# Patient Record
Sex: MALE | Race: ASIAN | NOT HISPANIC OR LATINO
[De-identification: names, ages, dates, MRNs, and addresses within clinical notes are randomized per-mention and may not be internally consistent; named-entity substitution may affect disease eponyms.]

---

## 2017-01-05 ENCOUNTER — APPOINTMENT (OUTPATIENT)
Dept: OPHTHALMOLOGY | Facility: CLINIC | Age: 77
End: 2017-01-05

## 2017-01-10 ENCOUNTER — FORM ENCOUNTER (OUTPATIENT)
Age: 77
End: 2017-01-10

## 2017-01-11 ENCOUNTER — APPOINTMENT (OUTPATIENT)
Dept: OTOLARYNGOLOGY | Facility: CLINIC | Age: 77
End: 2017-01-11

## 2017-01-11 ENCOUNTER — APPOINTMENT (OUTPATIENT)
Dept: RADIOLOGY | Facility: CLINIC | Age: 77
End: 2017-01-11

## 2017-01-11 ENCOUNTER — APPOINTMENT (OUTPATIENT)
Dept: ORTHOPEDIC SURGERY | Facility: CLINIC | Age: 77
End: 2017-01-11

## 2017-01-11 ENCOUNTER — OUTPATIENT (OUTPATIENT)
Dept: OUTPATIENT SERVICES | Facility: HOSPITAL | Age: 77
LOS: 1 days | End: 2017-01-11
Payer: MEDICARE

## 2017-01-11 VITALS — HEIGHT: 70 IN | BODY MASS INDEX: 27.92 KG/M2 | WEIGHT: 195 LBS

## 2017-01-11 VITALS
SYSTOLIC BLOOD PRESSURE: 160 MMHG | HEIGHT: 70 IN | WEIGHT: 195 LBS | BODY MASS INDEX: 27.92 KG/M2 | DIASTOLIC BLOOD PRESSURE: 90 MMHG

## 2017-01-11 DIAGNOSIS — M75.101 UNSPECIFIED ROTATOR CUFF TEAR OR RUPTURE OF RIGHT SHOULDER, NOT SPECIFIED AS TRAUMATIC: Chronic | ICD-10-CM

## 2017-01-11 DIAGNOSIS — M19.011 PRIMARY OSTEOARTHRITIS, RIGHT SHOULDER: ICD-10-CM

## 2017-01-11 DIAGNOSIS — H26.9 UNSPECIFIED CATARACT: Chronic | ICD-10-CM

## 2017-01-11 DIAGNOSIS — Q76.1 KLIPPEL-FEIL SYNDROME: Chronic | ICD-10-CM

## 2017-01-11 DIAGNOSIS — Z98.89 OTHER SPECIFIED POSTPROCEDURAL STATES: Chronic | ICD-10-CM

## 2017-01-11 DIAGNOSIS — R05 COUGH: ICD-10-CM

## 2017-01-11 DIAGNOSIS — R49.9 UNSPECIFIED VOICE AND RESONANCE DISORDER: ICD-10-CM

## 2017-01-11 DIAGNOSIS — M65.30 TRIGGER FINGER, UNSPECIFIED FINGER: Chronic | ICD-10-CM

## 2017-01-11 PROCEDURE — 71046 X-RAY EXAM CHEST 2 VIEWS: CPT

## 2017-02-21 ENCOUNTER — RX RENEWAL (OUTPATIENT)
Age: 77
End: 2017-02-21

## 2017-02-28 ENCOUNTER — APPOINTMENT (OUTPATIENT)
Dept: ORTHOPEDIC SURGERY | Facility: CLINIC | Age: 77
End: 2017-02-28

## 2017-04-06 ENCOUNTER — APPOINTMENT (OUTPATIENT)
Dept: ORTHOPEDIC SURGERY | Facility: CLINIC | Age: 77
End: 2017-04-06

## 2017-05-08 ENCOUNTER — RX RENEWAL (OUTPATIENT)
Age: 77
End: 2017-05-08

## 2017-06-05 ENCOUNTER — APPOINTMENT (OUTPATIENT)
Dept: OPHTHALMOLOGY | Facility: CLINIC | Age: 77
End: 2017-06-05

## 2017-06-08 ENCOUNTER — APPOINTMENT (OUTPATIENT)
Dept: OPHTHALMOLOGY | Facility: CLINIC | Age: 77
End: 2017-06-08

## 2017-06-15 ENCOUNTER — APPOINTMENT (OUTPATIENT)
Dept: NEPHROLOGY | Facility: CLINIC | Age: 77
End: 2017-06-15

## 2017-06-15 VITALS
HEIGHT: 70 IN | OXYGEN SATURATION: 95 % | WEIGHT: 195 LBS | SYSTOLIC BLOOD PRESSURE: 144 MMHG | BODY MASS INDEX: 27.92 KG/M2 | DIASTOLIC BLOOD PRESSURE: 70 MMHG | HEART RATE: 70 BPM

## 2017-06-29 LAB
ALBUMIN SERPL ELPH-MCNC: 4.3 G/DL
ANION GAP SERPL CALC-SCNC: 20 MMOL/L
APPEARANCE: CLEAR
BACTERIA: NEGATIVE
BASOPHILS # BLD AUTO: 0.02 K/UL
BASOPHILS NFR BLD AUTO: 0.4 %
BILIRUBIN URINE: NEGATIVE
BLOOD URINE: NEGATIVE
BUN SERPL-MCNC: 30 MG/DL
CALCIUM SERPL-MCNC: 10.6 MG/DL
CHLORIDE SERPL-SCNC: 103 MMOL/L
CO2 SERPL-SCNC: 19 MMOL/L
COLOR: YELLOW
CREAT SERPL-MCNC: 1.99 MG/DL
EOSINOPHIL # BLD AUTO: 0.26 K/UL
EOSINOPHIL NFR BLD AUTO: 5.2 %
GLUCOSE QUALITATIVE U: NORMAL MG/DL
GLUCOSE SERPL-MCNC: 157 MG/DL
HCT VFR BLD CALC: 38.6 %
HGB BLD-MCNC: 13 G/DL
HYALINE CASTS: 2 /LPF
IMM GRANULOCYTES NFR BLD AUTO: 0.2 %
KETONES URINE: NEGATIVE
LEUKOCYTE ESTERASE URINE: NEGATIVE
LYMPHOCYTES # BLD AUTO: 0.92 K/UL
LYMPHOCYTES NFR BLD AUTO: 18.5 %
MAN DIFF?: NORMAL
MCHC RBC-ENTMCNC: 31.4 PG
MCHC RBC-ENTMCNC: 33.7 GM/DL
MCV RBC AUTO: 93.2 FL
MICROSCOPIC-UA: NORMAL
MONOCYTES # BLD AUTO: 0.54 K/UL
MONOCYTES NFR BLD AUTO: 10.8 %
NEUTROPHILS # BLD AUTO: 3.23 K/UL
NEUTROPHILS NFR BLD AUTO: 64.9 %
NITRITE URINE: NEGATIVE
PH URINE: 5
PHOSPHATE SERPL-MCNC: 3 MG/DL
PLATELET # BLD AUTO: 190 K/UL
POTASSIUM SERPL-SCNC: 4.3 MMOL/L
PROTEIN URINE: ABNORMAL MG/DL
RBC # BLD: 4.14 M/UL
RBC # FLD: 13.7 %
RED BLOOD CELLS URINE: 0 /HPF
SODIUM SERPL-SCNC: 142 MMOL/L
SPECIFIC GRAVITY URINE: 1.01
SQUAMOUS EPITHELIAL CELLS: 0 /HPF
UROBILINOGEN URINE: NORMAL MG/DL
WBC # FLD AUTO: 4.98 K/UL
WHITE BLOOD CELLS URINE: 0 /HPF

## 2017-08-07 ENCOUNTER — RX RENEWAL (OUTPATIENT)
Age: 77
End: 2017-08-07

## 2017-09-11 ENCOUNTER — APPOINTMENT (OUTPATIENT)
Dept: ORTHOPEDIC SURGERY | Facility: CLINIC | Age: 77
End: 2017-09-11
Payer: MEDICARE

## 2017-09-11 PROCEDURE — 99214 OFFICE O/P EST MOD 30 MIN: CPT

## 2017-09-15 ENCOUNTER — OUTPATIENT (OUTPATIENT)
Dept: OUTPATIENT SERVICES | Facility: HOSPITAL | Age: 77
LOS: 1 days | End: 2017-09-15
Payer: MEDICARE

## 2017-09-15 VITALS
TEMPERATURE: 98 F | HEIGHT: 70 IN | WEIGHT: 184.97 LBS | OXYGEN SATURATION: 98 % | RESPIRATION RATE: 16 BRPM | DIASTOLIC BLOOD PRESSURE: 76 MMHG | SYSTOLIC BLOOD PRESSURE: 136 MMHG | HEART RATE: 78 BPM

## 2017-09-15 DIAGNOSIS — M67.432 GANGLION, LEFT WRIST: ICD-10-CM

## 2017-09-15 DIAGNOSIS — M67.439 GANGLION, UNSPECIFIED WRIST: ICD-10-CM

## 2017-09-15 DIAGNOSIS — Q76.1 KLIPPEL-FEIL SYNDROME: Chronic | ICD-10-CM

## 2017-09-15 DIAGNOSIS — E11.9 TYPE 2 DIABETES MELLITUS WITHOUT COMPLICATIONS: ICD-10-CM

## 2017-09-15 DIAGNOSIS — H26.9 UNSPECIFIED CATARACT: Chronic | ICD-10-CM

## 2017-09-15 DIAGNOSIS — I25.10 ATHEROSCLEROTIC HEART DISEASE OF NATIVE CORONARY ARTERY WITHOUT ANGINA PECTORIS: ICD-10-CM

## 2017-09-15 DIAGNOSIS — Z98.89 OTHER SPECIFIED POSTPROCEDURAL STATES: Chronic | ICD-10-CM

## 2017-09-15 DIAGNOSIS — Z01.818 ENCOUNTER FOR OTHER PREPROCEDURAL EXAMINATION: ICD-10-CM

## 2017-09-15 DIAGNOSIS — M65.30 TRIGGER FINGER, UNSPECIFIED FINGER: Chronic | ICD-10-CM

## 2017-09-15 DIAGNOSIS — M75.101 UNSPECIFIED ROTATOR CUFF TEAR OR RUPTURE OF RIGHT SHOULDER, NOT SPECIFIED AS TRAUMATIC: Chronic | ICD-10-CM

## 2017-09-15 LAB
ANION GAP SERPL CALC-SCNC: 15 MMOL/L — SIGNIFICANT CHANGE UP (ref 5–17)
BUN SERPL-MCNC: 24 MG/DL — HIGH (ref 7–23)
CALCIUM SERPL-MCNC: 10.4 MG/DL — SIGNIFICANT CHANGE UP (ref 8.4–10.5)
CHLORIDE SERPL-SCNC: 102 MMOL/L — SIGNIFICANT CHANGE UP (ref 96–108)
CO2 SERPL-SCNC: 22 MMOL/L — SIGNIFICANT CHANGE UP (ref 22–31)
CREAT SERPL-MCNC: 1.48 MG/DL — HIGH (ref 0.5–1.3)
GLUCOSE SERPL-MCNC: 156 MG/DL — HIGH (ref 70–99)
HBA1C BLD-MCNC: 7.2 % — HIGH (ref 4–5.6)
HCT VFR BLD CALC: 38.8 % — LOW (ref 39–50)
HGB BLD-MCNC: 13.2 G/DL — SIGNIFICANT CHANGE UP (ref 13–17)
MCHC RBC-ENTMCNC: 31.8 PG — SIGNIFICANT CHANGE UP (ref 27–34)
MCHC RBC-ENTMCNC: 34 GM/DL — SIGNIFICANT CHANGE UP (ref 32–36)
MCV RBC AUTO: 93.5 FL — SIGNIFICANT CHANGE UP (ref 80–100)
PLATELET # BLD AUTO: 192 K/UL — SIGNIFICANT CHANGE UP (ref 150–400)
POTASSIUM SERPL-MCNC: 4 MMOL/L — SIGNIFICANT CHANGE UP (ref 3.5–5.3)
POTASSIUM SERPL-SCNC: 4 MMOL/L — SIGNIFICANT CHANGE UP (ref 3.5–5.3)
RBC # BLD: 4.15 M/UL — LOW (ref 4.2–5.8)
RBC # FLD: 13.5 % — SIGNIFICANT CHANGE UP (ref 10.3–14.5)
SODIUM SERPL-SCNC: 139 MMOL/L — SIGNIFICANT CHANGE UP (ref 135–145)
WBC # BLD: 4.73 K/UL — SIGNIFICANT CHANGE UP (ref 3.8–10.5)
WBC # FLD AUTO: 4.73 K/UL — SIGNIFICANT CHANGE UP (ref 3.8–10.5)

## 2017-09-15 PROCEDURE — 83036 HEMOGLOBIN GLYCOSYLATED A1C: CPT

## 2017-09-15 PROCEDURE — 85027 COMPLETE CBC AUTOMATED: CPT

## 2017-09-15 PROCEDURE — G0463: CPT

## 2017-09-15 PROCEDURE — 80048 BASIC METABOLIC PNL TOTAL CA: CPT

## 2017-09-15 RX ORDER — LIDOCAINE HCL 20 MG/ML
0.2 VIAL (ML) INJECTION ONCE
Qty: 0 | Refills: 0 | Status: DISCONTINUED | OUTPATIENT
Start: 2017-09-18 | End: 2017-10-03

## 2017-09-15 RX ORDER — SODIUM CHLORIDE 9 MG/ML
3 INJECTION INTRAMUSCULAR; INTRAVENOUS; SUBCUTANEOUS EVERY 8 HOURS
Qty: 0 | Refills: 0 | Status: DISCONTINUED | OUTPATIENT
Start: 2017-09-18 | End: 2017-10-03

## 2017-09-15 NOTE — H&P PST ADULT - PSH
Bilateral cataracts  sp extraction  Bilateral rotator cuff syndrome    Cervical fusion syndrome    History of Arthroscopy  bilateral shoulder rotator cuff repair  History of lumbar laminectomy    Laminectomy with Spinal Fusion  cervical- 2009  lumbar lamenectomy  1998  Trigger finger of both hands  sp repair Bilateral cataracts  sp extraction  Bilateral rotator cuff syndrome  operated both'  Cervical fusion syndrome    History of Arthroscopy  bilateral shoulder rotator cuff repair  History of lumbar laminectomy    Laminectomy with Spinal Fusion  cervical- 2009  lumbar lamenectomy  1998  Trigger finger of both hands  sp repair

## 2017-09-15 NOTE — H&P PST ADULT - MUSCULOSKELETAL
details… detailed exam no joint erythema/no joint warmth/no calf tenderness/ROM intact/no joint swelling/normal strength

## 2017-09-15 NOTE — H&P PST ADULT - LAST CARDIAC ANGIOGRAM/IMAGING
2016-[facility] & 4 stents placed" 03/2016-[facility] & 4 stents placed"

## 2017-09-15 NOTE — H&P PST ADULT - HISTORY OF PRESENT ILLNESS
77 year old male retired  of [employer] reporta having ganglionic cysy in left hand 77 year old male retired  of [employer] report having ganglionic cyst in left wrist, scheduled for removal on 09/18/17.

## 2017-09-15 NOTE — H&P PST ADULT - MUSCULOSKELETAL COMMENTS
left  hand ganglionic cyst left  hand ganglionic cyst, h/o cervical fusion surgery left wrist ganglion

## 2017-09-15 NOTE — H&P PST ADULT - PROBLEM SELECTOR PLAN 3
Will follow up with labs/ fingerstick.   HgA1c ordered   Instructed to hold metformin & Glimepiride the morning of surgery  STAT FS  on the day of surgery ordered

## 2017-09-15 NOTE — H&P PST ADULT - PROBLEM SELECTOR PLAN 2
CALLED [facility] OFFICE(Ms Kaplan )TO INFORM/VERIFY DOS-MONDAY & PATIENT ON ASPRIN & PLAVIX FOR CAD with 4 coron.art stents 03/2016(LAST DOSE TODAY) & NO PRE OP MEDICAL EVAL OR NO APPOINTMENT>" no medical eval needed for this surgery & to continue both Asprin & Plavix to OR"

## 2017-09-15 NOTE — H&P PST ADULT - PMH
BPH (Benign Prostatic Hypertrophy)    Diabetes    Diabetes Mellitus Type II    H/O: Rotator Cuff Tear    Hyperlipemia    Hyperlipidemia    Hypertension    Hypertension    Pneumonia    Renal Calculi BPH (Benign Prostatic Hypertrophy)    CAD (coronary artery disease)  stents x4( 03/2016)  last echo stress test 05/2016  Diabetes  2  Diabetes Mellitus Type II    Ganglion cyst of wrist, left    H/O: Rotator Cuff Tear    Hyperlipemia    Hyperlipidemia    Hypertension    Hypertension    Pneumonia    Renal Calculi  h/olithotripsy 2010

## 2017-09-17 RX ORDER — ONDANSETRON 8 MG/1
4 TABLET, FILM COATED ORAL ONCE
Qty: 0 | Refills: 0 | Status: DISCONTINUED | OUTPATIENT
Start: 2017-09-18 | End: 2017-10-03

## 2017-09-17 RX ORDER — SODIUM CHLORIDE 9 MG/ML
1000 INJECTION, SOLUTION INTRAVENOUS
Qty: 0 | Refills: 0 | Status: DISCONTINUED | OUTPATIENT
Start: 2017-09-18 | End: 2017-10-03

## 2017-09-18 ENCOUNTER — APPOINTMENT (OUTPATIENT)
Dept: ORTHOPEDIC SURGERY | Facility: HOSPITAL | Age: 77
End: 2017-09-18

## 2017-09-18 ENCOUNTER — OUTPATIENT (OUTPATIENT)
Dept: OUTPATIENT SERVICES | Facility: HOSPITAL | Age: 77
LOS: 1 days | End: 2017-09-18
Payer: MEDICARE

## 2017-09-18 ENCOUNTER — RESULT REVIEW (OUTPATIENT)
Age: 77
End: 2017-09-18

## 2017-09-18 ENCOUNTER — TRANSCRIPTION ENCOUNTER (OUTPATIENT)
Age: 77
End: 2017-09-18

## 2017-09-18 VITALS
HEART RATE: 58 BPM | HEIGHT: 70 IN | WEIGHT: 184.97 LBS | DIASTOLIC BLOOD PRESSURE: 71 MMHG | OXYGEN SATURATION: 98 % | TEMPERATURE: 98 F | RESPIRATION RATE: 16 BRPM | SYSTOLIC BLOOD PRESSURE: 164 MMHG

## 2017-09-18 VITALS
HEART RATE: 58 BPM | SYSTOLIC BLOOD PRESSURE: 144 MMHG | DIASTOLIC BLOOD PRESSURE: 80 MMHG | OXYGEN SATURATION: 100 % | RESPIRATION RATE: 16 BRPM

## 2017-09-18 DIAGNOSIS — M67.439 GANGLION, UNSPECIFIED WRIST: ICD-10-CM

## 2017-09-18 DIAGNOSIS — M65.30 TRIGGER FINGER, UNSPECIFIED FINGER: Chronic | ICD-10-CM

## 2017-09-18 DIAGNOSIS — Z98.89 OTHER SPECIFIED POSTPROCEDURAL STATES: Chronic | ICD-10-CM

## 2017-09-18 DIAGNOSIS — Q76.1 KLIPPEL-FEIL SYNDROME: Chronic | ICD-10-CM

## 2017-09-18 DIAGNOSIS — H26.9 UNSPECIFIED CATARACT: Chronic | ICD-10-CM

## 2017-09-18 DIAGNOSIS — M75.101 UNSPECIFIED ROTATOR CUFF TEAR OR RUPTURE OF RIGHT SHOULDER, NOT SPECIFIED AS TRAUMATIC: Chronic | ICD-10-CM

## 2017-09-18 PROCEDURE — 88304 TISSUE EXAM BY PATHOLOGIST: CPT | Mod: 26

## 2017-09-18 PROCEDURE — 88304 TISSUE EXAM BY PATHOLOGIST: CPT

## 2017-09-18 PROCEDURE — 26160 REMOVE TENDON SHEATH LESION: CPT | Mod: LT

## 2017-09-18 PROCEDURE — 25111 REMOVE WRIST TENDON LESION: CPT | Mod: LT

## 2017-09-18 RX ORDER — ACETAMINOPHEN 500 MG
1000 TABLET ORAL ONCE
Qty: 0 | Refills: 0 | Status: DISCONTINUED | OUTPATIENT
Start: 2017-09-18 | End: 2017-09-18

## 2017-09-18 NOTE — ASU DISCHARGE PLAN (ADULT/PEDIATRIC). - MEDICATION SUMMARY - MEDICATIONS TO TAKE
I will START or STAY ON the medications listed below when I get home from the hospital:    Viagra 100 mg oral tablet  -- 1 tab(s) by mouth once a day, As Needed  -- Indication: For home med    Aspirin Enteric Coated 81 mg oral delayed release tablet  -- 1 tab(s) by mouth every other day  -- Indication: For home med    Norco 5 mg-325 mg oral tablet  -- 1 tab(s) by mouth every 6 hours  -- Indication: For pain    Flomax 0.4 mg oral capsule  -- 1 cap(s) by mouth once a day  -- Indication: For home med    glimepiride 2 mg oral tablet  -- 1 tab(s) by mouth once a day  -- Indication: For home med    metFORMIN 500 mg oral tablet  -- 1 tab(s) by mouth 2 times a day  -- Indication: For home med    allopurinol 100 mg oral tablet  -- 1 tab(s) by mouth once a day  -- Indication: For home med    atorvastatin 80 mg oral tablet  -- 1 tab(s) by mouth once a day  -- Indication: For home med    Plavix 75 mg oral tablet  -- 1 tab(s) by mouth once a day  -- Indication: For home med    carvedilol 25 mg oral tablet  -- 1 tab(s) by mouth 2 times a day  -- Indication: For hoem med    Norvasc 10 mg oral tablet  -- 1 tab(s) by mouth once a day  -- Indication: For home med    Pepcid 20 mg oral tablet  -- 1 tab(s) by mouth twice pre op  -- Indication: For home med    Co Q-10 100 mg oral capsule  -- 1 cap(s) by mouth once a day  -- Indication: For home med    glucosamine 500 mg oral tablet  -- 1 tab(s) by mouth 3 times a day  -- Indication: For home med    hydrALAZINE 50 mg oral tablet  -- 1 tab(s) by mouth 3 times a day  -- Indication: For home med    multivitamin  -- 1 tab(s) by mouth once a day  -- Indication: For home med

## 2017-09-18 NOTE — ASU DISCHARGE PLAN (ADULT/PEDIATRIC). - NOTIFY
Pain not relieved by Medications/Numbness, color, or temperature change to extremity/Swelling that continues/Bleeding that does not stop/Fever greater than 101

## 2017-09-18 NOTE — ASU PATIENT PROFILE, ADULT - PMH
BPH (Benign Prostatic Hypertrophy)    CAD (coronary artery disease)  stents x4( 03/2016)  last echo stress test 05/2016  Diabetes  2  Diabetes Mellitus Type II    Ganglion cyst of wrist, left    H/O: Rotator Cuff Tear    Hyperlipemia    Hyperlipidemia    Hypertension    Hypertension    Pneumonia    Renal Calculi  h/olithotripsy 2010

## 2017-09-18 NOTE — ASU DISCHARGE PLAN (ADULT/PEDIATRIC). - NURSING INSTRUCTIONS
pt met discharged criteria.printed and verbalized  instructions given to patient and family. patient confortable, to go home. ambulated to car by nursing assistant.

## 2017-09-18 NOTE — ASU PATIENT PROFILE, ADULT - PSH
Bilateral cataracts  sp extraction  Bilateral rotator cuff syndrome  operated both'  Cervical fusion syndrome    History of Arthroscopy  bilateral shoulder rotator cuff repair  History of lumbar laminectomy    Laminectomy with Spinal Fusion  cervical- 2009  lumbar lamenectomy  1998  Trigger finger of both hands  sp repair

## 2017-09-18 NOTE — PRE-ANESTHESIA EVALUATION ADULT - NSANTHOSAYNRD_GEN_A_CORE
No. SHARATH screening performed.  STOP BANG Legend: 0-2 = LOW Risk; 3-4 = INTERMEDIATE Risk; 5-8 = HIGH Risk

## 2017-09-21 LAB — SURGICAL PATHOLOGY STUDY: SIGNIFICANT CHANGE UP

## 2017-09-25 ENCOUNTER — APPOINTMENT (OUTPATIENT)
Dept: ORTHOPEDIC SURGERY | Facility: CLINIC | Age: 77
End: 2017-09-25

## 2017-09-25 ENCOUNTER — RX RENEWAL (OUTPATIENT)
Age: 77
End: 2017-09-25

## 2017-09-25 ENCOUNTER — APPOINTMENT (OUTPATIENT)
Dept: ORTHOPEDIC SURGERY | Facility: CLINIC | Age: 77
End: 2017-09-25
Payer: MEDICARE

## 2017-09-25 DIAGNOSIS — M67.439 GANGLION, UNSPECIFIED WRIST: ICD-10-CM

## 2017-09-25 PROCEDURE — 99024 POSTOP FOLLOW-UP VISIT: CPT

## 2017-10-16 ENCOUNTER — RX RENEWAL (OUTPATIENT)
Age: 77
End: 2017-10-16

## 2017-10-23 ENCOUNTER — APPOINTMENT (OUTPATIENT)
Dept: NEPHROLOGY | Facility: CLINIC | Age: 77
End: 2017-10-23
Payer: MEDICARE

## 2017-10-23 VITALS
DIASTOLIC BLOOD PRESSURE: 72 MMHG | WEIGHT: 185.19 LBS | SYSTOLIC BLOOD PRESSURE: 131 MMHG | BODY MASS INDEX: 26.51 KG/M2 | OXYGEN SATURATION: 97 % | HEART RATE: 63 BPM | HEIGHT: 70 IN

## 2017-10-23 PROCEDURE — 99214 OFFICE O/P EST MOD 30 MIN: CPT

## 2017-10-26 ENCOUNTER — APPOINTMENT (OUTPATIENT)
Dept: OPHTHALMOLOGY | Facility: CLINIC | Age: 77
End: 2017-10-26
Payer: MEDICARE

## 2017-10-26 PROCEDURE — 92012 INTRM OPH EXAM EST PATIENT: CPT

## 2017-10-26 PROCEDURE — 92083 EXTENDED VISUAL FIELD XM: CPT

## 2017-11-02 ENCOUNTER — APPOINTMENT (OUTPATIENT)
Dept: NEUROLOGY | Facility: CLINIC | Age: 77
End: 2017-11-02
Payer: MEDICARE

## 2017-11-02 VITALS
BODY MASS INDEX: 26.2 KG/M2 | WEIGHT: 183 LBS | HEART RATE: 66 BPM | DIASTOLIC BLOOD PRESSURE: 68 MMHG | HEIGHT: 70 IN | SYSTOLIC BLOOD PRESSURE: 151 MMHG

## 2017-11-02 DIAGNOSIS — Z86.73 PERSONAL HISTORY OF TRANSIENT ISCHEMIC ATTACK (TIA), AND CEREBRAL INFARCTION W/OUT RESIDUAL DEFICITS: ICD-10-CM

## 2017-11-02 PROCEDURE — 99205 OFFICE O/P NEW HI 60 MIN: CPT

## 2017-11-02 RX ORDER — CLOPIDOGREL BISULFATE 75 MG/1
75 TABLET, FILM COATED ORAL
Qty: 90 | Refills: 0 | Status: ACTIVE | COMMUNITY
Start: 2017-10-22

## 2017-11-07 LAB
ALBUMIN SERPL ELPH-MCNC: 4.3 G/DL
ANION GAP SERPL CALC-SCNC: 14 MMOL/L
BASOPHILS # BLD AUTO: 0.02 K/UL
BASOPHILS NFR BLD AUTO: 0.5 %
BUN SERPL-MCNC: 25 MG/DL
CALCIUM SERPL-MCNC: 11.3 MG/DL
CHLORIDE SERPL-SCNC: 102 MMOL/L
CHOLEST SERPL-MCNC: 127 MG/DL
CHOLEST/HDLC SERPL: 3.5 RATIO
CO2 SERPL-SCNC: 25 MMOL/L
CREAT SERPL-MCNC: 1.45 MG/DL
EOSINOPHIL # BLD AUTO: 0.19 K/UL
EOSINOPHIL NFR BLD AUTO: 4.8 %
GLUCOSE SERPL-MCNC: 119 MG/DL
HBA1C MFR BLD HPLC: 7.2 %
HCT VFR BLD CALC: 40.4 %
HDLC SERPL-MCNC: 36 MG/DL
HGB BLD-MCNC: 13.7 G/DL
IMM GRANULOCYTES NFR BLD AUTO: 0 %
LDLC SERPL CALC-MCNC: 57 MG/DL
LYMPHOCYTES # BLD AUTO: 0.96 K/UL
LYMPHOCYTES NFR BLD AUTO: 24.4 %
MAN DIFF?: NORMAL
MCHC RBC-ENTMCNC: 32.5 PG
MCHC RBC-ENTMCNC: 33.9 GM/DL
MCV RBC AUTO: 96 FL
MONOCYTES # BLD AUTO: 0.58 K/UL
MONOCYTES NFR BLD AUTO: 14.8 %
NEUTROPHILS # BLD AUTO: 2.18 K/UL
NEUTROPHILS NFR BLD AUTO: 55.5 %
PHOSPHATE SERPL-MCNC: 2.7 MG/DL
PLATELET # BLD AUTO: 174 K/UL
POTASSIUM SERPL-SCNC: 4.2 MMOL/L
RBC # BLD: 4.21 M/UL
RBC # FLD: 13.3 %
SODIUM SERPL-SCNC: 141 MMOL/L
TRIGL SERPL-MCNC: 170 MG/DL
WBC # FLD AUTO: 3.93 K/UL

## 2017-11-14 ENCOUNTER — FORM ENCOUNTER (OUTPATIENT)
Age: 77
End: 2017-11-14

## 2017-11-15 ENCOUNTER — OUTPATIENT (OUTPATIENT)
Dept: OUTPATIENT SERVICES | Facility: HOSPITAL | Age: 77
LOS: 1 days | End: 2017-11-15
Payer: MEDICARE

## 2017-11-15 ENCOUNTER — APPOINTMENT (OUTPATIENT)
Dept: MRI IMAGING | Facility: IMAGING CENTER | Age: 77
End: 2017-11-15
Payer: MEDICARE

## 2017-11-15 DIAGNOSIS — Q76.1 KLIPPEL-FEIL SYNDROME: Chronic | ICD-10-CM

## 2017-11-15 DIAGNOSIS — M65.30 TRIGGER FINGER, UNSPECIFIED FINGER: Chronic | ICD-10-CM

## 2017-11-15 DIAGNOSIS — H26.9 UNSPECIFIED CATARACT: Chronic | ICD-10-CM

## 2017-11-15 DIAGNOSIS — M75.101 UNSPECIFIED ROTATOR CUFF TEAR OR RUPTURE OF RIGHT SHOULDER, NOT SPECIFIED AS TRAUMATIC: Chronic | ICD-10-CM

## 2017-11-15 DIAGNOSIS — Z98.89 OTHER SPECIFIED POSTPROCEDURAL STATES: Chronic | ICD-10-CM

## 2017-11-15 DIAGNOSIS — R51 HEADACHE: ICD-10-CM

## 2017-11-15 PROCEDURE — 82565 ASSAY OF CREATININE: CPT

## 2017-11-15 PROCEDURE — A9585: CPT

## 2017-11-15 PROCEDURE — 70553 MRI BRAIN STEM W/O & W/DYE: CPT

## 2017-11-15 PROCEDURE — 70553 MRI BRAIN STEM W/O & W/DYE: CPT | Mod: 26

## 2017-11-17 ENCOUNTER — APPOINTMENT (OUTPATIENT)
Dept: CARDIOLOGY | Facility: CLINIC | Age: 77
End: 2017-11-17
Payer: MEDICARE

## 2017-11-17 VITALS
SYSTOLIC BLOOD PRESSURE: 165 MMHG | HEART RATE: 61 BPM | HEIGHT: 70 IN | DIASTOLIC BLOOD PRESSURE: 69 MMHG | OXYGEN SATURATION: 99 %

## 2017-11-17 VITALS — DIASTOLIC BLOOD PRESSURE: 81 MMHG | HEART RATE: 61 BPM | SYSTOLIC BLOOD PRESSURE: 148 MMHG

## 2017-11-17 PROCEDURE — 99214 OFFICE O/P EST MOD 30 MIN: CPT

## 2017-11-17 RX ORDER — PREDNISONE 10 MG/1
10 TABLET ORAL
Qty: 21 | Refills: 0 | Status: DISCONTINUED | COMMUNITY
Start: 2017-11-02 | End: 2017-11-17

## 2017-11-17 RX ORDER — BENZONATATE 200 MG/1
200 CAPSULE ORAL
Qty: 60 | Refills: 1 | Status: DISCONTINUED | COMMUNITY
Start: 2017-01-11 | End: 2017-11-17

## 2017-11-27 ENCOUNTER — RX RENEWAL (OUTPATIENT)
Age: 77
End: 2017-11-27

## 2017-12-04 ENCOUNTER — OUTPATIENT (OUTPATIENT)
Dept: OUTPATIENT SERVICES | Facility: HOSPITAL | Age: 77
LOS: 1 days | End: 2017-12-04
Payer: MEDICARE

## 2017-12-04 ENCOUNTER — APPOINTMENT (OUTPATIENT)
Dept: UROLOGY | Facility: CLINIC | Age: 77
End: 2017-12-04
Payer: MEDICARE

## 2017-12-04 DIAGNOSIS — Q76.1 KLIPPEL-FEIL SYNDROME: Chronic | ICD-10-CM

## 2017-12-04 DIAGNOSIS — M75.101 UNSPECIFIED ROTATOR CUFF TEAR OR RUPTURE OF RIGHT SHOULDER, NOT SPECIFIED AS TRAUMATIC: Chronic | ICD-10-CM

## 2017-12-04 DIAGNOSIS — M65.30 TRIGGER FINGER, UNSPECIFIED FINGER: Chronic | ICD-10-CM

## 2017-12-04 DIAGNOSIS — N52.9 MALE ERECTILE DYSFUNCTION, UNSPECIFIED: ICD-10-CM

## 2017-12-04 DIAGNOSIS — Z98.89 OTHER SPECIFIED POSTPROCEDURAL STATES: Chronic | ICD-10-CM

## 2017-12-04 DIAGNOSIS — R35.0 FREQUENCY OF MICTURITION: ICD-10-CM

## 2017-12-04 DIAGNOSIS — H26.9 UNSPECIFIED CATARACT: Chronic | ICD-10-CM

## 2017-12-04 PROCEDURE — 99214 OFFICE O/P EST MOD 30 MIN: CPT | Mod: 25

## 2017-12-04 PROCEDURE — 76775 US EXAM ABDO BACK WALL LIM: CPT | Mod: 26

## 2017-12-04 PROCEDURE — 76775 US EXAM ABDO BACK WALL LIM: CPT

## 2017-12-08 DIAGNOSIS — N20.0 CALCULUS OF KIDNEY: ICD-10-CM

## 2017-12-08 DIAGNOSIS — N40.1 BENIGN PROSTATIC HYPERPLASIA WITH LOWER URINARY TRACT SYMPTOMS: ICD-10-CM

## 2017-12-08 DIAGNOSIS — N52.9 MALE ERECTILE DYSFUNCTION, UNSPECIFIED: ICD-10-CM

## 2018-01-04 ENCOUNTER — APPOINTMENT (OUTPATIENT)
Dept: NEUROLOGY | Facility: CLINIC | Age: 78
End: 2018-01-04

## 2018-02-13 ENCOUNTER — RX RENEWAL (OUTPATIENT)
Age: 78
End: 2018-02-13

## 2018-02-16 ENCOUNTER — APPOINTMENT (OUTPATIENT)
Dept: NEUROLOGY | Facility: CLINIC | Age: 78
End: 2018-02-16

## 2018-02-16 ENCOUNTER — APPOINTMENT (OUTPATIENT)
Dept: CARDIOLOGY | Facility: CLINIC | Age: 78
End: 2018-02-16
Payer: MEDICARE

## 2018-02-16 VITALS
HEIGHT: 70 IN | OXYGEN SATURATION: 98 % | HEART RATE: 60 BPM | WEIGHT: 185 LBS | DIASTOLIC BLOOD PRESSURE: 72 MMHG | SYSTOLIC BLOOD PRESSURE: 157 MMHG | BODY MASS INDEX: 26.48 KG/M2

## 2018-02-16 PROCEDURE — 99214 OFFICE O/P EST MOD 30 MIN: CPT

## 2018-03-05 ENCOUNTER — APPOINTMENT (OUTPATIENT)
Dept: NEUROLOGY | Facility: CLINIC | Age: 78
End: 2018-03-05
Payer: MEDICARE

## 2018-03-05 VITALS
BODY MASS INDEX: 26.48 KG/M2 | SYSTOLIC BLOOD PRESSURE: 157 MMHG | WEIGHT: 185 LBS | DIASTOLIC BLOOD PRESSURE: 78 MMHG | HEART RATE: 75 BPM | HEIGHT: 70 IN

## 2018-03-05 DIAGNOSIS — G44.40 DRUG-INDUCED HEADACHE, NOT ELSEWHERE CLASSIFIED, NOT INTRACTABLE: ICD-10-CM

## 2018-03-05 DIAGNOSIS — R51 HEADACHE: ICD-10-CM

## 2018-03-05 PROCEDURE — 99214 OFFICE O/P EST MOD 30 MIN: CPT

## 2018-03-26 ENCOUNTER — APPOINTMENT (OUTPATIENT)
Dept: NEPHROLOGY | Facility: CLINIC | Age: 78
End: 2018-03-26
Payer: MEDICARE

## 2018-03-26 VITALS
HEIGHT: 70 IN | DIASTOLIC BLOOD PRESSURE: 71 MMHG | OXYGEN SATURATION: 97 % | HEART RATE: 60 BPM | SYSTOLIC BLOOD PRESSURE: 128 MMHG | BODY MASS INDEX: 26.77 KG/M2 | WEIGHT: 187 LBS

## 2018-03-26 PROCEDURE — 99214 OFFICE O/P EST MOD 30 MIN: CPT

## 2018-03-27 LAB
ALBUMIN SERPL ELPH-MCNC: 4.3 G/DL
ANION GAP SERPL CALC-SCNC: 13 MMOL/L
APPEARANCE: CLEAR
BACTERIA: NEGATIVE
BASOPHILS # BLD AUTO: 0.04 K/UL
BASOPHILS NFR BLD AUTO: 0.8 %
BILIRUBIN URINE: NEGATIVE
BLOOD URINE: NEGATIVE
BUN SERPL-MCNC: 28 MG/DL
CALCIUM SERPL-MCNC: 10.3 MG/DL
CHLORIDE SERPL-SCNC: 99 MMOL/L
CO2 SERPL-SCNC: 25 MMOL/L
COLOR: YELLOW
CREAT SERPL-MCNC: 1.7 MG/DL
EOSINOPHIL # BLD AUTO: 0.29 K/UL
EOSINOPHIL NFR BLD AUTO: 5.8 %
GLUCOSE QUALITATIVE U: NEGATIVE MG/DL
GLUCOSE SERPL-MCNC: 199 MG/DL
HCT VFR BLD CALC: 41.6 %
HGB BLD-MCNC: 14 G/DL
HYALINE CASTS: 1 /LPF
IMM GRANULOCYTES NFR BLD AUTO: 0 %
KETONES URINE: NEGATIVE
LEUKOCYTE ESTERASE URINE: NEGATIVE
LYMPHOCYTES # BLD AUTO: 1.08 K/UL
LYMPHOCYTES NFR BLD AUTO: 21.7 %
MAN DIFF?: NORMAL
MCHC RBC-ENTMCNC: 31.8 PG
MCHC RBC-ENTMCNC: 33.7 GM/DL
MCV RBC AUTO: 94.5 FL
MICROSCOPIC-UA: NORMAL
MONOCYTES # BLD AUTO: 0.55 K/UL
MONOCYTES NFR BLD AUTO: 11.1 %
NEUTROPHILS # BLD AUTO: 3.01 K/UL
NEUTROPHILS NFR BLD AUTO: 60.6 %
NITRITE URINE: NEGATIVE
PH URINE: 5
PHOSPHATE SERPL-MCNC: 2.9 MG/DL
PLATELET # BLD AUTO: 190 K/UL
POTASSIUM SERPL-SCNC: 4.5 MMOL/L
PROTEIN URINE: NEGATIVE MG/DL
RBC # BLD: 4.4 M/UL
RBC # FLD: 13.3 %
RED BLOOD CELLS URINE: 0 /HPF
SODIUM SERPL-SCNC: 137 MMOL/L
SPECIFIC GRAVITY URINE: 1.01
SQUAMOUS EPITHELIAL CELLS: 0 /HPF
UROBILINOGEN URINE: NEGATIVE MG/DL
WBC # FLD AUTO: 4.97 K/UL
WHITE BLOOD CELLS URINE: 0 /HPF

## 2018-05-04 ENCOUNTER — RX RENEWAL (OUTPATIENT)
Age: 78
End: 2018-05-04

## 2018-05-10 ENCOUNTER — APPOINTMENT (OUTPATIENT)
Dept: OPHTHALMOLOGY | Facility: CLINIC | Age: 78
End: 2018-05-10
Payer: MEDICARE

## 2018-05-10 PROCEDURE — 92014 COMPRE OPH EXAM EST PT 1/>: CPT

## 2018-05-10 PROCEDURE — 92250 FUNDUS PHOTOGRAPHY W/I&R: CPT

## 2018-06-18 ENCOUNTER — RX RENEWAL (OUTPATIENT)
Age: 78
End: 2018-06-18

## 2018-07-30 ENCOUNTER — RX RENEWAL (OUTPATIENT)
Age: 78
End: 2018-07-30

## 2018-08-27 ENCOUNTER — RX RENEWAL (OUTPATIENT)
Age: 78
End: 2018-08-27

## 2018-08-31 ENCOUNTER — APPOINTMENT (OUTPATIENT)
Dept: CARDIOLOGY | Facility: CLINIC | Age: 78
End: 2018-08-31
Payer: MEDICARE

## 2018-08-31 VITALS
SYSTOLIC BLOOD PRESSURE: 171 MMHG | HEIGHT: 70 IN | DIASTOLIC BLOOD PRESSURE: 81 MMHG | OXYGEN SATURATION: 97 % | HEART RATE: 60 BPM

## 2018-08-31 PROCEDURE — 99214 OFFICE O/P EST MOD 30 MIN: CPT

## 2018-09-05 VITALS — SYSTOLIC BLOOD PRESSURE: 158 MMHG | DIASTOLIC BLOOD PRESSURE: 76 MMHG

## 2018-09-05 VITALS — HEART RATE: 59 BPM | SYSTOLIC BLOOD PRESSURE: 171 MMHG | DIASTOLIC BLOOD PRESSURE: 81 MMHG | OXYGEN SATURATION: 97 %

## 2018-09-24 ENCOUNTER — APPOINTMENT (OUTPATIENT)
Dept: NEPHROLOGY | Facility: CLINIC | Age: 78
End: 2018-09-24
Payer: MEDICARE

## 2018-09-24 VITALS
HEART RATE: 60 BPM | SYSTOLIC BLOOD PRESSURE: 150 MMHG | BODY MASS INDEX: 26.54 KG/M2 | DIASTOLIC BLOOD PRESSURE: 80 MMHG | OXYGEN SATURATION: 98 % | RESPIRATION RATE: 15 BRPM | WEIGHT: 185 LBS

## 2018-09-24 PROBLEM — M67.432 GANGLION, LEFT WRIST: Chronic | Status: ACTIVE | Noted: 2017-09-15

## 2018-09-24 PROCEDURE — 99214 OFFICE O/P EST MOD 30 MIN: CPT

## 2018-09-24 RX ORDER — BLOOD-GLUCOSE METER
W/DEVICE EACH MISCELLANEOUS
Qty: 1 | Refills: 0 | Status: COMPLETED | COMMUNITY
Start: 2017-03-11 | End: 2018-09-24

## 2018-09-26 LAB
ALBUMIN SERPL ELPH-MCNC: 4.2 G/DL
ANION GAP SERPL CALC-SCNC: 12 MMOL/L
APPEARANCE: CLEAR
BACTERIA: NEGATIVE
BASOPHILS # BLD AUTO: 0.03 K/UL
BASOPHILS NFR BLD AUTO: 0.5 %
BILIRUBIN URINE: NEGATIVE
BLOOD URINE: NEGATIVE
BUN SERPL-MCNC: 23 MG/DL
CALCIUM SERPL-MCNC: 10.5 MG/DL
CHLORIDE SERPL-SCNC: 104 MMOL/L
CHOLEST SERPL-MCNC: 137 MG/DL
CHOLEST/HDLC SERPL: 3.6 RATIO
CO2 SERPL-SCNC: 25 MMOL/L
COLOR: YELLOW
CREAT SERPL-MCNC: 1.43 MG/DL
CREAT SPEC-SCNC: 13 MG/DL
CREAT/PROT UR: 0.4 RATIO
EOSINOPHIL # BLD AUTO: 0.28 K/UL
EOSINOPHIL NFR BLD AUTO: 4.4 %
GLUCOSE QUALITATIVE U: NEGATIVE MG/DL
GLUCOSE SERPL-MCNC: 198 MG/DL
HBA1C MFR BLD HPLC: 8.6 %
HCT VFR BLD CALC: 41.2 %
HDLC SERPL-MCNC: 38 MG/DL
HGB BLD-MCNC: 14 G/DL
HYALINE CASTS: 0 /LPF
IMM GRANULOCYTES NFR BLD AUTO: 0.3 %
KETONES URINE: NEGATIVE
LDLC SERPL CALC-MCNC: 55 MG/DL
LEUKOCYTE ESTERASE URINE: NEGATIVE
LYMPHOCYTES # BLD AUTO: 0.95 K/UL
LYMPHOCYTES NFR BLD AUTO: 15 %
MAN DIFF?: NORMAL
MCHC RBC-ENTMCNC: 32 PG
MCHC RBC-ENTMCNC: 34 GM/DL
MCV RBC AUTO: 94.3 FL
MICROSCOPIC-UA: NORMAL
MONOCYTES # BLD AUTO: 0.66 K/UL
MONOCYTES NFR BLD AUTO: 10.4 %
NEUTROPHILS # BLD AUTO: 4.38 K/UL
NEUTROPHILS NFR BLD AUTO: 69.4 %
NITRITE URINE: NEGATIVE
PH URINE: 6
PHOSPHATE SERPL-MCNC: 2.8 MG/DL
PLATELET # BLD AUTO: 183 K/UL
POTASSIUM SERPL-SCNC: 4.2 MMOL/L
PROT UR-MCNC: 5 MG/DL
PROTEIN URINE: NEGATIVE MG/DL
RBC # BLD: 4.37 M/UL
RBC # FLD: 13.2 %
RED BLOOD CELLS URINE: 0 /HPF
SODIUM SERPL-SCNC: 141 MMOL/L
SPECIFIC GRAVITY URINE: 1
SQUAMOUS EPITHELIAL CELLS: 0 /HPF
TRIGL SERPL-MCNC: 221 MG/DL
UROBILINOGEN URINE: NEGATIVE MG/DL
WBC # FLD AUTO: 6.32 K/UL
WHITE BLOOD CELLS URINE: 0 /HPF

## 2018-11-19 ENCOUNTER — RX RENEWAL (OUTPATIENT)
Age: 78
End: 2018-11-19

## 2018-12-03 ENCOUNTER — APPOINTMENT (OUTPATIENT)
Dept: UROLOGY | Facility: CLINIC | Age: 78
End: 2018-12-03
Payer: MEDICARE

## 2018-12-03 ENCOUNTER — OUTPATIENT (OUTPATIENT)
Dept: OUTPATIENT SERVICES | Facility: HOSPITAL | Age: 78
LOS: 1 days | End: 2018-12-03
Payer: MEDICARE

## 2018-12-03 DIAGNOSIS — M75.101 UNSPECIFIED ROTATOR CUFF TEAR OR RUPTURE OF RIGHT SHOULDER, NOT SPECIFIED AS TRAUMATIC: Chronic | ICD-10-CM

## 2018-12-03 DIAGNOSIS — Q76.1 KLIPPEL-FEIL SYNDROME: Chronic | ICD-10-CM

## 2018-12-03 DIAGNOSIS — Z98.89 OTHER SPECIFIED POSTPROCEDURAL STATES: Chronic | ICD-10-CM

## 2018-12-03 DIAGNOSIS — M65.30 TRIGGER FINGER, UNSPECIFIED FINGER: Chronic | ICD-10-CM

## 2018-12-03 DIAGNOSIS — H26.9 UNSPECIFIED CATARACT: Chronic | ICD-10-CM

## 2018-12-03 DIAGNOSIS — R35.0 FREQUENCY OF MICTURITION: ICD-10-CM

## 2018-12-03 PROCEDURE — 99214 OFFICE O/P EST MOD 30 MIN: CPT | Mod: 25

## 2018-12-03 PROCEDURE — 76775 US EXAM ABDO BACK WALL LIM: CPT | Mod: 26

## 2018-12-03 PROCEDURE — 76775 US EXAM ABDO BACK WALL LIM: CPT

## 2018-12-10 DIAGNOSIS — N52.9 MALE ERECTILE DYSFUNCTION, UNSPECIFIED: ICD-10-CM

## 2018-12-10 DIAGNOSIS — N40.1 BENIGN PROSTATIC HYPERPLASIA WITH LOWER URINARY TRACT SYMPTOMS: ICD-10-CM

## 2018-12-10 DIAGNOSIS — N20.0 CALCULUS OF KIDNEY: ICD-10-CM

## 2019-02-05 ENCOUNTER — APPOINTMENT (OUTPATIENT)
Dept: OPHTHALMOLOGY | Facility: CLINIC | Age: 79
End: 2019-02-05
Payer: MEDICARE

## 2019-02-05 DIAGNOSIS — H40.003 PREGLAUCOMA, UNSPECIFIED, BILATERAL: ICD-10-CM

## 2019-02-05 DIAGNOSIS — H35.373 PUCKERING OF MACULA, BILATERAL: ICD-10-CM

## 2019-02-05 PROCEDURE — 92012 INTRM OPH EXAM EST PATIENT: CPT

## 2019-02-05 PROCEDURE — 92134 CPTRZ OPH DX IMG PST SGM RTA: CPT

## 2019-02-14 ENCOUNTER — APPOINTMENT (OUTPATIENT)
Dept: PULMONOLOGY | Facility: CLINIC | Age: 79
End: 2019-02-14
Payer: MEDICARE

## 2019-02-14 VITALS
OXYGEN SATURATION: 98 % | SYSTOLIC BLOOD PRESSURE: 168 MMHG | HEART RATE: 69 BPM | BODY MASS INDEX: 27.49 KG/M2 | HEIGHT: 70 IN | DIASTOLIC BLOOD PRESSURE: 66 MMHG | WEIGHT: 192 LBS

## 2019-02-14 PROCEDURE — 94729 DIFFUSING CAPACITY: CPT | Mod: 26

## 2019-02-14 PROCEDURE — 99204 OFFICE O/P NEW MOD 45 MIN: CPT | Mod: 25

## 2019-02-14 PROCEDURE — 94060 EVALUATION OF WHEEZING: CPT | Mod: 26

## 2019-02-14 RX ORDER — HYDROCODONE BITARTRATE AND ACETAMINOPHEN 5; 325 MG/1; MG/1
5-325 TABLET ORAL
Qty: 10 | Refills: 0 | Status: DISCONTINUED | COMMUNITY
Start: 2017-09-14 | End: 2019-02-14

## 2019-02-14 RX ORDER — OMEPRAZOLE 20 MG/1
20 CAPSULE, DELAYED RELEASE ORAL
Qty: 30 | Refills: 0 | Status: DISCONTINUED | COMMUNITY
Start: 2018-08-27 | End: 2019-02-14

## 2019-02-14 RX ORDER — UBIQUINOL 100 MG
CAPSULE ORAL
Refills: 0 | Status: ACTIVE | COMMUNITY

## 2019-02-14 RX ORDER — MOMETASONE FUROATE 200 UG/1
200 AEROSOL RESPIRATORY (INHALATION) TWICE DAILY
Qty: 3 | Refills: 0 | Status: DISCONTINUED | COMMUNITY
Start: 2019-02-14 | End: 2019-02-14

## 2019-02-14 RX ORDER — UBIDECARENONE/VIT E ACET 100MG-5
CAPSULE ORAL
Refills: 0 | Status: ACTIVE | COMMUNITY

## 2019-02-14 RX ORDER — GUAIFENESIN AND CODEINE PHOSPHATE 10; 100 MG/5ML; MG/5ML
100-10 SOLUTION ORAL AT BEDTIME
Qty: 100 | Refills: 0 | Status: DISCONTINUED | COMMUNITY
Start: 2017-01-11 | End: 2019-02-14

## 2019-02-14 NOTE — HISTORY OF PRESENT ILLNESS
[FreeTextEntry1] : 79 year ild male with h/o cough daily for the last 3 years.  He was treated with Omeprazole, montelukast and several sprays which did not help. His cough worsens after taking ACe inhibitors which he stopped. He has h/o remote smoking history.The patient loves chocolates and eats lot of them. 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 He coughs as soon as he lies down and he stands up takes the cough medicine.\par \par He has no pets at home.\par CXR done in 1/17 is reported as normal.\par He goes to bed and falls asleep right away wakes up after 2 hours to urinate, wakes up 3-4 times to urinate.  Snores at night. He has woken up with  choking.\par \par He had a diagnosis of sleep apnea and he could not tolerate CPAP.

## 2019-02-14 NOTE — REVIEW OF SYSTEMS
[Cough] : cough [Dyspnea] : dyspnea [Nocturia] : nocturia [Myalgias] : myalgias [Arthralgias] : arthralgias [Negative] : Dermatologic

## 2019-02-14 NOTE — PHYSICAL EXAM
[General Appearance - Well Developed] : well developed [Normal Appearance] : normal appearance [Well Groomed] : well groomed [General Appearance - Well Nourished] : well nourished [No Deformities] : no deformities [General Appearance - In No Acute Distress] : no acute distress [Normal Conjunctiva] : the conjunctiva exhibited no abnormalities [Eyelids - No Xanthelasma] : the eyelids demonstrated no xanthelasmas [Normal Oropharynx] : normal oropharynx [IV] : IV [Neck Appearance] : the appearance of the neck was normal [Neck Cervical Mass (___cm)] : no neck mass was observed [Jugular Venous Distention Increased] : there was no jugular-venous distention [Thyroid Diffuse Enlargement] : the thyroid was not enlarged [Thyroid Nodule] : there were no palpable thyroid nodules [Neck Circumference: ___] : neck circumference is [unfilled] [Heart Rate And Rhythm] : heart rate and rhythm were normal [Heart Sounds] : normal S1 and S2 [Murmurs] : no murmurs present [Respiration, Rhythm And Depth] : normal respiratory rhythm and effort [Exaggerated Use Of Accessory Muscles For Inspiration] : no accessory muscle use [Auscultation Breath Sounds / Voice Sounds] : lungs were clear to auscultation bilaterally [Abdomen Soft] : soft [Abdomen Tenderness] : non-tender [Abdomen Mass (___ Cm)] : no abdominal mass palpated [Abnormal Walk] : normal gait [Gait - Sufficient For Exercise Testing] : the gait was sufficient for exercise testing [Nail Clubbing] : no clubbing of the fingernails [Cyanosis, Localized] : no localized cyanosis [Petechial Hemorrhages (___cm)] : no petechial hemorrhages [Skin Color & Pigmentation] : normal skin color and pigmentation [Skin Turgor] : normal skin turgor [] : no rash [Deep Tendon Reflexes (DTR)] : deep tendon reflexes were 2+ and symmetric [Sensation] : the sensory exam was normal to light touch and pinprick [No Focal Deficits] : no focal deficits [Oriented To Time, Place, And Person] : oriented to person, place, and time [Impaired Insight] : insight and judgment were intact [Affect] : the affect was normal

## 2019-02-14 NOTE — DISCUSSION/SUMMARY
[FreeTextEntry1] : The patient is a 79-year-old male with poorly controlled diabetes mellitus here for the evaluation of chronic cough. His cough appears to be multifactorial. He has been taking things that increase his cough like soda and chocolates. In addition he eats close to bedtime. He has been on Plavix and has untreated sleep apnea both of which can aggravate the gastroesophageal reflux disease.\par The patient has been advised to change his diet and stop eating chocolate and soda. He was advised not to eat close to bedtime. He will be treated for gastroesophageal reflux and airways disease.\par He was advised to try oral appliance for sleep apnea. He was advised to go off dairy for 4 weeks.

## 2019-03-01 ENCOUNTER — NON-APPOINTMENT (OUTPATIENT)
Age: 79
End: 2019-03-01

## 2019-03-01 ENCOUNTER — APPOINTMENT (OUTPATIENT)
Dept: CARDIOLOGY | Facility: CLINIC | Age: 79
End: 2019-03-01
Payer: MEDICARE

## 2019-03-01 VITALS — HEART RATE: 60 BPM | SYSTOLIC BLOOD PRESSURE: 129 MMHG | DIASTOLIC BLOOD PRESSURE: 75 MMHG

## 2019-03-01 VITALS
BODY MASS INDEX: 27.55 KG/M2 | DIASTOLIC BLOOD PRESSURE: 70 MMHG | WEIGHT: 192 LBS | HEART RATE: 56 BPM | SYSTOLIC BLOOD PRESSURE: 163 MMHG

## 2019-03-01 PROCEDURE — 99214 OFFICE O/P EST MOD 30 MIN: CPT

## 2019-03-01 NOTE — HISTORY OF PRESENT ILLNESS
[FreeTextEntry1] : 79 year old gentleman with a history of hypertension, CRI,  hyperlipidemia, diabetes mellitus, benign prostatic hypertrophy (BPH), urolithiasis, osteoarthritis, and gout. Presented for f/u, h/o RCA PCI with FOREST x 3. \par with un revascularized 70% midLAD. Doing well, active denies any CP, SOB, palpitations or claudication. \par Presents for routine visit.\par  \par

## 2019-03-01 NOTE — PHYSICAL EXAM
[General Appearance - Well Developed] : well developed [General Appearance - Well Nourished] : well nourished [Normal Conjunctiva] : the conjunctiva exhibited no abnormalities [Eyelids - No Xanthelasma] : the eyelids demonstrated no xanthelasmas [Normal Oral Mucosa] : normal oral mucosa [No Oral Cyanosis] : no oral cyanosis [Normal Jugular Venous A Waves Present] : normal jugular venous A waves present [Normal Jugular Venous V Waves Present] : normal jugular venous V waves present [] : no respiratory distress [Respiration, Rhythm And Depth] : normal respiratory rhythm and effort [Auscultation Breath Sounds / Voice Sounds] : lungs were clear to auscultation bilaterally [Heart Sounds] : normal S1 and S2 [Murmurs] : no murmurs present [Edema] : no peripheral edema present [1+] : left 1+ [Bowel Sounds] : normal bowel sounds [Abdomen Soft] : soft [Abdomen Tenderness] : non-tender [Abnormal Walk] : normal gait [Nail Clubbing] : no clubbing of the fingernails [Cyanosis, Localized] : no localized cyanosis [Skin Color & Pigmentation] : normal skin color and pigmentation [Oriented To Time, Place, And Person] : oriented to person, place, and time [FreeTextEntry1] : No bruit.

## 2019-03-11 ENCOUNTER — APPOINTMENT (OUTPATIENT)
Dept: NEPHROLOGY | Facility: CLINIC | Age: 79
End: 2019-03-11
Payer: MEDICARE

## 2019-03-11 VITALS
WEIGHT: 185 LBS | OXYGEN SATURATION: 96 % | DIASTOLIC BLOOD PRESSURE: 86 MMHG | HEIGHT: 70 IN | SYSTOLIC BLOOD PRESSURE: 147 MMHG | HEART RATE: 60 BPM | BODY MASS INDEX: 26.48 KG/M2

## 2019-03-11 PROCEDURE — 99214 OFFICE O/P EST MOD 30 MIN: CPT

## 2019-03-12 LAB
ALBUMIN SERPL ELPH-MCNC: 4.4 G/DL
ANION GAP SERPL CALC-SCNC: 13 MMOL/L
APPEARANCE: CLEAR
BACTERIA: NEGATIVE
BASOPHILS # BLD AUTO: 0.05 K/UL
BASOPHILS NFR BLD AUTO: 1.1 %
BILIRUBIN URINE: NEGATIVE
BLOOD URINE: NEGATIVE
BUN SERPL-MCNC: 23 MG/DL
CALCIUM SERPL-MCNC: 10.5 MG/DL
CHLORIDE SERPL-SCNC: 102 MMOL/L
CO2 SERPL-SCNC: 22 MMOL/L
COLOR: COLORLESS
CREAT SERPL-MCNC: 1.46 MG/DL
CREAT SPEC-SCNC: 36 MG/DL
CREAT/PROT UR: 0.8 RATIO
EOSINOPHIL # BLD AUTO: 0.32 K/UL
EOSINOPHIL NFR BLD AUTO: 6.9 %
GLUCOSE QUALITATIVE U: NEGATIVE
GLUCOSE SERPL-MCNC: 224 MG/DL
HBA1C MFR BLD HPLC: 8.5 %
HCT VFR BLD CALC: 42.6 %
HGB BLD-MCNC: 14.4 G/DL
HYALINE CASTS: 1 /LPF
IMM GRANULOCYTES NFR BLD AUTO: 0.2 %
KETONES URINE: NEGATIVE
LEUKOCYTE ESTERASE URINE: NEGATIVE
LYMPHOCYTES # BLD AUTO: 0.95 K/UL
LYMPHOCYTES NFR BLD AUTO: 20.5 %
MAN DIFF?: NORMAL
MCHC RBC-ENTMCNC: 31.6 PG
MCHC RBC-ENTMCNC: 33.8 GM/DL
MCV RBC AUTO: 93.6 FL
MICROSCOPIC-UA: NORMAL
MONOCYTES # BLD AUTO: 0.53 K/UL
MONOCYTES NFR BLD AUTO: 11.4 %
NEUTROPHILS # BLD AUTO: 2.77 K/UL
NEUTROPHILS NFR BLD AUTO: 59.9 %
NITRITE URINE: NEGATIVE
PH URINE: 6
PHOSPHATE SERPL-MCNC: 3 MG/DL
PLATELET # BLD AUTO: 201 K/UL
POTASSIUM SERPL-SCNC: 4.5 MMOL/L
PROT UR-MCNC: 28 MG/DL
PROTEIN URINE: NORMAL
RBC # BLD: 4.55 M/UL
RBC # FLD: 12.7 %
RED BLOOD CELLS URINE: 0 /HPF
SODIUM SERPL-SCNC: 137 MMOL/L
SPECIFIC GRAVITY URINE: 1.01
SQUAMOUS EPITHELIAL CELLS: 0 /HPF
UROBILINOGEN URINE: NORMAL
WBC # FLD AUTO: 4.63 K/UL
WHITE BLOOD CELLS URINE: 0 /HPF

## 2019-03-12 NOTE — REVIEW OF SYSTEMS
[Loss Of Hearing] : hearing loss [Palpitations] : palpitations [SOB on Exertion] : shortness of breath during exertion [Hesitancy] : urinary hesitancy [Nocturia] : nocturia [Sleep Disturbances] : sleep disturbances [Negative] : ENT [Fever] : no fever [Chills] : no chills [Feeling Poorly] : not feeling poorly [Feeling Tired] : not feeling tired [Recent Weight Gain (___ Lbs)] : no recent weight gain [Recent Weight Loss (___ Lbs)] : no recent weight loss [Heart Rate Is Slow] : the heart rate was not slow [Heart Rate Is Fast] : the heart rate was not fast [Chest Pain] : no chest pain [Lower Ext Edema] : no extremity edema [Shortness Of Breath] : no shortness of breath [Wheezing] : no wheezing [Abdominal Pain] : no abdominal pain [Vomiting] : no vomiting [Constipation] : no constipation [Diarrhea] : no diarrhea [Heartburn] : no heartburn [Arthralgias] : no arthralgias [Joint Pain] : no joint pain [Itching] : no itching [Dry Skin] : no dry skin [Dizziness] : no dizziness [Fainting] : no fainting [Anxiety] : no anxiety [Depression] : no depression [Muscle Weakness] : no muscle weakness [Easy Bleeding] : no tendency for easy bleeding [Easy Bruising] : no tendency for easy bruising

## 2019-03-12 NOTE — HISTORY OF PRESENT ILLNESS
[Stage 3] : stage 3 [Hypertensive Nephropathy] : hypertensive nephropathy [Nocturia] : nocturia [Antihypertensives] : antihypertensives [Good Compliance] : good compliance  [Edema] : no edema [Pruritus] : no pruritus [Malaise] : no malaise [Weakness] : no weakness [Anorexia] : no anorexia [Nausea] : no nausea [Vomiting] : no vomiting [FreeTextEntry1] : A case of CKD stage III with diabetes, hypertension, CAD, diabetic retinopathy,  nephrolithiasis, hypercalcemia and bilateral renal cysts has  no new symptoms.

## 2019-03-12 NOTE — ASSESSMENT
[FreeTextEntry1] : A case of CKD stage III with diabetes, hypertension, CAD, diabetic retinopathy, nephrolithiasis with hypercalcemia and renal cysts.BP is controlled and weight is stable.  Advised renal function. Lab tests evaluated. Renal function stable. There is increase in blood sugar. Advised better control of blood sugar.

## 2019-03-19 ENCOUNTER — APPOINTMENT (OUTPATIENT)
Dept: PULMONOLOGY | Facility: CLINIC | Age: 79
End: 2019-03-19
Payer: MEDICARE

## 2019-03-19 VITALS
DIASTOLIC BLOOD PRESSURE: 61 MMHG | HEIGHT: 60 IN | HEART RATE: 69 BPM | WEIGHT: 188 LBS | BODY MASS INDEX: 36.91 KG/M2 | OXYGEN SATURATION: 99 % | SYSTOLIC BLOOD PRESSURE: 134 MMHG

## 2019-03-19 DIAGNOSIS — R05 COUGH: ICD-10-CM

## 2019-03-19 PROCEDURE — 99213 OFFICE O/P EST LOW 20 MIN: CPT | Mod: 25

## 2019-03-19 PROCEDURE — 94729 DIFFUSING CAPACITY: CPT | Mod: 26

## 2019-03-19 PROCEDURE — 94010 BREATHING CAPACITY TEST: CPT | Mod: 26

## 2019-03-19 RX ORDER — BECLOMETHASONE DIPROPIONATE HFA 80 UG/1
80 AEROSOL, METERED RESPIRATORY (INHALATION) TWICE DAILY
Qty: 3 | Refills: 0 | Status: DISCONTINUED | COMMUNITY
End: 2019-03-19

## 2019-03-19 NOTE — PROCEDURE
[FreeTextEntry1] : The pulmonary function testing done reveals improved FEV1, FVC and DLCO compared to the last study.

## 2019-03-19 NOTE — HISTORY OF PRESENT ILLNESS
[FreeTextEntry1] : Patient with history of cough due to multifactorial etiology is here for followup. He has been treated for gastroesophageal reflux with ranitidine. He has been off dairy. The patient feels the cough is almost resolved. He feels well.\par He has been sleeping with an oral appliance. He sleeps for a total of 6-7 hours at night. He wakes up once in the middle to urinate.\par He has been on a low carb diet and lost 4 pounds since his last evaluation about a month back.\par He is not taking any inhalers at present.

## 2019-03-19 NOTE — PHYSICAL EXAM
[General Appearance - Well Developed] : well developed [Normal Appearance] : normal appearance [Well Groomed] : well groomed [General Appearance - Well Nourished] : well nourished [No Deformities] : no deformities [General Appearance - In No Acute Distress] : no acute distress [Normal Conjunctiva] : the conjunctiva exhibited no abnormalities [Eyelids - No Xanthelasma] : the eyelids demonstrated no xanthelasmas [Normal Oropharynx] : normal oropharynx [III] : III [Neck Cervical Mass (___cm)] : no neck mass was observed [Neck Appearance] : the appearance of the neck was normal [Jugular Venous Distention Increased] : there was no jugular-venous distention [Thyroid Diffuse Enlargement] : the thyroid was not enlarged [Thyroid Nodule] : there were no palpable thyroid nodules [Heart Rate And Rhythm] : heart rate and rhythm were normal [Heart Sounds] : normal S1 and S2 [Murmurs] : no murmurs present [Exaggerated Use Of Accessory Muscles For Inspiration] : no accessory muscle use [Respiration, Rhythm And Depth] : normal respiratory rhythm and effort [Abdomen Tenderness] : non-tender [Auscultation Breath Sounds / Voice Sounds] : lungs were clear to auscultation bilaterally [Abdomen Soft] : soft [Abdomen Mass (___ Cm)] : no abdominal mass palpated [Abnormal Walk] : normal gait [Gait - Sufficient For Exercise Testing] : the gait was sufficient for exercise testing [Nail Clubbing] : no clubbing of the fingernails [Cyanosis, Localized] : no localized cyanosis [Petechial Hemorrhages (___cm)] : no petechial hemorrhages [Skin Color & Pigmentation] : normal skin color and pigmentation [] : no rash [No Venous Stasis] : no venous stasis [Skin Lesions] : no skin lesions [No Skin Ulcers] : no skin ulcer [No Xanthoma] : no  xanthoma was observed [Deep Tendon Reflexes (DTR)] : deep tendon reflexes were 2+ and symmetric [Sensation] : the sensory exam was normal to light touch and pinprick [No Focal Deficits] : no focal deficits [Oriented To Time, Place, And Person] : oriented to person, place, and time [Impaired Insight] : insight and judgment were intact [Affect] : the affect was normal

## 2019-04-01 ENCOUNTER — APPOINTMENT (OUTPATIENT)
Dept: NEUROLOGY | Facility: CLINIC | Age: 79
End: 2019-04-01

## 2019-04-04 ENCOUNTER — RX CHANGE (OUTPATIENT)
Age: 79
End: 2019-04-04

## 2019-06-26 ENCOUNTER — RX CHANGE (OUTPATIENT)
Age: 79
End: 2019-06-26

## 2019-07-22 ENCOUNTER — APPOINTMENT (OUTPATIENT)
Dept: PULMONOLOGY | Facility: CLINIC | Age: 79
End: 2019-07-22
Payer: MEDICARE

## 2019-07-22 VITALS
WEIGHT: 185 LBS | SYSTOLIC BLOOD PRESSURE: 121 MMHG | OXYGEN SATURATION: 97 % | BODY MASS INDEX: 27.72 KG/M2 | DIASTOLIC BLOOD PRESSURE: 64 MMHG | HEART RATE: 61 BPM | HEIGHT: 68.5 IN

## 2019-07-22 PROCEDURE — 99213 OFFICE O/P EST LOW 20 MIN: CPT

## 2019-07-22 NOTE — REVIEW OF SYSTEMS
[Cough] : cough [Difficulty Maintaining Sleep] : no difficulty maintaining sleep [Difficulty Initiating Sleep] : no difficulty falling asleep [Negative] : Sleep Disorder

## 2019-07-22 NOTE — DISCUSSION/SUMMARY
[FreeTextEntry1] : The patient appears to have reflux related cough. Patient has been taking both aspirin and Plavix for more than 2 years. He was advised to check with the cardiologist if one of them can be discontinued.\par The patient is advised to continue the current diet.\par The patient is to continue the use of oral appliance.

## 2019-07-22 NOTE — HISTORY OF PRESENT ILLNESS
[FreeTextEntry1] : The patient with history of sleep apnea returns for followup. The patient has been using oral appliance every night.\par His cough persists although it is much better than before. The patient has cough mostly at night. The cough is mostly dry. The patient has significant history of reaction to ACE inhibitors. He is currently taking both aspirin and Plavix. He has history of significant gastroesophageal reflux disease. He takes ranitidine currently. The patient is being following reflux diet and lifestyle measures.

## 2019-08-01 ENCOUNTER — APPOINTMENT (OUTPATIENT)
Dept: OPHTHALMOLOGY | Facility: CLINIC | Age: 79
End: 2019-08-01

## 2019-08-02 ENCOUNTER — APPOINTMENT (OUTPATIENT)
Dept: OPHTHALMOLOGY | Facility: CLINIC | Age: 79
End: 2019-08-02

## 2019-09-09 ENCOUNTER — APPOINTMENT (OUTPATIENT)
Dept: NEPHROLOGY | Facility: CLINIC | Age: 79
End: 2019-09-09
Payer: MEDICARE

## 2019-09-09 VITALS
WEIGHT: 180.78 LBS | HEART RATE: 62 BPM | BODY MASS INDEX: 26.78 KG/M2 | OXYGEN SATURATION: 96 % | SYSTOLIC BLOOD PRESSURE: 146 MMHG | HEIGHT: 69 IN | DIASTOLIC BLOOD PRESSURE: 68 MMHG

## 2019-09-09 PROCEDURE — 99214 OFFICE O/P EST MOD 30 MIN: CPT

## 2019-09-09 NOTE — PHYSICAL EXAM
[General Appearance - Alert] : alert [General Appearance - In No Acute Distress] : in no acute distress [General Appearance - Well Nourished] : well nourished [General Appearance - Well Developed] : well developed [Sclera] : the sclera and conjunctiva were normal [Outer Ear] : the ears and nose were normal in appearance [Neck Appearance] : the appearance of the neck was normal [Neck Cervical Mass (___cm)] : no neck mass was observed [Jugular Venous Distention Increased] : there was no jugular-venous distention [Exaggerated Use Of Accessory Muscles For Inspiration] : no accessory muscle use [Respiration, Rhythm And Depth] : normal respiratory rhythm and effort [Auscultation Breath Sounds / Voice Sounds] : lungs were clear to auscultation bilaterally [Apical Impulse] : the apical impulse was normal [Heart Rate And Rhythm] : heart rate was normal and rhythm regular [Heart Sounds] : normal S1 and S2 [Heart Sounds Gallop] : no gallops [Edema] : there was no peripheral edema [Bowel Sounds] : normal bowel sounds [Abdomen Soft] : soft [Abdomen Tenderness] : non-tender [Cervical Lymph Nodes Enlarged Posterior Bilaterally] : posterior cervical [Cervical Lymph Nodes Enlarged Anterior Bilaterally] : anterior cervical [Supraclavicular Lymph Nodes Enlarged Bilaterally] : supraclavicular [Axillary Lymph Nodes Enlarged Bilaterally] : axillary [No CVA Tenderness] : no ~M costovertebral angle tenderness [No Spinal Tenderness] : no spinal tenderness [Abnormal Walk] : normal gait [Nail Clubbing] : no clubbing  or cyanosis of the fingernails [Musculoskeletal - Swelling] : no joint swelling seen [Skin Color & Pigmentation] : normal skin color and pigmentation [] : no rash [Oriented To Time, Place, And Person] : oriented to person, place, and time

## 2019-09-10 LAB
ALBUMIN SERPL ELPH-MCNC: 4.2 G/DL
ANION GAP SERPL CALC-SCNC: 11 MMOL/L
APPEARANCE: CLEAR
BACTERIA: NEGATIVE
BILIRUBIN URINE: NEGATIVE
BLOOD URINE: NEGATIVE
BUN SERPL-MCNC: 22 MG/DL
CALCIUM SERPL-MCNC: 10.5 MG/DL
CHLORIDE SERPL-SCNC: 100 MMOL/L
CO2 SERPL-SCNC: 26 MMOL/L
COLOR: COLORLESS
CREAT SERPL-MCNC: 1.58 MG/DL
CREAT SPEC-SCNC: 36 MG/DL
CREAT/PROT UR: 0.4 RATIO
GLUCOSE QUALITATIVE U: NEGATIVE
GLUCOSE SERPL-MCNC: 213 MG/DL
HYALINE CASTS: 1 /LPF
KETONES URINE: NEGATIVE
LEUKOCYTE ESTERASE URINE: NEGATIVE
MICROSCOPIC-UA: NORMAL
NITRITE URINE: NEGATIVE
PH URINE: 6
PHOSPHATE SERPL-MCNC: 2.8 MG/DL
POTASSIUM SERPL-SCNC: 4.2 MMOL/L
PROT UR-MCNC: 14 MG/DL
PROTEIN URINE: NEGATIVE
RED BLOOD CELLS URINE: 0 /HPF
SODIUM SERPL-SCNC: 137 MMOL/L
SPECIFIC GRAVITY URINE: 1.01
SQUAMOUS EPITHELIAL CELLS: 0 /HPF
UROBILINOGEN URINE: NORMAL
WHITE BLOOD CELLS URINE: 0 /HPF

## 2019-09-10 NOTE — HISTORY OF PRESENT ILLNESS
[Stage 3] : stage 3 [Nocturia] : nocturia [Hypertensive Nephropathy] : hypertensive nephropathy [Good Compliance] : good compliance  [Antihypertensives] : antihypertensives [Edema] : no edema [Pruritus] : no pruritus [Malaise] : no malaise [Weakness] : no weakness [Anorexia] : no anorexia [Vomiting] : no vomiting [Nausea] : no nausea [FreeTextEntry1] : A case of CKD stage III with diabetes, hypertension, CAD, diabetic retinopathy,  nephrolithiasis, hypercalcemia and bilateral renal cysts. Patient feels comfortable.

## 2019-09-10 NOTE — REVIEW OF SYSTEMS
[Loss Of Hearing] : hearing loss [Palpitations] : palpitations [SOB on Exertion] : shortness of breath during exertion [Hesitancy] : urinary hesitancy [Nocturia] : nocturia [Sleep Disturbances] : sleep disturbances [Negative] : ENT [Fever] : no fever [Feeling Poorly] : not feeling poorly [Chills] : no chills [Feeling Tired] : not feeling tired [Recent Weight Gain (___ Lbs)] : no recent weight gain [Recent Weight Loss (___ Lbs)] : no recent weight loss [Heart Rate Is Slow] : the heart rate was not slow [Heart Rate Is Fast] : the heart rate was not fast [Lower Ext Edema] : no extremity edema [Chest Pain] : no chest pain [Wheezing] : no wheezing [Shortness Of Breath] : no shortness of breath [Vomiting] : no vomiting [Abdominal Pain] : no abdominal pain [Diarrhea] : no diarrhea [Constipation] : no constipation [Heartburn] : no heartburn [Arthralgias] : no arthralgias [Itching] : no itching [Joint Pain] : no joint pain [Dry Skin] : no dry skin [Dizziness] : no dizziness [Fainting] : no fainting [Anxiety] : no anxiety [Depression] : no depression [Muscle Weakness] : no muscle weakness [Easy Bruising] : no tendency for easy bruising [Easy Bleeding] : no tendency for easy bleeding

## 2019-09-10 NOTE — ASSESSMENT
[FreeTextEntry1] : A case of CKD stage III with diabetes, hypertension, CAD, diabetic retinopathy, nephrolithiasis with hypercalcemia and renal cysts.BP is controlled and weight is stable.  Advised renal function. lab tests discussed with the patient. Renal function stable. Blood sugar is high. Advised better control of blood sugar.

## 2019-10-21 ENCOUNTER — APPOINTMENT (OUTPATIENT)
Dept: PULMONOLOGY | Facility: CLINIC | Age: 79
End: 2019-10-21
Payer: MEDICARE

## 2019-10-21 VITALS
OXYGEN SATURATION: 99 % | SYSTOLIC BLOOD PRESSURE: 128 MMHG | HEART RATE: 67 BPM | WEIGHT: 180 LBS | HEIGHT: 69 IN | BODY MASS INDEX: 26.66 KG/M2 | DIASTOLIC BLOOD PRESSURE: 60 MMHG

## 2019-10-21 DIAGNOSIS — R05 COUGH: ICD-10-CM

## 2019-10-21 PROCEDURE — 94010 BREATHING CAPACITY TEST: CPT

## 2019-10-21 PROCEDURE — 99214 OFFICE O/P EST MOD 30 MIN: CPT | Mod: 25

## 2019-10-21 PROCEDURE — G0008: CPT

## 2019-10-21 PROCEDURE — 90674 CCIIV4 VAC NO PRSV 0.5 ML IM: CPT

## 2019-10-21 PROCEDURE — 94729 DIFFUSING CAPACITY: CPT

## 2019-10-21 RX ORDER — RANITIDINE 150 MG/1
150 TABLET ORAL
Qty: 180 | Refills: 0 | Status: DISCONTINUED | COMMUNITY
Start: 2019-02-14 | End: 2019-10-21

## 2019-10-21 NOTE — HISTORY OF PRESENT ILLNESS
[FreeTextEntry1] : The patient with history of sleep apnea and cough returns for followup. His cough has improved significantly. He has been using oral appliance for sleep apnea as he cannot tolerate CPAP.\par He has been on a diet and lost 12 pounds in 1 year..

## 2019-10-21 NOTE — DISCUSSION/SUMMARY
[FreeTextEntry1] : The patient was advised to continue exercise and diet. He'll continue to use oral appliance. His improvement might be related to weight loss.

## 2019-10-21 NOTE — PHYSICAL EXAM
[General Appearance - Well Developed] : well developed [Normal Appearance] : normal appearance [Well Groomed] : well groomed [General Appearance - Well Nourished] : well nourished [No Deformities] : no deformities [General Appearance - In No Acute Distress] : no acute distress [Normal Conjunctiva] : the conjunctiva exhibited no abnormalities [Eyelids - No Xanthelasma] : the eyelids demonstrated no xanthelasmas [Normal Oropharynx] : normal oropharynx [IV] : IV [Neck Appearance] : the appearance of the neck was normal [Neck Cervical Mass (___cm)] : no neck mass was observed [Jugular Venous Distention Increased] : there was no jugular-venous distention [Thyroid Diffuse Enlargement] : the thyroid was not enlarged [Thyroid Nodule] : there were no palpable thyroid nodules [Heart Rate And Rhythm] : heart rate and rhythm were normal [Heart Sounds] : normal S1 and S2 [Murmurs] : no murmurs present [Respiration, Rhythm And Depth] : normal respiratory rhythm and effort [Exaggerated Use Of Accessory Muscles For Inspiration] : no accessory muscle use [Auscultation Breath Sounds / Voice Sounds] : lungs were clear to auscultation bilaterally [Abdomen Soft] : soft [Abdomen Tenderness] : non-tender [Gait - Sufficient For Exercise Testing] : the gait was sufficient for exercise testing [Abnormal Walk] : normal gait [Abdomen Mass (___ Cm)] : no abdominal mass palpated [Cyanosis, Localized] : no localized cyanosis [Nail Clubbing] : no clubbing of the fingernails [Petechial Hemorrhages (___cm)] : no petechial hemorrhages [Skin Color & Pigmentation] : normal skin color and pigmentation [Skin Lesions] : no skin lesions [No Venous Stasis] : no venous stasis [] : no rash [No Skin Ulcers] : no skin ulcer [No Xanthoma] : no  xanthoma was observed [Sensation] : the sensory exam was normal to light touch and pinprick [Deep Tendon Reflexes (DTR)] : deep tendon reflexes were 2+ and symmetric [Impaired Insight] : insight and judgment were intact [Oriented To Time, Place, And Person] : oriented to person, place, and time [No Focal Deficits] : no focal deficits [Affect] : the affect was normal

## 2019-10-23 ENCOUNTER — APPOINTMENT (OUTPATIENT)
Dept: OPHTHALMOLOGY | Facility: CLINIC | Age: 79
End: 2019-10-23
Payer: MEDICARE

## 2019-10-23 ENCOUNTER — NON-APPOINTMENT (OUTPATIENT)
Age: 79
End: 2019-10-23

## 2019-10-23 PROCEDURE — 92014 COMPRE OPH EXAM EST PT 1/>: CPT

## 2019-10-23 PROCEDURE — 92134 CPTRZ OPH DX IMG PST SGM RTA: CPT

## 2019-12-23 ENCOUNTER — APPOINTMENT (OUTPATIENT)
Dept: UROLOGY | Facility: CLINIC | Age: 79
End: 2019-12-23

## 2020-01-15 ENCOUNTER — APPOINTMENT (OUTPATIENT)
Dept: UROLOGY | Facility: CLINIC | Age: 80
End: 2020-01-15
Payer: MEDICARE

## 2020-01-15 ENCOUNTER — OUTPATIENT (OUTPATIENT)
Dept: OUTPATIENT SERVICES | Facility: HOSPITAL | Age: 80
LOS: 1 days | End: 2020-01-15
Payer: MEDICARE

## 2020-01-15 VITALS — SYSTOLIC BLOOD PRESSURE: 161 MMHG | DIASTOLIC BLOOD PRESSURE: 70 MMHG | TEMPERATURE: 97.7 F | HEART RATE: 62 BPM

## 2020-01-15 DIAGNOSIS — M75.101 UNSPECIFIED ROTATOR CUFF TEAR OR RUPTURE OF RIGHT SHOULDER, NOT SPECIFIED AS TRAUMATIC: Chronic | ICD-10-CM

## 2020-01-15 DIAGNOSIS — H26.9 UNSPECIFIED CATARACT: Chronic | ICD-10-CM

## 2020-01-15 DIAGNOSIS — M65.30 TRIGGER FINGER, UNSPECIFIED FINGER: Chronic | ICD-10-CM

## 2020-01-15 DIAGNOSIS — Z98.89 OTHER SPECIFIED POSTPROCEDURAL STATES: Chronic | ICD-10-CM

## 2020-01-15 DIAGNOSIS — Q76.1 KLIPPEL-FEIL SYNDROME: Chronic | ICD-10-CM

## 2020-01-15 PROCEDURE — 76775 US EXAM ABDO BACK WALL LIM: CPT

## 2020-01-15 PROCEDURE — 76775 US EXAM ABDO BACK WALL LIM: CPT | Mod: 26

## 2020-01-15 PROCEDURE — 99214 OFFICE O/P EST MOD 30 MIN: CPT | Mod: 25

## 2020-01-15 NOTE — PHYSICAL EXAM
[Bowel Sounds] : normal bowel sounds [General Appearance - Well Nourished] : well nourished [General Appearance - Well Developed] : well developed [Skin Color & Pigmentation] : normal skin color and pigmentation [Heart Rate And Rhythm] : Heart rate and rhythm were normal [] : no respiratory distress [Oriented To Time, Place, And Person] : oriented to person, place, and time [Normal Station and Gait] : the gait and station were normal for the patient's age [No Focal Deficits] : no focal deficits [No Palpable Adenopathy] : no palpable adenopathy

## 2020-01-21 NOTE — HISTORY OF PRESENT ILLNESS
[None] : no symptoms [FreeTextEntry1] : Mr. Schmitz is a 80 yo M\par Hx of kidney stones, BPH, ED, CKD\par Here for follow up.\par No new c/o\par \par LEMUEL today: no stones, stable cysts bilaterally\par \par On Flomax for BPH\par Stable nocturia x 3/night without bother. \par \par Urinating well: Qtime 6 seconds, Qmax 9 cc/s\par VV: 109 cc PVR 0cc today \par Cr 1.58 (9/2019) up from 1.43 in 2018\par \par Patient is currently experiencing no nausea nocturia and erectile dysfunction, but no urinary incontinence, no urinary retention, no urinary urgency, no urinary frequency, no straining, no weak stream, no intermittency, no post-void dribbling, no dysuria, no gross hematuria, no bladder spasm, no abdominal pain, no flank pain, edema was not present, no recent weight loss, no fever and no fatigue. \par Pain Level: None

## 2020-01-21 NOTE — ASSESSMENT
[FreeTextEntry1] : Doing well.\par No recurrent stones\par Continue Flomax for BPH\par f/u 12 months\par Renal sono next visit. \par

## 2020-01-24 DIAGNOSIS — N20.0 CALCULUS OF KIDNEY: ICD-10-CM

## 2020-01-24 DIAGNOSIS — N40.1 BENIGN PROSTATIC HYPERPLASIA WITH LOWER URINARY TRACT SYMPTOMS: ICD-10-CM

## 2020-01-24 DIAGNOSIS — N18.3 CHRONIC KIDNEY DISEASE, STAGE 3 (MODERATE): ICD-10-CM

## 2020-02-18 ENCOUNTER — APPOINTMENT (OUTPATIENT)
Dept: PULMONOLOGY | Facility: CLINIC | Age: 80
End: 2020-02-18

## 2020-03-09 ENCOUNTER — APPOINTMENT (OUTPATIENT)
Dept: UROLOGY | Facility: CLINIC | Age: 80
End: 2020-03-09

## 2020-03-09 ENCOUNTER — APPOINTMENT (OUTPATIENT)
Dept: UROLOGY | Facility: CLINIC | Age: 80
End: 2020-03-09
Payer: MEDICARE

## 2020-03-09 ENCOUNTER — APPOINTMENT (OUTPATIENT)
Dept: NEPHROLOGY | Facility: CLINIC | Age: 80
End: 2020-03-09
Payer: MEDICARE

## 2020-03-09 ENCOUNTER — OUTPATIENT (OUTPATIENT)
Dept: OUTPATIENT SERVICES | Facility: HOSPITAL | Age: 80
LOS: 1 days | End: 2020-03-09
Payer: MEDICARE

## 2020-03-09 VITALS
WEIGHT: 188 LBS | DIASTOLIC BLOOD PRESSURE: 84 MMHG | SYSTOLIC BLOOD PRESSURE: 144 MMHG | HEART RATE: 66 BPM | HEIGHT: 69 IN | BODY MASS INDEX: 27.85 KG/M2 | OXYGEN SATURATION: 98 %

## 2020-03-09 DIAGNOSIS — M65.30 TRIGGER FINGER, UNSPECIFIED FINGER: Chronic | ICD-10-CM

## 2020-03-09 DIAGNOSIS — R35.0 FREQUENCY OF MICTURITION: ICD-10-CM

## 2020-03-09 DIAGNOSIS — H26.9 UNSPECIFIED CATARACT: Chronic | ICD-10-CM

## 2020-03-09 DIAGNOSIS — M75.101 UNSPECIFIED ROTATOR CUFF TEAR OR RUPTURE OF RIGHT SHOULDER, NOT SPECIFIED AS TRAUMATIC: Chronic | ICD-10-CM

## 2020-03-09 DIAGNOSIS — Q76.1 KLIPPEL-FEIL SYNDROME: Chronic | ICD-10-CM

## 2020-03-09 DIAGNOSIS — Z98.89 OTHER SPECIFIED POSTPROCEDURAL STATES: Chronic | ICD-10-CM

## 2020-03-09 PROCEDURE — 99214 OFFICE O/P EST MOD 30 MIN: CPT

## 2020-03-09 PROCEDURE — 76775 US EXAM ABDO BACK WALL LIM: CPT | Mod: 26

## 2020-03-09 PROCEDURE — 76775 US EXAM ABDO BACK WALL LIM: CPT

## 2020-03-09 PROCEDURE — 99213 OFFICE O/P EST LOW 20 MIN: CPT | Mod: 25

## 2020-03-09 NOTE — PHYSICAL EXAM
[General Appearance - Alert] : alert [General Appearance - In No Acute Distress] : in no acute distress [General Appearance - Well Nourished] : well nourished [General Appearance - Well Developed] : well developed [Sclera] : the sclera and conjunctiva were normal [Outer Ear] : the ears and nose were normal in appearance [Neck Appearance] : the appearance of the neck was normal [Neck Cervical Mass (___cm)] : no neck mass was observed [Jugular Venous Distention Increased] : there was no jugular-venous distention [Respiration, Rhythm And Depth] : normal respiratory rhythm and effort [Exaggerated Use Of Accessory Muscles For Inspiration] : no accessory muscle use [Auscultation Breath Sounds / Voice Sounds] : lungs were clear to auscultation bilaterally [Apical Impulse] : the apical impulse was normal [Heart Rate And Rhythm] : heart rate was normal and rhythm regular [Heart Sounds] : normal S1 and S2 [Heart Sounds Gallop] : no gallops [Edema] : there was no peripheral edema [Bowel Sounds] : normal bowel sounds [Abdomen Soft] : soft [Abdomen Tenderness] : non-tender [Cervical Lymph Nodes Enlarged Posterior Bilaterally] : posterior cervical [Cervical Lymph Nodes Enlarged Anterior Bilaterally] : anterior cervical [Supraclavicular Lymph Nodes Enlarged Bilaterally] : supraclavicular [Axillary Lymph Nodes Enlarged Bilaterally] : axillary [No CVA Tenderness] : no ~M costovertebral angle tenderness [No Spinal Tenderness] : no spinal tenderness [Abnormal Walk] : normal gait [Nail Clubbing] : no clubbing  or cyanosis of the fingernails [Musculoskeletal - Swelling] : no joint swelling seen [Skin Color & Pigmentation] : normal skin color and pigmentation [] : no rash [Oriented To Time, Place, And Person] : oriented to person, place, and time

## 2020-03-10 ENCOUNTER — TRANSCRIPTION ENCOUNTER (OUTPATIENT)
Age: 80
End: 2020-03-10

## 2020-03-10 LAB
ALBUMIN SERPL ELPH-MCNC: 4.3 G/DL
ANION GAP SERPL CALC-SCNC: 12 MMOL/L
APPEARANCE: CLEAR
BACTERIA: NEGATIVE
BILIRUBIN URINE: NEGATIVE
BLOOD URINE: NEGATIVE
BUN SERPL-MCNC: 27 MG/DL
CALCIUM SERPL-MCNC: 10.4 MG/DL
CHLORIDE SERPL-SCNC: 102 MMOL/L
CO2 SERPL-SCNC: 24 MMOL/L
COLOR: NORMAL
CREAT SERPL-MCNC: 1.58 MG/DL
GLUCOSE QUALITATIVE U: ABNORMAL
GLUCOSE SERPL-MCNC: 217 MG/DL
HYALINE CASTS: 0 /LPF
KETONES URINE: NEGATIVE
LEUKOCYTE ESTERASE URINE: NEGATIVE
MICROSCOPIC-UA: NORMAL
NITRITE URINE: NEGATIVE
PH URINE: 5.5
PHOSPHATE SERPL-MCNC: 3.2 MG/DL
POTASSIUM SERPL-SCNC: 4.4 MMOL/L
PROTEIN URINE: ABNORMAL
RED BLOOD CELLS URINE: 1 /HPF
SODIUM SERPL-SCNC: 138 MMOL/L
SPECIFIC GRAVITY URINE: 1.01
SQUAMOUS EPITHELIAL CELLS: 0 /HPF
UROBILINOGEN URINE: NORMAL
WHITE BLOOD CELLS URINE: 0 /HPF

## 2020-03-10 NOTE — HISTORY OF PRESENT ILLNESS
[Stage 3] : stage 3 [Hypertensive Nephropathy] : hypertensive nephropathy [Nocturia] : nocturia [Antihypertensives] : antihypertensives [Good Compliance] : good compliance  [Edema] : no edema [Pruritus] : no pruritus [Malaise] : no malaise [Weakness] : no weakness [Anorexia] : no anorexia [Nausea] : no nausea [Vomiting] : no vomiting [FreeTextEntry1] : A case of CKD stage III with diabetes, hypertension, CAD, diabetic retinopathy,  nephrolithiasis, hypercalcemia and bilateral renal cysts. Patient does not have any new symptoms. Metformin has been discontinued and Tradjenta 5 mg PO has been started.

## 2020-03-10 NOTE — ASSESSMENT
[FreeTextEntry1] : A case of CKD stage III with diabetes, hypertension, CAD, diabetic retinopathy, nephrolithiasis with hypercalcemia and renal cysts.BP is controlled and weight is stable. \par Chronic kidney disease: No uremic symptoms. Adivsed BMP.\par Hypertension: BP is controlled.\par Diabetes mellitus: Metformin has been discontinued and Tradgenta 5 mg PO daily. \par Renal cyst. Need to be evaluated by renal sonogram. \par Renal function stable. Consider to change to SGLT2 inhibitors for diabetes control.

## 2020-03-13 ENCOUNTER — FORM ENCOUNTER (OUTPATIENT)
Age: 80
End: 2020-03-13

## 2020-03-14 ENCOUNTER — OUTPATIENT (OUTPATIENT)
Dept: OUTPATIENT SERVICES | Facility: HOSPITAL | Age: 80
LOS: 1 days | End: 2020-03-14
Payer: MEDICARE

## 2020-03-14 ENCOUNTER — APPOINTMENT (OUTPATIENT)
Dept: ULTRASOUND IMAGING | Facility: IMAGING CENTER | Age: 80
End: 2020-03-14
Payer: MEDICARE

## 2020-03-14 DIAGNOSIS — Z98.89 OTHER SPECIFIED POSTPROCEDURAL STATES: Chronic | ICD-10-CM

## 2020-03-14 DIAGNOSIS — Q76.1 KLIPPEL-FEIL SYNDROME: Chronic | ICD-10-CM

## 2020-03-14 DIAGNOSIS — Z00.8 ENCOUNTER FOR OTHER GENERAL EXAMINATION: ICD-10-CM

## 2020-03-14 DIAGNOSIS — M75.101 UNSPECIFIED ROTATOR CUFF TEAR OR RUPTURE OF RIGHT SHOULDER, NOT SPECIFIED AS TRAUMATIC: Chronic | ICD-10-CM

## 2020-03-14 DIAGNOSIS — H26.9 UNSPECIFIED CATARACT: Chronic | ICD-10-CM

## 2020-03-14 DIAGNOSIS — M65.30 TRIGGER FINGER, UNSPECIFIED FINGER: Chronic | ICD-10-CM

## 2020-03-14 PROCEDURE — 76770 US EXAM ABDO BACK WALL COMP: CPT | Mod: 26

## 2020-03-14 PROCEDURE — 76770 US EXAM ABDO BACK WALL COMP: CPT

## 2020-03-16 DIAGNOSIS — Z87.442 PERSONAL HISTORY OF URINARY CALCULI: ICD-10-CM

## 2020-04-29 ENCOUNTER — APPOINTMENT (OUTPATIENT)
Dept: OPHTHALMOLOGY | Facility: CLINIC | Age: 80
End: 2020-04-29

## 2020-05-05 ENCOUNTER — APPOINTMENT (OUTPATIENT)
Dept: PULMONOLOGY | Facility: CLINIC | Age: 80
End: 2020-05-05
Payer: MEDICARE

## 2020-05-05 DIAGNOSIS — K21.9 GASTRO-ESOPHAGEAL REFLUX DISEASE W/OUT ESOPHAGITIS: ICD-10-CM

## 2020-05-05 PROCEDURE — 99213 OFFICE O/P EST LOW 20 MIN: CPT | Mod: 95

## 2020-05-05 NOTE — HISTORY OF PRESENT ILLNESS
[Home] : at home, [unfilled] , at the time of the visit. [Medical Office: ([facility])___] : at the medical office located in  [TextBox_4] : The patient with H/o sleep apnea and GERD is being evaluated. He cannot tolerate CPAP. He is not using oral appliance.\par He sleeps well most of the times.\par He has GERD and ran out of Famotidine. He wants it to be renewed.\par He feels well.

## 2020-05-18 RX ORDER — FAMOTIDINE 20 MG/1
20 TABLET, FILM COATED ORAL
Qty: 180 | Refills: 0 | Status: DISCONTINUED | COMMUNITY
Start: 2020-04-27 | End: 2020-05-18

## 2020-06-03 NOTE — REASON FOR VISIT
Palliative Care [Follow-Up - Clinic] : a clinic follow-up of [FreeTextEntry2] : cardiac evaluation prior to dental work

## 2020-06-12 ENCOUNTER — TRANSCRIPTION ENCOUNTER (OUTPATIENT)
Age: 80
End: 2020-06-12

## 2020-06-12 ENCOUNTER — INPATIENT (INPATIENT)
Facility: HOSPITAL | Age: 80
LOS: 0 days | Discharge: HOME | DRG: 446 | End: 2020-06-12
Attending: TRANSPLANT SURGERY | Admitting: TRANSPLANT SURGERY
Payer: MEDICARE

## 2020-06-12 VITALS
RESPIRATION RATE: 18 BRPM | HEART RATE: 66 BPM | SYSTOLIC BLOOD PRESSURE: 160 MMHG | DIASTOLIC BLOOD PRESSURE: 81 MMHG | OXYGEN SATURATION: 97 % | TEMPERATURE: 98 F

## 2020-06-12 VITALS
SYSTOLIC BLOOD PRESSURE: 219 MMHG | HEART RATE: 71 BPM | OXYGEN SATURATION: 98 % | HEIGHT: 70 IN | RESPIRATION RATE: 17 BRPM | WEIGHT: 184.97 LBS | TEMPERATURE: 98 F | DIASTOLIC BLOOD PRESSURE: 98 MMHG

## 2020-06-12 DIAGNOSIS — Q76.1 KLIPPEL-FEIL SYNDROME: Chronic | ICD-10-CM

## 2020-06-12 DIAGNOSIS — M65.30 TRIGGER FINGER, UNSPECIFIED FINGER: Chronic | ICD-10-CM

## 2020-06-12 DIAGNOSIS — Z98.89 OTHER SPECIFIED POSTPROCEDURAL STATES: Chronic | ICD-10-CM

## 2020-06-12 DIAGNOSIS — M75.101 UNSPECIFIED ROTATOR CUFF TEAR OR RUPTURE OF RIGHT SHOULDER, NOT SPECIFIED AS TRAUMATIC: Chronic | ICD-10-CM

## 2020-06-12 DIAGNOSIS — H26.9 UNSPECIFIED CATARACT: Chronic | ICD-10-CM

## 2020-06-12 DIAGNOSIS — R10.11 RIGHT UPPER QUADRANT PAIN: ICD-10-CM

## 2020-06-12 LAB
ALBUMIN SERPL ELPH-MCNC: 4.4 G/DL — SIGNIFICANT CHANGE UP (ref 3.3–5)
ALP SERPL-CCNC: 76 U/L — SIGNIFICANT CHANGE UP (ref 40–120)
ALT FLD-CCNC: 33 U/L — SIGNIFICANT CHANGE UP (ref 10–45)
ANION GAP SERPL CALC-SCNC: 12 MMOL/L — SIGNIFICANT CHANGE UP (ref 5–17)
APPEARANCE UR: CLEAR — SIGNIFICANT CHANGE UP
APTT BLD: 30.1 SEC — SIGNIFICANT CHANGE UP (ref 27.5–36.3)
AST SERPL-CCNC: 34 U/L — SIGNIFICANT CHANGE UP (ref 10–40)
BACTERIA # UR AUTO: NEGATIVE — SIGNIFICANT CHANGE UP
BASOPHILS # BLD AUTO: 0.03 K/UL — SIGNIFICANT CHANGE UP (ref 0–0.2)
BASOPHILS NFR BLD AUTO: 0.8 % — SIGNIFICANT CHANGE UP (ref 0–2)
BILIRUB SERPL-MCNC: 0.4 MG/DL — SIGNIFICANT CHANGE UP (ref 0.2–1.2)
BILIRUB UR-MCNC: NEGATIVE — SIGNIFICANT CHANGE UP
BLD GP AB SCN SERPL QL: NEGATIVE — SIGNIFICANT CHANGE UP
BUN SERPL-MCNC: 26 MG/DL — HIGH (ref 7–23)
CALCIUM SERPL-MCNC: 10.5 MG/DL — SIGNIFICANT CHANGE UP (ref 8.4–10.5)
CHLORIDE SERPL-SCNC: 102 MMOL/L — SIGNIFICANT CHANGE UP (ref 96–108)
CO2 SERPL-SCNC: 25 MMOL/L — SIGNIFICANT CHANGE UP (ref 22–31)
COLOR SPEC: COLORLESS — SIGNIFICANT CHANGE UP
CREAT SERPL-MCNC: 1.53 MG/DL — HIGH (ref 0.5–1.3)
DIFF PNL FLD: ABNORMAL
EOSINOPHIL # BLD AUTO: 0.34 K/UL — SIGNIFICANT CHANGE UP (ref 0–0.5)
EOSINOPHIL NFR BLD AUTO: 8.6 % — HIGH (ref 0–6)
EPI CELLS # UR: 0 /HPF — SIGNIFICANT CHANGE UP
GLUCOSE SERPL-MCNC: 209 MG/DL — HIGH (ref 70–99)
GLUCOSE UR QL: ABNORMAL
HCT VFR BLD CALC: 40.4 % — SIGNIFICANT CHANGE UP (ref 39–50)
HGB BLD-MCNC: 13.6 G/DL — SIGNIFICANT CHANGE UP (ref 13–17)
HYALINE CASTS # UR AUTO: 0 /LPF — SIGNIFICANT CHANGE UP (ref 0–7)
IMM GRANULOCYTES NFR BLD AUTO: 0.3 % — SIGNIFICANT CHANGE UP (ref 0–1.5)
INR BLD: 0.9 RATIO — SIGNIFICANT CHANGE UP (ref 0.88–1.16)
KETONES UR-MCNC: NEGATIVE — SIGNIFICANT CHANGE UP
LEUKOCYTE ESTERASE UR-ACNC: NEGATIVE — SIGNIFICANT CHANGE UP
LIDOCAIN IGE QN: 93 U/L — HIGH (ref 7–60)
LYMPHOCYTES # BLD AUTO: 1.27 K/UL — SIGNIFICANT CHANGE UP (ref 1–3.3)
LYMPHOCYTES # BLD AUTO: 32.2 % — SIGNIFICANT CHANGE UP (ref 13–44)
MCHC RBC-ENTMCNC: 32.4 PG — SIGNIFICANT CHANGE UP (ref 27–34)
MCHC RBC-ENTMCNC: 33.7 GM/DL — SIGNIFICANT CHANGE UP (ref 32–36)
MCV RBC AUTO: 96.2 FL — SIGNIFICANT CHANGE UP (ref 80–100)
MONOCYTES # BLD AUTO: 0.57 K/UL — SIGNIFICANT CHANGE UP (ref 0–0.9)
MONOCYTES NFR BLD AUTO: 14.4 % — HIGH (ref 2–14)
NEUTROPHILS # BLD AUTO: 1.73 K/UL — LOW (ref 1.8–7.4)
NEUTROPHILS NFR BLD AUTO: 43.7 % — SIGNIFICANT CHANGE UP (ref 43–77)
NITRITE UR-MCNC: NEGATIVE — SIGNIFICANT CHANGE UP
NRBC # BLD: 0 /100 WBCS — SIGNIFICANT CHANGE UP (ref 0–0)
PH UR: 7.5 — SIGNIFICANT CHANGE UP (ref 5–8)
PLATELET # BLD AUTO: 166 K/UL — SIGNIFICANT CHANGE UP (ref 150–400)
POTASSIUM SERPL-MCNC: 4.1 MMOL/L — SIGNIFICANT CHANGE UP (ref 3.5–5.3)
POTASSIUM SERPL-SCNC: 4.1 MMOL/L — SIGNIFICANT CHANGE UP (ref 3.5–5.3)
PROT SERPL-MCNC: 7.1 G/DL — SIGNIFICANT CHANGE UP (ref 6–8.3)
PROT UR-MCNC: ABNORMAL
PROTHROM AB SERPL-ACNC: 10.2 SEC — SIGNIFICANT CHANGE UP (ref 10–12.9)
RBC # BLD: 4.2 M/UL — SIGNIFICANT CHANGE UP (ref 4.2–5.8)
RBC # FLD: 14.1 % — SIGNIFICANT CHANGE UP (ref 10.3–14.5)
RBC CASTS # UR COMP ASSIST: 1 /HPF — SIGNIFICANT CHANGE UP (ref 0–4)
RH IG SCN BLD-IMP: POSITIVE — SIGNIFICANT CHANGE UP
SARS-COV-2 RNA SPEC QL NAA+PROBE: SIGNIFICANT CHANGE UP
SODIUM SERPL-SCNC: 139 MMOL/L — SIGNIFICANT CHANGE UP (ref 135–145)
SP GR SPEC: 1.01 — LOW (ref 1.01–1.02)
UROBILINOGEN FLD QL: NEGATIVE — SIGNIFICANT CHANGE UP
WBC # BLD: 3.95 K/UL — SIGNIFICANT CHANGE UP (ref 3.8–10.5)
WBC # FLD AUTO: 3.95 K/UL — SIGNIFICANT CHANGE UP (ref 3.8–10.5)
WBC UR QL: 0 /HPF — SIGNIFICANT CHANGE UP (ref 0–5)

## 2020-06-12 PROCEDURE — 83690 ASSAY OF LIPASE: CPT

## 2020-06-12 PROCEDURE — 99285 EMERGENCY DEPT VISIT HI MDM: CPT

## 2020-06-12 PROCEDURE — 99053 MED SERV 10PM-8AM 24 HR FAC: CPT

## 2020-06-12 PROCEDURE — 86900 BLOOD TYPING SEROLOGIC ABO: CPT

## 2020-06-12 PROCEDURE — 93971 EXTREMITY STUDY: CPT | Mod: 26,RT

## 2020-06-12 PROCEDURE — 81001 URINALYSIS AUTO W/SCOPE: CPT

## 2020-06-12 PROCEDURE — 85730 THROMBOPLASTIN TIME PARTIAL: CPT

## 2020-06-12 PROCEDURE — 76700 US EXAM ABDOM COMPLETE: CPT

## 2020-06-12 PROCEDURE — 93005 ELECTROCARDIOGRAM TRACING: CPT

## 2020-06-12 PROCEDURE — 76857 US EXAM PELVIC LIMITED: CPT | Mod: 26

## 2020-06-12 PROCEDURE — 82962 GLUCOSE BLOOD TEST: CPT

## 2020-06-12 PROCEDURE — 76700 US EXAM ABDOM COMPLETE: CPT | Mod: 26

## 2020-06-12 PROCEDURE — 80053 COMPREHEN METABOLIC PANEL: CPT

## 2020-06-12 PROCEDURE — 71045 X-RAY EXAM CHEST 1 VIEW: CPT

## 2020-06-12 PROCEDURE — 85610 PROTHROMBIN TIME: CPT

## 2020-06-12 PROCEDURE — 86850 RBC ANTIBODY SCREEN: CPT

## 2020-06-12 PROCEDURE — 76857 US EXAM PELVIC LIMITED: CPT

## 2020-06-12 PROCEDURE — 86901 BLOOD TYPING SEROLOGIC RH(D): CPT

## 2020-06-12 PROCEDURE — 85027 COMPLETE CBC AUTOMATED: CPT

## 2020-06-12 PROCEDURE — 71045 X-RAY EXAM CHEST 1 VIEW: CPT | Mod: 26

## 2020-06-12 PROCEDURE — 99222 1ST HOSP IP/OBS MODERATE 55: CPT | Mod: GC

## 2020-06-12 PROCEDURE — 99223 1ST HOSP IP/OBS HIGH 75: CPT | Mod: GC

## 2020-06-12 PROCEDURE — 93971 EXTREMITY STUDY: CPT

## 2020-06-12 PROCEDURE — 87086 URINE CULTURE/COLONY COUNT: CPT

## 2020-06-12 RX ORDER — DEXTROSE 50 % IN WATER 50 %
25 SYRINGE (ML) INTRAVENOUS ONCE
Refills: 0 | Status: DISCONTINUED | OUTPATIENT
Start: 2020-06-12 | End: 2020-06-12

## 2020-06-12 RX ORDER — ATORVASTATIN CALCIUM 80 MG/1
80 TABLET, FILM COATED ORAL AT BEDTIME
Refills: 0 | Status: DISCONTINUED | OUTPATIENT
Start: 2020-06-12 | End: 2020-06-12

## 2020-06-12 RX ORDER — INSULIN LISPRO 100/ML
VIAL (ML) SUBCUTANEOUS
Refills: 0 | Status: DISCONTINUED | OUTPATIENT
Start: 2020-06-12 | End: 2020-06-12

## 2020-06-12 RX ORDER — FUROSEMIDE 40 MG
20 TABLET ORAL DAILY
Refills: 0 | Status: DISCONTINUED | OUTPATIENT
Start: 2020-06-12 | End: 2020-06-12

## 2020-06-12 RX ORDER — ASPIRIN/CALCIUM CARB/MAGNESIUM 324 MG
81 TABLET ORAL DAILY
Refills: 0 | Status: DISCONTINUED | OUTPATIENT
Start: 2020-06-12 | End: 2020-06-12

## 2020-06-12 RX ORDER — ALLOPURINOL 300 MG
100 TABLET ORAL DAILY
Refills: 0 | Status: DISCONTINUED | OUTPATIENT
Start: 2020-06-12 | End: 2020-06-12

## 2020-06-12 RX ORDER — DEXTROSE 50 % IN WATER 50 %
15 SYRINGE (ML) INTRAVENOUS ONCE
Refills: 0 | Status: DISCONTINUED | OUTPATIENT
Start: 2020-06-12 | End: 2020-06-12

## 2020-06-12 RX ORDER — ACETAMINOPHEN 500 MG
650 TABLET ORAL ONCE
Refills: 0 | Status: COMPLETED | OUTPATIENT
Start: 2020-06-12 | End: 2020-06-12

## 2020-06-12 RX ORDER — CLOPIDOGREL BISULFATE 75 MG/1
75 TABLET, FILM COATED ORAL DAILY
Refills: 0 | Status: DISCONTINUED | OUTPATIENT
Start: 2020-06-13 | End: 2020-06-12

## 2020-06-12 RX ORDER — ACETAMINOPHEN 500 MG
2 TABLET ORAL
Qty: 0 | Refills: 0 | DISCHARGE
Start: 2020-06-12

## 2020-06-12 RX ORDER — SODIUM CHLORIDE 9 MG/ML
500 INJECTION INTRAMUSCULAR; INTRAVENOUS; SUBCUTANEOUS ONCE
Refills: 0 | Status: COMPLETED | OUTPATIENT
Start: 2020-06-12 | End: 2020-06-12

## 2020-06-12 RX ORDER — HYDRALAZINE HCL 50 MG
1 TABLET ORAL
Qty: 0 | Refills: 0 | DISCHARGE

## 2020-06-12 RX ORDER — FAMOTIDINE 10 MG/ML
1 INJECTION INTRAVENOUS
Qty: 0 | Refills: 0 | DISCHARGE

## 2020-06-12 RX ORDER — INSULIN LISPRO 100/ML
VIAL (ML) SUBCUTANEOUS AT BEDTIME
Refills: 0 | Status: DISCONTINUED | OUTPATIENT
Start: 2020-06-12 | End: 2020-06-12

## 2020-06-12 RX ORDER — TAMSULOSIN HYDROCHLORIDE 0.4 MG/1
0.4 CAPSULE ORAL AT BEDTIME
Refills: 0 | Status: DISCONTINUED | OUTPATIENT
Start: 2020-06-12 | End: 2020-06-12

## 2020-06-12 RX ORDER — CLOPIDOGREL BISULFATE 75 MG/1
75 TABLET, FILM COATED ORAL DAILY
Refills: 0 | Status: DISCONTINUED | OUTPATIENT
Start: 2020-06-12 | End: 2020-06-12

## 2020-06-12 RX ORDER — FINASTERIDE 5 MG/1
5 TABLET, FILM COATED ORAL DAILY
Refills: 0 | Status: DISCONTINUED | OUTPATIENT
Start: 2020-06-12 | End: 2020-06-12

## 2020-06-12 RX ORDER — AMLODIPINE BESYLATE 2.5 MG/1
10 TABLET ORAL DAILY
Refills: 0 | Status: DISCONTINUED | OUTPATIENT
Start: 2020-06-12 | End: 2020-06-12

## 2020-06-12 RX ORDER — GLUCAGON INJECTION, SOLUTION 0.5 MG/.1ML
1 INJECTION, SOLUTION SUBCUTANEOUS ONCE
Refills: 0 | Status: DISCONTINUED | OUTPATIENT
Start: 2020-06-12 | End: 2020-06-12

## 2020-06-12 RX ORDER — SODIUM CHLORIDE 9 MG/ML
1000 INJECTION, SOLUTION INTRAVENOUS
Refills: 0 | Status: DISCONTINUED | OUTPATIENT
Start: 2020-06-12 | End: 2020-06-12

## 2020-06-12 RX ORDER — DEXTROSE 50 % IN WATER 50 %
12.5 SYRINGE (ML) INTRAVENOUS ONCE
Refills: 0 | Status: DISCONTINUED | OUTPATIENT
Start: 2020-06-12 | End: 2020-06-12

## 2020-06-12 RX ORDER — CARVEDILOL PHOSPHATE 80 MG/1
25 CAPSULE, EXTENDED RELEASE ORAL EVERY 12 HOURS
Refills: 0 | Status: DISCONTINUED | OUTPATIENT
Start: 2020-06-12 | End: 2020-06-12

## 2020-06-12 RX ORDER — HYDRALAZINE HCL 50 MG
50 TABLET ORAL
Refills: 0 | Status: DISCONTINUED | OUTPATIENT
Start: 2020-06-12 | End: 2020-06-12

## 2020-06-12 RX ORDER — METFORMIN HYDROCHLORIDE 850 MG/1
1 TABLET ORAL
Qty: 0 | Refills: 0 | DISCHARGE

## 2020-06-12 RX ADMIN — Medication 1 TABLET(S): at 13:50

## 2020-06-12 RX ADMIN — SODIUM CHLORIDE 500 MILLILITER(S): 9 INJECTION INTRAMUSCULAR; INTRAVENOUS; SUBCUTANEOUS at 06:41

## 2020-06-12 RX ADMIN — Medication 81 MILLIGRAM(S): at 12:12

## 2020-06-12 RX ADMIN — Medication 50 MILLIGRAM(S): at 21:21

## 2020-06-12 RX ADMIN — Medication 50 MILLIGRAM(S): at 12:12

## 2020-06-12 RX ADMIN — AMLODIPINE BESYLATE 10 MILLIGRAM(S): 2.5 TABLET ORAL at 12:15

## 2020-06-12 RX ADMIN — Medication 1: at 13:49

## 2020-06-12 RX ADMIN — FINASTERIDE 5 MILLIGRAM(S): 5 TABLET, FILM COATED ORAL at 12:12

## 2020-06-12 RX ADMIN — Medication 650 MILLIGRAM(S): at 21:02

## 2020-06-12 RX ADMIN — ATORVASTATIN CALCIUM 80 MILLIGRAM(S): 80 TABLET, FILM COATED ORAL at 21:21

## 2020-06-12 RX ADMIN — TAMSULOSIN HYDROCHLORIDE 0.4 MILLIGRAM(S): 0.4 CAPSULE ORAL at 21:21

## 2020-06-12 RX ADMIN — Medication 20 MILLIGRAM(S): at 18:15

## 2020-06-12 RX ADMIN — Medication 650 MILLIGRAM(S): at 02:40

## 2020-06-12 RX ADMIN — Medication 100 MILLIGRAM(S): at 12:12

## 2020-06-12 RX ADMIN — CARVEDILOL PHOSPHATE 25 MILLIGRAM(S): 80 CAPSULE, EXTENDED RELEASE ORAL at 18:12

## 2020-06-12 NOTE — CONSULT NOTE ADULT - ASSESSMENT
80M p/w 1w of abd pain while tolerating PO diet, surgery was consulted for possible cholecystitis.  Patient is hemodynamically stable at present time with limited clinical evidence for acute cholecystitis.     - no emergent surgical intervention at present time  - patient without laboratory evidence of acute cholecystitis at present time, given clinical exam and US findings likely biliary colic and not acute cholecystitis.   - will PO trial patient and repeat physical exam for possible discharge home w/ outpatient follow up vs admission at present time    Disposition to follow repeat examination by surgical team.     Plan discussed with attending surgeon ANGELY Lakhani MD    --  JANICE Dejesus MD, PGY-II  Transplant Surgery: [id number]

## 2020-06-12 NOTE — ED PROVIDER NOTE - NS ED ROS FT
Gen: Denies fever, weight loss  CV: Denies chest pain, palpitations  Skin: Denies rash, erythema, color changes  Resp: Denies SOB, cough  Endo: Denies sensitivity to heat, cold, increased urination  GI: Denies constipation, nausea, vomiting + abdominal pain   Msk: Denies LE swelling + back pain + extremity pain + swelling   : Denies dysuria, increased frequency  Neuro: Denies LOC, weakness, seizures  Psych: Denies hx of psych, hallucinations

## 2020-06-12 NOTE — CONSULT NOTE ADULT - ASSESSMENT
79 y/o M w/ PMH of HTN, HLD, DM2, CKD, BPH, OA, gout, CAD s/p FOREST x 3 (RCA) c/o RUQ abd pain x 1W.    #Stable ischemic heart dz  #Pre-op cardiac eval  -Pt has no active cardiopulmonary complaints  -Has known hx of CAD  -Please obtain ECG; notify cardiology when complete  -Pt has 1 RCRI risk factor, which is considered to be elev  -As per PERDUE, his risk of talya-op MACE is estimated to be 0.3%  -His maximal ET exceeds 4 METS  -Pt is at elev risk for a low risk procedure, however further cardiac testing (except ECG, as above) is unlikely to alter mgmt/outcomes    #Notify cardiology when ECG is complete  #Will d/w attg    Jass Reyes MD  Cardiology Fellow  [phone]  All Cardiology service information can be found 24/7 on [email or web address], password: [email or web address] 79 y/o M w/ PMH of HTN, HLD, DM2, CKD, BPH, OA, gout, CAD s/p FOREST x 3 (RCA) c/o RUQ abd pain x 1W.    #Stable ischemic heart dz  #Pre-op cardiac eval  -Pt has no active cardiopulmonary complaints  -Has known hx of CAD  -Please obtain ECG; notify cardiology when complete  -Pt has 1 RCRI risk factor, which is considered to be elev  -As per PERDUE, his risk of talya-op MACE is estimated to be 0.3%  -His maximal ET exceeds 4 METS  -Pt is at elev risk for a low risk procedure, however further cardiac testing (except ECG, as above) is unlikely to alter mgmt/outcomes  -Given hx of FOREST, would c/w clopidogrel in talya-op period if OK w/ surg team. Otherwise, pt should minimally be on ASA.    #Notify cardiology when ECG is complete  #Will d/w mecca Reyes MD  Cardiology Fellow  [phone]  All Cardiology service information can be found 24/7 on [email or web address], password: [email or web address]

## 2020-06-12 NOTE — ED ADULT NURSE NOTE - OBJECTIVE STATEMENT
pt reports 5 days of ruq abd pain, pt denies any nausea or vomiting, pt denies any fevers , pt reports normal bowel and urinary habits.  pt also reports bilateral lower extremity swelling that started today. pt is alert and oriented x3, speaking full clear sentences, respirations non-labored, skin warm dry and intact, strong pulses throughout, +1 pitting edema in bilat ankle area.  abd is soft but tender in ruq.  pt moving all extremities son command.

## 2020-06-12 NOTE — DISCHARGE NOTE PROVIDER - NSDCFUSCHEDAPPT_GEN_ALL_CORE_FT
ROSY JOSEPH ; 06/14/2020 ; NPP DisEmerg 1 Vasile ROSY Bonds ; 06/15/2020 ; [facility] PST-Presurgtest  ROSY JOSEPH ; 06/17/2020 ; [facility]  [facility]  ROSY JOSEPH ; 06/17/2020 ; [facility] Jac ROSY JOSEPH ; 06/14/2020 ; NPP DisEmerg 1 Vasile ROSY Bonds ; 06/15/2020 ; [facility] PST-Presurgtest  ROSY JOSEPH ; 06/17/2020 ; [facility] Surg TrPl  [facility]  ROSY JOSEPH ; 06/17/2020 ; [facility] Jac

## 2020-06-12 NOTE — CONSULT NOTE ADULT - ATTENDING COMMENTS
Agree with plan as outlined above.  No further cardiac testing needed prior to surgery.   Must remain on either plavix or aspirin perioperatively.

## 2020-06-12 NOTE — ED PROVIDER NOTE - SHIFT CHANGE DETAILS
***ATTENDING ADDENDUM (Dr. Camden Hagan): I have received handoff from [facility]. 80M with right upper quadrant abdominal pain, with concern for acute biliary disease (e.g. cholelithiasis, cholecystitis, or cholangitis). Awaiting completion of general surgery consultation and possible additional advanced testing. Will continue to observe and monitor closely.

## 2020-06-12 NOTE — ED PROVIDER NOTE - PROGRESS NOTE DETAILS
Saranya Pisano D.O. (PGY-1): Patient signed out to me, surgery in room now w/ patient. Plan- tba, will reassess pt's pain. Aliya JONES (PGY-1): Patient signed out to me, surgery in room now w/ patient. Plan- tba, will reassess pt's pain. Aliya JONES (PGY-1): Reassessed patient's pain, pain well controlled, pt ambulating without assistance. Will po challenge, surgery requesting to come back to re-examine abd after PO challenge, if can tolerate w/o significant pain, likely dc home with outpatient Sx clinic f/u and schedule for cholecystectomy. Aliya JONES (PGY-1): Reassessed patient's pain, pain well controlled, pt ambulated comfortably to bathroom. Will po challenge, surgery requesting to come back to re-examine abd after PO challenge, if can tolerate w/o significant pain, likely dc home with outpatient Sx clinic f/u and schedule for cholecystectomy. Aliya JONES (PGY-1): Reassessed patient's pain, pain well controlled, pt ambulated comfortably to bathroom. Will po challenge, surgery requesting to come back to re-examine abd after PO challenge, if can tolerate w/o significant pain, likely dc home with outpatient Sx clinic f/u and schedule for cholecystectomy.  **ATTENDING ADDENDUM (Dr. Camden Hagan): agree with above notation by Aliya. Agree with likely discharge home with close outpatient followup with primary care physician/provider. Anticipatory guidance provided by ED team throughout ED course.

## 2020-06-12 NOTE — DISCHARGE NOTE PROVIDER - CARE PROVIDER_API CALL
Jeff Lakhani I  SURGERY  [address] [place], [place] [address]  Phone: [phone]  Fax: [phone]  Follow Up Time:

## 2020-06-12 NOTE — DISCHARGE NOTE NURSING/CASE MANAGEMENT/SOCIAL WORK - NSDCFUADDAPPT_GEN_ALL_CORE_FT
1.  you are scheduled for surgery on Wednesday, June 17 2020 at 7:30AM with Dr. Lakhani (laparoscopic cholecystectomy or removal of gallbladder).  Please arrive at 5:30AM on this day.    2. you are scheduled for COVID testing on Sunday, June 14 2020 at 1:45PM at [address] [place] [address].  Please call [phone] option #2 to confirm or for any questions    3.  you are scheduled for pre-surgical testing on Monday, Patricia 15 at 10:30AM at [facility]. Please bring garage parking pass into hospital with you to have it validated at Pre-Surgical Testing.  You will also need to stop at the kiosk desk outside the main entrance to have your parking ticket re-validated before heading back to garage.  Please call the Surgery office for any questions: [phone]

## 2020-06-12 NOTE — DISCHARGE NOTE NURSING/CASE MANAGEMENT/SOCIAL WORK - PATIENT PORTAL LINK FT
You can access the FollowMyHealth Patient Portal offered by [facility] by registering at the following website: [email or web address]. By joining [employer]’s FollowMyHealth portal, you will also be able to view your health information using other applications (apps) compatible with our system.

## 2020-06-12 NOTE — DISCHARGE NOTE PROVIDER - NSDCCPCAREPLAN_GEN_ALL_CORE_FT
PRINCIPAL DISCHARGE DIAGNOSIS  Diagnosis: Right upper quadrant abdominal pain  Assessment and Plan of Treatment: you were treated for biliary colic with antibiotics and hydration.  you are scheduled for surgery to remove your gallbladder (cholecystectomy) on Wednesday June 17 with Dr. Lakhani.  You will continue to take Aspirin.  You must stop taking Plavix at this time in preparation for surgery.  You will restart it after surgery.  You are prescribed Augmentin (antibiotics) to protect you from infection before surgery. Please avoid high fat, greasy, large meals.  You may take Tylenol for pain as needed.

## 2020-06-12 NOTE — CONSULT NOTE ADULT - SUBJECTIVE AND OBJECTIVE BOX
Patient seen and evaluated at bedside    Chief Complaint: Abd pain    HPI: 81 y/o M w/ PMH of HTN, HLD, DM2, CKD, BPH, OA, gout, CAD s/p FOREST x 3 (RCA) c/o RUQ abd pain x 1W. Pt seen by gen sx and felt to be 2/2 biliary colic planned for possible lap nahid. Cards c/s for pre-op cardiac eval. Pt reports hx of angina in the past and pos stress test that prompted cath and FOREST placement. Since that time, he has not had recurrence of any CP. No dyspnea. No LE edema. No orthopnea/PND. At baseline, his maximal ET is limited only by knee pain but states can ambulate up 2 FOS w/o CP/dyspnea/resting; otherwise has never had limitation of maximal ET due to CP/dyspnea/fatigue. No hx of lung dz. Pt denies any active complaints aside from abd pain.      PMHx:   Ganglion cyst of wrist, left  CAD (coronary artery disease)  Diabetes  Hyperlipidemia  Hypertension  BPH (Benign Prostatic Hypertrophy)  Pneumonia  H/O: Rotator Cuff Tear  History of Arthroscopy  Renal Calculi  Diabetes Mellitus Type II  Hyperlipemia  Hypertension      PSHx:   Bilateral rotator cuff syndrome  History of lumbar laminectomy  Cervical fusion syndrome  Trigger finger of both hands  Bilateral cataracts  lumbar lamenectomy  Laminectomy with Spinal Fusion  History of Arthroscopy      Allergies:  Avapro (Hives)  enalapril (Unknown)  losartan (Other)  losartan (Other)  seasonal allergies-outdoor (Urticaria; Rhinorrhea; Sneezing)      Home Meds: Reviewed    Current Medications:   allopurinol 100 milliGRAM(s) Oral daily  amLODIPine   Tablet 10 milliGRAM(s) Oral daily  amoxicillin  500 milliGRAM(s)/clavulanate 1 Tablet(s) Oral two times a day  atorvastatin 80 milliGRAM(s) Oral at bedtime  carvedilol 25 milliGRAM(s) Oral every 12 hours  dextrose 40% Gel 15 Gram(s) Oral once PRN  dextrose 5%. 1000 milliLiter(s) IV Continuous <Continuous>  dextrose 50% Injectable 12.5 Gram(s) IV Push once  dextrose 50% Injectable 25 Gram(s) IV Push once  dextrose 50% Injectable 25 Gram(s) IV Push once  finasteride 5 milliGRAM(s) Oral daily  furosemide    Tablet 20 milliGRAM(s) Oral daily  glucagon  Injectable 1 milliGRAM(s) IntraMuscular once PRN  hydrALAZINE 50 milliGRAM(s) Oral two times a day  insulin lispro (HumaLOG) corrective regimen sliding scale   SubCutaneous three times a day before meals  insulin lispro (HumaLOG) corrective regimen sliding scale   SubCutaneous at bedtime  tamsulosin 0.4 milliGRAM(s) Oral at bedtime      FAMILY HISTORY:  Family history of cerebrovascular accident (CVA)  Family history of cerebrovascular accident: mother      Social History:  Smoking History: Denies  Alcohol Use: Denies  Drug Use: Denies    REVIEW OF SYSTEMS:  CONSTITUTIONAL: No weakness, fevers or chills  EYES/ENT: No visual changes;  No dysphagia  NECK: No pain or stiffness  RESPIRATORY: No cough, wheezing, hemoptysis; No shortness of breath  CARDIOVASCULAR: No chest pain or palpitations; No lower extremity edema  GASTROINTESTINAL: +Abd pain  BACK: No back pain  GENITOURINARY: No dysuria, frequency or hematuria  NEUROLOGICAL: No numbness or weakness  SKIN: No itching, burning, rashes, or lesions   All other review of systems is negative unless indicated above.    Physical Exam:  T(F): 97.7 (06-12), Max: 98.2 (06-12)  HR: 62 (06-12) (62 - 72)  BP: 160/66 (06-12) (160/66 - 219/98)  RR: 16 (06-12)  SpO2: 96% (06-12)  Gen: NAD.  HEENT: NCAT. PERRLA b/l.  Neck: No JVP elev.  CV: Normal S1, S2. RRR. No MRG.  Chest: CTAB. No WRR.  Abd: +BSx4. Soft. Nondistended. +RUQ tenderness.  Ext: No LE edema.  Skin: No cyanosis.    Cardiovascular Diagnostic Testing:    ECG: Personally reviewed:    Echo: Personally reviewed: < from: Transthoracic Echocardiogram (05.10.16 @ 12:55) >  1. Normal left ventricular internal dimensions and wall  thicknesses.  2. Normal left ventricular systolic function. No segmental  wall motion abnormalities.  3. Mild diastolic dysfunction (Stage I).  4. Normal right ventricular size andfunction.    Stress Testing:     Cath:    Imag< from: Nuclear Stress Test-Exercise (05.10.16 @ 15:44) >  * The left ventricle was normal in size. 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    Labs: Personally reviewed                        13.6   3.95  )-----------( 166      ( 12 Jun 2020 02:58 )             40.4     06-12    139  |  102  |  26<H>  ----------------------------<  209<H>  4.1   |  25  |  1.53<H>    Ca    10.5      12 Jun 2020 02:58    TPro  7.1  /  Alb  4.4  /  TBili  0.4  /  DBili  x   /  AST  34  /  ALT  33  /  AlkPhos  76  06-12    PT/INR - ( 12 Jun 2020 06:51 )   PT: 10.2 sec;   INR: 0.90 ratio         PTT - ( 12 Jun 2020 06:51 )  PTT:30.1 sec

## 2020-06-12 NOTE — ED ADULT NURSE REASSESSMENT NOTE - NS ED NURSE REASSESS COMMENT FT1
Pt remains off unit at U/S.
Pt reports partial relief of pain. NAD at this time. Has been ambulatory back and forth to the bathroom independently. Denies complains. Offered additional linens and lights dimmed per pt request. Awaiting abd U/S, plan of care explained.
Pt returned from U/S. Type + screen, coagulation labs, and COVID swab collected and sent to lab. VSS/ NAD. Aware of care plan at this time. 500cc bolus of NS infusing at this time. CXR completed. Patient denies complaints of pain or nausea at this time. Safety and comfort maintained. Change of shift report to receiving RN in progress. See handoff details in ED Adult Nurse Note in [place].
Pt. was given breakfast and pt. tolerated food well. Pending surgical re assessment.
70 year old female multiple myeloma, fever, positive flu profile. Patient has difficulty ambulating and has been sent over from [facility]. PMH Hypothyroidism, DM, Hyperlipidemia, GERD, HTN
Pt. received A&OX4 with no noted or reported distress, stable vitals.  Pt. denies pain at this time.   infusing with no noted pain, redness or swelling.  Pt. pending surgical consult, and COVID results.  Will continue to monitor  as ordered.

## 2020-06-12 NOTE — CONSULT NOTE ADULT - SUBJECTIVE AND OBJECTIVE BOX
SURGERY CONSULT NOTE  ROSY JOSEPH  |  [id number]  |  20 @ 08:51    CC: Patient is a 80y old  Male who presents with a chief complaint of abdominal pain.     HPI:  80M hx DM HTN HLD CAD s/p CABG now p/w 1w of abd pain w/o associated nausea / vomiting. Patient has been tolerating PO during the past week, has had no changes in bowel or bladder, no weight loss. No fevers.  Patient states that the pain was progressive and when he looked down yesterday found his r. foot was swollen prompting ED presentation. Ambulating w/o difficulty.     Patient denies any epigastric pain, denies back pain, denies HA / CP / SOB / N / V.     REVIEW OF SYSTEMS:  As above.     PAST MEDICAL & SURGICAL HISTORY:  Ganglion cyst of wrist, left  CAD (coronary artery disease): stents x4( 2016)  last echo stress test 2016  Diabetes: 2  Hyperlipidemia  Hypertension  BPH (Benign Prostatic Hypertrophy)  Pneumonia  H/O: Rotator Cuff Tear  Renal Calculi: h/olithotripsy   Diabetes Mellitus Type II  Hyperlipemia  Hypertension  Bilateral rotator cuff syndrome: operated both&#x27;  History of lumbar laminectomy  Cervical fusion syndrome  Trigger finger of both hands: sp repair  Bilateral cataracts: sp extraction  lumbar lamenectomy:   Laminectomy with Spinal Fusion: cervical-   History of Arthroscopy: bilateral shoulder rotator cuff repair    ALLERGIES:  Avapro (Hives)  enalapril (Unknown)  losartan (Other)  seasonal allergies-outdoor (Urticaria; Rhinorrhea; Sneezing)  losartan (Other)    SOCIAL HISTORY:  Smoking Hx: denies  Etoh Hx: denies  IVDA Hx: denies    FAMILY HISTORY:  Family history of cerebrovascular accident (CVA) (Mother)  Family history of cerebrovascular accident (Mother): mother    Objective:   Vital Signs Last 24 Hrs  T(C): 36.8 (2020 07:30), Max: 36.8 (2020 07:30)  T(F): 98.2 (2020 07:30), Max: 98.2 (2020 07:30)  HR: 65 (2020 07:30) (64 - 72)  BP: 166/76 (2020 07:30) (160/79 - 219/98)  BP(mean): 100 (2020 07:30) (100 - 100)  RR: 16 (2020 07:30) (16 - 18)  SpO2: 100% (2020 07:30) (98% - 100%)    Physical Exam:  General: Well developed, well nourished, alert and cooperative, and appears to be in no acute distress.  HEENT: normocephalic, vision is grossly intact  Chest: respirations grossly unlabored, supine on RA.  Abdomen: soft, non-distended, non-tender, no guarding or rebound    LABS:                        13.6   3.95  )-----------( 166      ( 2020 02:58 )             40.4     06-12    139  |  102  |  26<H>  ----------------------------<  209<H>  4.1   |  25  |  1.53<H>    Ca    10.5      2020 02:58    TPro  7.1  /  Alb  4.4  /  TBili  0.4  /  DBili  x   /  AST  34  /  ALT  33  /  AlkPhos  76  06-12  PT/INR - ( 2020 06:51 )   PT: 10.2 sec;   INR: 0.90 ratio    PTT - ( 2020 06:51 )  PTT:30.1 sec    LIVER FUNCTIONS - ( 2020 02:58 )  Alb: 4.4 g/dL / Pro: 7.1 g/dL / ALK PHOS: 76 U/L / ALT: 33 U/L / AST: 34 U/L / GGT: x           Urinalysis Basic - ( 2020 02:58 )    Color: Colorless / Appearance: Clear / S.009 / pH: x  Gluc: x / Ketone: Negative  / Bili: Negative / Urobili: Negative   Blood: x / Protein: 30 mg/dL / Nitrite: Negative   Leuk Esterase: Negative / RBC: 1 /hpf / WBC 0 /HPF   Sq Epi: x / Non Sq Epi: 0 /hpf / Bacteria: Negative    RADIOLOGY & ADDITIONAL STUDIES:    < from: US Abdomen Complete (20 @ 06:09) >  FINDINGS:  Liver: Normal size and echotexture  Bile ducts: Common bile duct measures 6 mm, the upper limit of normal.   Gallbladder: Distended with sludge, and a 3.2 cm calcified gallstone. Mural thickening up to 3.5 mm..    Pancreas: Visualized portions are within normal limits.  Spleen: 1.0 cm. Within normal limits.    Right kidney: 11.9 cm. An upper pole cyst measures 2.3 x 3.2 x 3.4 cm. Hyperechoic renal parenchyma, without hydronephrosis or perinephric fluid collection..   Left kidney: 11.5 cm. An upper/middle pole cyst measures 2.5 x 3.0 x 4.0 cm. Hyperechoic renal parenchyma. Pelviectasis. Trace perinephric fluid collection.     Ascites: None.  Aorta and IVC: Normal in caliber where imaged    Urinary bladder: Sonographically unremarkable    IMPRESSION:     1.  Appearance of the gallbladder is indeterminate, consider nuclear medicine hepatobiliary imaging if there is clinical concern for acute cystic duct obstruction.  2.  Chronic medical renal disease.  3.  Left renal pelviectasis and trace perinephric fluid.  4.  Bilateral renal cysts.    < end of copied text > SURGERY CONSULT NOTE  ROSY JOSEPH  |  [id number]  |  20 @ 08:51    CC: Patient is a 80y old  Male who presents with a chief complaint of abdominal pain.     HPI:  80M hx DM HTN HLD CAD s/p stent now p/w 1w of abd pain w/o associated nausea / vomiting. Patient has been tolerating PO during the past week, has had no changes in bowel or bladder, no weight loss. No fevers.  Patient states that the pain was progressive and when he looked down yesterday found his r. foot was swollen prompting ED presentation. Ambulating w/o difficulty.     Patient denies any epigastric pain, denies back pain, denies HA / CP / SOB / N / V.     REVIEW OF SYSTEMS:  As above.     PAST MEDICAL & SURGICAL HISTORY:  Ganglion cyst of wrist, left  CAD (coronary artery disease): stents x4( 2016)  last echo stress test 2016  Diabetes: 2  Hyperlipidemia  Hypertension  BPH (Benign Prostatic Hypertrophy)  Pneumonia  H/O: Rotator Cuff Tear  Renal Calculi: h/olithotripsy   Diabetes Mellitus Type II  Hyperlipemia  Hypertension  Bilateral rotator cuff syndrome: operated both&#x27;  History of lumbar laminectomy  Cervical fusion syndrome  Trigger finger of both hands: sp repair  Bilateral cataracts: sp extraction  lumbar lamenectomy:   Laminectomy with Spinal Fusion: cervical-   History of Arthroscopy: bilateral shoulder rotator cuff repair    ALLERGIES:  Avapro (Hives)  enalapril (Unknown)  losartan (Other)  seasonal allergies-outdoor (Urticaria; Rhinorrhea; Sneezing)  losartan (Other)    SOCIAL HISTORY:  Smoking Hx: denies  Etoh Hx: denies  IVDA Hx: denies    FAMILY HISTORY:  Family history of cerebrovascular accident (CVA) (Mother)  Family history of cerebrovascular accident (Mother): mother    Objective:   Vital Signs Last 24 Hrs  T(C): 36.8 (2020 07:30), Max: 36.8 (2020 07:30)  T(F): 98.2 (2020 07:30), Max: 98.2 (2020 07:30)  HR: 65 (2020 07:30) (64 - 72)  BP: 166/76 (2020 07:30) (160/79 - 219/98)  BP(mean): 100 (2020 07:30) (100 - 100)  RR: 16 (2020 07:30) (16 - 18)  SpO2: 100% (2020 07:30) (98% - 100%)    Physical Exam:  General: Well developed, well nourished, alert and cooperative, and appears to be in no acute distress.  HEENT: normocephalic, vision is grossly intact  Chest: respirations grossly unlabored, supine on RA.  Abdomen: soft, non-distended, non-tender, no guarding or rebound    LABS:                        13.6   3.95  )-----------( 166      ( 2020 02:58 )             40.4     06-12    139  |  102  |  26<H>  ----------------------------<  209<H>  4.1   |  25  |  1.53<H>    Ca    10.5      2020 02:58    TPro  7.1  /  Alb  4.4  /  TBili  0.4  /  DBili  x   /  AST  34  /  ALT  33  /  AlkPhos  76  06-12  PT/INR - ( 2020 06:51 )   PT: 10.2 sec;   INR: 0.90 ratio    PTT - ( 2020 06:51 )  PTT:30.1 sec    LIVER FUNCTIONS - ( 2020 02:58 )  Alb: 4.4 g/dL / Pro: 7.1 g/dL / ALK PHOS: 76 U/L / ALT: 33 U/L / AST: 34 U/L / GGT: x           Urinalysis Basic - ( 2020 02:58 )    Color: Colorless / Appearance: Clear / S.009 / pH: x  Gluc: x / Ketone: Negative  / Bili: Negative / Urobili: Negative   Blood: x / Protein: 30 mg/dL / Nitrite: Negative   Leuk Esterase: Negative / RBC: 1 /hpf / WBC 0 /HPF   Sq Epi: x / Non Sq Epi: 0 /hpf / Bacteria: Negative    RADIOLOGY & ADDITIONAL STUDIES:    < from: US Abdomen Complete (20 @ 06:09) >  FINDINGS:  Liver: Normal size and echotexture  Bile ducts: Common bile duct measures 6 mm, the upper limit of normal.   Gallbladder: Distended with sludge, and a 3.2 cm calcified gallstone. Mural thickening up to 3.5 mm..    Pancreas: Visualized portions are within normal limits.  Spleen: 1.0 cm. Within normal limits.    Right kidney: 11.9 cm. An upper pole cyst measures 2.3 x 3.2 x 3.4 cm. Hyperechoic renal parenchyma, without hydronephrosis or perinephric fluid collection..   Left kidney: 11.5 cm. An upper/middle pole cyst measures 2.5 x 3.0 x 4.0 cm. Hyperechoic renal parenchyma. Pelviectasis. Trace perinephric fluid collection.     Ascites: None.  Aorta and IVC: Normal in caliber where imaged    Urinary bladder: Sonographically unremarkable    IMPRESSION:     1.  Appearance of the gallbladder is indeterminate, consider nuclear medicine hepatobiliary imaging if there is clinical concern for acute cystic duct obstruction.  2.  Chronic medical renal disease.  3.  Left renal pelviectasis and trace perinephric fluid.  4.  Bilateral renal cysts.    < end of copied text >

## 2020-06-12 NOTE — DISCHARGE NOTE PROVIDER - NSDCFUADDAPPT_GEN_ALL_CORE_FT
1.  you are scheduled for surgery on Wednesday, June 17 2020 at 7:30AM with Dr. Lakhani (laparoscopic cholecystectomy or removal of gallbladder).  Please arrive at 5:30AM on this day.    2. you are scheduled for COVID testing on Sunday, June 14 2020 at 1:45PM at [address] [place] [address].  Please call [phone] option #[phone] to confirm or for any questions    3.  you are scheduled for pre-surgical testing on Monday, Patricia 15 at 10:30AM at [facility]. Please bring garage parking pass into hospital with you to have it validated at Pre-Surgical Testing.  You will also need to stop at the kiosk desk outside the main entrance to have your parking ticket re-validated before heading back to garage.  Please call the Surgery office for any questions: [phone]

## 2020-06-12 NOTE — ED PROVIDER NOTE - CLINICAL SUMMARY MEDICAL DECISION MAKING FREE TEXT BOX
80M w/ ruq, flank pain x1 week - afebrile, vss, ttp in RUQ not in RLQ, neg straight leg test & w/o pitting edema, higher suspicion for cholecystic vs renal etiology, will ultrasound & send labs to start - consider further imaging if inial u/s negative.

## 2020-06-12 NOTE — ED PROVIDER NOTE - NSFOLLOWUPINSTRUCTIONS_ED_ALL_ED_FT
- Lab and imaging results, if performed, were discussed with you along with your discharge diagnosis    - Please schedule a follow-up appointment with Surgery     - Return to the ED for any new, worsening, or concerning symptoms to you including worsening abdominal pain, inability to eat or drink, intractable vomiting     - Continue all prescribed medications    - Take Tylenol as directed as needed for pain  To control your pain at home, you should take Tylenol 650mg-1000mg every 6 to 8 hours. Limit your maximum daily Tylenol from all sources to 4000mg. Be aware that many other medications contain acetaminophen which is also known as Tylenol. Tylenol is an over the counter medications that you can  at your local pharmacy without a prescription. You need to respect all of the warnings on the bottles.    - Rest and keep yourself hydrated with fluids

## 2020-06-12 NOTE — ED PROVIDER NOTE - OBJECTIVE STATEMENT
76M w/ hx  DM, HLD, HTN, CAD s/p 4 cardiac stents p/w abd pain x1 week, initially epigastric now localizing more to RUQ w/o associated anorexia, no f/c/n/v/d, no CP or SOB, no urinary sxs. Also complains of R flank & Lower back pain & states last night it began radiating down R leg and felt like R foot was swollen. Had RUQ ultrasound scheduled for today outpt.

## 2020-06-12 NOTE — DISCHARGE NOTE PROVIDER - NSDCMRMEDTOKEN_GEN_ALL_CORE_FT
allopurinol 100 mg oral tablet: 1 tab(s) orally once a day  amoxicillin-clavulanate 500 mg-125 mg oral tablet: 1 tab(s) orally 2 times a day  Aspirin Enteric Coated 81 mg oral delayed release tablet: 1 tab(s) orally every other day  atorvastatin 80 mg oral tablet: 1 tab(s) orally once a day  carvedilol 25 mg oral tablet: 1 tab(s) orally 2 times a day  Co Q-10 100 mg oral capsule: 1 cap(s) orally once a day  Flomax 0.4 mg oral capsule: 1 cap(s) orally once a day  glimepiride 2 mg oral tablet: 1 tab(s) orally once a day  glucosamine 500 mg oral tablet: 1 tab(s) orally 3 times a day  hydrALAZINE 50 mg oral tablet: 1 tab(s) orally 2 times a day  Lasix 20 mg oral tablet: 1 tab(s) orally once a day  multivitamin: 1 tab(s) orally once a day  Norvasc 10 mg oral tablet: 1 tab(s) orally once a day  Propecia 1 mg oral tablet: 1 tab(s) orally once a day  Tradjenta 5 mg oral tablet: 1 tab(s) orally once a day  Viagra 100 mg oral tablet: 1 tab(s) orally once a day, As Needed acetaminophen 325 mg oral tablet: 2 tab(s) orally every 4 to 6 hours, As Needed  allopurinol 100 mg oral tablet: 1 tab(s) orally once a day  amoxicillin-clavulanate 500 mg-125 mg oral tablet: 1 tab(s) orally 2 times a day  Aspirin Enteric Coated 81 mg oral delayed release tablet: 1 tab(s) orally every other day  atorvastatin 80 mg oral tablet: 1 tab(s) orally once a day  carvedilol 25 mg oral tablet: 1 tab(s) orally 2 times a day  Co Q-10 100 mg oral capsule: 1 cap(s) orally once a day  Flomax 0.4 mg oral capsule: 1 cap(s) orally once a day  glimepiride 2 mg oral tablet: 1 tab(s) orally once a day  glucosamine 500 mg oral tablet: 1 tab(s) orally 3 times a day  hydrALAZINE 50 mg oral tablet: 1 tab(s) orally 2 times a day  Lasix 20 mg oral tablet: 1 tab(s) orally once a day  multivitamin: 1 tab(s) orally once a day  Norvasc 10 mg oral tablet: 1 tab(s) orally once a day  Propecia 1 mg oral tablet: 1 tab(s) orally once a day  Tradjenta 5 mg oral tablet: 1 tab(s) orally once a day  Viagra 100 mg oral tablet: 1 tab(s) orally once a day, As Needed

## 2020-06-12 NOTE — DISCHARGE NOTE PROVIDER - HOSPITAL COURSE
80M with DM, HTN, HLD, CAD s/p stents (on Plavix), presented to ED with abdominal pain, n/v x1 week.  No loss of appetite.  Also with RLE edema, negative for DVT on LE dopplers.     imaging revealed biliary colic without definitive acute cholecystitis (mildly distended, mildly thickened wall, no pericholecystic fluid).  Provided pain control, ABX, hydration and diet trials with good response.           Discharged home on 6/12 with plans for OR lap cholecystectomy with Dr. Lakhani (given recurrent episodes of biliary colic) on Wednesday June 17, Augmentin and Tylenol PO prn pain.         He was cleared by Cardiology Dr. Hipolito Gonzales, with plans to hold Plavix and continue ASA.

## 2020-06-12 NOTE — CONSULT NOTE ADULT - ATTENDING COMMENTS
Reports RUQ pain ~1wk.  Has had episodes in the past.  Mild TTP in RUQ.  U/S cannot r/o acute cholecystitis (mild distended, mildly thickened wall, no pericholecystic fluid).  No leukocytosis.  Does have h/o stent about 2yrs ago.  He is on Plavix monotherapy.  Will plan for cardiology evaluation and clearance. PO abx.  Will perform laparoscopic cholecystectomy in short order after discussing case with cardiology consult service.

## 2020-06-12 NOTE — ED PROVIDER NOTE - ATTENDING CONTRIBUTION TO CARE
MD Ibarra:  patient seen and evaluated personally.   I agree with the History & Physical,  Impression & Plan other than what was detailed in my note.  MD Ibarra     76M with IDDM, HTN, HLD, s/p cardiac stent x 4 presents to ed w/ cc of abd pain MD Ibarra:  patient seen and evaluated personally.   I agree with the History & Physical,  Impression & Plan other than what was detailed in my note.  MD Ibarra     76M with IDDM, HTN, HLD, s/p cardiac stent x 4 presents to ed w/ cc of abd pain, states it has been going on intermittently over the past week. Also having lower r back pain. Is here today because this pain in back is now going down leg and he feels his legs are swollen. no hx of dvt/pe, no sob, cp noted. pt states pain in ruq is mild and has not really changed. hx of kidney stone. no hematuria, dysuria. afebrile vitals stable, non toxic, well appearing, pos ttp in ruq, neg ramírez, no rlq ttp on my exam. ne straight leg test, no pitting edema noted, neg lott/tristin.

## 2020-06-12 NOTE — ED PROVIDER NOTE - PHYSICAL EXAMINATION
Gen: WDWN, NAD  HEENT: EOMI, no nasal discharge, mucous membranes moist  CV: RRR,, no M/R/G  Resp: CTAB, no W/R/R  GI: Abdomen soft non-distended, TTP RUQ w/ equivocal murphys,  MSK: No open wounds, no bruising, no LE edema, old surgical scar on thoracic spine, + R lumbar back TTP w/ neg straight leg raise   Neuro: A&Ox4, following commands, moving all four extremities spontaneously  Psych: appropriate mood, denies AH, VH, SI

## 2020-06-13 LAB
CULTURE RESULTS: SIGNIFICANT CHANGE UP
SPECIMEN SOURCE: SIGNIFICANT CHANGE UP

## 2020-06-14 ENCOUNTER — APPOINTMENT (OUTPATIENT)
Dept: DISASTER EMERGENCY | Facility: CLINIC | Age: 80
End: 2020-06-14

## 2020-06-14 DIAGNOSIS — Z01.818 ENCOUNTER FOR OTHER PREPROCEDURAL EXAMINATION: ICD-10-CM

## 2020-06-15 ENCOUNTER — OUTPATIENT (OUTPATIENT)
Dept: OUTPATIENT SERVICES | Facility: HOSPITAL | Age: 80
LOS: 1 days | End: 2020-06-15
Payer: MEDICARE

## 2020-06-15 VITALS
SYSTOLIC BLOOD PRESSURE: 147 MMHG | HEART RATE: 63 BPM | WEIGHT: 186.73 LBS | TEMPERATURE: 97 F | OXYGEN SATURATION: 99 % | RESPIRATION RATE: 18 BRPM | DIASTOLIC BLOOD PRESSURE: 74 MMHG | HEIGHT: 70 IN

## 2020-06-15 DIAGNOSIS — I25.10 ATHEROSCLEROTIC HEART DISEASE OF NATIVE CORONARY ARTERY WITHOUT ANGINA PECTORIS: ICD-10-CM

## 2020-06-15 DIAGNOSIS — E11.9 TYPE 2 DIABETES MELLITUS WITHOUT COMPLICATIONS: ICD-10-CM

## 2020-06-15 DIAGNOSIS — K81.0 ACUTE CHOLECYSTITIS: ICD-10-CM

## 2020-06-15 DIAGNOSIS — M65.30 TRIGGER FINGER, UNSPECIFIED FINGER: Chronic | ICD-10-CM

## 2020-06-15 DIAGNOSIS — Z01.818 ENCOUNTER FOR OTHER PREPROCEDURAL EXAMINATION: ICD-10-CM

## 2020-06-15 DIAGNOSIS — G47.33 OBSTRUCTIVE SLEEP APNEA (ADULT) (PEDIATRIC): ICD-10-CM

## 2020-06-15 DIAGNOSIS — H26.9 UNSPECIFIED CATARACT: Chronic | ICD-10-CM

## 2020-06-15 DIAGNOSIS — K81.9 CHOLECYSTITIS, UNSPECIFIED: ICD-10-CM

## 2020-06-15 DIAGNOSIS — Z98.89 OTHER SPECIFIED POSTPROCEDURAL STATES: Chronic | ICD-10-CM

## 2020-06-15 DIAGNOSIS — M75.101 UNSPECIFIED ROTATOR CUFF TEAR OR RUPTURE OF RIGHT SHOULDER, NOT SPECIFIED AS TRAUMATIC: Chronic | ICD-10-CM

## 2020-06-15 DIAGNOSIS — Q76.1 KLIPPEL-FEIL SYNDROME: Chronic | ICD-10-CM

## 2020-06-15 DIAGNOSIS — I10 ESSENTIAL (PRIMARY) HYPERTENSION: ICD-10-CM

## 2020-06-15 LAB
A1C WITH ESTIMATED AVERAGE GLUCOSE RESULT: 8.2 % — HIGH (ref 4–5.6)
ALBUMIN SERPL ELPH-MCNC: 4.3 G/DL — SIGNIFICANT CHANGE UP (ref 3.3–5)
ALP SERPL-CCNC: 74 U/L — SIGNIFICANT CHANGE UP (ref 40–120)
ALT FLD-CCNC: 29 U/L — SIGNIFICANT CHANGE UP (ref 10–45)
ANION GAP SERPL CALC-SCNC: 10 MMOL/L — SIGNIFICANT CHANGE UP (ref 5–17)
AST SERPL-CCNC: 32 U/L — SIGNIFICANT CHANGE UP (ref 10–40)
BILIRUB SERPL-MCNC: 0.5 MG/DL — SIGNIFICANT CHANGE UP (ref 0.2–1.2)
BUN SERPL-MCNC: 22 MG/DL — SIGNIFICANT CHANGE UP (ref 7–23)
CALCIUM SERPL-MCNC: 10 MG/DL — SIGNIFICANT CHANGE UP (ref 8.4–10.5)
CHLORIDE SERPL-SCNC: 103 MMOL/L — SIGNIFICANT CHANGE UP (ref 96–108)
CO2 SERPL-SCNC: 23 MMOL/L — SIGNIFICANT CHANGE UP (ref 22–31)
CREAT SERPL-MCNC: 1.53 MG/DL — HIGH (ref 0.5–1.3)
ESTIMATED AVERAGE GLUCOSE: 189 MG/DL — HIGH (ref 68–114)
GLUCOSE SERPL-MCNC: 281 MG/DL — HIGH (ref 70–99)
HCT VFR BLD CALC: 40.6 % — SIGNIFICANT CHANGE UP (ref 39–50)
HGB BLD-MCNC: 13.4 G/DL — SIGNIFICANT CHANGE UP (ref 13–17)
MCHC RBC-ENTMCNC: 31.6 PG — SIGNIFICANT CHANGE UP (ref 27–34)
MCHC RBC-ENTMCNC: 33 GM/DL — SIGNIFICANT CHANGE UP (ref 32–36)
MCV RBC AUTO: 95.8 FL — SIGNIFICANT CHANGE UP (ref 80–100)
NRBC # BLD: 0 /100 WBCS — SIGNIFICANT CHANGE UP (ref 0–0)
PLATELET # BLD AUTO: 159 K/UL — SIGNIFICANT CHANGE UP (ref 150–400)
POTASSIUM SERPL-MCNC: 4.4 MMOL/L — SIGNIFICANT CHANGE UP (ref 3.5–5.3)
POTASSIUM SERPL-SCNC: 4.4 MMOL/L — SIGNIFICANT CHANGE UP (ref 3.5–5.3)
PROT SERPL-MCNC: 7 G/DL — SIGNIFICANT CHANGE UP (ref 6–8.3)
RBC # BLD: 4.24 M/UL — SIGNIFICANT CHANGE UP (ref 4.2–5.8)
RBC # FLD: 14.1 % — SIGNIFICANT CHANGE UP (ref 10.3–14.5)
SARS-COV-2 N GENE NPH QL NAA+PROBE: NOT DETECTED
SODIUM SERPL-SCNC: 136 MMOL/L — SIGNIFICANT CHANGE UP (ref 135–145)
WBC # BLD: 3.87 K/UL — SIGNIFICANT CHANGE UP (ref 3.8–10.5)
WBC # FLD AUTO: 3.87 K/UL — SIGNIFICANT CHANGE UP (ref 3.8–10.5)

## 2020-06-15 PROCEDURE — 83036 HEMOGLOBIN GLYCOSYLATED A1C: CPT

## 2020-06-15 PROCEDURE — 80053 COMPREHEN METABOLIC PANEL: CPT

## 2020-06-15 PROCEDURE — 85027 COMPLETE CBC AUTOMATED: CPT

## 2020-06-15 PROCEDURE — G0463: CPT

## 2020-06-15 RX ORDER — LIDOCAINE HCL 20 MG/ML
0.2 VIAL (ML) INJECTION ONCE
Refills: 0 | Status: DISCONTINUED | OUTPATIENT
Start: 2020-06-17 | End: 2020-07-02

## 2020-06-15 RX ORDER — CEFAZOLIN SODIUM 1 G
2000 VIAL (EA) INJECTION ONCE
Refills: 0 | Status: DISCONTINUED | OUTPATIENT
Start: 2020-06-17 | End: 2020-07-02

## 2020-06-15 RX ORDER — CHLORHEXIDINE GLUCONATE 213 G/1000ML
1 SOLUTION TOPICAL ONCE
Refills: 0 | Status: DISCONTINUED | OUTPATIENT
Start: 2020-06-17 | End: 2020-07-02

## 2020-06-15 NOTE — H&P PST ADULT - NSICDXPASTSURGICALHX_GEN_ALL_CORE_FT
PAST SURGICAL HISTORY:  Bilateral cataracts sp extraction    Bilateral rotator cuff syndrome operated both'    Cervical fusion syndrome     History of Arthroscopy bilateral shoulder rotator cuff repair    History of lumbar laminectomy     Laminectomy with Spinal Fusion cervical- 2009    lumbar lamenectomy 1998    Trigger finger of both hands sp repair

## 2020-06-15 NOTE — H&P PST ADULT - NSICDXFAMILYHX_GEN_ALL_CORE_FT
FAMILY HISTORY:  Family history of cerebrovascular accident, mother  Family history of cerebrovascular accident (CVA)

## 2020-06-15 NOTE — H&P PST ADULT - ATTENDING COMMENTS
81yo M seen in ER for biliary colic vs cholecystitis.  Felt better with abx, pain medication and PO challenge.  Scheduled today for short interval f/u lap cholecystectomy, possible open.  Risks of the procedure including but not limited to bleeding, infection and damage to adjacent structures discussed.  Pt signed surgical consent after all questions were answered. 79yo M seen in ER for biliary colic vs cholecystitis.  Felt better with abx, pain medication and PO challenge.  Scheduled today for short interval f/u lap cholecystectomy, possible open.  Pt was on Plavix, but changed to ASA after d/w Cardiology consultant.  Surgery scheduled after giving 5 days for conversion.  Risks of the procedure including but not limited to bleeding, infection and damage to adjacent structures discussed.  Pt signed surgical consent after all questions were answered.

## 2020-06-15 NOTE — H&P PST ADULT - NSICDXPROBLEM_GEN_ALL_CORE_FT
PROBLEM DIAGNOSES  Problem: Cholecystitis  Assessment and Plan: Laparoscpic cholecystectomy    Problem: Type 2 diabetes mellitus  Assessment and Plan: Pt. instructed not to tke his scheduled dose of glimeperide evening before surgery and not to take his scheduled dose of tradjenta morning of procedure  Stat fingerstick morning of procedure    Problem: Hypertension  Assessment and Plan: Pt. instructed to take his scheduled dose of carvedilol, hydralazine and lasix morning of procedure    Problem: Coronary artery disease  Assessment and Plan: Pt. reports last dose of Plavix 6/13/20 & continuing daily aspirin    Problem: SHARATH (obstructive sleep apnea)  Assessment and Plan: SHARATH Precautions  OR booking notified of SHARATH

## 2020-06-15 NOTE — H&P PST ADULT - HISTORY OF PRESENT ILLNESS
80 year old male presents for laparoscopic cholecystectomy. Pt. reports experiencing right upper quadrant pain X 1 week. Evaluated in ER 6/12/20, + Cholecystitis, procedure recommended.

## 2020-06-15 NOTE — H&P PST ADULT - NSICDXPASTMEDICALHX_GEN_ALL_CORE_FT
PAST MEDICAL HISTORY:  BPH (Benign Prostatic Hypertrophy)     CAD (coronary artery disease) stents x4( 03/2016)  last echo stress test 05/2016    Diabetes 2    Diabetes Mellitus Type II     Ganglion cyst of wrist, left     H/O: Rotator Cuff Tear     Hyperlipemia     Hyperlipidemia     Hypertension     Hypertension     Pneumonia     Renal Calculi h/olithotripsy 2010 PAST MEDICAL HISTORY:  BPH (Benign Prostatic Hypertrophy)     CAD (coronary artery disease) stents x4( 03/2017)  last echo stress test 05/2016    Chronic kidney disease (CKD)     Diabetes 2    Diabetes Mellitus Type II     Ganglion cyst of wrist, left     H/O gastroesophageal reflux (GERD)     H/O: Rotator Cuff Tear     Hyperlipemia     Hyperlipidemia     Hypertension     Hypertension     OA (osteoarthritis)     SHARATH (obstructive sleep apnea) non compliant with CPAP    Pneumonia     Renal Calculi h/olithotripsy 2010

## 2020-06-16 ENCOUNTER — TRANSCRIPTION ENCOUNTER (OUTPATIENT)
Age: 80
End: 2020-06-16

## 2020-06-17 ENCOUNTER — APPOINTMENT (OUTPATIENT)
Dept: TRANSPLANT | Facility: HOSPITAL | Age: 80
End: 2020-06-17

## 2020-06-17 ENCOUNTER — OUTPATIENT (OUTPATIENT)
Dept: OUTPATIENT SERVICES | Facility: HOSPITAL | Age: 80
LOS: 1 days | End: 2020-06-17
Payer: MEDICARE

## 2020-06-17 ENCOUNTER — RESULT REVIEW (OUTPATIENT)
Age: 80
End: 2020-06-17

## 2020-06-17 VITALS
OXYGEN SATURATION: 97 % | WEIGHT: 186.73 LBS | HEART RATE: 59 BPM | RESPIRATION RATE: 16 BRPM | SYSTOLIC BLOOD PRESSURE: 174 MMHG | DIASTOLIC BLOOD PRESSURE: 87 MMHG | TEMPERATURE: 98 F | HEIGHT: 70 IN

## 2020-06-17 VITALS
TEMPERATURE: 98 F | SYSTOLIC BLOOD PRESSURE: 162 MMHG | HEART RATE: 68 BPM | OXYGEN SATURATION: 97 % | RESPIRATION RATE: 17 BRPM | DIASTOLIC BLOOD PRESSURE: 76 MMHG

## 2020-06-17 DIAGNOSIS — Q76.1 KLIPPEL-FEIL SYNDROME: Chronic | ICD-10-CM

## 2020-06-17 DIAGNOSIS — M65.30 TRIGGER FINGER, UNSPECIFIED FINGER: Chronic | ICD-10-CM

## 2020-06-17 DIAGNOSIS — H26.9 UNSPECIFIED CATARACT: Chronic | ICD-10-CM

## 2020-06-17 DIAGNOSIS — Z98.89 OTHER SPECIFIED POSTPROCEDURAL STATES: Chronic | ICD-10-CM

## 2020-06-17 DIAGNOSIS — M75.101 UNSPECIFIED ROTATOR CUFF TEAR OR RUPTURE OF RIGHT SHOULDER, NOT SPECIFIED AS TRAUMATIC: Chronic | ICD-10-CM

## 2020-06-17 DIAGNOSIS — K81.0 ACUTE CHOLECYSTITIS: ICD-10-CM

## 2020-06-17 PROCEDURE — 47562 LAPAROSCOPIC CHOLECYSTECTOMY: CPT | Mod: GC

## 2020-06-17 PROCEDURE — 82962 GLUCOSE BLOOD TEST: CPT

## 2020-06-17 PROCEDURE — 88309 TISSUE EXAM BY PATHOLOGIST: CPT

## 2020-06-17 PROCEDURE — C1889: CPT

## 2020-06-17 PROCEDURE — 47562 LAPAROSCOPIC CHOLECYSTECTOMY: CPT

## 2020-06-17 PROCEDURE — 88309 TISSUE EXAM BY PATHOLOGIST: CPT | Mod: 26

## 2020-06-17 RX ORDER — SODIUM CHLORIDE 9 MG/ML
3 INJECTION INTRAMUSCULAR; INTRAVENOUS; SUBCUTANEOUS EVERY 8 HOURS
Refills: 0 | Status: DISCONTINUED | OUTPATIENT
Start: 2020-06-17 | End: 2020-06-17

## 2020-06-17 RX ORDER — ONDANSETRON 8 MG/1
4 TABLET, FILM COATED ORAL ONCE
Refills: 0 | Status: DISCONTINUED | OUTPATIENT
Start: 2020-06-17 | End: 2020-06-17

## 2020-06-17 RX ORDER — SODIUM CHLORIDE 9 MG/ML
1000 INJECTION, SOLUTION INTRAVENOUS
Refills: 0 | Status: DISCONTINUED | OUTPATIENT
Start: 2020-06-17 | End: 2020-07-02

## 2020-06-17 RX ORDER — HYDROMORPHONE HYDROCHLORIDE 2 MG/ML
0.25 INJECTION INTRAMUSCULAR; INTRAVENOUS; SUBCUTANEOUS
Refills: 0 | Status: DISCONTINUED | OUTPATIENT
Start: 2020-06-17 | End: 2020-06-17

## 2020-06-17 RX ORDER — CEPHALEXIN 500 MG
1 CAPSULE ORAL
Qty: 10 | Refills: 0
Start: 2020-06-17 | End: 2020-06-21

## 2020-06-17 RX ORDER — CEPHALEXIN 500 MG
1 CAPSULE ORAL
Qty: 10 | Refills: 0
Start: 2020-06-17

## 2020-06-17 RX ADMIN — SODIUM CHLORIDE 30 MILLILITER(S): 9 INJECTION, SOLUTION INTRAVENOUS at 12:06

## 2020-06-17 NOTE — ASU DISCHARGE PLAN (ADULT/PEDIATRIC) - CARE PROVIDER_API CALL
Jeff Lakhani I  SURGERY  [address] [place], [place] [address]  Phone: [phone]  Fax: [phone]  Follow Up Time: 2 weeks

## 2020-06-17 NOTE — PRE-ANESTHESIA EVALUATION ADULT - NSANTHPMHFT_GEN_ALL_CORE
80m pmh ( CAD s/p stents x4, DM-2, CKD, GERD, HLD), Patient denies: Angina & Dyspnea on rest or exertion, Denies Severe Regurgitation/Heart burn.

## 2020-06-17 NOTE — PRE-ANESTHESIA EVALUATION ADULT - NSRADCARDRESULTSFT_GEN_ALL_CORE
< from: Transthoracic Echocardiogram (05.10.16 @ 12:55) >    Conclusions:  1. Normal left ventricular internal dimensions and wall  thicknesses.  2. Normal left ventricular systolic function. No segmental  wall motion abnormalities.  3. Mild diastolic dysfunction (Stage I).  4. Normal right ventricular size andfunction.  *** No previous Echo exam.  ------------------------------------------------------------------------  Confirmed on  5/10/2016 - 14:26:13 by Marilyn Amado M.D.    < end of copied text >    < from: Nuclear Stress Test-Exercise (05.10.16 @ 15:44) >    IMPRESSIONS:AbnormalStudy  * Exercise capacity: 8 METS, Average for age and gender.  * Chest Pain: No chest pain with exercise.  * Symptom: Shortness of breath.  * HR Response: Appropriate.  * BP Response: Appropriate.  * Heart Rhythm: Sinus Rhythm - 66 BPM.  * BaselineECG: There were approximately 1 mm downsloping  ST segment depressions and T wave inversions in leads II,  III, aVF, and V6 at baseline.  T wave inversion in lead  V5.  * ECG Abnormalities: ECG changes could not be interpreted  due to abnormal baseline ECG.  * Arrhythmia: Rare VPDs occurred during stress and  recovery.  * Review of raw data shows: Minor motion artifact.  * The left ventricle was normal in size. 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  * Post-stress gated wall motion analysis was performed  (LVEF = 57 %;LVEDV = 94 ml.) revealing reduced systolic  thickening of the basal inferior, basal inferolateral, and  basal inferoseptal walls.  *** Compared with Nuclear/Stress perfusion images of  3/22/2016, slightly less ischemia is noted on the current  study.    < end of copied text >

## 2020-06-17 NOTE — BRIEF OPERATIVE NOTE - OPERATION/FINDINGS
Chronically inflamed gallbladder. Cystic duct and artery clipped with 5mm clips. Large gallstone removed in separate endocatch bag.

## 2020-06-17 NOTE — PRE-ANESTHESIA EVALUATION ADULT - NSANTHLABRESULTSFT_GEN_ALL_CORE
13.4   3.87  )-----------( 159      ( 15 Raj 2020 11:06 )             40.6   06-15    136  |  103  |  22  ----------------------------<  281<H>  4.4   |  23  |  1.53<H>    Ca    10.0      15 Raj 2020 11:06    TPro  7.0  /  Alb  4.3  /  TBili  0.5  /  DBili  x   /  AST  32  /  ALT  29  /  AlkPhos  74  06-15    COVID-19 PCR: Timothy (12 Jun 2020 06:51)

## 2020-06-24 PROBLEM — M19.90 UNSPECIFIED OSTEOARTHRITIS, UNSPECIFIED SITE: Chronic | Status: ACTIVE | Noted: 2020-06-15

## 2020-06-24 PROBLEM — G47.33 OBSTRUCTIVE SLEEP APNEA (ADULT) (PEDIATRIC): Chronic | Status: ACTIVE | Noted: 2020-06-15

## 2020-06-24 PROBLEM — Z87.19 PERSONAL HISTORY OF OTHER DISEASES OF THE DIGESTIVE SYSTEM: Chronic | Status: ACTIVE | Noted: 2020-06-15

## 2020-06-24 PROBLEM — N18.9 CHRONIC KIDNEY DISEASE, UNSPECIFIED: Chronic | Status: ACTIVE | Noted: 2020-06-15

## 2020-06-29 ENCOUNTER — APPOINTMENT (OUTPATIENT)
Dept: TRANSPLANT | Facility: CLINIC | Age: 80
End: 2020-06-29
Payer: MEDICARE

## 2020-06-29 VITALS
WEIGHT: 180 LBS | HEIGHT: 69 IN | DIASTOLIC BLOOD PRESSURE: 75 MMHG | RESPIRATION RATE: 15 BRPM | OXYGEN SATURATION: 100 % | BODY MASS INDEX: 26.66 KG/M2 | HEART RATE: 65 BPM | SYSTOLIC BLOOD PRESSURE: 150 MMHG | TEMPERATURE: 98 F

## 2020-06-29 LAB
AFP-TM SERPL-MCNC: 2 NG/ML
ALBUMIN SERPL ELPH-MCNC: 4.1 G/DL
ALP BLD-CCNC: 79 U/L
ALT SERPL-CCNC: 29 U/L
ANION GAP SERPL CALC-SCNC: 13 MMOL/L
AST SERPL-CCNC: 25 U/L
BASOPHILS # BLD AUTO: 0.02 K/UL
BASOPHILS NFR BLD AUTO: 0.4 %
BILIRUB SERPL-MCNC: 0.6 MG/DL
BUN SERPL-MCNC: 30 MG/DL
CALCIUM SERPL-MCNC: 10 MG/DL
CANCER AG19-9 SERPL-ACNC: 52 U/ML
CEA SERPL-MCNC: 4.3 NG/ML
CHLORIDE SERPL-SCNC: 102 MMOL/L
CO2 SERPL-SCNC: 24 MMOL/L
CREAT SERPL-MCNC: 1.89 MG/DL
EOSINOPHIL # BLD AUTO: 0.25 K/UL
EOSINOPHIL NFR BLD AUTO: 4.5 %
GLUCOSE SERPL-MCNC: 204 MG/DL
HCT VFR BLD CALC: 39.4 %
HGB BLD-MCNC: 13.4 G/DL
IMM GRANULOCYTES NFR BLD AUTO: 0.5 %
LYMPHOCYTES # BLD AUTO: 1.11 K/UL
LYMPHOCYTES NFR BLD AUTO: 20 %
MAN DIFF?: NORMAL
MCHC RBC-ENTMCNC: 32.4 PG
MCHC RBC-ENTMCNC: 34 GM/DL
MCV RBC AUTO: 95.2 FL
MONOCYTES # BLD AUTO: 0.55 K/UL
MONOCYTES NFR BLD AUTO: 9.9 %
NEUTROPHILS # BLD AUTO: 3.58 K/UL
NEUTROPHILS NFR BLD AUTO: 64.7 %
PLATELET # BLD AUTO: 184 K/UL
POTASSIUM SERPL-SCNC: 4.2 MMOL/L
PROT SERPL-MCNC: 6.3 G/DL
RBC # BLD: 4.14 M/UL
RBC # FLD: 13.8 %
SODIUM SERPL-SCNC: 138 MMOL/L
WBC # FLD AUTO: 5.54 K/UL

## 2020-06-29 PROCEDURE — 99024 POSTOP FOLLOW-UP VISIT: CPT

## 2020-06-29 NOTE — HISTORY OF PRESENT ILLNESS
[de-identified] : 79yo M presents for evaluation sp lap cholecystectomy.  Since surgery his talya-incisional pain has been improving.  He is tolerating a diet with nl BMs.  He has noted no incisional issues. \par \par

## 2020-06-29 NOTE — PHYSICAL EXAM
[de-identified] : NAD [de-identified] : Breathing comfortably on room air [de-identified] : soft, nt/nd

## 2020-06-29 NOTE — ASSESSMENT
[FreeTextEntry1] : 79yo sp lap cholecystectomy for biliary colic, specimen revealing GB Ca, T1a. \par \par - We discussed his diagnosis.  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  Likely can proceed with observation/surveillance imaging q3-6mos.  Pt referred to Dr. Ford for further consultation.   CEA//AFP drawn today.  Will get baseline MRI.\par - Contact information given to patient should he have further questions.\par

## 2020-07-06 ENCOUNTER — APPOINTMENT (OUTPATIENT)
Age: 80
End: 2020-07-06
Payer: MEDICARE

## 2020-07-06 PROCEDURE — 99205 OFFICE O/P NEW HI 60 MIN: CPT | Mod: 95

## 2020-07-06 RX ORDER — HYDRALAZINE HYDROCHLORIDE 50 MG/1
50 TABLET ORAL TWICE DAILY
Qty: 60 | Refills: 6 | Status: DISCONTINUED | COMMUNITY
End: 2020-07-06

## 2020-07-06 NOTE — PHYSICAL EXAM
[Fully active, able to carry on all pre-disease performance without restriction] : Status 0 - Fully active, able to carry on all pre-disease performance without restriction [Normal] : affect appropriate [de-identified] : normal respiratory pattern

## 2020-07-06 NOTE — CONSULT LETTER
[Dear  ___] : Dear  [unfilled], [Courtesy Letter:] : I had the pleasure of seeing your patient, [unfilled], in my office today. [Please see my note below.] : Please see my note below. [Consult Closing:] : Thank you very much for allowing me to participate in the care of this patient.  If you have any questions, please do not hesitate to contact me. [Sincerely,] : Sincerely, [FreeTextEntry3] : Ashli Frod D.O. \par Attending Physician \par [facility] H[facility]\par  \par [facility] \par Tel: [phone]\par Fax: [phone]\par

## 2020-07-06 NOTE — REASON FOR VISIT
[Initial Consultation] : an initial consultation [FreeTextEntry2] : Stage I resected gallbladder cancer

## 2020-07-06 NOTE — HISTORY OF PRESENT ILLNESS
[Home] : at home, [unfilled] , at the time of the visit. [Medical Office: ([facility])___] : at the medical office located in  [Disease: _____________________] : Disease: [unfilled] [Verbal consent obtained from patient] : the patient, [unfilled] [T: ___] : T[unfilled] [M: ___] : M[unfilled] [N: ___] : N[unfilled] [AJCC Stage: ____] : AJCC Stage: [unfilled] [de-identified] : 80 M w/ h/o CKD, HTN, DM, CAD s/p stents, SHARATH presents for evaluation of an incidentally found Stage I gallbladder cancer after a cholecystectomy. \par \sue Tapia noted progressive RUQ abdominal pain in the beginning of June 2020. He presented to [facility] ER on 6/12 with same complaints and underwent a abdominal US which was indeterminant but showed a thickened wall. Denied any change in BMs, change in appetite or weight loss. After obtaining out patient cardiac clearance he underwent a lap choley on 6/17/20. Intra op GB noted to be chronically inflamed with a large gallstone noted. 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 The cystic duct was negative for malignancy or dysplasia. No LV1 or PNI noted. Post op course unremarkable. \sue \sue Tapia subsequently referred to medical oncology for further evaluation.  [de-identified] : adenocarcinoma  [de-identified] : Recovering well after surgery. Mild tenderness at the incision area. Eating well. Denies any change in BMs. No n/v

## 2020-07-07 ENCOUNTER — OUTPATIENT (OUTPATIENT)
Dept: OUTPATIENT SERVICES | Facility: HOSPITAL | Age: 80
LOS: 1 days | End: 2020-07-07
Payer: MEDICARE

## 2020-07-07 ENCOUNTER — APPOINTMENT (OUTPATIENT)
Dept: CT IMAGING | Facility: CLINIC | Age: 80
End: 2020-07-07
Payer: MEDICARE

## 2020-07-07 DIAGNOSIS — H26.9 UNSPECIFIED CATARACT: Chronic | ICD-10-CM

## 2020-07-07 DIAGNOSIS — M75.101 UNSPECIFIED ROTATOR CUFF TEAR OR RUPTURE OF RIGHT SHOULDER, NOT SPECIFIED AS TRAUMATIC: Chronic | ICD-10-CM

## 2020-07-07 DIAGNOSIS — Z98.89 OTHER SPECIFIED POSTPROCEDURAL STATES: Chronic | ICD-10-CM

## 2020-07-07 DIAGNOSIS — Q76.1 KLIPPEL-FEIL SYNDROME: Chronic | ICD-10-CM

## 2020-07-07 DIAGNOSIS — M65.30 TRIGGER FINGER, UNSPECIFIED FINGER: Chronic | ICD-10-CM

## 2020-07-07 DIAGNOSIS — C23 MALIGNANT NEOPLASM OF GALLBLADDER: ICD-10-CM

## 2020-07-07 PROCEDURE — 71250 CT THORAX DX C-: CPT | Mod: 26

## 2020-07-07 PROCEDURE — 71250 CT THORAX DX C-: CPT

## 2020-07-08 ENCOUNTER — OUTPATIENT (OUTPATIENT)
Dept: OUTPATIENT SERVICES | Facility: HOSPITAL | Age: 80
LOS: 1 days | Discharge: HOME | End: 2020-07-08

## 2020-07-08 DIAGNOSIS — M75.101 UNSPECIFIED ROTATOR CUFF TEAR OR RUPTURE OF RIGHT SHOULDER, NOT SPECIFIED AS TRAUMATIC: Chronic | ICD-10-CM

## 2020-07-08 DIAGNOSIS — M65.30 TRIGGER FINGER, UNSPECIFIED FINGER: Chronic | ICD-10-CM

## 2020-07-08 DIAGNOSIS — Q76.1 KLIPPEL-FEIL SYNDROME: Chronic | ICD-10-CM

## 2020-07-08 DIAGNOSIS — Z98.89 OTHER SPECIFIED POSTPROCEDURAL STATES: Chronic | ICD-10-CM

## 2020-07-08 DIAGNOSIS — D64.9 ANEMIA, UNSPECIFIED: ICD-10-CM

## 2020-07-08 DIAGNOSIS — H26.9 UNSPECIFIED CATARACT: Chronic | ICD-10-CM

## 2020-07-13 ENCOUNTER — APPOINTMENT (OUTPATIENT)
Dept: MRI IMAGING | Facility: CLINIC | Age: 80
End: 2020-07-13

## 2020-07-17 ENCOUNTER — APPOINTMENT (OUTPATIENT)
Dept: CT IMAGING | Facility: CLINIC | Age: 80
End: 2020-07-17

## 2020-07-17 ENCOUNTER — APPOINTMENT (OUTPATIENT)
Dept: MRI IMAGING | Facility: CLINIC | Age: 80
End: 2020-07-17

## 2020-08-03 ENCOUNTER — APPOINTMENT (OUTPATIENT)
Age: 80
End: 2020-08-03

## 2020-08-03 ENCOUNTER — RESULT REVIEW (OUTPATIENT)
Age: 80
End: 2020-08-03

## 2020-08-03 LAB
ALBUMIN SERPL ELPH-MCNC: 4.3 G/DL
ALP BLD-CCNC: 71 U/L
ALT SERPL-CCNC: 30 U/L
ANION GAP SERPL CALC-SCNC: 11 MMOL/L
AST SERPL-CCNC: 37 U/L
BASOPHILS # BLD AUTO: 0.05 K/UL — SIGNIFICANT CHANGE UP (ref 0–0.2)
BASOPHILS NFR BLD AUTO: 1.1 % — SIGNIFICANT CHANGE UP (ref 0–2)
BILIRUB SERPL-MCNC: 0.4 MG/DL
BUN SERPL-MCNC: 26 MG/DL
CALCIUM SERPL-MCNC: 10.5 MG/DL
CANCER AG19-9 SERPL-ACNC: 46 U/ML
CEA SERPL-MCNC: 3.9 NG/ML
CHLORIDE SERPL-SCNC: 106 MMOL/L
CO2 SERPL-SCNC: 22 MMOL/L
CREAT SERPL-MCNC: 1.62 MG/DL
EOSINOPHIL # BLD AUTO: 0.29 K/UL — SIGNIFICANT CHANGE UP (ref 0–0.5)
EOSINOPHIL NFR BLD AUTO: 6.2 % — HIGH (ref 0–6)
GLUCOSE SERPL-MCNC: 196 MG/DL
HCT VFR BLD CALC: 38.9 % — LOW (ref 39–50)
HGB BLD-MCNC: 13.4 G/DL — SIGNIFICANT CHANGE UP (ref 13–17)
IMM GRANULOCYTES NFR BLD AUTO: 0 % — SIGNIFICANT CHANGE UP (ref 0–1.5)
LYMPHOCYTES # BLD AUTO: 0.97 K/UL — LOW (ref 1–3.3)
LYMPHOCYTES # BLD AUTO: 20.8 % — SIGNIFICANT CHANGE UP (ref 13–44)
MCHC RBC-ENTMCNC: 32.8 PG — SIGNIFICANT CHANGE UP (ref 27–34)
MCHC RBC-ENTMCNC: 34.4 GM/DL — SIGNIFICANT CHANGE UP (ref 32–36)
MCV RBC AUTO: 95.1 FL — SIGNIFICANT CHANGE UP (ref 80–100)
MONOCYTES # BLD AUTO: 0.49 K/UL — SIGNIFICANT CHANGE UP (ref 0–0.9)
MONOCYTES NFR BLD AUTO: 10.5 % — SIGNIFICANT CHANGE UP (ref 2–14)
NEUTROPHILS # BLD AUTO: 2.87 K/UL — SIGNIFICANT CHANGE UP (ref 1.8–7.4)
NEUTROPHILS NFR BLD AUTO: 61.4 % — SIGNIFICANT CHANGE UP (ref 43–77)
NRBC # BLD: 0 /100 WBCS — SIGNIFICANT CHANGE UP (ref 0–0)
PLATELET # BLD AUTO: 167 K/UL — SIGNIFICANT CHANGE UP (ref 150–400)
POTASSIUM SERPL-SCNC: 4.3 MMOL/L
PROT SERPL-MCNC: 6.5 G/DL
RBC # BLD: 4.09 M/UL — LOW (ref 4.2–5.8)
RBC # FLD: 13.3 % — SIGNIFICANT CHANGE UP (ref 10.3–14.5)
SODIUM SERPL-SCNC: 139 MMOL/L
WBC # BLD: 4.67 K/UL — SIGNIFICANT CHANGE UP (ref 3.8–10.5)
WBC # FLD AUTO: 4.67 K/UL — SIGNIFICANT CHANGE UP (ref 3.8–10.5)

## 2020-08-03 RX ORDER — FAMOTIDINE 20 MG/1
20 TABLET, FILM COATED ORAL
Qty: 180 | Refills: 0 | Status: ACTIVE | COMMUNITY
Start: 2020-05-18 | End: 1900-01-01

## 2020-08-07 ENCOUNTER — OUTPATIENT (OUTPATIENT)
Dept: OUTPATIENT SERVICES | Facility: HOSPITAL | Age: 80
LOS: 1 days | Discharge: HOME | End: 2020-08-07

## 2020-08-07 DIAGNOSIS — H26.9 UNSPECIFIED CATARACT: Chronic | ICD-10-CM

## 2020-08-07 DIAGNOSIS — Z98.89 OTHER SPECIFIED POSTPROCEDURAL STATES: Chronic | ICD-10-CM

## 2020-08-07 DIAGNOSIS — M75.101 UNSPECIFIED ROTATOR CUFF TEAR OR RUPTURE OF RIGHT SHOULDER, NOT SPECIFIED AS TRAUMATIC: Chronic | ICD-10-CM

## 2020-08-07 DIAGNOSIS — Q76.1 KLIPPEL-FEIL SYNDROME: Chronic | ICD-10-CM

## 2020-08-07 DIAGNOSIS — M65.30 TRIGGER FINGER, UNSPECIFIED FINGER: Chronic | ICD-10-CM

## 2020-08-07 DIAGNOSIS — D64.9 ANEMIA, UNSPECIFIED: ICD-10-CM

## 2020-08-10 ENCOUNTER — APPOINTMENT (OUTPATIENT)
Age: 80
End: 2020-08-10
Payer: MEDICARE

## 2020-08-10 DIAGNOSIS — F15.23: ICD-10-CM

## 2020-08-10 PROCEDURE — 99213 OFFICE O/P EST LOW 20 MIN: CPT | Mod: 95

## 2020-08-15 ENCOUNTER — OUTPATIENT (OUTPATIENT)
Dept: OUTPATIENT SERVICES | Facility: HOSPITAL | Age: 80
LOS: 1 days | End: 2020-08-15
Payer: MEDICARE

## 2020-08-15 ENCOUNTER — APPOINTMENT (OUTPATIENT)
Dept: ULTRASOUND IMAGING | Facility: IMAGING CENTER | Age: 80
End: 2020-08-15
Payer: MEDICARE

## 2020-08-15 DIAGNOSIS — M65.30 TRIGGER FINGER, UNSPECIFIED FINGER: Chronic | ICD-10-CM

## 2020-08-15 DIAGNOSIS — H26.9 UNSPECIFIED CATARACT: Chronic | ICD-10-CM

## 2020-08-15 DIAGNOSIS — E04.1 NONTOXIC SINGLE THYROID NODULE: ICD-10-CM

## 2020-08-15 DIAGNOSIS — M75.101 UNSPECIFIED ROTATOR CUFF TEAR OR RUPTURE OF RIGHT SHOULDER, NOT SPECIFIED AS TRAUMATIC: Chronic | ICD-10-CM

## 2020-08-15 DIAGNOSIS — Z98.89 OTHER SPECIFIED POSTPROCEDURAL STATES: Chronic | ICD-10-CM

## 2020-08-15 DIAGNOSIS — Q76.1 KLIPPEL-FEIL SYNDROME: Chronic | ICD-10-CM

## 2020-08-15 PROCEDURE — 76536 US EXAM OF HEAD AND NECK: CPT

## 2020-08-15 PROCEDURE — 76536 US EXAM OF HEAD AND NECK: CPT | Mod: 26

## 2020-08-21 ENCOUNTER — NON-APPOINTMENT (OUTPATIENT)
Age: 80
End: 2020-08-21

## 2020-08-21 ENCOUNTER — APPOINTMENT (OUTPATIENT)
Dept: CARDIOLOGY | Facility: CLINIC | Age: 80
End: 2020-08-21
Payer: MEDICARE

## 2020-08-21 VITALS — DIASTOLIC BLOOD PRESSURE: 70 MMHG | HEART RATE: 62 BPM | OXYGEN SATURATION: 99 % | SYSTOLIC BLOOD PRESSURE: 143 MMHG

## 2020-08-21 PROCEDURE — 99214 OFFICE O/P EST MOD 30 MIN: CPT

## 2020-08-21 PROCEDURE — 93000 ELECTROCARDIOGRAM COMPLETE: CPT

## 2020-08-24 NOTE — PHYSICAL EXAM
[General Appearance - Well Developed] : well developed [General Appearance - Well Nourished] : well nourished [No Oral Cyanosis] : no oral cyanosis [Normal Oral Mucosa] : normal oral mucosa [Normal Conjunctiva] : the conjunctiva exhibited no abnormalities [Eyelids - No Xanthelasma] : the eyelids demonstrated no xanthelasmas [Normal Jugular Venous A Waves Present] : normal jugular venous A waves present [Normal Jugular Venous V Waves Present] : normal jugular venous V waves present [Respiration, Rhythm And Depth] : normal respiratory rhythm and effort [Murmurs] : no murmurs present [Edema] : no peripheral edema present [Heart Sounds] : normal S1 and S2 [Auscultation Breath Sounds / Voice Sounds] : lungs were clear to auscultation bilaterally [1+] : right 1+ [Abdomen Soft] : soft [Bowel Sounds] : normal bowel sounds [Abdomen Tenderness] : non-tender [Nail Clubbing] : no clubbing of the fingernails [Abnormal Walk] : normal gait [Skin Color & Pigmentation] : normal skin color and pigmentation [Cyanosis, Localized] : no localized cyanosis [Oriented To Time, Place, And Person] : oriented to person, place, and time [Heart Rate And Rhythm] : heart rate and rhythm were normal [No Venous Stasis] : no venous stasis [] : no rash [No Skin Ulcers] : no skin ulcer [Impaired Insight] : insight and judgment were intact [Affect] : the affect was normal [Mood] : the mood was normal [FreeTextEntry1] : No bruit.

## 2020-08-24 NOTE — DISCUSSION/SUMMARY
[FreeTextEntry1] : Assessment:\par 1. CAD s/p RCA FOREST x 3 and unrevascularized 70% mLAD (2016 NST without anterior ischemia)\par 2. HTN / HLD / DM\par 3. 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 Maintain DAPT w/ ASA 81mg daily and Plavix 75mg daily given unrevascularized mLAD\par 2. Decrease Atorvastatin to 40mg qhs (age > 75)\par 3. Continue Norvasc 10mg daily, Coreg 25mg BID, Hydralzine 50mg BID\par 4. Echo now to eval for dyspnea with heavy exertion. \par 5. Med/Onc Follow-up\par \par RTC 6 months

## 2020-08-31 PROBLEM — F15.23 CAFFEINE WITHDRAWAL: Status: RESOLVED | Noted: 2018-03-05 | Resolved: 2020-08-31

## 2020-08-31 NOTE — HISTORY OF PRESENT ILLNESS
[Home] : at home, [unfilled] , at the time of the visit. [Medical Office: ([facility])___] : at the medical office located in  [Verbal consent obtained from patient] : the patient, [unfilled] [Disease: _____________________] : Disease: [unfilled] [T: ___] : T[unfilled] [N: ___] : N[unfilled] [M: ___] : M[unfilled] [AJCC Stage: ____] : AJCC Stage: [unfilled] [de-identified] : 80 M w/ h/o CKD, HTN, DM, CAD s/p stents, SHARATH presents for evaluation of an incidentally found Stage I gallbladder cancer after a cholecystectomy. \par \sue Tapia noted progressive RUQ abdominal pain in the beginning of June 2020. He presented to [facility] ER on 6/12 with same complaints and underwent a abdominal US which was indeterminant but showed a thickened wall. Denied any change in BMs, change in appetite or weight loss. After obtaining out patient cardiac clearance he underwent a lap choley on 6/17/20. Intra op GB noted to be chronically inflamed with a large gallstone noted. 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 The cystic duct was negative for malignancy or dysplasia. No LV1 or PNI noted. Post op course unremarkable. \par \sue Tapia subsequently referred to medical oncology for further evaluation. \par \par 7/9/20: CT Chest: ill defined b/l groundglass opacities, 1.3 indeterminant adrenal lesion, 1.4 thyroid nodule \par 7/23/20: MRI A/P: limited scan for biliary pathology  [de-identified] : adenocarcinoma  [de-identified] : overall feels well. denies any c/o. we reviewed results of CT chest, MRI

## 2020-08-31 NOTE — CONSULT LETTER
[Dear  ___] : Dear  [unfilled], [Courtesy Letter:] : I had the pleasure of seeing your patient, [unfilled], in my office today. [Please see my note below.] : Please see my note below. [Consult Closing:] : Thank you very much for allowing me to participate in the care of this patient.  If you have any questions, please do not hesitate to contact me. [Sincerely,] : Sincerely, [FreeTextEntry3] : Ashli Ford D.O. \par Attending Physician \par [facility] of Medical Oncology and Hematology\par  \par [facility] \par Tel: [phone]\par Fax: [phone]\par

## 2020-08-31 NOTE — PHYSICAL EXAM
[Fully active, able to carry on all pre-disease performance without restriction] : Status 0 - Fully active, able to carry on all pre-disease performance without restriction [Normal] : affect appropriate [de-identified] : normal respiratory pattern

## 2020-09-03 ENCOUNTER — APPOINTMENT (OUTPATIENT)
Dept: NUCLEAR MEDICINE | Facility: IMAGING CENTER | Age: 80
End: 2020-09-03
Payer: MEDICARE

## 2020-09-03 ENCOUNTER — OUTPATIENT (OUTPATIENT)
Dept: OUTPATIENT SERVICES | Facility: HOSPITAL | Age: 80
LOS: 1 days | End: 2020-09-03
Payer: MEDICARE

## 2020-09-03 DIAGNOSIS — Q76.1 KLIPPEL-FEIL SYNDROME: Chronic | ICD-10-CM

## 2020-09-03 DIAGNOSIS — C23 MALIGNANT NEOPLASM OF GALLBLADDER: ICD-10-CM

## 2020-09-03 DIAGNOSIS — Z98.89 OTHER SPECIFIED POSTPROCEDURAL STATES: Chronic | ICD-10-CM

## 2020-09-03 DIAGNOSIS — M65.30 TRIGGER FINGER, UNSPECIFIED FINGER: Chronic | ICD-10-CM

## 2020-09-03 DIAGNOSIS — H26.9 UNSPECIFIED CATARACT: Chronic | ICD-10-CM

## 2020-09-03 DIAGNOSIS — M75.101 UNSPECIFIED ROTATOR CUFF TEAR OR RUPTURE OF RIGHT SHOULDER, NOT SPECIFIED AS TRAUMATIC: Chronic | ICD-10-CM

## 2020-09-03 PROCEDURE — A9552: CPT

## 2020-09-03 PROCEDURE — 78815 PET IMAGE W/CT SKULL-THIGH: CPT | Mod: 26,PS

## 2020-09-03 PROCEDURE — 78815 PET IMAGE W/CT SKULL-THIGH: CPT

## 2020-09-04 ENCOUNTER — APPOINTMENT (OUTPATIENT)
Dept: HEMATOLOGY ONCOLOGY | Facility: CLINIC | Age: 80
End: 2020-09-04

## 2020-09-04 ENCOUNTER — APPOINTMENT (OUTPATIENT)
Dept: OPHTHALMOLOGY | Facility: CLINIC | Age: 80
End: 2020-09-04

## 2020-09-04 ENCOUNTER — RESULT REVIEW (OUTPATIENT)
Age: 80
End: 2020-09-04

## 2020-09-04 LAB
ALBUMIN SERPL ELPH-MCNC: 4.5 G/DL
ALP BLD-CCNC: 75 U/L
ALT SERPL-CCNC: 29 U/L
ANION GAP SERPL CALC-SCNC: 12 MMOL/L
AST SERPL-CCNC: 34 U/L
BASOPHILS # BLD AUTO: 0.03 K/UL — SIGNIFICANT CHANGE UP (ref 0–0.2)
BASOPHILS NFR BLD AUTO: 0.7 % — SIGNIFICANT CHANGE UP (ref 0–2)
BILIRUB SERPL-MCNC: 0.4 MG/DL
BUN SERPL-MCNC: 31 MG/DL
CALCIUM SERPL-MCNC: 10.3 MG/DL
CANCER AG19-9 SERPL-ACNC: 44 U/ML
CEA SERPL-MCNC: 4 NG/ML
CHLORIDE SERPL-SCNC: 100 MMOL/L
CO2 SERPL-SCNC: 23 MMOL/L
CREAT SERPL-MCNC: 1.82 MG/DL
EOSINOPHIL # BLD AUTO: 0.3 K/UL — SIGNIFICANT CHANGE UP (ref 0–0.5)
EOSINOPHIL NFR BLD AUTO: 6.7 % — HIGH (ref 0–6)
GLUCOSE SERPL-MCNC: 232 MG/DL
HCT VFR BLD CALC: 37.2 % — LOW (ref 39–50)
HGB BLD-MCNC: 12.6 G/DL — LOW (ref 13–17)
IMM GRANULOCYTES NFR BLD AUTO: 0.2 % — SIGNIFICANT CHANGE UP (ref 0–1.5)
LYMPHOCYTES # BLD AUTO: 1.09 K/UL — SIGNIFICANT CHANGE UP (ref 1–3.3)
LYMPHOCYTES # BLD AUTO: 24.4 % — SIGNIFICANT CHANGE UP (ref 13–44)
MCHC RBC-ENTMCNC: 32.7 PG — SIGNIFICANT CHANGE UP (ref 27–34)
MCHC RBC-ENTMCNC: 33.9 GM/DL — SIGNIFICANT CHANGE UP (ref 32–36)
MCV RBC AUTO: 96.6 FL — SIGNIFICANT CHANGE UP (ref 80–100)
MONOCYTES # BLD AUTO: 0.64 K/UL — SIGNIFICANT CHANGE UP (ref 0–0.9)
MONOCYTES NFR BLD AUTO: 14.3 % — HIGH (ref 2–14)
NEUTROPHILS # BLD AUTO: 2.4 K/UL — SIGNIFICANT CHANGE UP (ref 1.8–7.4)
NEUTROPHILS NFR BLD AUTO: 53.7 % — SIGNIFICANT CHANGE UP (ref 43–77)
NRBC # BLD: 0 /100 WBCS — SIGNIFICANT CHANGE UP (ref 0–0)
PLATELET # BLD AUTO: 161 K/UL — SIGNIFICANT CHANGE UP (ref 150–400)
POTASSIUM SERPL-SCNC: 4.3 MMOL/L
PROT SERPL-MCNC: 6.8 G/DL
RBC # BLD: 3.85 M/UL — LOW (ref 4.2–5.8)
RBC # FLD: 13.3 % — SIGNIFICANT CHANGE UP (ref 10.3–14.5)
SODIUM SERPL-SCNC: 136 MMOL/L
WBC # BLD: 4.47 K/UL — SIGNIFICANT CHANGE UP (ref 3.8–10.5)
WBC # FLD AUTO: 4.47 K/UL — SIGNIFICANT CHANGE UP (ref 3.8–10.5)

## 2020-09-14 ENCOUNTER — APPOINTMENT (OUTPATIENT)
Dept: NEPHROLOGY | Facility: CLINIC | Age: 80
End: 2020-09-14
Payer: MEDICARE

## 2020-09-14 VITALS
BODY MASS INDEX: 26.7 KG/M2 | SYSTOLIC BLOOD PRESSURE: 135 MMHG | OXYGEN SATURATION: 98 % | HEART RATE: 65 BPM | WEIGHT: 180.78 LBS | DIASTOLIC BLOOD PRESSURE: 70 MMHG

## 2020-09-14 PROCEDURE — 99214 OFFICE O/P EST MOD 30 MIN: CPT

## 2020-09-14 NOTE — PHYSICAL EXAM
[General Appearance - Alert] : alert [General Appearance - In No Acute Distress] : in no acute distress [General Appearance - Well Nourished] : well nourished [General Appearance - Well Developed] : well developed [Sclera] : the sclera and conjunctiva were normal [Outer Ear] : the ears and nose were normal in appearance [Neck Appearance] : the appearance of the neck was normal [Neck Cervical Mass (___cm)] : no neck mass was observed [Jugular Venous Distention Increased] : there was no jugular-venous distention [Respiration, Rhythm And Depth] : normal respiratory rhythm and effort [Exaggerated Use Of Accessory Muscles For Inspiration] : no accessory muscle use [Auscultation Breath Sounds / Voice Sounds] : lungs were clear to auscultation bilaterally [Apical Impulse] : the apical impulse was normal [Heart Rate And Rhythm] : heart rate was normal and rhythm regular [Heart Sounds] : normal S1 and S2 [Heart Sounds Gallop] : no gallops [Edema] : there was no peripheral edema [Bowel Sounds] : normal bowel sounds [Abdomen Soft] : soft [Abdomen Tenderness] : non-tender [Cervical Lymph Nodes Enlarged Posterior Bilaterally] : posterior cervical [Cervical Lymph Nodes Enlarged Anterior Bilaterally] : anterior cervical [Supraclavicular Lymph Nodes Enlarged Bilaterally] : supraclavicular [Axillary Lymph Nodes Enlarged Bilaterally] : axillary [No Spinal Tenderness] : no spinal tenderness [No CVA Tenderness] : no ~M costovertebral angle tenderness [Nail Clubbing] : no clubbing  or cyanosis of the fingernails [Abnormal Walk] : normal gait [Musculoskeletal - Swelling] : no joint swelling seen [] : no rash [Skin Color & Pigmentation] : normal skin color and pigmentation [Oriented To Time, Place, And Person] : oriented to person, place, and time

## 2020-09-15 NOTE — REVIEW OF SYSTEMS
[Recent Weight Loss (___ Lbs)] : recent [unfilled] ~Ulb weight loss [Loss Of Hearing] : hearing loss [Palpitations] : palpitations [SOB on Exertion] : shortness of breath during exertion [Hesitancy] : urinary hesitancy [Nocturia] : nocturia [Sleep Disturbances] : sleep disturbances [Negative] : ENT [Chills] : no chills [Fever] : no fever [Feeling Poorly] : not feeling poorly [Feeling Tired] : not feeling tired [Recent Weight Gain (___ Lbs)] : no recent weight gain [Heart Rate Is Slow] : the heart rate was not slow [Chest Pain] : no chest pain [Heart Rate Is Fast] : the heart rate was not fast [Lower Ext Edema] : no extremity edema [Shortness Of Breath] : no shortness of breath [Wheezing] : no wheezing [Abdominal Pain] : no abdominal pain [Vomiting] : no vomiting [Constipation] : no constipation [Diarrhea] : no diarrhea [Heartburn] : no heartburn [Arthralgias] : no arthralgias [Joint Pain] : no joint pain [Itching] : no itching [Dry Skin] : no dry skin [Dizziness] : no dizziness [Fainting] : no fainting [Anxiety] : no anxiety [Depression] : no depression [Muscle Weakness] : no muscle weakness [Easy Bleeding] : no tendency for easy bleeding [Easy Bruising] : no tendency for easy bruising

## 2020-09-15 NOTE — ASSESSMENT
[FreeTextEntry1] : A case of CKD stage III with diabetes, hypertension, CAD, diabetic retinopathy, nephrolithiasis with hypercalcemia and renal cysts.BP is controlled and weight is stable.  Patient has undergone Advised renal function. lab tests discussed with the patient. Renal function stable. Blood sugar is high. Advised better control of blood sugar. Advised renal function tests. Lab tests evaluated. Renal function stable. A1C 8.1%. Advised better control of blood sugar.

## 2020-09-15 NOTE — HISTORY OF PRESENT ILLNESS
[Stage 3] : stage 3 [Hypertensive Nephropathy] : hypertensive nephropathy [Nocturia] : nocturia [Antihypertensives] : antihypertensives [Good Compliance] : good compliance  [Edema] : no edema [Pruritus] : no pruritus [Malaise] : no malaise [Weakness] : no weakness [Anorexia] : no anorexia [Nausea] : no nausea [Vomiting] : no vomiting [FreeTextEntry1] : A case of CKD stage III with diabetes, hypertension, CAD, diabetic retinopathy,  nephrolithiasis, hypercalcemia and bilateral renal cysts. Patient has undergone cholecystectomy for gall bladder cancer. Patient complains of mild insomnia. Patient is being evaluated for CKD.

## 2020-09-16 ENCOUNTER — OUTPATIENT (OUTPATIENT)
Dept: OUTPATIENT SERVICES | Facility: HOSPITAL | Age: 80
LOS: 1 days | End: 2020-09-16
Payer: MEDICARE

## 2020-09-16 ENCOUNTER — APPOINTMENT (OUTPATIENT)
Dept: CV DIAGNOSITCS | Facility: HOSPITAL | Age: 80
End: 2020-09-16

## 2020-09-16 DIAGNOSIS — M65.30 TRIGGER FINGER, UNSPECIFIED FINGER: Chronic | ICD-10-CM

## 2020-09-16 DIAGNOSIS — H26.9 UNSPECIFIED CATARACT: Chronic | ICD-10-CM

## 2020-09-16 DIAGNOSIS — Z98.89 OTHER SPECIFIED POSTPROCEDURAL STATES: Chronic | ICD-10-CM

## 2020-09-16 DIAGNOSIS — Q76.1 KLIPPEL-FEIL SYNDROME: Chronic | ICD-10-CM

## 2020-09-16 DIAGNOSIS — M75.101 UNSPECIFIED ROTATOR CUFF TEAR OR RUPTURE OF RIGHT SHOULDER, NOT SPECIFIED AS TRAUMATIC: Chronic | ICD-10-CM

## 2020-09-16 DIAGNOSIS — I10 ESSENTIAL (PRIMARY) HYPERTENSION: ICD-10-CM

## 2020-09-16 PROCEDURE — 93306 TTE W/DOPPLER COMPLETE: CPT | Mod: 26

## 2020-09-16 PROCEDURE — 93306 TTE W/DOPPLER COMPLETE: CPT

## 2020-09-17 ENCOUNTER — OUTPATIENT (OUTPATIENT)
Dept: OUTPATIENT SERVICES | Facility: HOSPITAL | Age: 80
LOS: 1 days | Discharge: HOME | End: 2020-09-17

## 2020-09-17 DIAGNOSIS — D64.9 ANEMIA, UNSPECIFIED: ICD-10-CM

## 2020-09-17 DIAGNOSIS — Z98.89 OTHER SPECIFIED POSTPROCEDURAL STATES: Chronic | ICD-10-CM

## 2020-09-17 DIAGNOSIS — M75.101 UNSPECIFIED ROTATOR CUFF TEAR OR RUPTURE OF RIGHT SHOULDER, NOT SPECIFIED AS TRAUMATIC: Chronic | ICD-10-CM

## 2020-09-17 DIAGNOSIS — Q76.1 KLIPPEL-FEIL SYNDROME: Chronic | ICD-10-CM

## 2020-09-17 DIAGNOSIS — M65.30 TRIGGER FINGER, UNSPECIFIED FINGER: Chronic | ICD-10-CM

## 2020-09-17 DIAGNOSIS — H26.9 UNSPECIFIED CATARACT: Chronic | ICD-10-CM

## 2020-09-18 ENCOUNTER — APPOINTMENT (OUTPATIENT)
Dept: HEMATOLOGY ONCOLOGY | Facility: CLINIC | Age: 80
End: 2020-09-18

## 2020-09-18 LAB
ALBUMIN SERPL ELPH-MCNC: 4.4 G/DL
ANION GAP SERPL CALC-SCNC: 11 MMOL/L
BUN SERPL-MCNC: 30 MG/DL
CALCIUM SERPL-MCNC: 10.4 MG/DL
CHLORIDE SERPL-SCNC: 102 MMOL/L
CO2 SERPL-SCNC: 24 MMOL/L
CREAT SERPL-MCNC: 1.62 MG/DL
ESTIMATED AVERAGE GLUCOSE: 186 MG/DL
GLUCOSE SERPL-MCNC: 214 MG/DL
HBA1C MFR BLD HPLC: 8.1 %
PHOSPHATE SERPL-MCNC: 3.1 MG/DL
POTASSIUM SERPL-SCNC: 4.3 MMOL/L
SODIUM SERPL-SCNC: 137 MMOL/L

## 2020-10-14 ENCOUNTER — APPOINTMENT (OUTPATIENT)
Age: 80
End: 2020-10-14
Payer: MEDICARE

## 2020-10-14 PROCEDURE — 99213 OFFICE O/P EST LOW 20 MIN: CPT | Mod: 95

## 2020-11-07 NOTE — HISTORY OF PRESENT ILLNESS
[Home] : at home, [unfilled] , at the time of the visit. [Medical Office: ([facility])___] : at the medical office located in  [Verbal consent obtained from patient] : the patient, [unfilled] [Disease: _____________________] : Disease: [unfilled] [T: ___] : T[unfilled] [N: ___] : N[unfilled] [M: ___] : M[unfilled] [AJCC Stage: ____] : AJCC Stage: [unfilled] [de-identified] : 80 M w/ h/o CKD, HTN, DM, CAD s/p stents, SHARATH presents for evaluation of an incidentally found Stage I gallbladder cancer after a cholecystectomy. \par \sue Tapia noted progressive RUQ abdominal pain in the beginning of June 2020. He presented to [facility] ER on 6/12 with same complaints and underwent a abdominal US which was indeterminant but showed a thickened wall. Denied any change in BMs, change in appetite or weight loss. After obtaining out patient cardiac clearance he underwent a lap choley on 6/17/20. Intra op GB noted to be chronically inflamed with a large gallstone noted. 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 The cystic duct was negative for malignancy or dysplasia. No LV1 or PNI noted. Post op course unremarkable. \par \sue Tapia subsequently referred to medical oncology for further evaluation. \par \par 7/9/20: CT Chest: ill defined b/l groundglass opacities, 1.3 indeterminant adrenal lesion, 1.4 thyroid nodule \par 7/23/20: MRI A/P: limited scan for biliary pathology \par 9/3/20: PET/CT: AMADO [de-identified] : adenocarcinoma  [FreeTextEntry1] : surveillance  [de-identified] : Feels well. Denies any c/o.

## 2020-11-13 ENCOUNTER — APPOINTMENT (OUTPATIENT)
Dept: CARDIOLOGY | Facility: CLINIC | Age: 80
End: 2020-11-13
Payer: MEDICARE

## 2020-11-13 ENCOUNTER — NON-APPOINTMENT (OUTPATIENT)
Age: 80
End: 2020-11-13

## 2020-11-13 VITALS — OXYGEN SATURATION: 99 % | SYSTOLIC BLOOD PRESSURE: 189 MMHG | DIASTOLIC BLOOD PRESSURE: 87 MMHG | HEART RATE: 56 BPM

## 2020-11-13 PROCEDURE — 99072 ADDL SUPL MATRL&STAF TM PHE: CPT

## 2020-11-13 PROCEDURE — 99215 OFFICE O/P EST HI 40 MIN: CPT

## 2020-11-13 PROCEDURE — 93000 ELECTROCARDIOGRAM COMPLETE: CPT

## 2020-11-13 NOTE — DISCUSSION/SUMMARY
[FreeTextEntry1] : Assessment:\par 1. CAD s/p RCA FOREST x 3 and unrevascularized 70% mLAD (2016 NST without anterior ischemia)\par 2. HTN / HLD / DM\par 3. 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 Maintain DAPT w/ ASA 81mg daily and Plavix 75mg daily given unrevascularized mLAD\par 2. Atorvastatin to 40mg qhs (age > 75)\par 3. Continue Norvasc 10mg daily, Coreg 25mg BID, Hydralzine 50mg BID\par 4.  Will check labs to assess for dyspnea and fatigue:  troponin, BNP, dimer, TSH\par 5.  Nuclear stress test\par 6.  Home blood pressure monitoring\par 7.  Await testing.

## 2020-11-13 NOTE — PHYSICAL EXAM
[General Appearance - Well Developed] : well developed [General Appearance - Well Nourished] : well nourished [Normal Conjunctiva] : the conjunctiva exhibited no abnormalities [Eyelids - No Xanthelasma] : the eyelids demonstrated no xanthelasmas [Normal Oral Mucosa] : normal oral mucosa [No Oral Cyanosis] : no oral cyanosis [Normal Jugular Venous A Waves Present] : normal jugular venous A waves present [Normal Jugular Venous V Waves Present] : normal jugular venous V waves present [Respiration, Rhythm And Depth] : normal respiratory rhythm and effort [Auscultation Breath Sounds / Voice Sounds] : lungs were clear to auscultation bilaterally [Heart Rate And Rhythm] : heart rate and rhythm were normal [Heart Sounds] : normal S1 and S2 [Murmurs] : no murmurs present [Edema] : no peripheral edema present [1+] : left 1+ [Bowel Sounds] : normal bowel sounds [Abdomen Soft] : soft [Abdomen Tenderness] : non-tender [Abnormal Walk] : normal gait [Nail Clubbing] : no clubbing of the fingernails [Cyanosis, Localized] : no localized cyanosis [Skin Color & Pigmentation] : normal skin color and pigmentation [] : no rash [No Venous Stasis] : no venous stasis [No Skin Ulcers] : no skin ulcer [Oriented To Time, Place, And Person] : oriented to person, place, and time [Impaired Insight] : insight and judgment were intact [Affect] : the affect was normal [Mood] : the mood was normal [FreeTextEntry1] : No bruit.

## 2020-11-13 NOTE — HISTORY OF PRESENT ILLNESS
[FreeTextEntry1] : 79 year old active man with CAD s/p RCA FOREST x 3 and unrevascularized 70% mLAD, HTN, HLD, DM, BPH, OA, Gout presents for routine follow-up.\par \par He gets short of breath with minimal exertion.  This is new for him.  Started a few weeks ago.  It is not getting worse.  He is concerned about this.  He is short of breath even with walking at the park.   Increased fatigue.  \par No CP or Palpitations.  BP at home is 150.  Elevated in the office\par \par \par Cardiac Hx:\par 3/16 Cath - RCA PCI with FOREST x 3. LAD 70% --but nuc stress without anterior ischemia. \par 5/16 NST - Basal inf. / lateral ischemia\par 5/16 Echo - Normal LV, RV, EF      \par 6/16 Renal Duplex - No GATITO\par \par Meds:\par ASA, Plavix, Atorava 80\par Coreg 25mg BID, Hydral 50mg BID, Norvasc 10mg, Lasix 20mg      \par \par Allergies:\par ACE/ARB allergy noted

## 2020-11-13 NOTE — REVIEW OF SYSTEMS
[see HPI] : see HPI [Dyspnea on exertion] : dyspnea during exertion [Negative] : Heme/Lymph [Shortness Of Breath] : shortness of breath [Chest Pain] : no chest pain [Palpitations] : no palpitations

## 2020-11-14 LAB
ALBUMIN SERPL ELPH-MCNC: 4.6 G/DL
ALP BLD-CCNC: 65 U/L
ALT SERPL-CCNC: 30 U/L
ANION GAP SERPL CALC-SCNC: 11 MMOL/L
AST SERPL-CCNC: 37 U/L
BASOPHILS # BLD AUTO: 0.04 K/UL
BASOPHILS NFR BLD AUTO: 0.9 %
BILIRUB SERPL-MCNC: 0.4 MG/DL
BUN SERPL-MCNC: 29 MG/DL
CALCIUM SERPL-MCNC: 11.1 MG/DL
CHLORIDE SERPL-SCNC: 104 MMOL/L
CO2 SERPL-SCNC: 24 MMOL/L
CREAT SERPL-MCNC: 2.2 MG/DL
DEPRECATED D DIMER PPP IA-ACNC: 185 NG/ML DDU
EOSINOPHIL # BLD AUTO: 0.3 K/UL
EOSINOPHIL NFR BLD AUTO: 6.9 %
GLUCOSE SERPL-MCNC: 176 MG/DL
HCT VFR BLD CALC: 42.9 %
HGB BLD-MCNC: 14.2 G/DL
IMM GRANULOCYTES NFR BLD AUTO: 0 %
LYMPHOCYTES # BLD AUTO: 1.05 K/UL
LYMPHOCYTES NFR BLD AUTO: 24.3 %
MAN DIFF?: NORMAL
MCHC RBC-ENTMCNC: 32.6 PG
MCHC RBC-ENTMCNC: 33.1 GM/DL
MCV RBC AUTO: 98.6 FL
MONOCYTES # BLD AUTO: 0.41 K/UL
MONOCYTES NFR BLD AUTO: 9.5 %
NEUTROPHILS # BLD AUTO: 2.52 K/UL
NEUTROPHILS NFR BLD AUTO: 58.4 %
NT-PROBNP SERPL-MCNC: 452 PG/ML
PLATELET # BLD AUTO: 199 K/UL
POTASSIUM SERPL-SCNC: 4.3 MMOL/L
PROT SERPL-MCNC: 7.3 G/DL
RBC # BLD: 4.35 M/UL
RBC # FLD: 13 %
SODIUM SERPL-SCNC: 140 MMOL/L
TROPONIN-T, HIGH SENSITIVITY: 32 NG/L
TSH SERPL-ACNC: 1.43 UIU/ML
WBC # FLD AUTO: 4.32 K/UL

## 2020-11-16 ENCOUNTER — NON-APPOINTMENT (OUTPATIENT)
Age: 80
End: 2020-11-16

## 2020-11-17 ENCOUNTER — OUTPATIENT (OUTPATIENT)
Dept: OUTPATIENT SERVICES | Facility: HOSPITAL | Age: 80
LOS: 1 days | End: 2020-11-17
Payer: MEDICARE

## 2020-11-17 ENCOUNTER — APPOINTMENT (OUTPATIENT)
Dept: RADIOLOGY | Facility: IMAGING CENTER | Age: 80
End: 2020-11-17
Payer: MEDICARE

## 2020-11-17 DIAGNOSIS — M75.101 UNSPECIFIED ROTATOR CUFF TEAR OR RUPTURE OF RIGHT SHOULDER, NOT SPECIFIED AS TRAUMATIC: Chronic | ICD-10-CM

## 2020-11-17 DIAGNOSIS — I25.10 ATHEROSCLEROTIC HEART DISEASE OF NATIVE CORONARY ARTERY WITHOUT ANGINA PECTORIS: ICD-10-CM

## 2020-11-17 DIAGNOSIS — H26.9 UNSPECIFIED CATARACT: Chronic | ICD-10-CM

## 2020-11-17 DIAGNOSIS — Z98.89 OTHER SPECIFIED POSTPROCEDURAL STATES: Chronic | ICD-10-CM

## 2020-11-17 DIAGNOSIS — R05 COUGH: ICD-10-CM

## 2020-11-17 DIAGNOSIS — Q76.1 KLIPPEL-FEIL SYNDROME: Chronic | ICD-10-CM

## 2020-11-17 DIAGNOSIS — M65.30 TRIGGER FINGER, UNSPECIFIED FINGER: Chronic | ICD-10-CM

## 2020-11-17 PROCEDURE — 71046 X-RAY EXAM CHEST 2 VIEWS: CPT

## 2020-11-17 PROCEDURE — 71046 X-RAY EXAM CHEST 2 VIEWS: CPT | Mod: 26

## 2020-12-01 ENCOUNTER — APPOINTMENT (OUTPATIENT)
Dept: ULTRASOUND IMAGING | Facility: IMAGING CENTER | Age: 80
End: 2020-12-01
Payer: MEDICARE

## 2020-12-01 ENCOUNTER — OUTPATIENT (OUTPATIENT)
Dept: OUTPATIENT SERVICES | Facility: HOSPITAL | Age: 80
LOS: 1 days | End: 2020-12-01
Payer: MEDICARE

## 2020-12-01 DIAGNOSIS — N40.1 BENIGN PROSTATIC HYPERPLASIA WITH LOWER URINARY TRACT SYMPTOMS: ICD-10-CM

## 2020-12-01 DIAGNOSIS — Z00.8 ENCOUNTER FOR OTHER GENERAL EXAMINATION: ICD-10-CM

## 2020-12-01 DIAGNOSIS — Q76.1 KLIPPEL-FEIL SYNDROME: Chronic | ICD-10-CM

## 2020-12-01 DIAGNOSIS — Z98.89 OTHER SPECIFIED POSTPROCEDURAL STATES: Chronic | ICD-10-CM

## 2020-12-01 DIAGNOSIS — H26.9 UNSPECIFIED CATARACT: Chronic | ICD-10-CM

## 2020-12-01 DIAGNOSIS — M75.101 UNSPECIFIED ROTATOR CUFF TEAR OR RUPTURE OF RIGHT SHOULDER, NOT SPECIFIED AS TRAUMATIC: Chronic | ICD-10-CM

## 2020-12-01 DIAGNOSIS — M65.30 TRIGGER FINGER, UNSPECIFIED FINGER: Chronic | ICD-10-CM

## 2020-12-01 PROCEDURE — 76857 US EXAM PELVIC LIMITED: CPT

## 2020-12-01 PROCEDURE — 76857 US EXAM PELVIC LIMITED: CPT | Mod: 26

## 2020-12-01 PROCEDURE — 76770 US EXAM ABDO BACK WALL COMP: CPT

## 2020-12-01 PROCEDURE — 76770 US EXAM ABDO BACK WALL COMP: CPT | Mod: 26

## 2020-12-04 ENCOUNTER — OUTPATIENT (OUTPATIENT)
Dept: OUTPATIENT SERVICES | Facility: HOSPITAL | Age: 80
LOS: 1 days | Discharge: HOME | End: 2020-12-04

## 2020-12-04 DIAGNOSIS — D64.9 ANEMIA, UNSPECIFIED: ICD-10-CM

## 2020-12-04 DIAGNOSIS — Z98.89 OTHER SPECIFIED POSTPROCEDURAL STATES: Chronic | ICD-10-CM

## 2020-12-04 DIAGNOSIS — H26.9 UNSPECIFIED CATARACT: Chronic | ICD-10-CM

## 2020-12-04 DIAGNOSIS — M65.30 TRIGGER FINGER, UNSPECIFIED FINGER: Chronic | ICD-10-CM

## 2020-12-04 DIAGNOSIS — Q76.1 KLIPPEL-FEIL SYNDROME: Chronic | ICD-10-CM

## 2020-12-04 DIAGNOSIS — M75.101 UNSPECIFIED ROTATOR CUFF TEAR OR RUPTURE OF RIGHT SHOULDER, NOT SPECIFIED AS TRAUMATIC: Chronic | ICD-10-CM

## 2020-12-08 ENCOUNTER — APPOINTMENT (OUTPATIENT)
Dept: HEMATOLOGY ONCOLOGY | Facility: CLINIC | Age: 80
End: 2020-12-08

## 2020-12-08 ENCOUNTER — RESULT REVIEW (OUTPATIENT)
Age: 80
End: 2020-12-08

## 2020-12-08 LAB
BASOPHILS # BLD AUTO: 0.03 K/UL — SIGNIFICANT CHANGE UP (ref 0–0.2)
BASOPHILS NFR BLD AUTO: 0.7 % — SIGNIFICANT CHANGE UP (ref 0–2)
CANCER AG19-9 SERPL-ACNC: 32 U/ML
CEA SERPL-MCNC: 3.6 NG/ML
EOSINOPHIL # BLD AUTO: 0.27 K/UL — SIGNIFICANT CHANGE UP (ref 0–0.5)
EOSINOPHIL NFR BLD AUTO: 6.5 % — HIGH (ref 0–6)
HCT VFR BLD CALC: 38.9 % — LOW (ref 39–50)
HGB BLD-MCNC: 12.9 G/DL — LOW (ref 13–17)
IMM GRANULOCYTES NFR BLD AUTO: 0.2 % — SIGNIFICANT CHANGE UP (ref 0–1.5)
LYMPHOCYTES # BLD AUTO: 0.97 K/UL — LOW (ref 1–3.3)
LYMPHOCYTES # BLD AUTO: 23.2 % — SIGNIFICANT CHANGE UP (ref 13–44)
MCHC RBC-ENTMCNC: 32.3 PG — SIGNIFICANT CHANGE UP (ref 27–34)
MCHC RBC-ENTMCNC: 33.2 G/DL — SIGNIFICANT CHANGE UP (ref 32–36)
MCV RBC AUTO: 97.5 FL — SIGNIFICANT CHANGE UP (ref 80–100)
MONOCYTES # BLD AUTO: 0.47 K/UL — SIGNIFICANT CHANGE UP (ref 0–0.9)
MONOCYTES NFR BLD AUTO: 11.2 % — SIGNIFICANT CHANGE UP (ref 2–14)
NEUTROPHILS # BLD AUTO: 2.43 K/UL — SIGNIFICANT CHANGE UP (ref 1.8–7.4)
NEUTROPHILS NFR BLD AUTO: 58.2 % — SIGNIFICANT CHANGE UP (ref 43–77)
NRBC # BLD: 0 /100 WBCS — SIGNIFICANT CHANGE UP (ref 0–0)
PLATELET # BLD AUTO: 180 K/UL — SIGNIFICANT CHANGE UP (ref 150–400)
RBC # BLD: 3.99 M/UL — LOW (ref 4.2–5.8)
RBC # FLD: 13.2 % — SIGNIFICANT CHANGE UP (ref 10.3–14.5)
WBC # BLD: 4.18 K/UL — SIGNIFICANT CHANGE UP (ref 3.8–10.5)
WBC # FLD AUTO: 4.18 K/UL — SIGNIFICANT CHANGE UP (ref 3.8–10.5)

## 2020-12-09 ENCOUNTER — APPOINTMENT (OUTPATIENT)
Dept: NEPHROLOGY | Facility: CLINIC | Age: 80
End: 2020-12-09
Payer: MEDICARE

## 2020-12-09 DIAGNOSIS — N17.9 ACUTE KIDNEY FAILURE, UNSPECIFIED: ICD-10-CM

## 2020-12-09 PROCEDURE — 99213 OFFICE O/P EST LOW 20 MIN: CPT | Mod: 95

## 2020-12-09 NOTE — HISTORY OF PRESENT ILLNESS
[FreeTextEntry1] : A case of CKD stage III with diabetes, hypertension, bilateral kidney cysts, hyperlipidemia noted to have worsening of serum creatinine. There is no other complaint.

## 2020-12-09 NOTE — ASSESSMENT
[FreeTextEntry1] : A case of CKD stage III with diabetes, hypertension, bilateral kidney cysts, hyperlipidemia noted to have worsening of serum creatinine. There is no other complaint. No h/o weight loss. Diabetes and hypertension is well controlled. Patient elevated serum calcium. Kidneys shows bilateral septate cyst (earlier they were simple cyst). The thickness of the septa 2 mm. Advised to consult urologist. Advised PTH and viti D levels. Patient has been advised to stop vit D and repeat BMP after one week.

## 2020-12-11 LAB
24R-OH-CALCIDIOL SERPL-MCNC: 26 PG/ML
25(OH)D3 SERPL-MCNC: 36 NG/ML
ALBUMIN SERPL ELPH-MCNC: 4.3 G/DL
ANION GAP SERPL CALC-SCNC: 11 MMOL/L
APPEARANCE: CLEAR
BACTERIA: NEGATIVE
BILIRUBIN URINE: NEGATIVE
BLOOD URINE: NEGATIVE
BUN SERPL-MCNC: 34 MG/DL
CALCIUM SERPL-MCNC: 11.1 MG/DL
CHLORIDE SERPL-SCNC: 103 MMOL/L
CO2 SERPL-SCNC: 25 MMOL/L
COLOR: YELLOW
CREAT SERPL-MCNC: 2.15 MG/DL
GLUCOSE QUALITATIVE U: ABNORMAL
GLUCOSE SERPL-MCNC: 157 MG/DL
HYALINE CASTS: 1 /LPF
KETONES URINE: NEGATIVE
LEUKOCYTE ESTERASE URINE: NEGATIVE
MICROSCOPIC-UA: NORMAL
NITRITE URINE: NEGATIVE
PARATHYROID HORMONE INTACT: 90 PG/ML
PH URINE: 6
PHOSPHATE SERPL-MCNC: 3.1 MG/DL
POTASSIUM SERPL-SCNC: 4.7 MMOL/L
PROTEIN URINE: ABNORMAL
RED BLOOD CELLS URINE: 1 /HPF
SODIUM SERPL-SCNC: 140 MMOL/L
SPECIFIC GRAVITY URINE: 1.02
SQUAMOUS EPITHELIAL CELLS: 1 /HPF
UROBILINOGEN URINE: NORMAL
WHITE BLOOD CELLS URINE: 0 /HPF

## 2021-01-13 ENCOUNTER — APPOINTMENT (OUTPATIENT)
Dept: UROLOGY | Facility: CLINIC | Age: 81
End: 2021-01-13
Payer: MEDICARE

## 2021-01-13 ENCOUNTER — OUTPATIENT (OUTPATIENT)
Dept: OUTPATIENT SERVICES | Facility: HOSPITAL | Age: 81
LOS: 1 days | End: 2021-01-13
Payer: MEDICARE

## 2021-01-13 VITALS
DIASTOLIC BLOOD PRESSURE: 75 MMHG | TEMPERATURE: 98 F | SYSTOLIC BLOOD PRESSURE: 173 MMHG | RESPIRATION RATE: 17 BRPM | WEIGHT: 180 LBS | HEART RATE: 74 BPM | HEIGHT: 69 IN | BODY MASS INDEX: 26.66 KG/M2

## 2021-01-13 DIAGNOSIS — M65.30 TRIGGER FINGER, UNSPECIFIED FINGER: Chronic | ICD-10-CM

## 2021-01-13 DIAGNOSIS — Q76.1 KLIPPEL-FEIL SYNDROME: Chronic | ICD-10-CM

## 2021-01-13 DIAGNOSIS — M75.101 UNSPECIFIED ROTATOR CUFF TEAR OR RUPTURE OF RIGHT SHOULDER, NOT SPECIFIED AS TRAUMATIC: Chronic | ICD-10-CM

## 2021-01-13 DIAGNOSIS — Z98.89 OTHER SPECIFIED POSTPROCEDURAL STATES: Chronic | ICD-10-CM

## 2021-01-13 DIAGNOSIS — H26.9 UNSPECIFIED CATARACT: Chronic | ICD-10-CM

## 2021-01-13 DIAGNOSIS — R35.0 FREQUENCY OF MICTURITION: ICD-10-CM

## 2021-01-13 PROCEDURE — 76775 US EXAM ABDO BACK WALL LIM: CPT

## 2021-01-13 PROCEDURE — 99072 ADDL SUPL MATRL&STAF TM PHE: CPT

## 2021-01-13 PROCEDURE — 99213 OFFICE O/P EST LOW 20 MIN: CPT | Mod: 25

## 2021-01-13 PROCEDURE — 76775 US EXAM ABDO BACK WALL LIM: CPT | Mod: 26

## 2021-01-13 NOTE — PHYSICAL EXAM
[General Appearance - Well Developed] : well developed [General Appearance - Well Nourished] : well nourished [Bowel Sounds] : normal bowel sounds [Skin Color & Pigmentation] : normal skin color and pigmentation [Heart Rate And Rhythm] : Heart rate and rhythm were normal [] : no respiratory distress [Oriented To Time, Place, And Person] : oriented to person, place, and time [Normal Station and Gait] : the gait and station were normal for the patient's age [No Focal Deficits] : no focal deficits [No Palpable Adenopathy] : no palpable adenopathy

## 2021-01-15 ENCOUNTER — RESULT REVIEW (OUTPATIENT)
Age: 81
End: 2021-01-15

## 2021-01-15 ENCOUNTER — EMERGENCY (EMERGENCY)
Facility: HOSPITAL | Age: 81
LOS: 1 days | End: 2021-01-15
Attending: EMERGENCY MEDICINE | Admitting: EMERGENCY MEDICINE
Payer: MEDICARE

## 2021-01-15 VITALS
WEIGHT: 182.1 LBS | OXYGEN SATURATION: 98 % | HEART RATE: 77 BPM | SYSTOLIC BLOOD PRESSURE: 137 MMHG | TEMPERATURE: 98 F | RESPIRATION RATE: 18 BRPM | HEIGHT: 70 IN | DIASTOLIC BLOOD PRESSURE: 75 MMHG

## 2021-01-15 DIAGNOSIS — M65.30 TRIGGER FINGER, UNSPECIFIED FINGER: Chronic | ICD-10-CM

## 2021-01-15 DIAGNOSIS — M75.101 UNSPECIFIED ROTATOR CUFF TEAR OR RUPTURE OF RIGHT SHOULDER, NOT SPECIFIED AS TRAUMATIC: Chronic | ICD-10-CM

## 2021-01-15 DIAGNOSIS — Q76.1 KLIPPEL-FEIL SYNDROME: Chronic | ICD-10-CM

## 2021-01-15 DIAGNOSIS — H26.9 UNSPECIFIED CATARACT: Chronic | ICD-10-CM

## 2021-01-15 DIAGNOSIS — N40.1 BENIGN PROSTATIC HYPERPLASIA WITH LOWER URINARY TRACT SYMPTOMS: ICD-10-CM

## 2021-01-15 DIAGNOSIS — Z98.89 OTHER SPECIFIED POSTPROCEDURAL STATES: Chronic | ICD-10-CM

## 2021-01-15 DIAGNOSIS — N28.1 CYST OF KIDNEY, ACQUIRED: ICD-10-CM

## 2021-01-15 LAB
ALBUMIN SERPL ELPH-MCNC: 3.8 G/DL — SIGNIFICANT CHANGE UP (ref 3.3–5)
ALP SERPL-CCNC: 57 U/L — SIGNIFICANT CHANGE UP (ref 40–120)
ALT FLD-CCNC: 20 U/L — SIGNIFICANT CHANGE UP (ref 10–45)
ANION GAP SERPL CALC-SCNC: 10 MMOL/L — SIGNIFICANT CHANGE UP (ref 5–17)
APTT BLD: 33.6 SEC — SIGNIFICANT CHANGE UP (ref 27.5–35.5)
AST SERPL-CCNC: 27 U/L — SIGNIFICANT CHANGE UP (ref 10–40)
BASOPHILS # BLD AUTO: 0.03 K/UL — SIGNIFICANT CHANGE UP (ref 0–0.2)
BASOPHILS NFR BLD AUTO: 0.4 % — SIGNIFICANT CHANGE UP (ref 0–2)
BILIRUB SERPL-MCNC: 0.5 MG/DL — SIGNIFICANT CHANGE UP (ref 0.2–1.2)
BUN SERPL-MCNC: 21 MG/DL — SIGNIFICANT CHANGE UP (ref 7–23)
CALCIUM SERPL-MCNC: 10.4 MG/DL — SIGNIFICANT CHANGE UP (ref 8.4–10.5)
CHLORIDE SERPL-SCNC: 106 MMOL/L — SIGNIFICANT CHANGE UP (ref 96–108)
CO2 SERPL-SCNC: 24 MMOL/L — SIGNIFICANT CHANGE UP (ref 22–31)
CREAT SERPL-MCNC: 1.94 MG/DL — HIGH (ref 0.5–1.3)
CRP SERPL-MCNC: 2.34 MG/DL — HIGH (ref 0–0.4)
EOSINOPHIL # BLD AUTO: 0.15 K/UL — SIGNIFICANT CHANGE UP (ref 0–0.5)
EOSINOPHIL NFR BLD AUTO: 2.1 % — SIGNIFICANT CHANGE UP (ref 0–6)
GLUCOSE BLDC GLUCOMTR-MCNC: 141 MG/DL — HIGH (ref 70–99)
GLUCOSE SERPL-MCNC: 175 MG/DL — HIGH (ref 70–99)
HCT VFR BLD CALC: 35.1 % — LOW (ref 39–50)
HGB BLD-MCNC: 11.7 G/DL — LOW (ref 13–17)
IMM GRANULOCYTES NFR BLD AUTO: 0.4 % — SIGNIFICANT CHANGE UP (ref 0–1.5)
INR BLD: 1.03 RATIO — SIGNIFICANT CHANGE UP (ref 0.88–1.16)
LYMPHOCYTES # BLD AUTO: 0.65 K/UL — LOW (ref 1–3.3)
LYMPHOCYTES # BLD AUTO: 9.2 % — LOW (ref 13–44)
MCHC RBC-ENTMCNC: 32.1 PG — SIGNIFICANT CHANGE UP (ref 27–34)
MCHC RBC-ENTMCNC: 33.3 GM/DL — SIGNIFICANT CHANGE UP (ref 32–36)
MCV RBC AUTO: 96.4 FL — SIGNIFICANT CHANGE UP (ref 80–100)
MONOCYTES # BLD AUTO: 0.76 K/UL — SIGNIFICANT CHANGE UP (ref 0–0.9)
MONOCYTES NFR BLD AUTO: 10.8 % — SIGNIFICANT CHANGE UP (ref 2–14)
NEUTROPHILS # BLD AUTO: 5.43 K/UL — SIGNIFICANT CHANGE UP (ref 1.8–7.4)
NEUTROPHILS NFR BLD AUTO: 77.1 % — HIGH (ref 43–77)
NRBC # BLD: 0 /100 WBCS — SIGNIFICANT CHANGE UP (ref 0–0)
PLATELET # BLD AUTO: 193 K/UL — SIGNIFICANT CHANGE UP (ref 150–400)
POTASSIUM SERPL-MCNC: 4 MMOL/L — SIGNIFICANT CHANGE UP (ref 3.5–5.3)
POTASSIUM SERPL-SCNC: 4 MMOL/L — SIGNIFICANT CHANGE UP (ref 3.5–5.3)
PROT SERPL-MCNC: 6.9 G/DL — SIGNIFICANT CHANGE UP (ref 6–8.3)
PROTHROM AB SERPL-ACNC: 12.3 SEC — SIGNIFICANT CHANGE UP (ref 10.6–13.6)
RBC # BLD: 3.64 M/UL — LOW (ref 4.2–5.8)
RBC # FLD: 13.3 % — SIGNIFICANT CHANGE UP (ref 10.3–14.5)
SARS-COV-2 RNA SPEC QL NAA+PROBE: SIGNIFICANT CHANGE UP
SODIUM SERPL-SCNC: 140 MMOL/L — SIGNIFICANT CHANGE UP (ref 135–145)
WBC # BLD: 7.05 K/UL — SIGNIFICANT CHANGE UP (ref 3.8–10.5)
WBC # FLD AUTO: 7.05 K/UL — SIGNIFICANT CHANGE UP (ref 3.8–10.5)

## 2021-01-15 PROCEDURE — 73630 X-RAY EXAM OF FOOT: CPT | Mod: 26,LT

## 2021-01-15 PROCEDURE — 99220: CPT

## 2021-01-15 RX ORDER — OXYCODONE AND ACETAMINOPHEN 5; 325 MG/1; MG/1
1 TABLET ORAL ONCE
Refills: 0 | Status: DISCONTINUED | OUTPATIENT
Start: 2021-01-15 | End: 2021-01-15

## 2021-01-15 RX ORDER — CEFAZOLIN SODIUM 1 G
VIAL (EA) INJECTION
Refills: 0 | Status: DISCONTINUED | OUTPATIENT
Start: 2021-01-15 | End: 2021-01-15

## 2021-01-15 RX ORDER — TAMSULOSIN HYDROCHLORIDE 0.4 MG/1
0.4 CAPSULE ORAL AT BEDTIME
Refills: 0 | Status: DISCONTINUED | OUTPATIENT
Start: 2021-01-15 | End: 2021-01-18

## 2021-01-15 RX ORDER — DEXTROSE 50 % IN WATER 50 %
25 SYRINGE (ML) INTRAVENOUS ONCE
Refills: 0 | Status: DISCONTINUED | OUTPATIENT
Start: 2021-01-15 | End: 2021-01-18

## 2021-01-15 RX ORDER — CEFAZOLIN SODIUM 1 G
1000 VIAL (EA) INJECTION ONCE
Refills: 0 | Status: COMPLETED | OUTPATIENT
Start: 2021-01-15 | End: 2021-01-15

## 2021-01-15 RX ORDER — AMLODIPINE BESYLATE 2.5 MG/1
10 TABLET ORAL DAILY
Refills: 0 | Status: DISCONTINUED | OUTPATIENT
Start: 2021-01-15 | End: 2021-01-18

## 2021-01-15 RX ORDER — DEXTROSE 50 % IN WATER 50 %
15 SYRINGE (ML) INTRAVENOUS ONCE
Refills: 0 | Status: DISCONTINUED | OUTPATIENT
Start: 2021-01-15 | End: 2021-01-18

## 2021-01-15 RX ORDER — DEXTROSE 50 % IN WATER 50 %
12.5 SYRINGE (ML) INTRAVENOUS ONCE
Refills: 0 | Status: DISCONTINUED | OUTPATIENT
Start: 2021-01-15 | End: 2021-01-18

## 2021-01-15 RX ORDER — ALLOPURINOL 300 MG
100 TABLET ORAL ONCE
Refills: 0 | Status: COMPLETED | OUTPATIENT
Start: 2021-01-15 | End: 2021-01-15

## 2021-01-15 RX ORDER — INSULIN LISPRO 100/ML
VIAL (ML) SUBCUTANEOUS
Refills: 0 | Status: DISCONTINUED | OUTPATIENT
Start: 2021-01-15 | End: 2021-01-18

## 2021-01-15 RX ORDER — OXYCODONE HYDROCHLORIDE 5 MG/1
5 TABLET ORAL ONCE
Refills: 0 | Status: DISCONTINUED | OUTPATIENT
Start: 2021-01-15 | End: 2021-01-15

## 2021-01-15 RX ORDER — AMPICILLIN SODIUM AND SULBACTAM SODIUM 250; 125 MG/ML; MG/ML
3 INJECTION, POWDER, FOR SUSPENSION INTRAMUSCULAR; INTRAVENOUS EVERY 6 HOURS
Refills: 0 | Status: DISCONTINUED | OUTPATIENT
Start: 2021-01-16 | End: 2021-01-18

## 2021-01-15 RX ORDER — AMPICILLIN SODIUM AND SULBACTAM SODIUM 250; 125 MG/ML; MG/ML
INJECTION, POWDER, FOR SUSPENSION INTRAMUSCULAR; INTRAVENOUS
Refills: 0 | Status: DISCONTINUED | OUTPATIENT
Start: 2021-01-15 | End: 2021-01-18

## 2021-01-15 RX ORDER — ATORVASTATIN CALCIUM 80 MG/1
40 TABLET, FILM COATED ORAL AT BEDTIME
Refills: 0 | Status: DISCONTINUED | OUTPATIENT
Start: 2021-01-15 | End: 2021-01-18

## 2021-01-15 RX ORDER — FUROSEMIDE 40 MG
20 TABLET ORAL DAILY
Refills: 0 | Status: DISCONTINUED | OUTPATIENT
Start: 2021-01-15 | End: 2021-01-18

## 2021-01-15 RX ORDER — GLUCAGON INJECTION, SOLUTION 0.5 MG/.1ML
1 INJECTION, SOLUTION SUBCUTANEOUS ONCE
Refills: 0 | Status: DISCONTINUED | OUTPATIENT
Start: 2021-01-15 | End: 2021-01-18

## 2021-01-15 RX ORDER — ACETAMINOPHEN 500 MG
975 TABLET ORAL EVERY 6 HOURS
Refills: 0 | Status: DISCONTINUED | OUTPATIENT
Start: 2021-01-15 | End: 2021-01-18

## 2021-01-15 RX ORDER — FAMOTIDINE 10 MG/ML
20 INJECTION INTRAVENOUS DAILY
Refills: 0 | Status: DISCONTINUED | OUTPATIENT
Start: 2021-01-15 | End: 2021-01-18

## 2021-01-15 RX ORDER — AMPICILLIN SODIUM AND SULBACTAM SODIUM 250; 125 MG/ML; MG/ML
3 INJECTION, POWDER, FOR SUSPENSION INTRAMUSCULAR; INTRAVENOUS ONCE
Refills: 0 | Status: COMPLETED | OUTPATIENT
Start: 2021-01-15 | End: 2021-01-15

## 2021-01-15 RX ORDER — HYDRALAZINE HCL 50 MG
50 TABLET ORAL
Refills: 0 | Status: DISCONTINUED | OUTPATIENT
Start: 2021-01-15 | End: 2021-01-18

## 2021-01-15 RX ORDER — CARVEDILOL PHOSPHATE 80 MG/1
25 CAPSULE, EXTENDED RELEASE ORAL EVERY 12 HOURS
Refills: 0 | Status: DISCONTINUED | OUTPATIENT
Start: 2021-01-15 | End: 2021-01-18

## 2021-01-15 RX ORDER — SODIUM CHLORIDE 9 MG/ML
1000 INJECTION, SOLUTION INTRAVENOUS
Refills: 0 | Status: DISCONTINUED | OUTPATIENT
Start: 2021-01-15 | End: 2021-01-18

## 2021-01-15 RX ORDER — CEFAZOLIN SODIUM 1 G
1000 VIAL (EA) INJECTION EVERY 8 HOURS
Refills: 0 | Status: DISCONTINUED | OUTPATIENT
Start: 2021-01-15 | End: 2021-01-15

## 2021-01-15 RX ORDER — CLOPIDOGREL BISULFATE 75 MG/1
75 TABLET, FILM COATED ORAL DAILY
Refills: 0 | Status: DISCONTINUED | OUTPATIENT
Start: 2021-01-15 | End: 2021-01-18

## 2021-01-15 RX ORDER — ACETAMINOPHEN 500 MG
975 TABLET ORAL ONCE
Refills: 0 | Status: COMPLETED | OUTPATIENT
Start: 2021-01-15 | End: 2021-01-15

## 2021-01-15 RX ORDER — INSULIN GLARGINE 100 [IU]/ML
12 INJECTION, SOLUTION SUBCUTANEOUS AT BEDTIME
Refills: 0 | Status: DISCONTINUED | OUTPATIENT
Start: 2021-01-15 | End: 2021-01-18

## 2021-01-15 RX ORDER — SODIUM CHLORIDE 9 MG/ML
500 INJECTION INTRAMUSCULAR; INTRAVENOUS; SUBCUTANEOUS ONCE
Refills: 0 | Status: COMPLETED | OUTPATIENT
Start: 2021-01-15 | End: 2021-01-15

## 2021-01-15 RX ADMIN — SODIUM CHLORIDE 250 MILLILITER(S): 9 INJECTION INTRAMUSCULAR; INTRAVENOUS; SUBCUTANEOUS at 16:04

## 2021-01-15 RX ADMIN — Medication 110 MILLIGRAM(S): at 12:29

## 2021-01-15 RX ADMIN — Medication 100 MILLIGRAM(S): at 11:48

## 2021-01-15 RX ADMIN — Medication 975 MILLIGRAM(S): at 17:22

## 2021-01-15 RX ADMIN — OXYCODONE HYDROCHLORIDE 5 MILLIGRAM(S): 5 TABLET ORAL at 19:24

## 2021-01-15 RX ADMIN — INSULIN GLARGINE 12 UNIT(S): 100 INJECTION, SOLUTION SUBCUTANEOUS at 21:12

## 2021-01-15 RX ADMIN — Medication 975 MILLIGRAM(S): at 09:01

## 2021-01-15 RX ADMIN — OXYCODONE AND ACETAMINOPHEN 1 TABLET(S): 5; 325 TABLET ORAL at 23:19

## 2021-01-15 RX ADMIN — AMPICILLIN SODIUM AND SULBACTAM SODIUM 200 GRAM(S): 250; 125 INJECTION, POWDER, FOR SUSPENSION INTRAMUSCULAR; INTRAVENOUS at 19:09

## 2021-01-15 NOTE — CONSULT NOTE ADULT - SUBJECTIVE AND OBJECTIVE BOX
Podiatry pager #: [phone]    Patient is a 80y old  Male who presents with a chief complaint of     HPI:      PAST MEDICAL & SURGICAL HISTORY:  OA (osteoarthritis)    H/O gastroesophageal reflux (GERD)    Chronic kidney disease (CKD)    SHARATH (obstructive sleep apnea)  non compliant with CPAP    Ganglion cyst of wrist, left    CAD (coronary artery disease)  stents x4( 03/2017)  last echo stress test 05/2016    Diabetes  2    Hyperlipidemia    Hypertension    BPH (Benign Prostatic Hypertrophy)    Pneumonia    H/O: Rotator Cuff Tear    Renal Calculi  h/olithotripsy 2010    Diabetes Mellitus Type II    Hyperlipemia    Hypertension    Bilateral rotator cuff syndrome  operated both&#x27;    History of lumbar laminectomy    Cervical fusion syndrome    Trigger finger of both hands  sp repair    Bilateral cataracts  sp extraction    lumbar lamenectomy  1998    Laminectomy with Spinal Fusion  cervical- 2009    History of Arthroscopy  bilateral shoulder rotator cuff repair        MEDICATIONS  (STANDING):  allopurinol 100 milliGRAM(s) Oral once  amLODIPine   Tablet 10 milliGRAM(s) Oral daily  atorvastatin 40 milliGRAM(s) Oral at bedtime  carvedilol 25 milliGRAM(s) Oral every 12 hours  ceFAZolin   IVPB 1000 milliGRAM(s) IV Intermittent every 8 hours  ceFAZolin   IVPB      clopidogrel Tablet 75 milliGRAM(s) Oral daily  doxycycline IVPB      doxycycline IVPB 100 milliGRAM(s) IV Intermittent every 12 hours  famotidine    Tablet 20 milliGRAM(s) Oral daily  furosemide    Tablet 20 milliGRAM(s) Oral daily  hydrALAZINE 50 milliGRAM(s) Oral two times a day  tamsulosin 0.4 milliGRAM(s) Oral at bedtime    MEDICATIONS  (PRN):      Allergies    Avapro (Hives)  enalapril (Unknown)  losartan (Other)  seasonal allergies-outdoor (Urticaria; Rhinorrhea; Sneezing)    Intolerances    losartan (Other)      VITALS:    Vital Signs Last 24 Hrs  T(C): 36.7 (15 Madhu 2021 14:28), Max: 36.8 (15 Madhu 2021 08:23)  T(F): 98.1 (15 Madhu 2021 14:28), Max: 98.2 (15 Madhu 2021 08:23)  HR: 81 (15 Madhu 2021 14:28) (68 - 81)  BP: 167/77 (15 Madhu 2021 14:28) (135/65 - 167/77)  BP(mean): --  RR: 16 (15 Madhu 2021 14:28) (16 - 18)  SpO2: 98% (15 Madhu 2021 14:28) (98% - 99%)    LABS:                          11.7   7.05  )-----------( 193      ( 15 Madhu 2021 09:00 )             35.1       01-15    140  |  106  |  21  ----------------------------<  175<H>  4.0   |  24  |  1.94<H>    Ca    10.4      15 Madhu 2021 09:00    TPro  6.9  /  Alb  3.8  /  TBili  0.5  /  DBili  x   /  AST  27  /  ALT  20  /  AlkPhos  57  01-15      CAPILLARY BLOOD GLUCOSE          PT/INR - ( 15 Madhu 2021 09:00 )   PT: 12.3 sec;   INR: 1.03 ratio         PTT - ( 15 Madhu 2021 09:00 )  PTT:33.6 sec    LOWER EXTREMITY PHYSICAL EXAM:    Vasular: DP/PT 2/4, B/L, CFT intact B/L, Temperature gradient warm to warm on L, warm to cool on R   Neuro: Epicritic sensation intact to the level of digits, B/L.  Musculoskeletal/Ortho: unremarkable  Derm: L foot w/ dorsal 5th MPJ cellulitis to lateral midfoot, pain on palpation over abrasion, no fluctuance, no drainage        RADIOLOGY & ADDITIONAL STUDIES:    < from: Xray Foot AP + Lateral + Oblique, Left (01.15.21 @ 09:21) >  EXAM:  FOOT COMPLETE LEFT (MIN 3 VIEWS)                            PROCEDURE DATE:  01/15/2021            INTERPRETATION:  XR FOOT COMPLETE 3 VIEWS LEFT    HISTORY:  Pain and swelling. Injured 5 days ago by dropping a bottle of wine on foot.    VIEWS: 3  IMAGES: 3    COMPARISON: None.    FINDINGS:    OSSEOUS STRUCTURES    Fractures:  None.    JOINTS    Joint Space(s):  Small tibiotalar marginal osteophytes are noted. Tiny joint body or soft tissue calcification is noted posterior to the posterior subtalar joint.    OTHER FINDINGS:  Plantar and retrocalcaneal enthesophytes are noted. Mild-to-moderate atherosclerotic calcifications are present.    IMPRESSION:  1. No acute osseous abnormalities.                ALBERT CAMARENA MD; Attending Radiologist  This document has been electronically signed. Madhu 15 2021  9:51AM    < end of copied text >

## 2021-01-15 NOTE — ED ADULT NURSE NOTE - OBJECTIVE STATEMENT
81 yo presents to the ED from home. A&Ox4, ambulatory with cane c/o L foot pain. pt reports that 3 days ago, a glass of wine fell onto his foot. pt denies feeling pain initially but reports pain as time went on. pt reports difficulty ambulating due to pain. pt reports swelling noted. small laceration noted on dorsal aspect. pt denies fever, chills. pt has DM type 2, on insulin. pt is well appearing. 20G in LAC. Patient undressed and placed into gown, call bell in hand and side rails up for safety. warm blanket provided, vital signs stable, pt in no acute distress.

## 2021-01-15 NOTE — ED CDU PROVIDER SUBSEQUENT DAY NOTE - PMH
BPH (Benign Prostatic Hypertrophy)    CAD (coronary artery disease)  stents x4( 03/2017)  last echo stress test 05/2016  Chronic kidney disease (CKD)    Diabetes  2  Diabetes Mellitus Type II    Ganglion cyst of wrist, left    H/O gastroesophageal reflux (GERD)    H/O: Rotator Cuff Tear    Hyperlipemia    Hyperlipidemia    Hypertension    Hypertension    OA (osteoarthritis)    SHARATH (obstructive sleep apnea)  non compliant with CPAP  Pneumonia    Renal Calculi  h/olithotripsy 2010

## 2021-01-15 NOTE — ED CDU PROVIDER INITIAL DAY NOTE - OBJECTIVE STATEMENT
80 year old male with pmhx  DM, HLD, HTN, CAD s/p 4 cardiac stents, BPH presents to ED c/o left foot pain and swelling x 4 days. Pt states he dropped part of a broken wine bottle on his foot approx 5 days ago. At that time had very mild pain which has worsened since now with difficulty ambulating. He reports redness and swelling to the area. Has not taken anything for the pain. No prior injuries to his foot. Denies fevers, chills, chest pain, sob, abd pain, n/v/d.  ED Course: Cr 1.94 (baseline 1.5s), WBC wnl, COVID negative, labs otherwise non-actionable. XR foot negative for fx, foreign body, evidence of osteomyelitis. Pt sent to CDU for frequent eval, IV abx, pain control, podiatry consult.

## 2021-01-15 NOTE — ED PROVIDER NOTE - CLINICAL SUMMARY MEDICAL DECISION MAKING FREE TEXT BOX
80 year old male with pmhx  DM, HLD, HTN, CAD s/p 4 cardiac stents, BPH presents to ED c/o left foot pain and swelling x 3 days in the setting of dropping part of a broken wine bottle on his foot approx 5 days ago.  Initially had very mild pain, with progressive redness and swelling to the area. Now with difficulty ambulating. Pt VSS, he is well appearing in NAD. Exam notable for diffuse swelling to the left foot sparing ankle.  1mm scabbed lesion overlying L 4th metatarsal head with erythema and  ttp overlying the L 4-5 metatarsals.  Concern for cellulitis given scabbed lesion and redness to foot. Will obtain basic lab work, xray foot to eval for fracture and FB. Provide analgesics, likely treat with abx and reassess for dispo  Alberta Yepez PA-C

## 2021-01-15 NOTE — ED CDU PROVIDER SUBSEQUENT DAY NOTE - MEDICAL DECISION MAKING DETAILS
Dr. Zehra Jefferson: 80 year old male with history of  pmhx  DM, HLD, HTN, CAD s/p 4 cardiac stents, BPH presented to ED L foot cellulitis. Podiatry consult appreciated. Redness and pain improved with Unasyn. No constitutional symptoms overnight.  Will continue to monitor. Discharge home with Augmentin and podiatry follow up if pt continues to improve.

## 2021-01-15 NOTE — ED ADULT NURSE NOTE - NS ED NURSE LEVEL OF CONSCIOUSNESS SPEECH
Speaking Coherently

## 2021-01-15 NOTE — ED ADULT NURSE NOTE - NSIMPLEMENTINTERV_GEN_ALL_ED
Implemented All Fall Risk Interventions:  [place] to call system. Call bell, personal items and telephone within reach. Instruct patient to call for assistance. Room bathroom lighting operational. Non-slip footwear when patient is off stretcher. Physically safe environment: no spills, clutter or unnecessary equipment. Stretcher in lowest position, wheels locked, appropriate side rails in place. Provide visual cue, wrist band, yellow gown, etc. Monitor gait and stability. Monitor for mental status changes and reorient to person, place, and time. Review medications for side effects contributing to fall risk. Reinforce activity limits and safety measures with patient and family.

## 2021-01-15 NOTE — ED CDU PROVIDER INITIAL DAY NOTE - PROGRESS NOTE DETAILS
Podiatry evaluated pt at bedside, performed incision of L dorsal foot and removed foreign body (glass). Podiatry recommends changing the abx to Augmentin, cleared from a podiatry perspective. Will give pt Unasyn while in CDU and continue to monitor. Pt seen at bedside. Pt in NAD, comfortable. VS stable from last reading. Pt has no complaints at this time. Denies f/c. Exam relatively unchanged from previous exam, still tenderness overlying region of erythema.  d/w podiatry will be down to see pt. CDU PROGRESS NOTE ESEQUIEL BAPTISTE: Pt resting comfortably without complaint. NAD. VSS. Will continue to monitor.

## 2021-01-15 NOTE — ED PROVIDER NOTE - PHYSICAL EXAMINATION
CONSTITUTIONAL: Patient is awake, alert and oriented x 3. Patient is well appearing and in no acute distress.  HEAD: NCAT,  NECK: supple, FROM  LUNGS: CTA B/  HEART: RRR  ABDOMEN: Soft nd/nt, no rebound or guarding   EXTREMITY: no edema or calf tenderness b/l, FROM upper and lower ext b/l. There is diffuse swelling to the left foot sparing ankle.  1mm scabbed lesion overlying 4th metatarsal head. There is erythema with  ttp overlying the 4-5 metatarsals.   SKIN: see extremity otherwise with no rash or lesions.   NEURO: No focal deficits

## 2021-01-15 NOTE — CONSULT NOTE ADULT - ASSESSMENT
79 y/o M w/ L foot pain s/p injury    - pt seen and evaluated  - afebrile, wbc 7  - L foot XR neg for fracture or foreign body  - L foot w/ dorsal 5th MPJ cellulitis to lateral midfoot, pain on palpation over abrasion, no fluctuance, no drainage  - local field block administered with 7cc 1% lidocaine plain  - I&D performed to medial 5th MPJ down to the level of subq and not beyond  - small metallic and presumably glass foreign bodies expressed   - flushed, cultured, foreign bodies sent to pathology  - redressed with steri strip and DSD  - WBAT to L heel in surgical shoe  - keep dressing clean, dry and intact until follow up appointment   - pod stable for d/c on 10 days PO Augmentin   - advised pt to return to ED if starts to experience constitutional symptoms or worsening pain/appearance  - to follow up with Dr. De Oliveira Monday 1/18  - please call [phone] to schedule an appointment  - discussed with attending

## 2021-01-15 NOTE — ED PROVIDER NOTE - ATTENDING CONTRIBUTION TO CARE
Jhon Desai MD, FACEP   Patient endorsed to the observation PA at the time of observation order placement.  Based on patient's history and physical exam, as well as the results of today's workup, I feel that patient warrants observation in the hospital for further workup/evaluation and continued management. I discussed the findings of today's workup with the patient and addressed the patient's questions and concerns. The patient was agreeable with observation. Our team spoke with the CDU receiving team who accepted the patient for observation and subsequently took over the patient's care at the time of order placement for observation.

## 2021-01-15 NOTE — ED CDU PROVIDER SUBSEQUENT DAY NOTE - PROGRESS NOTE DETAILS
CDU PROGRESS NOTE ESEQUIEL BAPTISTE: pt resting comfortably without complaint. NAD. VSS. pain controlled adequately. pending podiatry re-eval in am. Will continue to monitor. Pt seen at bedside. Pt in NAD, comfortable. VS stable from last reading. Pt has no complaints at this time. Minimal pain to L foot. Dressing removed, pt foot with notable improvement in erythema, minimal tenderness surrounding incision site, no active bleeding or DC. NVI, FROM w/o pain b/l ankle/foot. Pt ambulating with steady gait. Repeat BMP this morning showing Cr  1.94 -> 2.16, d/w opt who states he has not had much to drink (1 can of diet gingerale overnight). Additionally pt BG in the 60s this morning, no mental status changes/disorientation. Will give PO glucose replacement and reassess. Will give pt IVF and repeat BMP once blood glucose is normalized. Pt seen at bedside. Pt in NAD, comfortable. VS stable from last reading. Creatinine downtrending after IV fluids. Pt states he has persistent pain and mild swelling in L foot. Of note pt declined pain control with Tylenol this morning, requesting only opioid pain management, and thus did not receive pain medications. Mild tenderness to L dorsolateral foot at incision site, FROM, NVI, minimal erythema, improved from prior exams. d/w podiatry team. podiatry resident reevaluated and d/w pt that pain and swelling is expected s/p I&D. Pt understands and is agreeable to DC. pt has f/u with podiatry in 2 days. discussed importance of adequate pain management using opioids only for severe pain. Pt ambulatory. All questions answered. Called pt son (RN) at pt request, discussed all DC instructions and return precautions. Son will be arriving soon to pick pt up. Pt ready and stable for DC.

## 2021-01-15 NOTE — ED ADULT NURSE REASSESSMENT NOTE - COMFORT CARE
uses walker./ambulated to bathroom/darkened lights/plan of care explained/po fluids offered/repositioned/warm blanket provided

## 2021-01-15 NOTE — ED CDU PROVIDER INITIAL DAY NOTE - PHYSICAL EXAMINATION
GEN: Pt in NAD, A&O x3.  PSYCH: Affect appropriate.  EYES: Sclera white w/o injection.   ENT: Head NCAT. MMM. Neck supple FROM.  RESP: CTA b/l, no wheezes, rales, or rhonchi.   CARDIAC: RRR, clear distinct S1, S2, no appreciable murmurs.  ABD: Abdomen soft, non-tender. No CVAT b/l.  VASC: 2+ radial and dorsalis pedis pulses b/l. No calf tenderness.  MSK: Pain with inversion/eversion of L midfoot, otherwise FROM b/l LE without pain. No joint erythema.  NEURO: No focal motor or sensory deficits of b/l LE.  SKIN: +Erythema and edema of L dorsal-lateral foot proximal to 4th and 5th digits with punctate skin break. +tender to palpation. +indurated, no fluctuance, no crepitus. No streaking.

## 2021-01-15 NOTE — ED PROVIDER NOTE - OBJECTIVE STATEMENT
80 year old male with pmhx  DM, HLD, HTN, CAD s/p 4 cardiac stents, BPH presents to ED c/o left foot pain and swelling x 4 days. Pt states he dropped part of a broken wine bottle on his foot approx 5 days ago. At that time had very mild pain which has worsened since now with difficulty ambulating. He reports redness and swelling to the area. Has not taken anything for the pain. No prior injuries to his foot. Denies fevers, chills, chest pain, sob, abd pain, n/v/d

## 2021-01-15 NOTE — ED CDU PROVIDER DISPOSITION NOTE - CLINICAL COURSE
80 year old male with pmhx  DM, HLD, HTN, CAD s/p 4 cardiac stents, BPH presents to ED c/o left foot pain and swelling x 4 days. Pt states he dropped part of a broken wine bottle on his foot approx 5 days ago. At that time had very mild pain which has worsened since now with difficulty ambulating. He reports redness and swelling to the area. Has not taken anything for the pain. No prior injuries to his foot. Denies fevers, chills, chest pain, sob, abd pain, n/v/d.  ED Course: Cr 1.94 (baseline 1.5s), WBC wnl, COVID negative, labs otherwise non-actionable. XR foot negative for fx, foreign body, evidence of osteomyelitis. Pt sent to CDU for frequent eval, IV abx, podiatry consult.  CDU Course: Podiatry evaluated by at bedside, incised affect area and removed FB (glass), recommended change abx to augmentin. Repeat BMP showed Cr____. 80 year old male with pmhx  DM, HLD, HTN, CAD s/p 4 cardiac stents, BPH presents to ED c/o left foot pain and swelling x 4 days. Pt states he dropped part of a broken wine bottle on his foot approx 5 days ago. At that time had very mild pain which has worsened since now with difficulty ambulating. He reports redness and swelling to the area. Has not taken anything for the pain. No prior injuries to his foot. Denies fevers, chills, chest pain, sob, abd pain, n/v/d.  ED Course: Cr 1.94 (baseline 1.5s), WBC wnl, COVID negative, labs otherwise non-actionable. XR foot negative for fx, foreign body, evidence of osteomyelitis. Pt sent to CDU for frequent eval, IV abx, podiatry consult.  CDU Course: Podiatry evaluated by at bedside, incised affect area and removed FB (glass), recommended change abx to augmentin. Repeat BMP showed Cr 2.16, of note pt states poor PO fluid intake overnight. Cr improved ton 1.92 after 500cc IV fluids. Erythema greatly improved on abx, mild swelling and pain as expected s/p I&D. Pt was ready and stable at time of DC.

## 2021-01-15 NOTE — ED CDU PROVIDER DISPOSITION NOTE - CARE PROVIDER_API CALL
Chuy De Oliveira (DPM)  Surgery  [facility]  Dept of Surgery, [address] [address] [place] [id number]  Phone: [phone]  Fax: [phone]  Follow Up Time: 1-3 Days  
COPD (chronic obstructive pulmonary disease)

## 2021-01-15 NOTE — ED CDU PROVIDER DISPOSITION NOTE - ATTENDING CONTRIBUTION TO CARE
80 year old male with history of  pmhx  DM, HLD, HTN, CAD s/p 4 cardiac stents, BPH presented to ED L foot cellulitis. Podiatry consult appreciated. Redness and pain improved with Unasyn. No constitutional symptoms overnight. Podiatry consult appreciated. Will discharge home with strict return precautions.

## 2021-01-15 NOTE — ED CDU PROVIDER INITIAL DAY NOTE - ATTENDING CONTRIBUTION TO CARE
***Jhon Desai MD***  I have personally performed a face to face diagnostic evaluation on this patient.  I have reviewed the ACP note and agree with the history, exam, and plan of care, except as noted. Patient will be reassessed and discussed with AM/PM CDU/FT MD who will follow up on labs, analgesia, imaging and disposition as clinically indicated. Please see emergency department provider note. ***Jhon Desai MD***  I have personally performed a face to face diagnostic evaluation on this patient.  I have reviewed the ACP note and agree with the history, exam, and plan of care, except as noted. Patient will be reassessed and discussed with AM/PM CDU/FT MD who will follow up on labs, analgesia, imaging and disposition as clinically indicated. Please see emergency department provider note

## 2021-01-15 NOTE — ED CDU PROVIDER INITIAL DAY NOTE - FAMILY HISTORY
Mother  Still living? Unknown  Family history of cerebrovascular accident, Age at diagnosis: Age Unknown

## 2021-01-15 NOTE — ED PROVIDER NOTE - PROGRESS NOTE DETAILS
Patient able to ambulate bathroom with walker. Labs returned and are un-actionable. X-ray neg for fx or FB. Decision made w/ Dr. Desai to treat with ancef/doxy. Spoke to CDU ESEQUIEL Bueno who will see pt  Alberta Yepez PA-C

## 2021-01-15 NOTE — ED ADULT NURSE REASSESSMENT NOTE - NS ED NURSE REASSESS COMMENT FT1
Pt report received from Prema JORDAN. Pt transferred to cdu Bed 2 for IV antibiotics due to lt foot cellulitis. Pt a/ox3 denies respiratory distress, sob, dyspnea, C/P, palpitations, n/v/d at this time. Pt states symptoms have improved.  VSS, afebrile, IV clean/dry/intact. Pt aware of plan of care, call bell within reach ,safety maintained. Will monitor and assess.
food provided to pt. pending CDU eval.
Received pt from JULIAN Jackman , received pt alert and responsive, oriented x4, denies any respiratory distress, SOB, or difficulty breathing. Pt transferred to CDU for L foot pain currently 10/10 will medicate per order. Pending stat IV unasyn q6, pt aware of plan. IV in place, patent and free of signs of infiltration, pt denies chest pain or palpitations, V/S stable, pt afebrile. Pt educated on unit and unit rules, instructed patient to notify RN of any needed assistance, Pt verbalizes understanding, Call bell placed within reach. Safety maintained. Will continue to monitor.

## 2021-01-15 NOTE — ED CDU PROVIDER DISPOSITION NOTE - PATIENT PORTAL LINK FT
You can access the FollowMyHealth Patient Portal offered by [facility] by registering at the following website: [email or web address]. By joining [facility]’s FollowMyHealth portal, you will also be able to view your health information using other applications (apps) compatible with our system.

## 2021-01-15 NOTE — ED CDU PROVIDER DISPOSITION NOTE - NSFOLLOWUPINSTRUCTIONS_ED_ALL_ED_FT
1. Follow-up with your PCP in 2-3 days.     Follow-up with your podiatrist within 1 week.    2. Take your antibiotic as prescribed. Finish the full course of your antibiotic, do not skip doses.    3. Rest and elevate affected area. Take Tylenol over the counter as directed for pain.    4. Return to ED if you experience any worsening redness, swelling, streaking (red lines), fever, chills, or any other concerning symptoms. 1. Follow-up with your PCP in 2-3 days.     Follow-up with podiatry, Dr. De Oliveira, on Monday 1/18. See information below:    Chuy De Oliveira (DPSHWETA)  Surgery  [facility]  Dept of Surgery, [address] [address] [place] [address]  Phone: [phone]  Fax: [phone]  Follow Up Time: 1-3 Days       You have been provided with a copy of all of your lab results and images in this packet. Please bring these results with you to discuss with your PCP.    2. Take your antibiotic, Augmentin, as prescribed. Finish the full course of your antibiotic, do not skip doses. Take Tylenol over the counter as directed for pain. For severe pain despite Tylenol use, you may take oxycodone. Do not drive, drink alcohol, or operate machinery while taking this medications. Caution with walking while taking this medication.    3. Rest and elevate affected area.      Keep the dressing clean, dry, and in place until you are evaluated by podiatry outpatient.     Walk as tolerated with your foot in the surgical shoe    4. Return to ED if you experience any worsening redness, swelling, streaking (red lines), fever, chills, or any other concerning symptoms.

## 2021-01-16 VITALS
RESPIRATION RATE: 17 BRPM | TEMPERATURE: 98 F | HEART RATE: 78 BPM | SYSTOLIC BLOOD PRESSURE: 119 MMHG | DIASTOLIC BLOOD PRESSURE: 74 MMHG | OXYGEN SATURATION: 98 %

## 2021-01-16 LAB
ANION GAP SERPL CALC-SCNC: 10 MMOL/L — SIGNIFICANT CHANGE UP (ref 5–17)
ANION GAP SERPL CALC-SCNC: 9 MMOL/L — SIGNIFICANT CHANGE UP (ref 5–17)
BUN SERPL-MCNC: 26 MG/DL — HIGH (ref 7–23)
BUN SERPL-MCNC: 26 MG/DL — HIGH (ref 7–23)
CALCIUM SERPL-MCNC: 10.1 MG/DL — SIGNIFICANT CHANGE UP (ref 8.4–10.5)
CALCIUM SERPL-MCNC: 10.8 MG/DL — HIGH (ref 8.4–10.5)
CHLORIDE SERPL-SCNC: 103 MMOL/L — SIGNIFICANT CHANGE UP (ref 96–108)
CHLORIDE SERPL-SCNC: 104 MMOL/L — SIGNIFICANT CHANGE UP (ref 96–108)
CO2 SERPL-SCNC: 25 MMOL/L — SIGNIFICANT CHANGE UP (ref 22–31)
CO2 SERPL-SCNC: 26 MMOL/L — SIGNIFICANT CHANGE UP (ref 22–31)
CREAT SERPL-MCNC: 1.92 MG/DL — HIGH (ref 0.5–1.3)
CREAT SERPL-MCNC: 2.14 MG/DL — HIGH (ref 0.5–1.3)
ERYTHROCYTE [SEDIMENTATION RATE] IN BLOOD: 42 MM/HR — HIGH (ref 0–20)
GLUCOSE BLDC GLUCOMTR-MCNC: 123 MG/DL — HIGH (ref 70–99)
GLUCOSE BLDC GLUCOMTR-MCNC: 67 MG/DL — LOW (ref 70–99)
GLUCOSE BLDC GLUCOMTR-MCNC: 76 MG/DL — SIGNIFICANT CHANGE UP (ref 70–99)
GLUCOSE SERPL-MCNC: 212 MG/DL — HIGH (ref 70–99)
GLUCOSE SERPL-MCNC: 66 MG/DL — LOW (ref 70–99)
POTASSIUM SERPL-MCNC: 3.6 MMOL/L — SIGNIFICANT CHANGE UP (ref 3.5–5.3)
POTASSIUM SERPL-MCNC: 4.1 MMOL/L — SIGNIFICANT CHANGE UP (ref 3.5–5.3)
POTASSIUM SERPL-SCNC: 3.6 MMOL/L — SIGNIFICANT CHANGE UP (ref 3.5–5.3)
POTASSIUM SERPL-SCNC: 4.1 MMOL/L — SIGNIFICANT CHANGE UP (ref 3.5–5.3)
SODIUM SERPL-SCNC: 138 MMOL/L — SIGNIFICANT CHANGE UP (ref 135–145)
SODIUM SERPL-SCNC: 139 MMOL/L — SIGNIFICANT CHANGE UP (ref 135–145)

## 2021-01-16 PROCEDURE — 99217: CPT

## 2021-01-16 PROCEDURE — 69200 CLEAR OUTER EAR CANAL: CPT | Mod: T4

## 2021-01-16 PROCEDURE — G0378: CPT

## 2021-01-16 PROCEDURE — 85652 RBC SED RATE AUTOMATED: CPT

## 2021-01-16 PROCEDURE — 86140 C-REACTIVE PROTEIN: CPT

## 2021-01-16 PROCEDURE — U0003: CPT

## 2021-01-16 PROCEDURE — U0005: CPT

## 2021-01-16 PROCEDURE — 96376 TX/PRO/DX INJ SAME DRUG ADON: CPT | Mod: XU

## 2021-01-16 PROCEDURE — 80053 COMPREHEN METABOLIC PANEL: CPT

## 2021-01-16 PROCEDURE — 80048 BASIC METABOLIC PNL TOTAL CA: CPT

## 2021-01-16 PROCEDURE — 96375 TX/PRO/DX INJ NEW DRUG ADDON: CPT | Mod: XU

## 2021-01-16 PROCEDURE — 96365 THER/PROPH/DIAG IV INF INIT: CPT | Mod: XU

## 2021-01-16 PROCEDURE — 85730 THROMBOPLASTIN TIME PARTIAL: CPT

## 2021-01-16 PROCEDURE — 87186 SC STD MICRODIL/AGAR DIL: CPT

## 2021-01-16 PROCEDURE — 99284 EMERGENCY DEPT VISIT MOD MDM: CPT | Mod: 25

## 2021-01-16 PROCEDURE — 87070 CULTURE OTHR SPECIMN AEROBIC: CPT

## 2021-01-16 PROCEDURE — 85025 COMPLETE CBC W/AUTO DIFF WBC: CPT

## 2021-01-16 PROCEDURE — 87205 SMEAR GRAM STAIN: CPT

## 2021-01-16 PROCEDURE — 73630 X-RAY EXAM OF FOOT: CPT

## 2021-01-16 PROCEDURE — 85610 PROTHROMBIN TIME: CPT

## 2021-01-16 PROCEDURE — 82962 GLUCOSE BLOOD TEST: CPT

## 2021-01-16 PROCEDURE — 88300 SURGICAL PATH GROSS: CPT

## 2021-01-16 RX ORDER — SODIUM CHLORIDE 9 MG/ML
500 INJECTION INTRAMUSCULAR; INTRAVENOUS; SUBCUTANEOUS ONCE
Refills: 0 | Status: COMPLETED | OUTPATIENT
Start: 2021-01-16 | End: 2021-01-16

## 2021-01-16 RX ORDER — DEXTROSE 50 % IN WATER 50 %
15 SYRINGE (ML) INTRAVENOUS ONCE
Refills: 0 | Status: COMPLETED | OUTPATIENT
Start: 2021-01-16 | End: 2021-01-16

## 2021-01-16 RX ORDER — OXYCODONE HYDROCHLORIDE 5 MG/1
1 TABLET ORAL
Qty: 4 | Refills: 0
Start: 2021-01-16 | End: 2021-01-17

## 2021-01-16 RX ADMIN — AMPICILLIN SODIUM AND SULBACTAM SODIUM 200 GRAM(S): 250; 125 INJECTION, POWDER, FOR SUSPENSION INTRAMUSCULAR; INTRAVENOUS at 01:23

## 2021-01-16 RX ADMIN — SODIUM CHLORIDE 500 MILLILITER(S): 9 INJECTION INTRAMUSCULAR; INTRAVENOUS; SUBCUTANEOUS at 08:43

## 2021-01-16 RX ADMIN — AMPICILLIN SODIUM AND SULBACTAM SODIUM 3 GRAM(S): 250; 125 INJECTION, POWDER, FOR SUSPENSION INTRAMUSCULAR; INTRAVENOUS at 01:53

## 2021-01-16 RX ADMIN — Medication 15 GRAM(S): at 08:18

## 2021-01-16 RX ADMIN — AMPICILLIN SODIUM AND SULBACTAM SODIUM 200 GRAM(S): 250; 125 INJECTION, POWDER, FOR SUSPENSION INTRAMUSCULAR; INTRAVENOUS at 07:30

## 2021-01-17 NOTE — HISTORY OF PRESENT ILLNESS
[None] : no symptoms [FreeTextEntry1] : Mr. Schmitz is a 79 yo M\par Hx of kidney stones, BPH, ED, CKD\par Here for follow up\par \par Latest renal sono showed bilateral renal cysts with septations\par Cr now 2.15\par \par LEMUEL today: no stones, stable cysts bilaterally, no hydronephrosis\par \par On Flomax for BPH\par Stable nocturia x 3/night without bother, but daytime frequency now every 2 hours or sometimes more frequent.\par Takes Lasix 20mg every other day.\par \par PVR today = 1cc \par \par Last visit\par Urinating well: Qtime 6 seconds, Qmax 9 cc/s\par VV: 109 cc PVR 0cc today \par Cr 1.58 (9/2019) up from 1.43 in 2018

## 2021-01-17 NOTE — ASSESSMENT
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 ERx sent to mail order pharmacy\par f/u 3 months\par Uroflow and PVR next visit

## 2021-01-18 LAB
-  AMPICILLIN/SULBACTAM: SIGNIFICANT CHANGE UP
-  CEFAZOLIN: SIGNIFICANT CHANGE UP
-  CLINDAMYCIN: SIGNIFICANT CHANGE UP
-  ERYTHROMYCIN: SIGNIFICANT CHANGE UP
-  GENTAMICIN: SIGNIFICANT CHANGE UP
-  OXACILLIN: SIGNIFICANT CHANGE UP
-  PENICILLIN: SIGNIFICANT CHANGE UP
-  RIFAMPIN: SIGNIFICANT CHANGE UP
-  TETRACYCLINE: SIGNIFICANT CHANGE UP
-  TRIMETHOPRIM/SULFAMETHOXAZOLE: SIGNIFICANT CHANGE UP
-  VANCOMYCIN: SIGNIFICANT CHANGE UP
METHOD TYPE: SIGNIFICANT CHANGE UP

## 2021-01-20 LAB
CULTURE RESULTS: SIGNIFICANT CHANGE UP
ORGANISM # SPEC MICROSCOPIC CNT: SIGNIFICANT CHANGE UP
ORGANISM # SPEC MICROSCOPIC CNT: SIGNIFICANT CHANGE UP
SPECIMEN SOURCE: SIGNIFICANT CHANGE UP

## 2021-01-21 LAB — SURGICAL PATHOLOGY STUDY: SIGNIFICANT CHANGE UP

## 2021-02-22 NOTE — H&P PST ADULT - HARM RISK FACTORS
Cardiac Electrophysiology Consultation   Date: 2/22/2021  Reason for Consultation: Atrial Fibrillation   Consult Requesting Physician: Mirta Dietz MD    Chief Complaint:   Chief Complaint   Patient presents with    Follow-Up from Hospital     af        HPI: Nasrin Ortiz is a 66 y.o. patient with a history of hypotension, hypothyroidism, fibromyalgia, afib and anxiety who presented to [facility] 2/2021 with reports of palpitations and chest pain. She was recently treated for upper respiratory infection. She was noted to be in A fib with RVR and spontaneously converted to SR. [address] was initiated prior to discharge. Today, patient reports feeling better now that she is in a normal rhythm. She believes palpitations have been ongoing for many years. The initial episode of palpitations was 3 yrs ago while riding her elliptical when her HR was elevated and after sitting for a few minutes, HR normalized and she resumed her workout. She noticed another episode at least 6 months to 1 year later and this time while riding the elliptical her HR remained normal, but she felt severe palpitations. She remembers having at least 2 more episodes where she was evaluated in the ER. The most recent episode was preceded by neck, chest and back pain for weeks. She was evaluated in ER and found to be in a fib with RVR that spontaneously converted to sinus rhythm. She remains active in her daily life and works out with free weights, on elliptical, treadmill and stationary bike. Monitors sodium and sugar in her diet. Has noticed swelling of her lower extremities since starting new medication and feels as though she is having difficulty losing weight. Reports compliant with medication as prescribed and tolerating. Today, specifically denies any chest pain, pressure, tightness, nausea, vomiting, diaphoresis, SOB/SUTTON, palpitations, heart racing, dizziness/lightheadedness, orthopnea, PND, LE edema or syncope.     Past Medical History: Diagnosis Date    Anxiety     Atrial fibrillation (HCC)     Fibromyalgia     Fibromyalgia     Hypertension     Hypotension     Hypothyroidism     Irregular heart rate         Past Surgical History:   Procedure Laterality Date    CARDIAC CATHETERIZATION  2019    COLONOSCOPY N/A 2/7/2020    COLONOSCOPY WITH BIOPSY performed by Erick Camacho MD at [address]  2/25/2020    COLONOSCOPY POLYPECTOMY SNARE/COLD BIOPSY performed by Erick Camacho MD at [address] [facility]  [id number]    adenoidectomy    TUBAL LIGATION      UPPER GASTROINTESTINAL ENDOSCOPY N/A 2/7/2020    EGD BIOPSY performed by Erick Camacho MD at [facility] END[facility]COPY       Allergies: Allergies   Allergen Reactions    Cefdinir Other (See Comments)    Food      avacodos    Tylenol With Codeine #3 [Acetaminophen-Codeine] Other (See Comments)     hallicunations    Codeine Other (See Comments)     HALLUCINATIONS  HALLUCINATIONS         Medication:   Prior to Admission medications    Medication Sig Start Date End Date Taking?  Authorizing Provider   apixaban (ELIQUIS) 5 MG TABS tablet Take 1 tablet by mouth 2 times daily 2/14/21  Yes Agustina Abbasi MD   dilTIAZem (CARDIZEM CD) 120 MG extended release capsule Take 1 capsule by mouth daily 2/15/21  Yes Agustina Abbasi MD   Cholecalciferol (VITAMIN D) 50 MCG (2000 UT) CAPS capsule Take 2,000 Units by mouth daily   Yes Historical Provider, MD   Turmeric 1053 MG TABS Take 1 tablet by mouth daily   Yes Historical Provider, MD   polyethylene glycol (GLYCOLAX) 17 g packet Take 17 g by mouth nightly   Yes Historical Provider, MD   carvedilol (COREG) 12.5 MG tablet TAKE 1 TABLET TWICE A DAY WITH MEALS 2/20/20  Yes Goran Ty MD   Omega-3 Fatty Acids (FISH OIL CONCENTRATE PO) Take 1 capsule by mouth daily    Yes Historical Provider, MD   Multiple Vitamins-Minerals (THERAPEUTIC MULTIVITAMIN-MINERALS) tablet Take 1 tablet by mouth daily   Yes Historical Provider, MD   Ascorbic Acid (VITAMIN C IMMUNE HEALTH PO) Take 500 mg by mouth daily    Yes Historical Provider, MD   Coenzyme Q10 (COQ10 PO) Take 1 tablet by mouth daily    Yes Historical Provider, MD   levothyroxine (SYNTHROID) 50 MCG tablet Take 50 mcg by mouth Daily   Yes Historical Provider, MD   fluticasone (FLONASE) 50 MCG/ACT nasal spray 1 spray by Nasal route daily as needed for Allergies    Yes Historical Provider, MD   cetirizine (ZYRTEC) 10 MG tablet Take 10 mg by mouth daily   Yes Historical Provider, MD       Social History:   reports that she has never smoked. She has never used smokeless tobacco. She reports current alcohol use. She reports that she does not use drugs. Family History:  family history includes Dementia in her mother; High Blood Pressure in her mother. Reviewed. Denies family history of sudden cardiac death, arrhythmia, premature CAD    Review of System:    · General ROS: negative for - chills, fever   · Psychological ROS: negative for - anxiety or depression  · Ophthalmic ROS: negative for - eye pain or loss of vision  · ENT ROS: negative for - epistaxis, headaches, nasal discharge, sore throat   · Allergy and Immunology ROS: negative for - hives, nasal congestion   · Hematological and Lymphatic ROS: negative for - bleeding problems, blood clots, bruising or jaundice  · Endocrine ROS: negative for - skin changes, temperature intolerance or unexpected weight changes  · Respiratory ROS: negative for - cough, hemoptysis, pleuritic pain, SOB, sputum changes or wheezing  · Cardiovascular ROS: Per HPI.    · Gastrointestinal ROS: negative for - abdominal pain, blood in stools, diarrhea, hematemesis, melena, nausea/vomiting or swallowing difficulty/pain  · Genito-Urinary ROS: negative for - dysuria or incontinence  · Musculoskeletal ROS: negative for - joint swelling or muscle pain  · Neurological ROS: negative for - confusion, dizziness, gait disturbance, headaches, numbness/tingling, seizures, speech problems, tremors, visual changes or weakness  · Dermatological ROS: negative for - rash    Physical Examination:  Vitals:    02/22/21 1354   BP: (!) 140/90   Pulse:    Temp:    SpO2:        · Constitutional: Oriented. No distress. · Head: Normocephalic and atraumatic. · Mouth/Throat: Oropharynx is clear and moist.   · Eyes: Conjunctivae normal. EOM are normal.   · Neck: Normal range of motion. Neck supple. No rigidity. No JVD present. · Cardiovascular: Normal rate, regular rhythm, S1&S2 and intact distal pulses. · Pulmonary/Chest: Bilateral respiratory sounds. No wheezes. No rhonchi. · Abdominal: Soft. Bowel sounds present. No distension, No tenderness. · Musculoskeletal: No tenderness. No edema    · Lymphadenopathy: Has no cervical adenopathy. · Neurological: Alert and oriented. Cranial nerve appears intact, No Gross deficit   · Skin: Skin is warm and dry. No rash noted. · Psychiatric: Has a normal mood, affect and behavior     Labs:  Reviewed. ECG: reviewed, Sinus  rhythm with v-rate of 65 bpm with QRS duration 94 ms. No pathologic Q waves, ventricular pre-excitation, or QT prolongation. Studies:   1. Event monitor:       2. Echo: 2/26/19   Normal left ventricular size, wall thickness and wall motion with an   estimated ejection fraction of 55-60%. Normal left atrial size. The right ventricle is normal in size and function. There is trivial tricuspid regurgitation with the systolic pulmonary artery   pressure (SPAP) estimated at 31 mmHg     3. Stress Test:  9/26/17 @ [facility]  Normal exercise treadmill stress test  Patient's duke score equals 9 which is low risk  No perfusion defect. EF 75%. 4. Cath: 2/27/19 @ [facility]   FINDINGS  1. Left dominant coronary arterial circulation with no angiographic  evidence of coronary atherosclerosis. 2.  Left ventricular end-diastolic pressure of approximately 12 mmHg.   3.  Normal left ventricular systolic starting one of the more powerful anti-arrhythmic medication (amiodarone), and, if extrapolated, may have further diminishing success as time goes on). Atrial fibrillation ablation is a potentially curative therapy with very reasonable success rate after a first time procedure and with improving success rates with subsequent procedures. The risks, benefits and alternatives of the atrial fibrillation ablation procedure were discussed with the patient. The risks including, but not limited to, bleeding, infection, radiation exposure, injury to vascular, cardiac and surrounding structures (including pneumothorax), stroke, cardiac perforation, tamponade, need for emergent open heart surgery, need for pacemaker implantation, injury to the phrenic nerve, injury to the esophagus, myocardial infarction and death were discussed in detail. The patient was also counseled at length about the risks of karina Covid-19 in the nadia-operative and post-operative states including the recovery window of their procedure. The patient was made aware that karina Covid-19 after a surgical procedure may worsen their prognosis for recovering from the virus and lend to a higher morbidity and or mortality risk. The patient was given the option of postponing their procedure. I spent 40 minutes face to face with the patient, with greater than 50% of that time spent in counseling on the above. The patient opted to proceed with the atrial fibrillation ablation. We will schedule for a radiofrequency ablation with Cart[employer] Navigation system with a PATRICK procedure immediately prior to this ablation. A cardiac CTA will be ordered for pulmonary vein mapping prior to this procedure. We will hold Eliquis for 12 hours prior to procedure. We will order BMP, CBC, PT/INR, and Type & Screen prior to the procedure. Essential hypertension  BP is elevated on today's exam  Medications causing fluid retention and will be discontinued.  Stop Cardizem now and reduce Coreg to 6.25 mg BID for 3 days then stop  Start Lisinopril 5 mg daily   Encouraged low sodium diet     Thank you for allowing me to participate in the care of Swapnil Franz. All questions and concerns were addressed to the patient/family. Alternatives to my treatment were discussed. This note was scribed in the presence of Dr. Eliot German MD by Ede Paige    The scribe's documentation has been prepared under my direction and personally reviewed by me in its entirety. I confirm that the note above accurately reflects all work, physical examination, the discussion of treatments and procedures, and medical decision making performed by me.     Eliot German MD, MS, [facility], [facility]  Cardiac Electrophysiology  [address]  [address] [facility], [address] [place] [address]  [facility]arm[facility] [place] [address]  [phone] no

## 2021-02-23 ENCOUNTER — APPOINTMENT (OUTPATIENT)
Dept: CARDIOLOGY | Facility: CLINIC | Age: 81
End: 2021-02-23

## 2021-03-02 ENCOUNTER — TRANSCRIPTION ENCOUNTER (OUTPATIENT)
Age: 81
End: 2021-03-02

## 2021-03-02 ENCOUNTER — INPATIENT (INPATIENT)
Facility: HOSPITAL | Age: 81
LOS: 6 days | Discharge: HOME HEALTH | DRG: 623 | End: 2021-03-09
Attending: INTERNAL MEDICINE | Admitting: STUDENT IN AN ORGANIZED HEALTH CARE EDUCATION/TRAINING PROGRAM
Payer: MEDICARE

## 2021-03-02 VITALS
DIASTOLIC BLOOD PRESSURE: 73 MMHG | HEIGHT: 70 IN | WEIGHT: 179.9 LBS | RESPIRATION RATE: 16 BRPM | OXYGEN SATURATION: 98 % | SYSTOLIC BLOOD PRESSURE: 166 MMHG | TEMPERATURE: 98 F | HEART RATE: 77 BPM

## 2021-03-02 DIAGNOSIS — S91.309A UNSPECIFIED OPEN WOUND, UNSPECIFIED FOOT, INITIAL ENCOUNTER: ICD-10-CM

## 2021-03-02 DIAGNOSIS — Z01.818 ENCOUNTER FOR OTHER PREPROCEDURAL EXAMINATION: ICD-10-CM

## 2021-03-02 DIAGNOSIS — E11.9 TYPE 2 DIABETES MELLITUS WITHOUT COMPLICATIONS: ICD-10-CM

## 2021-03-02 DIAGNOSIS — I10 ESSENTIAL (PRIMARY) HYPERTENSION: ICD-10-CM

## 2021-03-02 DIAGNOSIS — K21.9 GASTRO-ESOPHAGEAL REFLUX DISEASE WITHOUT ESOPHAGITIS: ICD-10-CM

## 2021-03-02 DIAGNOSIS — N18.30 CHRONIC KIDNEY DISEASE, STAGE 3 UNSPECIFIED: ICD-10-CM

## 2021-03-02 DIAGNOSIS — M75.101 UNSPECIFIED ROTATOR CUFF TEAR OR RUPTURE OF RIGHT SHOULDER, NOT SPECIFIED AS TRAUMATIC: Chronic | ICD-10-CM

## 2021-03-02 DIAGNOSIS — Z98.89 OTHER SPECIFIED POSTPROCEDURAL STATES: Chronic | ICD-10-CM

## 2021-03-02 DIAGNOSIS — Z18.9 RETAINED FOREIGN BODY FRAGMENTS, UNSPECIFIED MATERIAL: ICD-10-CM

## 2021-03-02 DIAGNOSIS — Q76.1 KLIPPEL-FEIL SYNDROME: Chronic | ICD-10-CM

## 2021-03-02 DIAGNOSIS — M65.30 TRIGGER FINGER, UNSPECIFIED FINGER: Chronic | ICD-10-CM

## 2021-03-02 DIAGNOSIS — I25.10 ATHEROSCLEROTIC HEART DISEASE OF NATIVE CORONARY ARTERY WITHOUT ANGINA PECTORIS: ICD-10-CM

## 2021-03-02 DIAGNOSIS — N40.0 BENIGN PROSTATIC HYPERPLASIA WITHOUT LOWER URINARY TRACT SYMPTOMS: ICD-10-CM

## 2021-03-02 DIAGNOSIS — Z29.9 ENCOUNTER FOR PROPHYLACTIC MEASURES, UNSPECIFIED: ICD-10-CM

## 2021-03-02 DIAGNOSIS — M10.9 GOUT, UNSPECIFIED: ICD-10-CM

## 2021-03-02 DIAGNOSIS — H26.9 UNSPECIFIED CATARACT: Chronic | ICD-10-CM

## 2021-03-02 LAB
ALBUMIN SERPL ELPH-MCNC: 4.4 G/DL — SIGNIFICANT CHANGE UP (ref 3.3–5)
ALP SERPL-CCNC: 73 U/L — SIGNIFICANT CHANGE UP (ref 40–120)
ALT FLD-CCNC: 19 U/L — SIGNIFICANT CHANGE UP (ref 10–45)
ANION GAP SERPL CALC-SCNC: 10 MMOL/L — SIGNIFICANT CHANGE UP (ref 5–17)
APTT BLD: 36.9 SEC — HIGH (ref 27.5–35.5)
AST SERPL-CCNC: 28 U/L — SIGNIFICANT CHANGE UP (ref 10–40)
BASE EXCESS BLDV CALC-SCNC: 1.7 MMOL/L — SIGNIFICANT CHANGE UP (ref -2–2)
BASOPHILS # BLD AUTO: 0.03 K/UL — SIGNIFICANT CHANGE UP (ref 0–0.2)
BASOPHILS NFR BLD AUTO: 0.6 % — SIGNIFICANT CHANGE UP (ref 0–2)
BILIRUB SERPL-MCNC: 0.3 MG/DL — SIGNIFICANT CHANGE UP (ref 0.2–1.2)
BLD GP AB SCN SERPL QL: NEGATIVE — SIGNIFICANT CHANGE UP
BUN SERPL-MCNC: 29 MG/DL — HIGH (ref 7–23)
CA-I SERPL-SCNC: 1.48 MMOL/L — HIGH (ref 1.12–1.3)
CALCIUM SERPL-MCNC: 11.5 MG/DL — HIGH (ref 8.4–10.5)
CHLORIDE BLDV-SCNC: 106 MMOL/L — SIGNIFICANT CHANGE UP (ref 96–108)
CHLORIDE SERPL-SCNC: 102 MMOL/L — SIGNIFICANT CHANGE UP (ref 96–108)
CO2 BLDV-SCNC: 29 MMOL/L — SIGNIFICANT CHANGE UP (ref 22–30)
CO2 SERPL-SCNC: 27 MMOL/L — SIGNIFICANT CHANGE UP (ref 22–31)
CREAT SERPL-MCNC: 2.2 MG/DL — HIGH (ref 0.5–1.3)
EOSINOPHIL # BLD AUTO: 0.17 K/UL — SIGNIFICANT CHANGE UP (ref 0–0.5)
EOSINOPHIL NFR BLD AUTO: 3.4 % — SIGNIFICANT CHANGE UP (ref 0–6)
GAS PNL BLDV: 140 MMOL/L — SIGNIFICANT CHANGE UP (ref 135–145)
GAS PNL BLDV: SIGNIFICANT CHANGE UP
GAS PNL BLDV: SIGNIFICANT CHANGE UP
GLUCOSE BLDV-MCNC: 250 MG/DL — HIGH (ref 70–99)
GLUCOSE SERPL-MCNC: 264 MG/DL — HIGH (ref 70–99)
HCO3 BLDV-SCNC: 28 MMOL/L — SIGNIFICANT CHANGE UP (ref 21–29)
HCT VFR BLD CALC: 34.7 % — LOW (ref 39–50)
HCT VFR BLDA CALC: 40 % — SIGNIFICANT CHANGE UP (ref 39–50)
HGB BLD CALC-MCNC: 13.1 G/DL — SIGNIFICANT CHANGE UP (ref 13–17)
HGB BLD-MCNC: 11.8 G/DL — LOW (ref 13–17)
IMM GRANULOCYTES NFR BLD AUTO: 0.4 % — SIGNIFICANT CHANGE UP (ref 0–1.5)
INR BLD: 1.02 RATIO — SIGNIFICANT CHANGE UP (ref 0.88–1.16)
LACTATE BLDV-MCNC: 0.9 MMOL/L — SIGNIFICANT CHANGE UP (ref 0.7–2)
LYMPHOCYTES # BLD AUTO: 0.72 K/UL — LOW (ref 1–3.3)
LYMPHOCYTES # BLD AUTO: 14.6 % — SIGNIFICANT CHANGE UP (ref 13–44)
MCHC RBC-ENTMCNC: 32.5 PG — SIGNIFICANT CHANGE UP (ref 27–34)
MCHC RBC-ENTMCNC: 34 GM/DL — SIGNIFICANT CHANGE UP (ref 32–36)
MCV RBC AUTO: 95.6 FL — SIGNIFICANT CHANGE UP (ref 80–100)
MONOCYTES # BLD AUTO: 0.44 K/UL — SIGNIFICANT CHANGE UP (ref 0–0.9)
MONOCYTES NFR BLD AUTO: 8.9 % — SIGNIFICANT CHANGE UP (ref 2–14)
NEUTROPHILS # BLD AUTO: 3.55 K/UL — SIGNIFICANT CHANGE UP (ref 1.8–7.4)
NEUTROPHILS NFR BLD AUTO: 72.1 % — SIGNIFICANT CHANGE UP (ref 43–77)
NRBC # BLD: 0 /100 WBCS — SIGNIFICANT CHANGE UP (ref 0–0)
PCO2 BLDV: 50 MMHG — SIGNIFICANT CHANGE UP (ref 35–50)
PH BLDV: 7.35 — SIGNIFICANT CHANGE UP (ref 7.35–7.45)
PLATELET # BLD AUTO: 282 K/UL — SIGNIFICANT CHANGE UP (ref 150–400)
PO2 BLDV: 32 MMHG — SIGNIFICANT CHANGE UP (ref 25–45)
POTASSIUM BLDV-SCNC: 3.8 MMOL/L — SIGNIFICANT CHANGE UP (ref 3.5–5.3)
POTASSIUM SERPL-MCNC: 3.8 MMOL/L — SIGNIFICANT CHANGE UP (ref 3.5–5.3)
POTASSIUM SERPL-SCNC: 3.8 MMOL/L — SIGNIFICANT CHANGE UP (ref 3.5–5.3)
PROT SERPL-MCNC: 8.1 G/DL — SIGNIFICANT CHANGE UP (ref 6–8.3)
PROTHROM AB SERPL-ACNC: 12.2 SEC — SIGNIFICANT CHANGE UP (ref 10.6–13.6)
RBC # BLD: 3.63 M/UL — LOW (ref 4.2–5.8)
RBC # FLD: 13.2 % — SIGNIFICANT CHANGE UP (ref 10.3–14.5)
RH IG SCN BLD-IMP: POSITIVE — SIGNIFICANT CHANGE UP
SAO2 % BLDV: 52 % — LOW (ref 67–88)
SARS-COV-2 RNA SPEC QL NAA+PROBE: SIGNIFICANT CHANGE UP
SODIUM SERPL-SCNC: 139 MMOL/L — SIGNIFICANT CHANGE UP (ref 135–145)
WBC # BLD: 4.93 K/UL — SIGNIFICANT CHANGE UP (ref 3.8–10.5)
WBC # FLD AUTO: 4.93 K/UL — SIGNIFICANT CHANGE UP (ref 3.8–10.5)

## 2021-03-02 PROCEDURE — 99223 1ST HOSP IP/OBS HIGH 75: CPT

## 2021-03-02 PROCEDURE — 99285 EMERGENCY DEPT VISIT HI MDM: CPT

## 2021-03-02 PROCEDURE — 93010 ELECTROCARDIOGRAM REPORT: CPT

## 2021-03-02 PROCEDURE — 73630 X-RAY EXAM OF FOOT: CPT | Mod: 26,LT

## 2021-03-02 PROCEDURE — 71046 X-RAY EXAM CHEST 2 VIEWS: CPT | Mod: 26

## 2021-03-02 RX ORDER — SODIUM CHLORIDE 9 MG/ML
1000 INJECTION, SOLUTION INTRAVENOUS
Refills: 0 | Status: DISCONTINUED | OUTPATIENT
Start: 2021-03-02 | End: 2021-03-03

## 2021-03-02 RX ORDER — FAMOTIDINE 10 MG/ML
1 INJECTION INTRAVENOUS
Qty: 0 | Refills: 0 | DISCHARGE

## 2021-03-02 RX ORDER — CLOPIDOGREL BISULFATE 75 MG/1
75 TABLET, FILM COATED ORAL DAILY
Refills: 0 | Status: DISCONTINUED | OUTPATIENT
Start: 2021-03-02 | End: 2021-03-03

## 2021-03-02 RX ORDER — INSULIN LISPRO 100/ML
VIAL (ML) SUBCUTANEOUS
Refills: 0 | Status: DISCONTINUED | OUTPATIENT
Start: 2021-03-02 | End: 2021-03-02

## 2021-03-02 RX ORDER — TAMSULOSIN HYDROCHLORIDE 0.4 MG/1
0.4 CAPSULE ORAL AT BEDTIME
Refills: 0 | Status: DISCONTINUED | OUTPATIENT
Start: 2021-03-02 | End: 2021-03-03

## 2021-03-02 RX ORDER — ALLOPURINOL 300 MG
100 TABLET ORAL DAILY
Refills: 0 | Status: DISCONTINUED | OUTPATIENT
Start: 2021-03-02 | End: 2021-03-03

## 2021-03-02 RX ORDER — GLUCAGON INJECTION, SOLUTION 0.5 MG/.1ML
1 INJECTION, SOLUTION SUBCUTANEOUS ONCE
Refills: 0 | Status: DISCONTINUED | OUTPATIENT
Start: 2021-03-02 | End: 2021-03-03

## 2021-03-02 RX ORDER — DEXTROSE 50 % IN WATER 50 %
12.5 SYRINGE (ML) INTRAVENOUS ONCE
Refills: 0 | Status: DISCONTINUED | OUTPATIENT
Start: 2021-03-02 | End: 2021-03-03

## 2021-03-02 RX ORDER — DEXTROSE 50 % IN WATER 50 %
25 SYRINGE (ML) INTRAVENOUS ONCE
Refills: 0 | Status: DISCONTINUED | OUTPATIENT
Start: 2021-03-02 | End: 2021-03-03

## 2021-03-02 RX ORDER — ATORVASTATIN CALCIUM 80 MG/1
1 TABLET, FILM COATED ORAL
Qty: 0 | Refills: 0 | DISCHARGE

## 2021-03-02 RX ORDER — FOLIC ACID 0.8 MG
1 TABLET ORAL DAILY
Refills: 0 | Status: DISCONTINUED | OUTPATIENT
Start: 2021-03-02 | End: 2021-03-03

## 2021-03-02 RX ORDER — DEXTROSE 50 % IN WATER 50 %
15 SYRINGE (ML) INTRAVENOUS ONCE
Refills: 0 | Status: DISCONTINUED | OUTPATIENT
Start: 2021-03-02 | End: 2021-03-03

## 2021-03-02 RX ORDER — FENOFIBRATE,MICRONIZED 130 MG
48 CAPSULE ORAL DAILY
Refills: 0 | Status: DISCONTINUED | OUTPATIENT
Start: 2021-03-02 | End: 2021-03-03

## 2021-03-02 RX ORDER — HYDRALAZINE HCL 50 MG
50 TABLET ORAL
Refills: 0 | Status: DISCONTINUED | OUTPATIENT
Start: 2021-03-02 | End: 2021-03-03

## 2021-03-02 RX ORDER — AMLODIPINE BESYLATE 2.5 MG/1
10 TABLET ORAL DAILY
Refills: 0 | Status: DISCONTINUED | OUTPATIENT
Start: 2021-03-02 | End: 2021-03-03

## 2021-03-02 RX ORDER — ACETAMINOPHEN 500 MG
650 TABLET ORAL ONCE
Refills: 0 | Status: COMPLETED | OUTPATIENT
Start: 2021-03-02 | End: 2021-03-02

## 2021-03-02 RX ORDER — ATORVASTATIN CALCIUM 80 MG/1
40 TABLET, FILM COATED ORAL AT BEDTIME
Refills: 0 | Status: DISCONTINUED | OUTPATIENT
Start: 2021-03-02 | End: 2021-03-03

## 2021-03-02 RX ORDER — LINAGLIPTIN 5 MG/1
1 TABLET, FILM COATED ORAL
Qty: 0 | Refills: 0 | DISCHARGE

## 2021-03-02 RX ORDER — FAMOTIDINE 10 MG/ML
20 INJECTION INTRAVENOUS DAILY
Refills: 0 | Status: DISCONTINUED | OUTPATIENT
Start: 2021-03-02 | End: 2021-03-03

## 2021-03-02 RX ORDER — CARVEDILOL PHOSPHATE 80 MG/1
25 CAPSULE, EXTENDED RELEASE ORAL EVERY 12 HOURS
Refills: 0 | Status: DISCONTINUED | OUTPATIENT
Start: 2021-03-02 | End: 2021-03-03

## 2021-03-02 RX ORDER — INSULIN LISPRO 100/ML
VIAL (ML) SUBCUTANEOUS AT BEDTIME
Refills: 0 | Status: DISCONTINUED | OUTPATIENT
Start: 2021-03-02 | End: 2021-03-02

## 2021-03-02 RX ORDER — FINASTERIDE 5 MG/1
1 TABLET, FILM COATED ORAL DAILY
Refills: 0 | Status: DISCONTINUED | OUTPATIENT
Start: 2021-03-02 | End: 2021-03-02

## 2021-03-02 RX ADMIN — ATORVASTATIN CALCIUM 40 MILLIGRAM(S): 80 TABLET, FILM COATED ORAL at 22:26

## 2021-03-02 RX ADMIN — TAMSULOSIN HYDROCHLORIDE 0.4 MILLIGRAM(S): 0.4 CAPSULE ORAL at 22:26

## 2021-03-02 RX ADMIN — Medication 50 MILLIGRAM(S): at 22:26

## 2021-03-02 RX ADMIN — AMLODIPINE BESYLATE 10 MILLIGRAM(S): 2.5 TABLET ORAL at 16:11

## 2021-03-02 RX ADMIN — Medication 650 MILLIGRAM(S): at 14:33

## 2021-03-02 RX ADMIN — Medication 48 MILLIGRAM(S): at 17:57

## 2021-03-02 RX ADMIN — CARVEDILOL PHOSPHATE 25 MILLIGRAM(S): 80 CAPSULE, EXTENDED RELEASE ORAL at 17:57

## 2021-03-02 RX ADMIN — Medication 2: at 16:12

## 2021-03-02 RX ADMIN — Medication 100 MILLIGRAM(S): at 16:11

## 2021-03-02 NOTE — ED PROVIDER NOTE - PROGRESS NOTE DETAILS
case d/w podiatry team - aware of pt, will evaluate in ED. recommended holding abx at this time. pre-ops ordered. recommended medicine admission. - Edgardo Oviedo PA-C

## 2021-03-02 NOTE — H&P ADULT - NSHPLABSRESULTS_GEN_ALL_CORE
.  LABS:                         11.8   4.93  )-----------( 282      ( 02 Mar 2021 12:31 )             34.7     03-02    139  |  102  |  29<H>  ----------------------------<  264<H>  3.8   |  27  |  2.20<H>    Ca    11.5<H>      02 Mar 2021 12:31    TPro  8.1  /  Alb  4.4  /  TBili  0.3  /  DBili  x   /  AST  28  /  ALT  19  /  AlkPhos  73  03-02    PT/INR - ( 02 Mar 2021 12:31 )   PT: 12.2 sec;   INR: 1.02 ratio         PTT - ( 02 Mar 2021 12:31 )  PTT:36.9 sec              RADIOLOGY, EKG & ADDITIONAL TESTS: Reviewed.

## 2021-03-02 NOTE — H&P ADULT - PROBLEM SELECTOR PLAN 7
-Patient with a history of DM on Actos 30mg daily and Glimepiride 4mg BID  -Patient has not eaten all day and will be NPO after midnight so will place on sliding scale for now with consistent carbohydrate diet

## 2021-03-02 NOTE — H&P ADULT - PROBLEM SELECTOR PLAN 2
-Patient with a history of HTN, continue home doses of amlodipine 10mg daily, carvedilol 25mg BID and hydralazine 50mg BID  -Hold home lasix in the setting of slight SAPNA and prolonged NPO status tomorrow

## 2021-03-02 NOTE — H&P ADULT - PROBLEM SELECTOR PLAN 9
-Patient with >4 METS  -RCRI score 2-class 3 risk, 10.1% 30 day risk of death, MI or cardiac arrest  -Patient moderate risk for low risk procedure  -Cardiology evaluation pending

## 2021-03-02 NOTE — ED ADULT TRIAGE NOTE - CHIEF COMPLAINT QUOTE
sent by Dr. De Oliveira for admission for surgery on left infected toe with foreign body (glass from champagne bottle). Hx diabetes.

## 2021-03-02 NOTE — ED ADULT NURSE NOTE - NSIMPLEMENTINTERV_GEN_ALL_ED
Implemented All Fall Risk Interventions:  Tow to call system. Call bell, personal items and telephone within reach. Instruct patient to call for assistance. Room bathroom lighting operational. Non-slip footwear when patient is off stretcher. Physically safe environment: no spills, clutter or unnecessary equipment. Stretcher in lowest position, wheels locked, appropriate side rails in place. Provide visual cue, wrist band, yellow gown, etc. Monitor gait and stability. Monitor for mental status changes and reorient to person, place, and time. Review medications for side effects contributing to fall risk. Reinforce activity limits and safety measures with patient and family.

## 2021-03-02 NOTE — CONSULT NOTE ADULT - ASSESSMENT
80 yo male patient with left foot 5th MPJ foreign body  -   - Rec admit under medicine and cardiology for clearance  - Please evaluate and document for medical/cardiac clearance for procedure under sedation with local anesthesia  - Booked for Left foot incision and drainage with foreign body removal for Wednesday 3/3 @1:30PM with Dr. Chuy De Oliveira.  - NPO after midnight  - COVID PCR STAT  - Discussed with attending 80 yo male patient with left foot 5th MPJ foreign body  - pt seen and evaluated   - afebrile, labs pending   - L dorsal 5th MPJ granular wound with palpable foreign body, no purulence, no fluctuance, no acute signs of infection   - Rec admit under medicine and cardiology for clearance  - Please evaluate and document for medical/cardiac clearance for procedure under sedation with local anesthesia  - Booked for Left foot incision and drainage with foreign body removal for Wednesday 3/3 @1:30PM with Dr. Chuy De Oliveira.  - NPO after midnight  - COVID PCR STAT  - Discussed with attending

## 2021-03-02 NOTE — H&P ADULT - PROBLEM SELECTOR PLAN 8
-Patient with a history of CKD 3 with baseline creatinine 1.5-2 based on previous records  -Creatinine today 2.20, might be slight SAPNA  -Hold home lasix in the setting of SAPNA and patient to be NPO after midnight, can restart as tolerated  -Renally dose medications  -Avoid nephrotoxins

## 2021-03-02 NOTE — H&P ADULT - NSHPREVIEWOFSYSTEMS_GEN_ALL_CORE
REVIEW OF SYSTEMS:    CONSTITUTIONAL: No weakness, fevers or chills  EYES/ENT: No visual changes;  No vertigo or throat pain   NECK: No pain or stiffness  RESPIRATORY: No cough, wheezing, hemoptysis; No shortness of breath  CARDIOVASCULAR: No chest pain or palpitations  GASTROINTESTINAL: No abdominal or epigastric pain. No nausea, vomiting, or hematemesis; No diarrhea or constipation. No melena or hematochezia.  GENITOURINARY: No dysuria, frequency or hematuria  NEUROLOGICAL: No numbness or weakness  SKIN: No itching, burning, rashes, or lesions  MSK: L foot pain and swelling  HEME: No easy bleeding, no easy bruising  All other review of systems is negative unless indicated above.

## 2021-03-02 NOTE — H&P ADULT - PROBLEM SELECTOR PLAN 4
-Patient with a history of BPH, continue home dose of tamsulosin 0.4mg daily  -Note, patient also takes finasteride 1mg daily for hair loss but pharmacy does not have, will hold for now

## 2021-03-02 NOTE — ED ADULT NURSE NOTE - AGENT'S NAME
After obtaining consent, and per orders of Pastor Chavez, CPNP injection of 1.2 million units of bicillin given by Deneice Mask, LPN. Patient instructed to remain in clinic for 20 minutes afterwards, and to report any adverse reaction to me immediately. Shiva Schmitz (son)

## 2021-03-02 NOTE — ED ADULT NURSE NOTE - OBJECTIVE STATEMENT
Pt presents to ED with wound to left lateral foot, sent by outpt podiatrist, DILLON, ambulatory with cane, hx of DM, pt reports being seen at [facility] for foreign body of glass bottle in foot, was d/c after FB removal. Pt sent today for worsening swelling and pain to area. Area swollen and open wound present draining purulent drainage, no streaking noted, no fevers or chills, breathing unlabored, symmetrical, no shortness of breath. No chest pain, abdomen soft and nontender, no nausea vomiting or diarrhea. No hematuria or dysuria.

## 2021-03-02 NOTE — ED PROVIDER NOTE - ATTENDING CONTRIBUTION TO CARE
attending Pau: 81yM h/o DM II, HTN, CAD w/stents, CKD, BPH sent in for admission for surgical intervention for foreign body of L foot. Initial injury 1.5months ago with broken glass, attempted removal and treatment of abx at that time. Recently had outpatient imaging showing retained FB. +persistent pain at site, worse with walking. Denies fever, chills, numbness, weakness, color changes, CP, SOB, dizziness, LOC. Will obtain preop labs, podiatry eval in ED, admit

## 2021-03-02 NOTE — H&P ADULT - NSICDXFAMILYHX_GEN_ALL_CORE_FT
FAMILY HISTORY:  Mother  Still living? Unknown  Family history of cerebrovascular accident, Age at diagnosis: Age Unknown

## 2021-03-02 NOTE — H&P ADULT - NSHPPHYSICALEXAM_GEN_ALL_CORE
.  VITAL SIGNS:  T(C): 36.8 (03-02-21 @ 10:36), Max: 36.8 (03-02-21 @ 10:36)  T(F): 98.2 (03-02-21 @ 10:36), Max: 98.2 (03-02-21 @ 10:36)  HR: 77 (03-02-21 @ 10:36) (77 - 77)  BP: 166/73 (03-02-21 @ 10:36) (166/73 - 166/73)  BP(mean): --  RR: 16 (03-02-21 @ 10:36) (16 - 16)  SpO2: 98% (03-02-21 @ 10:36) (98% - 98%)  Wt(kg): --    PHYSICAL EXAM:    Constitutional: WDWN resting comfortably in bed; NAD  Head: NC/AT  Respiratory: CTA B/L; no W/R/R, no retractions  Cardiac: +S1/S2; RRR; no M/R/G; PMI non-displaced  Gastrointestinal: soft, NT/ND; no rebound or guarding; +BSx4  Extremities: WWP, no clubbing or cyanosis; no peripheral edema  Musculoskeletal: NROM x4; L foot wrapped in kerlix  Dermatologic: skin warm, dry and intact; no rashes, wounds, or scars  Neurologic: AAOx3; CNII-XII grossly intact; no focal deficits  Psychiatric: affect and characteristics of appearance, verbalizations, behaviors are appropriate

## 2021-03-02 NOTE — H&P ADULT - PROBLEM SELECTOR PLAN 1
-Patient initially had piece of glass stuck in his left foot cleaned out by podiatry on 1/15 and was treated with antibiotics  -Three days after his ER visit, his foot began to become painful and swollen again  -MRI yesterday revealing retained glass in L foot  -Evaluated by podiatry-for OR tomorrow for incision and drainage and foreign body removal  -Xray of L foot with erosive changes are present about the fifth metatarsal head and lateral base of the proximal fifth phalanx, new when compared with prior exam, consistent with osteomyelitis-ID consulted for antibiotic assistance, follow up recs

## 2021-03-02 NOTE — H&P ADULT - NSICDXPASTMEDICALHX_GEN_ALL_CORE_FT
PAST MEDICAL HISTORY:  BPH (Benign Prostatic Hypertrophy)     CAD (coronary artery disease) stents x4( 03/2017)  last echo stress test 05/2016    Chronic kidney disease (CKD)     Diabetes 2    Diabetes Mellitus Type II     Ganglion cyst of wrist, left     H/O gastroesophageal reflux (GERD)     H/O: Rotator Cuff Tear     Hyperlipemia     Hyperlipidemia     Hypertension     Hypertension     OA (osteoarthritis)     SHARATH (obstructive sleep apnea) non compliant with CPAP    Pneumonia     Renal Calculi h/olithotripsy 2010

## 2021-03-02 NOTE — CONSULT NOTE ADULT - SUBJECTIVE AND OBJECTIVE BOX
HPI:  81 year old male with PMH HTN, CAD with 4 stents (more than 5 years ago), BPH, HLD, gout, GERD, DM2 and CKD stage 3 who presents for retained foreign body in his left foot. The patient states that a glass bottle broke and he had a piece of glass stuck in his L foot about 1.5 months ago. His foot was swollen and painful so he went to the ER January 15th and had his foot cleaned out by podiatry and he was treated with augmentin x 1 week and was sent home. About 3 days later, he noticed that his pain and swelling returned. He had an MRI performed yesterday which showed that he still had a piece of glass in his foot and was told by his podiatrist Dr. De Oliveira to go to the ER for evaluation. Aside from L foot pain and swelling, he denies any other symptoms including chest pain, shortness of breath, abdominal pain, nausea, vomiting or diarrhea. Upon arrival, vital signs were /73, HR 77, RR 16, temperature 98.2 degrees Farenheit and saturating 98% on room air. He was seen by podiatry who booked him for L foot incision and drainage and foreign body removal for tomorrow. Planned for OR tomorrow. XR foot with Erosive changes are present about the fifth metatarsal head and lateral base of the proximal fifth phalanx, new when compared with prior exam, consistent with osteomyelitis. There is adjacent soft tissue swelling. No radiopaque foreign body is visualized. No dislocation. There are vascular calcifications.    PAST MEDICAL & SURGICAL HISTORY:  OA (osteoarthritis)    H/O gastroesophageal reflux (GERD)    Chronic kidney disease (CKD)    SHARATH (obstructive sleep apnea)  non compliant with CPAP    Ganglion cyst of wrist, left    CAD (coronary artery disease)  stents x4( 03/2017)  last echo stress test 05/2016    Diabetes  2    Hyperlipidemia    Hypertension    BPH (Benign Prostatic Hypertrophy)    Pneumonia    H/O: Rotator Cuff Tear    Renal Calculi  h/olithotripsy 2010    Diabetes Mellitus Type II    Hyperlipemia    Hypertension    Bilateral rotator cuff syndrome  operated both&#x27;    History of lumbar laminectomy    Cervical fusion syndrome    Trigger finger of both hands  sp repair    Bilateral cataracts  sp extraction    lumbar lamenectomy  1998    Laminectomy with Spinal Fusion  cervical- 2009    History of Arthroscopy  bilateral shoulder rotator cuff repair    Allergies    Avapro (Hives)  enalapril (Unknown)  losartan (Other)  seasonal allergies-outdoor (Urticaria; Rhinorrhea; Sneezing)    Intolerances    losartan (Other)    ANTIMICROBIALS:      OTHER MEDS:  allopurinol 100 milliGRAM(s) Oral daily  amLODIPine   Tablet 10 milliGRAM(s) Oral daily  atorvastatin 40 milliGRAM(s) Oral at bedtime  carvedilol 25 milliGRAM(s) Oral every 12 hours  clopidogrel Tablet 75 milliGRAM(s) Oral daily  dextrose 40% Gel 15 Gram(s) Oral once  dextrose 5%. 1000 milliLiter(s) IV Continuous <Continuous>  dextrose 5%. 1000 milliLiter(s) IV Continuous <Continuous>  dextrose 50% Injectable 25 Gram(s) IV Push once  dextrose 50% Injectable 12.5 Gram(s) IV Push once  dextrose 50% Injectable 25 Gram(s) IV Push once  famotidine    Tablet 20 milliGRAM(s) Oral daily  fenofibrate Tablet 48 milliGRAM(s) Oral daily  folic acid 1 milliGRAM(s) Oral daily  glucagon  Injectable 1 milliGRAM(s) IntraMuscular once  hydrALAZINE 50 milliGRAM(s) Oral two times a day  insulin lispro (ADMELOG) corrective regimen sliding scale   SubCutaneous three times a day before meals  insulin lispro (ADMELOG) corrective regimen sliding scale   SubCutaneous at bedtime  multivitamin 1 Tablet(s) Oral daily  tamsulosin 0.4 milliGRAM(s) Oral at bedtime    SOCIAL HISTORY: Denies smoking, alcohol, drug use.    FAMILY HISTORY:  Family history of cerebrovascular accident (Mother)  mother    Drug Dosing Weight  Height (cm): 177.8 (02 Mar 2021 10:36)  Weight (kg): 81.6 (02 Mar 2021 10:36)  BMI (kg/m2): 25.8 (02 Mar 2021 10:36)  BSA (m2): 2 (02 Mar 2021 10:36)    PE:    Vital Signs Last 24 Hrs  T(C): 36.6 (02 Mar 2021 15:40), Max: 36.8 (02 Mar 2021 10:36)  T(F): 97.8 (02 Mar 2021 15:40), Max: 98.2 (02 Mar 2021 10:36)  HR: 67 (02 Mar 2021 17:55) (67 - 79)  BP: 147/72 (02 Mar 2021 17:55) (147/72 - 166/73)  BP(mean): --  RR: 18 (02 Mar 2021 15:40) (16 - 18)  SpO2: 99% (02 Mar 2021 15:40) (98% - 99%)    Gen: AOx3, NAD  CV: S1+S2 normal, no murmurs  Resp: Clear bilat, no resp distress  Abd: Soft, nontender, +BS  Ext: No LE edema, no wounds  : No Mathew  IV/Skin: No thrombophlebitis, left foot granular wound, no drainage, no erythema  Msk: No low back pain, no arthralgias, no joint swelling  Neuro: No sensory deficits, no motor deficits    LABS:                          11.8   4.93  )-----------( 282      ( 02 Mar 2021 12:31 )             34.7       03-02    139  |  102  |  29<H>  ----------------------------<  264<H>  3.8   |  27  |  2.20<H>    Ca    11.5<H>      02 Mar 2021 12:31    TPro  8.1  /  Alb  4.4  /  TBili  0.3  /  DBili  x   /  AST  28  /  ALT  19  /  AlkPhos  73  03-02    MICROBIOLOGY:  v    RADIOLOGY:    < from: Xray Chest 2 Views PA/Lat (03.02.21 @ 12:59) >  IMPRESSION:  Clear lungs.    < end of copied text >

## 2021-03-02 NOTE — ED PROVIDER NOTE - OBJECTIVE STATEMENT
81y male PMHx DM II, HTN, CAD w/stents, CKD, BPH p/w foot injury. Pt was sent in for admission by Dr. De Oliveira (pod) for admission for removal of foreign body from his foot. Pt initially had an injury 1.5months ago with broken glass in his left foot, treated with removal of FB and abx. recently had outpatient imaging showing retained FB and pt reports persistent pain at the site, worse with walking. last took tylenol approx 6hrs ago. Denies fever, chills, numbness, weakness, color changes, CP, SOB, dizziness, LOC

## 2021-03-02 NOTE — CONSULT NOTE ADULT - SUBJECTIVE AND OBJECTIVE BOX
Podiatry pager #: [phone]/ [phone]    Patient is a 81y old  Male who presents with a chief complaint of left heel foreign body    HPI:    81y male PMHx DM II, HTN, CAD w/stents, CKD, BPH p/w foot injury. Pt was sent in for admission by Dr. De Oliveira (pod) for admission for removal of foreign body from his foot. Pt initially had an injury 1.5months ago with broken glass in his left foot, treated with removal of FB and abx. recently had outpatient imaging showing retained FB and pt reports persistent pain at the site, worse with walking. last took tylenol approx 6hrs ago. Denies fever, chills, numbness, weakness, color changes, CP, SOB, dizziness, LOC    PAST MEDICAL & SURGICAL HISTORY:  OA (osteoarthritis)    H/O gastroesophageal reflux (GERD)    Chronic kidney disease (CKD)    SHARATH (obstructive sleep apnea)  non compliant with CPAP    Ganglion cyst of wrist, left    CAD (coronary artery disease)  stents x4( 03/2017)  last echo stress test 05/2016    Diabetes  2    Hyperlipidemia    Hypertension    BPH (Benign Prostatic Hypertrophy)    Pneumonia    H/O: Rotator Cuff Tear    Renal Calculi  h/olithotripsy 2010    Diabetes Mellitus Type II    Hyperlipemia    Hypertension    Bilateral rotator cuff syndrome  operated both&#x27;    History of lumbar laminectomy    Cervical fusion syndrome    Trigger finger of both hands  sp repair    Bilateral cataracts  sp extraction    lumbar lamenectomy  1998    Laminectomy with Spinal Fusion  cervical- 2009    History of Arthroscopy  bilateral shoulder rotator cuff repair        MEDICATIONS  (STANDING):  acetaminophen   Tablet .. 650 milliGRAM(s) Oral once    MEDICATIONS  (PRN):      Allergies    Avapro (Hives)  enalapril (Unknown)  losartan (Other)  seasonal allergies-outdoor (Urticaria; Rhinorrhea; Sneezing)    Intolerances    losartan (Other)      VITALS:    Vital Signs Last 24 Hrs  T(C): 36.8 (02 Mar 2021 10:36), Max: 36.8 (02 Mar 2021 10:36)  T(F): 98.2 (02 Mar 2021 10:36), Max: 98.2 (02 Mar 2021 10:36)  HR: 77 (02 Mar 2021 10:36) (77 - 77)  BP: 166/73 (02 Mar 2021 10:36) (166/73 - 166/73)  BP(mean): --  RR: 16 (02 Mar 2021 10:36) (16 - 16)  SpO2: 98% (02 Mar 2021 10:36) (98% - 98%)    LABS:                CAPILLARY BLOOD GLUCOSE              LOWER EXTREMITY PHYSICAL EXAM:    Vascular: DP/PT 2/4, B/L, CFT intact B/L, Temperature gradient warm to warm on L, warm to cool on R   Neuro: Epicritic sensation intact to the level of digits, B/L.  Musculoskeletal/Ortho: unremarkable  Derm: L foot w/ dorsal 5th MPJ cellulitis to lateral midfoot, pain on palpation over abrasion, no fluctuance, no drainage    RADIOLOGY & ADDITIONAL STUDIES:

## 2021-03-02 NOTE — H&P ADULT - HISTORY OF PRESENT ILLNESS
81 year old male with PMH HTN, CAD with 4 stents (more than 5 years ago), BPH, HLD, gout, GERD, DM2 and CKD stage 3 who presents for retained foreign body in his left foot. The patient states that a glass bottle broke and he had a piece of glass stuck in his L foot. His foot was swollen and painful so he went to the ER January 15th and had his foot cleaned out by podiatry and treated with antibiotics and was sent home. About 3 days later, he noticed that his pain and swelling returned. He had an MRI performed yesterday which showed that he still had a piece of glass in his foot and was told by his podiatrist Dr. De Oliveira to go to the OR for evaluation. Aside from L foot pain and swelling, he denies any other symptoms including chest pain, shortness of breath, abdominal pain, nausea, vomiting or diarrhea. Upon arrival, vital signs were /73, HR 77, RR 16, temperature 98.2 degrees Farenheit and saturating 98% on room air. He was seen by podiatry who booked him for L foot incision and drainage and foreign body removal for tomorrow. 81 year old male with PMH HTN, CAD with 4 stents (more than 5 years ago), BPH, HLD, gout, GERD, DM2 and CKD stage 3 who presents for retained foreign body in his left foot. The patient states that a glass bottle broke and he had a piece of glass stuck in his L foot about 1.5 months ago. His foot was swollen and painful so he went to the ER January 15th and had his foot cleaned out by podiatry and he was treated with antibiotics and was sent home. About 3 days later, he noticed that his pain and swelling returned. He had an MRI performed yesterday which showed that he still had a piece of glass in his foot and was told by his podiatrist Dr. De Oliveira to go to the ER for evaluation. Aside from L foot pain and swelling, he denies any other symptoms including chest pain, shortness of breath, abdominal pain, nausea, vomiting or diarrhea. Upon arrival, vital signs were /73, HR 77, RR 16, temperature 98.2 degrees Farenheit and saturating 98% on room air. He was seen by podiatry who booked him for L foot incision and drainage and foreign body removal for tomorrow.

## 2021-03-02 NOTE — CONSULT NOTE ADULT - ASSESSMENT
81 year old male with HTN, CAD with stents, BPH, HLD, gout, GERD, DM2, CKD3 presenting for foreign body in his left foot, initially was in the ED 1/15/21, was cleaned and discharged on augmentin. Was following with outpatient Podiatry with recent MRI demonstrating piece of glass retained so sent to the ED. XR here with erosive changes on the fifth metatarsal head and lateral base of the proximal fifth phalanx consistent with OM. Planned for the OR tomorrow with Podiatry.    Afebrile, WBC WNL.  No systemic signs of infection.  Left foot wound without signs of active infection    Recommend:  #Left foot wound with retained glass, XR concerning for OM  -Planned for OR tomorrow - please send for path and cultures - bacterial, AFB and fungal.  -Can monitor off antibiotics given no systemic signs of infection  -Check ESR/ CRP    #CKD3  -Continue to monitor Cr    Bassem Maguire MD  Pager [phone]  After 5pm/weekends call [phone]    Discussed plan with NP.

## 2021-03-02 NOTE — CONSULT NOTE ADULT - SUBJECTIVE AND OBJECTIVE BOX
CHIEF COMPLAINT:Patient is a 81y old  Male who presents with a chief complaint of Retained foreign body in foot (02 Mar 2021 14:22)      HISTORY OF PRESENT ILLNESS:HPI:  81 year old male with PMH HTN, CAD with 4 stents (more than 5 years ago), BPH, HLD, gout, GERD, DM2 and CKD stage 3 who presents for retained foreign body in his left foot. The patient states that a glass bottle broke and he had a piece of glass stuck in his L foot about 1.5 months ago. His foot was swollen and painful so he went to the ER January 15th and had his foot cleaned out by podiatry and he was treated with antibiotics and was sent home. About 3 days later, he noticed that his pain and swelling returned. He had an MRI performed yesterday which showed that he still had a piece of glass in his foot and was told by his podiatrist Dr. De Oliveira to go to the ER for evaluation. Aside from L foot pain and swelling, he denies any other symptoms including chest pain, shortness of breath, abdominal pain, nausea, vomiting or diarrhea. Upon arrival, vital signs were /73, HR 77, RR 16, temperature 98.2 degrees Farenheit and saturating 98% on room air. He was seen by podiatry who booked him for L foot incision and drainage and foreign body removal for tomorrow. (02 Mar 2021 14:22)      PAST MEDICAL & SURGICAL HISTORY:  OA (osteoarthritis)    H/O gastroesophageal reflux (GERD)    Chronic kidney disease (CKD)    SHARATH (obstructive sleep apnea)  non compliant with CPAP    Ganglion cyst of wrist, left    CAD (coronary artery disease)  stents x4( 03/2017)  last echo stress test 05/2016    Diabetes  2    Hyperlipidemia    Hypertension    BPH (Benign Prostatic Hypertrophy)    Pneumonia    H/O: Rotator Cuff Tear    Renal Calculi  h/olithotripsy 2010    Diabetes Mellitus Type II    Hyperlipemia    Hypertension    Bilateral rotator cuff syndrome  operated both&#x27;    History of lumbar laminectomy    Cervical fusion syndrome    Trigger finger of both hands  sp repair    Bilateral cataracts  sp extraction    lumbar lamenectomy  1998    Laminectomy with Spinal Fusion  cervical- 2009    History of Arthroscopy  bilateral shoulder rotator cuff repair            MEDICATIONS:  amLODIPine   Tablet 10 milliGRAM(s) Oral daily  carvedilol 25 milliGRAM(s) Oral every 12 hours  clopidogrel Tablet 75 milliGRAM(s) Oral daily  hydrALAZINE 50 milliGRAM(s) Oral two times a day  tamsulosin 0.4 milliGRAM(s) Oral at bedtime          famotidine    Tablet 20 milliGRAM(s) Oral daily    allopurinol 100 milliGRAM(s) Oral daily  atorvastatin 40 milliGRAM(s) Oral at bedtime  dextrose 40% Gel 15 Gram(s) Oral once  dextrose 50% Injectable 25 Gram(s) IV Push once  dextrose 50% Injectable 12.5 Gram(s) IV Push once  dextrose 50% Injectable 25 Gram(s) IV Push once  fenofibrate Tablet 48 milliGRAM(s) Oral daily  glucagon  Injectable 1 milliGRAM(s) IntraMuscular once  insulin lispro (ADMELOG) corrective regimen sliding scale   SubCutaneous three times a day before meals  insulin lispro (ADMELOG) corrective regimen sliding scale   SubCutaneous at bedtime    dextrose 5%. 1000 milliLiter(s) IV Continuous <Continuous>  dextrose 5%. 1000 milliLiter(s) IV Continuous <Continuous>  folic acid 1 milliGRAM(s) Oral daily  multivitamin 1 Tablet(s) Oral daily      FAMILY HISTORY:  Family history of cerebrovascular accident (Mother)  mother        Non-contributory    SOCIAL HISTORY:    [ ] Tobacco  [ ] Drugs  [ ] Alcohol    Allergies    Avapro (Hives)  enalapril (Unknown)  losartan (Other)  seasonal allergies-outdoor (Urticaria; Rhinorrhea; Sneezing)    Intolerances    losartan (Other)  	    REVIEW OF SYSTEMS:  CONSTITUTIONAL: No fever  EYES: No eye pain, visual disturbances, or discharge  ENMT:  No difficulty hearing, tinnitus  NECK: No pain or stiffness  RESPIRATORY: No cough, wheezing,  CARDIOVASCULAR: No chest pain, palpitations, passing out, dizziness, or leg swelling  GASTROINTESTINAL:  No nausea, vomiting, diarrhea or constipation. No melena.  GENITOURINARY: No dysuria, hematuria  NEUROLOGICAL: No stroke like symptoms  SKIN: No burning or lesions   ENDOCRINE: No heat or cold intolerance  MUSCULOSKELETAL: No joint pain or swelling  PSYCHIATRIC: No  anxiety, mood swings  HEME/LYMPH: No bleeding gums  ALLERGY AND IMMUNOLOGIC: No hives or eczema	    All other ROS negative    PHYSICAL EXAM:  T(C): 36.8 (03-02-21 @ 10:36), Max: 36.8 (03-02-21 @ 10:36)  HR: 77 (03-02-21 @ 10:36) (77 - 77)  BP: 166/73 (03-02-21 @ 10:36) (166/73 - 166/73)  RR: 16 (03-02-21 @ 10:36) (16 - 16)  SpO2: 98% (03-02-21 @ 10:36) (98% - 98%)  Wt(kg): --  I&O's Summary      Appearance: Normal	  HEENT:   Normal oral mucosa, EOMI	  Cardiovascular:  S1 S2, No JVD,    Respiratory: Lungs clear to auscultation	  Psychiatry: Alert  Gastrointestinal:  Soft, Non-tender, + BS	  Skin: No rashes   Neurologic: Non-focal  Extremities:  No edema  Vascular: Peripheral pulses palpable    	    	  	  CARDIAC MARKERS:  Labs personally reviewed by me                                  11.8   4.93  )-----------( 282      ( 02 Mar 2021 12:31 )             34.7     03-02    139  |  102  |  29<H>  ----------------------------<  264<H>  3.8   |  27  |  2.20<H>    Ca    11.5<H>      02 Mar 2021 12:31    TPro  8.1  /  Alb  4.4  /  TBili  0.3  /  DBili  x   /  AST  28  /  ALT  19  /  AlkPhos  73  03-02          EKG: Personally reviewed by me -   Radiology: Personally reviewed by me -       Assessment /Plan:           Hiwot Hoskins DO [facility]  Cardiovascular Medicine  [address] [place] [address], [address]  Office [phone]  Cell [phone] CHIEF COMPLAINT:Patient is a 81y old  Male who presents with a chief complaint of Retained foreign body in foot (02 Mar 2021 14:22)      HISTORY OF PRESENT ILLNESS:HPI:  81 year old male with PMH HTN, CAD with 4 stents (more than 5 years ago), BPH, HLD, gout, GERD, DM2 and CKD stage 3 who presents for retained foreign body in his left foot. The patient states that a glass bottle broke and he had a piece of glass stuck in his L foot about 1.5 months ago. His foot was swollen and painful so he went to the ER January 15th and had his foot cleaned out by podiatry and he was treated with antibiotics and was sent home. About 3 days later, he noticed that his pain and swelling returned. He had an MRI performed yesterday which showed that he still had a piece of glass in his foot and was told by his podiatrist Dr. De Oliveira to go to the ER for evaluation. Aside from L foot pain and swelling, he denies any other symptoms including chest pain, shortness of breath, abdominal pain, nausea, vomiting or diarrhea. Upon arrival, vital signs were /73, HR 77, RR 16, temperature 98.2 degrees Farenheit and saturating 98% on room air. He was seen by podiatry who booked him for L foot incision and drainage and foreign body removal for tomorrow. (02 Mar 2021 14:22)      PAST MEDICAL & SURGICAL HISTORY:  OA (osteoarthritis)  H/O gastroesophageal reflux (GERD)  Chronic kidney disease (CKD)  SHARATH (obstructive sleep apnea)  non compliant with CPAP  Ganglion cyst of wrist, left  CAD (coronary artery disease)  stents x4( 03/2017)  last echo stress test 05/2016  Diabetes  2  Hyperlipidemia  Hypertension  BPH (Benign Prostatic Hypertrophy)  Pneumonia  H/O: Rotator Cuff Tear  Renal Calculi  h/olithotripsy 2010  Diabetes Mellitus Type II  Hyperlipemia  Hypertension  Bilateral rotator cuff syndrome  operated both&#x27;  History of lumbar laminectomy  Cervical fusion syndrome  Triggr finger of both hands  sp repair  Bilateral cataracts  sp extraction  lumbar lamenectomy  1998  Laminectomy with Spinal Fusion  cervical- 2009  History of Arthroscopy  bilateral shoulder rotator cuff repair      MEDICATIONS:  amLODIPine   Tablet 10 milliGRAM(s) Oral daily  carvedilol 25 milliGRAM(s) Oral every 12 hours  clopidogrel Tablet 75 milliGRAM(s) Oral daily  hydrALAZINE 50 milliGRAM(s) Oral two times a day  tamsulosin 0.4 milliGRAM(s) Oral at bedtime  famotidine    Tablet 20 milliGRAM(s) Oral daily  allopurinol 100 milliGRAM(s) Oral daily  atorvastatin 40 milliGRAM(s) Oral at bedtime  dextrose 40% Gel 15 Gram(s) Oral once  dextrose 50% Injectable 25 Gram(s) IV Push once  dextrose 50% Injectable 12.5 Gram(s) IV Push once  dextrose 50% Injectable 25 Gram(s) IV Push once  fenofibrate Tablet 48 milliGRAM(s) Oral daily  glucagon  Injectable 1 milliGRAM(s) IntraMuscular once  insulin lispro (ADMELOG) corrective regimen sliding scale   SubCutaneous three times a day before meals  insulin lispro (ADMELOG) corrective regimen sliding scale   SubCutaneous at bedtime  dextrose 5%. 1000 milliLiter(s) IV Continuous <Continuous>  dextrose 5%. 1000 milliLiter(s) IV Continuous <Continuous>  folic acid 1 milliGRAM(s) Oral daily  multivitamin 1 Tablet(s) Oral daily    FAMILY HISTORY:  Family history of cerebrovascular accident (Mother)  mother    Non-contributory    SOCIAL HISTORY:    [ ] Tobacco  [ ] Drugs  [ ] Alcohol    Allergies    Avapro (Hives)  enalapril (Unknown)  losartan (Other)  seasonal allergies-outdoor (Urticaria; Rhinorrhea; Sneezing)  Intolerances  losartan (Other)  	  REVIEW OF SYSTEMS:  CONSTITUTIONAL: No fever  EYES: No eye pain, visual disturbances, or discharge  ENMT:  No difficulty hearing, tinnitus  NECK: No pain or stiffness  RESPIRATORY: No cough, wheezing,  CARDIOVASCULAR: No chest pain, palpitations, passing out, dizziness, or leg swelling  GASTROINTESTINAL:  No nausea, vomiting, diarrhea or constipation. No melena.  GENITOURINARY: No dysuria, hematuria  NEUROLOGICAL: No stroke like symptoms  SKIN: No burning or lesions L. FOOT injury   ENDOCRINE: No heat or cold intolerance  MUSCULOSKELETAL: No joint pain or swelling  PSYCHIATRIC: No  anxiety, mood swings  HEME/LYMPH: No bleeding gums  ALLERGY AND IMMUNOLOGIC: No hives or eczema	    All other ROS negative    PHYSICAL EXAM:  T(C): 36.8 (03-02-21 @ 10:36), Max: 36.8 (03-02-21 @ 10:36)  HR: 77 (03-02-21 @ 10:36) (77 - 77)  BP: 166/73 (03-02-21 @ 10:36) (166/73 - 166/73)  RR: 16 (03-02-21 @ 10:36) (16 - 16)  SpO2: 98% (03-02-21 @ 10:36) (98% - 98%)  Wt(kg): --  I&O's Summary      Appearance: Normal	  HEENT:   Normal oral mucosa, EOMI	  Cardiovascular:  S1 S2, No JVD,    Respiratory: Lungs clear to auscultation	  Psychiatry: Alert  Gastrointestinal:  Soft, Non-tender, + BS	  Skin: No rashes   Neurologic: Non-focal  Extremities:  No edema  Vascular: Peripheral pulses palpable    	    	  	  CARDIAC MARKERS:  Labs personally reviewed by me                      11.8   4.93  )-----------( 282      ( 02 Mar 2021 12:31 )             34.7     03-02    139  |  102  |  29<H>  ----------------------------<  264<H>  3.8   |  27  |  2.20<H>    Ca    11.5<H>      02 Mar 2021 12:31    TPro  8.1  /  Alb  4.4  /  TBili  0.3  /  DBili  x   /  AST  28  /  ALT  19  /  AlkPhos  73  03-02    EKG: Personally reviewed by me - NSR 74bpm   Radiology: Personally reviewed by me -   < from: Transthoracic Echocardiogram (09.16.20 @ 14:48) >  EF (Hernández Rule): 70 %    < end of copied text >  < from: Transthoracic Echocardiogram (09.16.20 @ 14:48) >  Conclusions:  1. Normal mitral valve. Minimal mitral regurgitation.  2. Calcified trileaflet aortic valve with normal opening.  Mild aortic regurgitation.  3. Mild concentric left ventricular hypertrophy.  4. Mild diastolic dysfunction (Stage I).  5. Normal right ventricular size and function.  6. Normal pericardium with no pericardial effusion.  *** Compared with echocardiogram of 5/10/2016, no  significant changes noted.    < end of copied text >  < from: Nuclear Stress Test-Exercise (05.10.16 @ 15:44) >  76 M CAD s/p PCI x 3 to RCA (3/2016, mLAD  70%, mCx 50%),     < end of copied text >  < from: Nuclear Stress Test-Exercise (05.10.16 @ 15:44) >  * Exercise capacity: 8 METS, Average for age and gender.  * Chest Pain: No chest pain with exercise.  * Symptom: Shortness of breath.  * HR Response: Appropriate.  * BP Response: Appropriate.  * Heart Rhythm: Sinus Rhythm - 66 BPM.  * BaselineECG: There were approximately 1 mm downsloping  ST segment depressions and T wave inversions in leads II,  III, aVF, and V6 at baseline.  T wave inversion in lead  V5.  * ECG Abnormalities: ECG changes could not be interpreted  due to abnormal baseline ECG.  * Arrhythmia: Rare VPDs occurred during stress and  recovery.  * Review of raw data shows: Minor motion artifact.  * The left ventricle was normal in size. 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  * Post-stress gated wall motion analysis was performed  (LVEF = 57 %;LVEDV = 94 ml.) revealing reduced systolic  thickening of the basal inferior, basal inferolateral, and  basal inferoseptal walls.  *** Compared with Nuclear/Stress perfusion images of  3/22/2016, slightly less ischemia is noted on the current  study.  -------------------------------------------------------------------    < end of copied text >  Assessment /Plan:     1. Coronary Artery Disease- s/p 3 stents to RCA in 2016, mLAD 70%, mCx 50%  - c/w asa, plavix and statin, beta blocker  - recent TTE from 9/2020 with normal LVEF, no wma   - patient reports walking regularly for 30minutes without chest pain, shortness of breath, palpitations, denies orthopnea,PND     2. Hypertension- well controlled  - c/w home medications amlodipine 10mg daily, Hydralazine 50mg BID     3. Hyperlipidemia- c/w statin and fenofibrate    4. Type 2 Diabetes- hold home glymerperide  - inpatient insulin sliding scale   - f/u A1C    5. Pre- op Risk Stratification- patient with moderate functional capacity. Recent TTE 2020, normal LVEF  - patient is low- moderate cardiac risk for planned procedure. No cardiac objection to proceed          Hiwot Hoskins DO [facility]  Cardiovascular Medicine  [address] [place] [address], [address]  Office [phone]  Cell [phone] CHIEF COMPLAINT:Patient is a 81y old  Male who presents with a chief complaint of Retained foreign body in foot (02 Mar 2021 14:22)      HISTORY OF PRESENT ILLNESS:HPI:  81 year old male with PMH HTN, CAD with 4 stents (more than 5 years ago), BPH, HLD, gout, GERD, DM2 and CKD stage 3 who presents for retained foreign body in his left foot. The patient states that a glass bottle broke and he had a piece of glass stuck in his L foot about 1.5 months ago. His foot was swollen and painful so he went to the ER January 15th and had his foot cleaned out by podiatry and he was treated with antibiotics and was sent home. About 3 days later, he noticed that his pain and swelling returned. He had an MRI performed yesterday which showed that he still had a piece of glass in his foot and was told by his podiatrist Dr. De Oliveira to go to the ER for evaluation. Aside from L foot pain and swelling, he denies any other symptoms including chest pain, shortness of breath, abdominal pain, nausea, vomiting or diarrhea. Upon arrival, vital signs were /73, HR 77, RR 16, temperature 98.2 degrees Farenheit and saturating 98% on room air. He was seen by podiatry who booked him for L foot incision and drainage and foreign body removal for tomorrow. (02 Mar 2021 14:22)      PAST MEDICAL & SURGICAL HISTORY:  OA (osteoarthritis)  H/O gastroesophageal reflux (GERD)  Chronic kidney disease (CKD)  SHARATH (obstructive sleep apnea)  non compliant with CPAP  Ganglion cyst of wrist, left  CAD (coronary artery disease)  stents x4( 03/2017)  last echo stress test 05/2016  Diabetes  2  Hyperlipidemia  Hypertension  BPH (Benign Prostatic Hypertrophy)  Pneumonia  H/O: Rotator Cuff Tear  Renal Calculi  h/olithotripsy 2010  Diabetes Mellitus Type II  Hyperlipemia  Hypertension  Bilateral rotator cuff syndrome  operated both&#x27;  History of lumbar laminectomy  Cervical fusion syndrome  Triggr finger of both hands  sp repair  Bilateral cataracts  sp extraction  lumbar lamenectomy  1998  Laminectomy with Spinal Fusion  cervical- 2009  History of Arthroscopy  bilateral shoulder rotator cuff repair      MEDICATIONS:  amLODIPine   Tablet 10 milliGRAM(s) Oral daily  carvedilol 25 milliGRAM(s) Oral every 12 hours  clopidogrel Tablet 75 milliGRAM(s) Oral daily  hydrALAZINE 50 milliGRAM(s) Oral two times a day  tamsulosin 0.4 milliGRAM(s) Oral at bedtime  famotidine    Tablet 20 milliGRAM(s) Oral daily  allopurinol 100 milliGRAM(s) Oral daily  atorvastatin 40 milliGRAM(s) Oral at bedtime  dextrose 40% Gel 15 Gram(s) Oral once  dextrose 50% Injectable 25 Gram(s) IV Push once  dextrose 50% Injectable 12.5 Gram(s) IV Push once  dextrose 50% Injectable 25 Gram(s) IV Push once  fenofibrate Tablet 48 milliGRAM(s) Oral daily  glucagon  Injectable 1 milliGRAM(s) IntraMuscular once  insulin lispro (ADMELOG) corrective regimen sliding scale   SubCutaneous three times a day before meals  insulin lispro (ADMELOG) corrective regimen sliding scale   SubCutaneous at bedtime  dextrose 5%. 1000 milliLiter(s) IV Continuous <Continuous>  dextrose 5%. 1000 milliLiter(s) IV Continuous <Continuous>  folic acid 1 milliGRAM(s) Oral daily  multivitamin 1 Tablet(s) Oral daily    FAMILY HISTORY:  Family history of cerebrovascular accident (Mother)  mother    Non-contributory    SOCIAL HISTORY:    [ ] Tobacco  [ ] Drugs  [ ] Alcohol    Allergies    Avapro (Hives)  enalapril (Unknown)  losartan (Other)  seasonal allergies-outdoor (Urticaria; Rhinorrhea; Sneezing)  Intolerances  losartan (Other)  	  REVIEW OF SYSTEMS:  CONSTITUTIONAL: No fever  EYES: No eye pain, visual disturbances, or discharge  ENMT:  No difficulty hearing, tinnitus  NECK: No pain or stiffness  RESPIRATORY: No cough, wheezing,  CARDIOVASCULAR: No chest pain, palpitations, passing out, dizziness, or leg swelling  GASTROINTESTINAL:  No nausea, vomiting, diarrhea or constipation. No melena.  GENITOURINARY: No dysuria, hematuria  NEUROLOGICAL: No stroke like symptoms  SKIN: No burning or lesions L. FOOT injury   ENDOCRINE: No heat or cold intolerance  MUSCULOSKELETAL: No joint pain or swelling  PSYCHIATRIC: No  anxiety, mood swings  HEME/LYMPH: No bleeding gums  ALLERGY AND IMMUNOLOGIC: No hives or eczema	    All other ROS negative    PHYSICAL EXAM:  T(C): 36.8 (03-02-21 @ 10:36), Max: 36.8 (03-02-21 @ 10:36)  HR: 77 (03-02-21 @ 10:36) (77 - 77)  BP: 166/73 (03-02-21 @ 10:36) (166/73 - 166/73)  RR: 16 (03-02-21 @ 10:36) (16 - 16)  SpO2: 98% (03-02-21 @ 10:36) (98% - 98%)  Wt(kg): --  I&O's Summary      Appearance: Normal	  HEENT:   Normal oral mucosa, EOMI	  Cardiovascular:  S1 S2, No JVD,    Respiratory: Lungs clear to auscultation	  Psychiatry: Alert  Gastrointestinal:  Soft, Non-tender, + BS	  Skin: No rashes   Neurologic: Non-focal  Extremities:  No edema  Vascular: Peripheral pulses palpable    	    	  	  CARDIAC MARKERS:  Labs personally reviewed by me                      11.8   4.93  )-----------( 282      ( 02 Mar 2021 12:31 )             34.7     03-02    139  |  102  |  29<H>  ----------------------------<  264<H>  3.8   |  27  |  2.20<H>    Ca    11.5<H>      02 Mar 2021 12:31    TPro  8.1  /  Alb  4.4  /  TBili  0.3  /  DBili  x   /  AST  28  /  ALT  19  /  AlkPhos  73  03-02    EKG: Personally reviewed by me - NSR 74bpm   Radiology: Personally reviewed by me -   < from: Transthoracic Echocardiogram (09.16.20 @ 14:48) >  EF (Hernández Rule): 70 %    < end of copied text >  < from: Transthoracic Echocardiogram (09.16.20 @ 14:48) >  Conclusions:  1. Normal mitral valve. Minimal mitral regurgitation.  2. Calcified trileaflet aortic valve with normal opening.  Mild aortic regurgitation.  3. Mild concentric left ventricular hypertrophy.  4. Mild diastolic dysfunction (Stage I).  5. Normal right ventricular size and function.  6. Normal pericardium with no pericardial effusion.  *** Compared with echocardiogram of 5/10/2016, no  significant changes noted.    < end of copied text >  < from: Nuclear Stress Test-Exercise (05.10.16 @ 15:44) >  76 M CAD s/p PCI x 3 to RCA (3/2016, mLAD  70%, mCx 50%),     < end of copied text >  < from: Nuclear Stress Test-Exercise (05.10.16 @ 15:44) >  * Exercise capacity: 8 METS, Average for age and gender.  * Chest Pain: No chest pain with exercise.  * Symptom: Shortness of breath.  * HR Response: Appropriate.  * BP Response: Appropriate.  * Heart Rhythm: Sinus Rhythm - 66 BPM.  * BaselineECG: There were approximately 1 mm downsloping  ST segment depressions and T wave inversions in leads II,  III, aVF, and V6 at baseline.  T wave inversion in lead  V5.  * ECG Abnormalities: ECG changes could not be interpreted  due to abnormal baseline ECG.  * Arrhythmia: Rare VPDs occurred during stress and  recovery.  * Review of raw data shows: Minor motion artifact.  * The left ventricle was normal in size. 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  * Post-stress gated wall motion analysis was performed  (LVEF = 57 %;LVEDV = 94 ml.) revealing reduced systolic  thickening of the basal inferior, basal inferolateral, and  basal inferoseptal walls.  *** Compared with Nuclear/Stress perfusion images of  3/22/2016, slightly less ischemia is noted on the current  study.  -------------------------------------------------------------------    < end of copied text >    CXR - clear lungs     Assessment /Plan:     1. Coronary Artery Disease- s/p 3 stents to RCA in 2016, mLAD 70%, mCx 50%  - c/w asa, plavix and statin, beta blocker  - recent TTE from 9/2020 with normal LVEF, no wma   - patient reports walking regularly for 30minutes without chest pain, shortness of breath, palpitations, denies orthopnea,PND     2. Hypertension- well controlled  - c/w home medications amlodipine 10mg daily, Hydralazine 50mg BID     3. Hyperlipidemia- c/w statin and fenofibrate    4. Type 2 Diabetes- hold home glymerperide  - inpatient insulin sliding scale   - f/u A1C    5. Pre- op Risk Stratification- patient with moderate functional capacity. Recent TTE 2020, normal LVEF  - patient is low- moderate cardiac risk for planned procedure. No cardiac objection to proceed          Hiwot Hoskins DO [facility]  Cardiovascular Medicine  [address] [place] [address], [address]  Office [phone]  Cell [phone] CHIEF COMPLAINT:Patient is a 81y old  Male who presents with a chief complaint of Retained foreign body in foot (02 Mar 2021 14:22)      HISTORY OF PRESENT ILLNESS:HPI:  81 year old male with PMH HTN, CAD with 4 stents (more than 5 years ago), BPH, HLD, gout, GERD, DM2 and CKD stage 3 who presents for retained foreign body in his left foot. The patient states that a glass bottle broke and he had a piece of glass stuck in his L foot about 1.5 months ago. His foot was swollen and painful so he went to the ER January 15th and had his foot cleaned out by podiatry and he was treated with antibiotics and was sent home. About 3 days later, he noticed that his pain and swelling returned. He had an MRI performed yesterday which showed that he still had a piece of glass in his foot and was told by his podiatrist Dr. De Oliveira to go to the ER for evaluation. Aside from L foot pain and swelling, he denies any other symptoms including chest pain, shortness of breath, abdominal pain, nausea, vomiting or diarrhea. Upon arrival, vital signs were /73, HR 77, RR 16, temperature 98.2 degrees Farenheit and saturating 98% on room air. He was seen by podiatry who booked him for L foot incision and drainage and foreign body removal for tomorrow. (02 Mar 2021 14:22)      PAST MEDICAL & SURGICAL HISTORY:  OA (osteoarthritis)  H/O gastroesophageal reflux (GERD)  Chronic kidney disease (CKD)  SHARATH (obstructive sleep apnea)  non compliant with CPAP  Ganglion cyst of wrist, left  CAD (coronary artery disease)  stents x4( 03/2017)  last echo stress test 05/2016  Diabetes  2  Hyperlipidemia  Hypertension  BPH (Benign Prostatic Hypertrophy)  Pneumonia  H/O: Rotator Cuff Tear  Renal Calculi  h/olithotripsy 2010  Diabetes Mellitus Type II  Hyperlipemia  Hypertension  Bilateral rotator cuff syndrome  operated both&#x27;  History of lumbar laminectomy  Cervical fusion syndrome  Triggr finger of both hands  sp repair  Bilateral cataracts  sp extraction  lumbar lamenectomy  1998  Laminectomy with Spinal Fusion  cervical- 2009  History of Arthroscopy  bilateral shoulder rotator cuff repair      MEDICATIONS:  amLODIPine   Tablet 10 milliGRAM(s) Oral daily  carvedilol 25 milliGRAM(s) Oral every 12 hours  clopidogrel Tablet 75 milliGRAM(s) Oral daily  hydrALAZINE 50 milliGRAM(s) Oral two times a day  tamsulosin 0.4 milliGRAM(s) Oral at bedtime  famotidine    Tablet 20 milliGRAM(s) Oral daily  allopurinol 100 milliGRAM(s) Oral daily  atorvastatin 40 milliGRAM(s) Oral at bedtime  dextrose 40% Gel 15 Gram(s) Oral once  dextrose 50% Injectable 25 Gram(s) IV Push once  dextrose 50% Injectable 12.5 Gram(s) IV Push once  dextrose 50% Injectable 25 Gram(s) IV Push once  fenofibrate Tablet 48 milliGRAM(s) Oral daily  glucagon  Injectable 1 milliGRAM(s) IntraMuscular once  insulin lispro (ADMELOG) corrective regimen sliding scale   SubCutaneous three times a day before meals  insulin lispro (ADMELOG) corrective regimen sliding scale   SubCutaneous at bedtime  dextrose 5%. 1000 milliLiter(s) IV Continuous <Continuous>  dextrose 5%. 1000 milliLiter(s) IV Continuous <Continuous>  folic acid 1 milliGRAM(s) Oral daily  multivitamin 1 Tablet(s) Oral daily    FAMILY HISTORY:  Family history of cerebrovascular accident (Mother)  mother    Non-contributory    SOCIAL HISTORY:    not a smoker    Allergies    Avapro (Hives)  enalapril (Unknown)  losartan (Other)  seasonal allergies-outdoor (Urticaria; Rhinorrhea; Sneezing)  Intolerances  losartan (Other)  	  REVIEW OF SYSTEMS:  CONSTITUTIONAL: No fever  EYES: No eye pain, visual disturbances, or discharge  ENMT:  No difficulty hearing, tinnitus  NECK: No pain or stiffness  RESPIRATORY: No cough, wheezing,  CARDIOVASCULAR: No chest pain, palpitations, passing out, dizziness, or leg swelling  GASTROINTESTINAL:  No nausea, vomiting, diarrhea or constipation. No melena.  GENITOURINARY: No dysuria, hematuria  NEUROLOGICAL: No stroke like symptoms  SKIN: No burning or lesions L. FOOT injury   ENDOCRINE: No heat or cold intolerance  MUSCULOSKELETAL: No joint pain or swelling  PSYCHIATRIC: No  anxiety, mood swings  HEME/LYMPH: No bleeding gums  ALLERGY AND IMMUNOLOGIC: No hives or eczema	    All other ROS negative    PHYSICAL EXAM:  T(C): 36.8 (03-02-21 @ 10:36), Max: 36.8 (03-02-21 @ 10:36)  HR: 77 (03-02-21 @ 10:36) (77 - 77)  BP: 166/73 (03-02-21 @ 10:36) (166/73 - 166/73)  RR: 16 (03-02-21 @ 10:36) (16 - 16)  SpO2: 98% (03-02-21 @ 10:36) (98% - 98%)  Wt(kg): --  I&O's Summary      Appearance: Normal	  HEENT:   Normal oral mucosa, EOMI	  Cardiovascular:  S1 S2, No JVD,    Respiratory: Lungs clear to auscultation	  Psychiatry: Alert  Gastrointestinal:  Soft, Non-tender, + BS	  Skin: No rashes   Neurologic: Non-focal  Extremities:  No edema  Vascular: Peripheral pulses palpable    	    	  	  CARDIAC MARKERS:  Labs personally reviewed by me                      11.8   4.93  )-----------( 282      ( 02 Mar 2021 12:31 )             34.7     03-02    139  |  102  |  29<H>  ----------------------------<  264<H>  3.8   |  27  |  2.20<H>    Ca    11.5<H>      02 Mar 2021 12:31    TPro  8.1  /  Alb  4.4  /  TBili  0.3  /  DBili  x   /  AST  28  /  ALT  19  /  AlkPhos  73  03-02    EKG: Personally reviewed by me - NSR 74bpm   Radiology: Personally reviewed by me -   < from: Transthoracic Echocardiogram (09.16.20 @ 14:48) >  EF (Hernández Rule): 70 %    < end of copied text >  < from: Transthoracic Echocardiogram (09.16.20 @ 14:48) >  Conclusions:  1. Normal mitral valve. Minimal mitral regurgitation.  2. Calcified trileaflet aortic valve with normal opening.  Mild aortic regurgitation.  3. Mild concentric left ventricular hypertrophy.  4. Mild diastolic dysfunction (Stage I).  5. Normal right ventricular size and function.  6. Normal pericardium with no pericardial effusion.  *** Compared with echocardiogram of 5/10/2016, no  significant changes noted.    < end of copied text >  < from: Nuclear Stress Test-Exercise (05.10.16 @ 15:44) >  76 M CAD s/p PCI x 3 to RCA (3/2016, mLAD  70%, mCx 50%),     < end of copied text >  < from: Nuclear Stress Test-Exercise (05.10.16 @ 15:44) >  * Exercise capacity: 8 METS, Average for age and gender.  * Chest Pain: No chest pain with exercise.  * Symptom: Shortness of breath.  * HR Response: Appropriate.  * BP Response: Appropriate.  * Heart Rhythm: Sinus Rhythm - 66 BPM.  * BaselineECG: There were approximately 1 mm downsloping  ST segment depressions and T wave inversions in leads II,  III, aVF, and V6 at baseline.  T wave inversion in lead  V5.  * ECG Abnormalities: ECG changes could not be interpreted  due to abnormal baseline ECG.  * Arrhythmia: Rare VPDs occurred during stress and  recovery.  * Review of raw data shows: Minor motion artifact.  * The left ventricle was normal in size. 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  * Post-stress gated wall motion analysis was performed  (LVEF = 57 %;LVEDV = 94 ml.) revealing reduced systolic  thickening of the basal inferior, basal inferolateral, and  basal inferoseptal walls.  *** Compared with Nuclear/Stress perfusion images of  3/22/2016, slightly less ischemia is noted on the current  study.  -------------------------------------------------------------------    < end of copied text >    CXR - clear lungs     Assessment /Plan:     1. Coronary Artery Disease- s/p 3 stents to RCA in 2016, mLAD 70%, mCx 50%  - c/w asa, plavix and statin, beta blocker  - recent TTE from 9/2020 with normal LVEF, no wma   - patient reports walking regularly for 30minutes without chest pain, shortness of breath, palpitations, denies orthopnea,PND     2. Hypertension- well controlled  - c/w home medications amlodipine 10mg daily, Hydralazine 50mg BID     3. Hyperlipidemia- c/w statin and fenofibrate    4. Type 2 Diabetes- hold home glymerperide  - inpatient insulin sliding scale   - f/u A1C    5. Pre- op Risk Stratification- patient with moderate functional capacity. Recent TTE 2020, normal LVEF  - patient is low- moderate cardiac risk for planned procedure. No cardiac objection to proceed          Hiwot Hoskins DO [facility]  Cardiovascular Medicine  [address] [place] [address], [address]  Office [phone]  Cell [phone]

## 2021-03-02 NOTE — H&P ADULT - PROBLEM SELECTOR PLAN 3
-Patient with a history of CAD with 4 stents, continue home dose of carvedilol 25mg BID  -Takes plavix 75mg daily, will defer decision to restart to podiatry/cardiology, stents are older than 5 years per patient  -Continue lipitor 40mg daily and fenofibrate 48mg daily -Patient with a history of CAD with 4 stents, continue home dose of carvedilol 25mg BID  -Takes plavix 75mg daily, will restart, spoke with podiatry who states that surgery not invasive  -Continue lipitor 40mg daily and fenofibrate 48mg daily

## 2021-03-02 NOTE — H&P ADULT - ASSESSMENT
81 year old male with PMH HTN, CAD with 4 stents (more than 5 years ago), BPH, HLD, gout, GERD, DM2 and CKD stage 3 who presents for retained foreign body in his left foot.

## 2021-03-02 NOTE — ED PROVIDER NOTE - SKIN WOUND TYPE
open wound over dorsal aspect of left 5th toe 1x1cm without purulence, no surrounding erythema, no crepitus. no visible FB

## 2021-03-02 NOTE — ED PROVIDER NOTE - MUSCULOSKELETAL, MLM
Spine appears normal, range of motion is not limited, no muscle or joint tenderness. Ambulatory without difficulty

## 2021-03-03 ENCOUNTER — TRANSCRIPTION ENCOUNTER (OUTPATIENT)
Age: 81
End: 2021-03-03

## 2021-03-03 ENCOUNTER — RESULT REVIEW (OUTPATIENT)
Age: 81
End: 2021-03-03

## 2021-03-03 LAB
A1C WITH ESTIMATED AVERAGE GLUCOSE RESULT: 8.3 % — HIGH (ref 4–5.6)
ALBUMIN SERPL ELPH-MCNC: 3.5 G/DL — SIGNIFICANT CHANGE UP (ref 3.3–5)
ALP SERPL-CCNC: 59 U/L — SIGNIFICANT CHANGE UP (ref 40–120)
ALT FLD-CCNC: 17 U/L — SIGNIFICANT CHANGE UP (ref 10–45)
ANION GAP SERPL CALC-SCNC: 9 MMOL/L — SIGNIFICANT CHANGE UP (ref 5–17)
APTT BLD: 35.5 SEC — SIGNIFICANT CHANGE UP (ref 27.5–35.5)
AST SERPL-CCNC: 23 U/L — SIGNIFICANT CHANGE UP (ref 10–40)
BILIRUB SERPL-MCNC: 0.3 MG/DL — SIGNIFICANT CHANGE UP (ref 0.2–1.2)
BLD GP AB SCN SERPL QL: NEGATIVE — SIGNIFICANT CHANGE UP
BUN SERPL-MCNC: 32 MG/DL — HIGH (ref 7–23)
CALCIUM SERPL-MCNC: 10.9 MG/DL — HIGH (ref 8.4–10.5)
CHLORIDE SERPL-SCNC: 103 MMOL/L — SIGNIFICANT CHANGE UP (ref 96–108)
CO2 SERPL-SCNC: 26 MMOL/L — SIGNIFICANT CHANGE UP (ref 22–31)
CREAT SERPL-MCNC: 2.33 MG/DL — HIGH (ref 0.5–1.3)
ERYTHROCYTE [SEDIMENTATION RATE] IN BLOOD: 64 MM/HR — HIGH (ref 0–20)
ESTIMATED AVERAGE GLUCOSE: 192 MG/DL — HIGH (ref 68–114)
GLUCOSE SERPL-MCNC: 134 MG/DL — HIGH (ref 70–99)
GRAM STN FLD: SIGNIFICANT CHANGE UP
HCT VFR BLD CALC: 30.1 % — LOW (ref 39–50)
HGB BLD-MCNC: 9.8 G/DL — LOW (ref 13–17)
INR BLD: 1.07 RATIO — SIGNIFICANT CHANGE UP (ref 0.88–1.16)
MAGNESIUM SERPL-MCNC: 2 MG/DL — SIGNIFICANT CHANGE UP (ref 1.6–2.6)
MCHC RBC-ENTMCNC: 30.9 PG — SIGNIFICANT CHANGE UP (ref 27–34)
MCHC RBC-ENTMCNC: 32.6 GM/DL — SIGNIFICANT CHANGE UP (ref 32–36)
MCV RBC AUTO: 95 FL — SIGNIFICANT CHANGE UP (ref 80–100)
NRBC # BLD: 0 /100 WBCS — SIGNIFICANT CHANGE UP (ref 0–0)
PHOSPHATE SERPL-MCNC: 2.3 MG/DL — LOW (ref 2.5–4.5)
PLATELET # BLD AUTO: 243 K/UL — SIGNIFICANT CHANGE UP (ref 150–400)
POTASSIUM SERPL-MCNC: 3.6 MMOL/L — SIGNIFICANT CHANGE UP (ref 3.5–5.3)
POTASSIUM SERPL-SCNC: 3.6 MMOL/L — SIGNIFICANT CHANGE UP (ref 3.5–5.3)
PROT SERPL-MCNC: 6.8 G/DL — SIGNIFICANT CHANGE UP (ref 6–8.3)
PROTHROM AB SERPL-ACNC: 12.6 SEC — SIGNIFICANT CHANGE UP (ref 10.6–13.6)
RBC # BLD: 3.17 M/UL — LOW (ref 4.2–5.8)
RBC # FLD: 13.2 % — SIGNIFICANT CHANGE UP (ref 10.3–14.5)
RH IG SCN BLD-IMP: POSITIVE — SIGNIFICANT CHANGE UP
SODIUM SERPL-SCNC: 138 MMOL/L — SIGNIFICANT CHANGE UP (ref 135–145)
SPECIMEN SOURCE: SIGNIFICANT CHANGE UP
WBC # BLD: 3.14 K/UL — LOW (ref 3.8–10.5)
WBC # FLD AUTO: 3.14 K/UL — LOW (ref 3.8–10.5)

## 2021-03-03 PROCEDURE — 88311 DECALCIFY TISSUE: CPT | Mod: 26

## 2021-03-03 PROCEDURE — 99233 SBSQ HOSP IP/OBS HIGH 50: CPT

## 2021-03-03 PROCEDURE — 88305 TISSUE EXAM BY PATHOLOGIST: CPT | Mod: 26

## 2021-03-03 RX ORDER — ACETAMINOPHEN 500 MG
650 TABLET ORAL EVERY 6 HOURS
Refills: 0 | Status: DISCONTINUED | OUTPATIENT
Start: 2021-03-03 | End: 2021-03-09

## 2021-03-03 RX ORDER — ACETAMINOPHEN 500 MG
1000 TABLET ORAL ONCE
Refills: 0 | Status: COMPLETED | OUTPATIENT
Start: 2021-03-03 | End: 2021-03-03

## 2021-03-03 RX ORDER — ATORVASTATIN CALCIUM 80 MG/1
40 TABLET, FILM COATED ORAL AT BEDTIME
Refills: 0 | Status: DISCONTINUED | OUTPATIENT
Start: 2021-03-03 | End: 2021-03-09

## 2021-03-03 RX ORDER — SODIUM CHLORIDE 9 MG/ML
1000 INJECTION, SOLUTION INTRAVENOUS
Refills: 0 | Status: DISCONTINUED | OUTPATIENT
Start: 2021-03-03 | End: 2021-03-09

## 2021-03-03 RX ORDER — ALLOPURINOL 300 MG
100 TABLET ORAL DAILY
Refills: 0 | Status: DISCONTINUED | OUTPATIENT
Start: 2021-03-03 | End: 2021-03-09

## 2021-03-03 RX ORDER — FENOFIBRATE,MICRONIZED 130 MG
48 CAPSULE ORAL DAILY
Refills: 0 | Status: DISCONTINUED | OUTPATIENT
Start: 2021-03-03 | End: 2021-03-09

## 2021-03-03 RX ORDER — HYDRALAZINE HCL 50 MG
50 TABLET ORAL
Refills: 0 | Status: DISCONTINUED | OUTPATIENT
Start: 2021-03-03 | End: 2021-03-09

## 2021-03-03 RX ORDER — DEXTROSE 50 % IN WATER 50 %
25 SYRINGE (ML) INTRAVENOUS ONCE
Refills: 0 | Status: DISCONTINUED | OUTPATIENT
Start: 2021-03-03 | End: 2021-03-09

## 2021-03-03 RX ORDER — INSULIN LISPRO 100/ML
VIAL (ML) SUBCUTANEOUS
Refills: 0 | Status: DISCONTINUED | OUTPATIENT
Start: 2021-03-03 | End: 2021-03-09

## 2021-03-03 RX ORDER — GLUCAGON INJECTION, SOLUTION 0.5 MG/.1ML
1 INJECTION, SOLUTION SUBCUTANEOUS ONCE
Refills: 0 | Status: DISCONTINUED | OUTPATIENT
Start: 2021-03-03 | End: 2021-03-09

## 2021-03-03 RX ORDER — DEXTROSE 50 % IN WATER 50 %
12.5 SYRINGE (ML) INTRAVENOUS ONCE
Refills: 0 | Status: DISCONTINUED | OUTPATIENT
Start: 2021-03-03 | End: 2021-03-09

## 2021-03-03 RX ORDER — CLOPIDOGREL BISULFATE 75 MG/1
75 TABLET, FILM COATED ORAL DAILY
Refills: 0 | Status: DISCONTINUED | OUTPATIENT
Start: 2021-03-03 | End: 2021-03-09

## 2021-03-03 RX ORDER — OMEGA-3 ACID ETHYL ESTERS 1 G
1 CAPSULE ORAL
Qty: 0 | Refills: 0 | DISCHARGE

## 2021-03-03 RX ORDER — DEXTROSE 50 % IN WATER 50 %
15 SYRINGE (ML) INTRAVENOUS ONCE
Refills: 0 | Status: DISCONTINUED | OUTPATIENT
Start: 2021-03-03 | End: 2021-03-09

## 2021-03-03 RX ORDER — FOLIC ACID 0.8 MG
1 TABLET ORAL DAILY
Refills: 0 | Status: DISCONTINUED | OUTPATIENT
Start: 2021-03-03 | End: 2021-03-09

## 2021-03-03 RX ORDER — OXYCODONE HYDROCHLORIDE 5 MG/1
5 TABLET ORAL ONCE
Refills: 0 | Status: DISCONTINUED | OUTPATIENT
Start: 2021-03-03 | End: 2021-03-03

## 2021-03-03 RX ORDER — CARVEDILOL PHOSPHATE 80 MG/1
25 CAPSULE, EXTENDED RELEASE ORAL EVERY 12 HOURS
Refills: 0 | Status: DISCONTINUED | OUTPATIENT
Start: 2021-03-03 | End: 2021-03-09

## 2021-03-03 RX ORDER — OXYCODONE AND ACETAMINOPHEN 5; 325 MG/1; MG/1
1 TABLET ORAL EVERY 4 HOURS
Refills: 0 | Status: DISCONTINUED | OUTPATIENT
Start: 2021-03-03 | End: 2021-03-09

## 2021-03-03 RX ORDER — TAMSULOSIN HYDROCHLORIDE 0.4 MG/1
0.4 CAPSULE ORAL AT BEDTIME
Refills: 0 | Status: DISCONTINUED | OUTPATIENT
Start: 2021-03-03 | End: 2021-03-09

## 2021-03-03 RX ORDER — GLUCOSAMINE/CHONDROITIN/C/MANG 500-400 MG
3 CAPSULE ORAL
Qty: 0 | Refills: 0 | DISCHARGE

## 2021-03-03 RX ORDER — INSULIN LISPRO 100/ML
VIAL (ML) SUBCUTANEOUS EVERY 6 HOURS
Refills: 0 | Status: DISCONTINUED | OUTPATIENT
Start: 2021-03-02 | End: 2021-03-03

## 2021-03-03 RX ORDER — INSULIN LISPRO 100/ML
VIAL (ML) SUBCUTANEOUS AT BEDTIME
Refills: 0 | Status: DISCONTINUED | OUTPATIENT
Start: 2021-03-03 | End: 2021-03-09

## 2021-03-03 RX ORDER — INSULIN LISPRO 100/ML
VIAL (ML) SUBCUTANEOUS EVERY 6 HOURS
Refills: 0 | Status: DISCONTINUED | OUTPATIENT
Start: 2021-03-03 | End: 2021-03-04

## 2021-03-03 RX ORDER — FAMOTIDINE 10 MG/ML
20 INJECTION INTRAVENOUS DAILY
Refills: 0 | Status: DISCONTINUED | OUTPATIENT
Start: 2021-03-03 | End: 2021-03-09

## 2021-03-03 RX ORDER — AMLODIPINE BESYLATE 2.5 MG/1
10 TABLET ORAL DAILY
Refills: 0 | Status: DISCONTINUED | OUTPATIENT
Start: 2021-03-03 | End: 2021-03-09

## 2021-03-03 RX ADMIN — OXYCODONE HYDROCHLORIDE 5 MILLIGRAM(S): 5 TABLET ORAL at 17:03

## 2021-03-03 RX ADMIN — AMLODIPINE BESYLATE 10 MILLIGRAM(S): 2.5 TABLET ORAL at 05:07

## 2021-03-03 RX ADMIN — TAMSULOSIN HYDROCHLORIDE 0.4 MILLIGRAM(S): 0.4 CAPSULE ORAL at 21:26

## 2021-03-03 RX ADMIN — CARVEDILOL PHOSPHATE 25 MILLIGRAM(S): 80 CAPSULE, EXTENDED RELEASE ORAL at 17:21

## 2021-03-03 RX ADMIN — Medication 50 MILLIGRAM(S): at 05:06

## 2021-03-03 RX ADMIN — ATORVASTATIN CALCIUM 40 MILLIGRAM(S): 80 TABLET, FILM COATED ORAL at 21:26

## 2021-03-03 RX ADMIN — Medication 650 MILLIGRAM(S): at 19:57

## 2021-03-03 RX ADMIN — Medication 1000 MILLIGRAM(S): at 15:32

## 2021-03-03 RX ADMIN — Medication 400 MILLIGRAM(S): at 15:07

## 2021-03-03 RX ADMIN — CARVEDILOL PHOSPHATE 25 MILLIGRAM(S): 80 CAPSULE, EXTENDED RELEASE ORAL at 05:07

## 2021-03-03 RX ADMIN — Medication 650 MILLIGRAM(S): at 19:27

## 2021-03-03 RX ADMIN — OXYCODONE HYDROCHLORIDE 5 MILLIGRAM(S): 5 TABLET ORAL at 16:33

## 2021-03-03 RX ADMIN — Medication 50 MILLIGRAM(S): at 17:21

## 2021-03-03 NOTE — BRIEF OPERATIVE NOTE - NSICDXBRIEFPREOP_GEN_ALL_CORE_FT
PRE-OP DIAGNOSIS:  Foot osteomyelitis, left 03-Mar-2021 14:22:32  Hudson Landrum  Abscess of foot 03-Mar-2021 14:22:24  Hudson Landrum

## 2021-03-03 NOTE — PROGRESS NOTE ADULT - ATTENDING COMMENTS
discussed planned procedure. will take bone biopsy. remains at risk for partial ray resection. may need PICC based on OR data.

## 2021-03-03 NOTE — PROGRESS NOTE ADULT - ASSESSMENT
Plan:   Pt is scheduled for Left foot incision and drainage with foreign body removal with Dr. Chuy De Oliveira at 1:30PM. Patient is aware of procedure and is NPO since midnight.  CXR on sunrise.  EKG on sunrise.  COVID negative  Medical/Cardiac clearance since 3/2 and documented in chart.  Consent signed and in chart.  Procedure was explained to patient in detail. All alternatives, risks and complications were discussed. All questions answered.

## 2021-03-03 NOTE — PRE-ANESTHESIA EVALUATION ADULT - NSANTHPMHFT_GEN_ALL_CORE
81 year old male with PMH HTN, CAD with 4 stents (more than 5 years ago), BPH, HLD, gout, GERD, DM2 and CKD stage 3 who presents for retained foreign body in his left foot. The patient states that a glass bottle broke and he had a piece of glass stuck in his L foot about 1.5 months ago. His foot was swollen and painful so he went to the ER January 15th and had his foot cleaned out by podiatry and he was treated with antibiotics and was sent home. About 3 days later, he noticed that his pain and swelling returned. He had an MRI performed yesterday which showed that he still had a piece of glass in his foot and was told by his podiatrist Dr. De Oliveira to go to the ER for evaluation. Aside from L foot pain and swelling, he denies any other symptoms including chest pain, shortness of breath, abdominal pain, nausea, vomiting or diarrhea. Upon arrival, vital signs were /73, HR 77, RR 16, temperature 98.2 degrees Farenheit and saturating 98% on room air. He was seen by podiatry who booked him for L foot incision and drainage and foreign body removal for tomorrow.

## 2021-03-03 NOTE — DISCHARGE NOTE PROVIDER - CARE PROVIDERS DIRECT ADDRESSES
[email or web address]

## 2021-03-03 NOTE — DISCHARGE NOTE PROVIDER - NSDCMRMEDTOKEN_GEN_ALL_CORE_FT
Actos 30 mg oral tablet: 1 tab(s) orally once a day  allopurinol 100 mg oral tablet: 1 tab(s) orally once a day  atorvastatin 40 mg oral tablet: 1 tab(s) orally once a day  carvedilol 25 mg oral tablet: 1 tab(s) orally 2 times a day  clopidogrel 75 mg oral tablet: 1 tab(s) orally once a day  Co Q-10 100 mg oral capsule: 1 cap(s) orally once a day  famotidine 20 mg oral tablet: 1 tab(s) orally once a day  fenofibrate 48 mg oral tablet: 1 tab(s) orally once a day  Flomax 0.4 mg oral capsule: 1 cap(s) orally once a day  folic acid 1 mg oral tablet: 1 tab(s) orally once a day  glimepiride 4 mg oral tablet: 1 tab(s) orally 2 times a day  Glucosamine Chondroitin oral capsule: 1 cap(s) orally once a day  hydrALAZINE 50 mg oral tablet: 1 tab(s) orally 2 times a day  Lasix 20 mg oral tablet: 1 tab(s) orally once a day  multivitamin: 1 tab(s) orally once a day  Norvasc 10 mg oral tablet: 1 tab(s) orally once a day at 6pm  omega-3 polyunsaturated fatty acids 1200 mg oral capsule: 1 cap(s) orally once a day  Propecia 1 mg oral tablet: 1 tab(s) orally once a day   Actos 30 mg oral tablet: 1 tab(s) orally once a day  allopurinol 100 mg oral tablet: 1 tab(s) orally once a day  atorvastatin 40 mg oral tablet: 1 tab(s) orally once a day  carvedilol 25 mg oral tablet: 1 tab(s) orally 2 times a day  CEFAZOLIN IVPB  2000 mg in IV solution 50 ml, IV intermittent, every 12 hours, infuse over 30 minutes       through 4/16/2021   icd 10: M86.9:   clopidogrel 75 mg oral tablet: 1 tab(s) orally once a day  Co Q-10 100 mg oral capsule: 1 cap(s) orally once a day  famotidine 20 mg oral tablet: 1 tab(s) orally once a day  fenofibrate 48 mg oral tablet: 1 tab(s) orally once a day  Flomax 0.4 mg oral capsule: 1 cap(s) orally once a day  folic acid 1 mg oral tablet: 1 tab(s) orally once a day  glimepiride 4 mg oral tablet: 1 tab(s) orally 2 times a day  Glucosamine Chondroitin oral capsule: 1 cap(s) orally once a day  hydrALAZINE 50 mg oral tablet: 1 tab(s) orally 2 times a day  Lasix 20 mg oral tablet: 1 tab(s) orally once a day  multivitamin: 1 tab(s) orally once a day  Norvasc 10 mg oral tablet: 1 tab(s) orally once a day at 6pm  omega-3 polyunsaturated fatty acids 1200 mg oral capsule: 1 cap(s) orally once a day  Propecia 1 mg oral tablet: 1 tab(s) orally once a day  ROLLING WALKER:    Actos 30 mg oral tablet: 1 tab(s) orally once a day  allopurinol 100 mg oral tablet: 1 tab(s) orally once a day  atorvastatin 40 mg oral tablet: 1 tab(s) orally once a day  carvedilol 25 mg oral tablet: 1 tab(s) orally 2 times a day  CEFAZOLIN IVPB  2000 mg in IV solution 50 ml, IV intermittent, every 12 hours, infuse over 30 minutes       through 4/16/2021   icd 10: M86.9:   clopidogrel 75 mg oral tablet: 1 tab(s) orally once a day  Co Q-10 100 mg oral capsule: 1 cap(s) orally once a day  famotidine 20 mg oral tablet: 1 tab(s) orally once a day  fenofibrate 48 mg oral tablet: 1 tab(s) orally once a day  Flomax 0.4 mg oral capsule: 1 cap(s) orally once a day  Flush per protocol and flush after antibiotic infusion:   folic acid 1 mg oral tablet: 1 tab(s) orally once a day  glimepiride 4 mg oral tablet: 1 tab(s) orally 2 times a day  Glucosamine Chondroitin oral capsule: 1 cap(s) orally once a day  hydrALAZINE 50 mg oral tablet: 1 tab(s) orally 2 times a day  Lasix 20 mg oral tablet: 1 tab(s) orally once a day  multivitamin: 1 tab(s) orally once a day  Norvasc 10 mg oral tablet: 1 tab(s) orally once a day at 6pm  omega-3 polyunsaturated fatty acids 1200 mg oral capsule: 1 cap(s) orally once a day  Propecia 1 mg oral tablet: 1 tab(s) orally once a day  ROLLING WALKER:   Will need CBC/ CMP/ ESR/ CRP weekly, fax to the attention of Dr. Maguire at [phone]:    acetaminophen 325 mg oral tablet: 2 tab(s) orally every 6 hours, As needed, Mild Pain (1 - 3), Moderate Pain (4 - 6)  Actos 30 mg oral tablet: 1 tab(s) orally once a day  allopurinol 100 mg oral tablet: 1 tab(s) orally once a day  atorvastatin 40 mg oral tablet: 1 tab(s) orally once a day  carvedilol 25 mg oral tablet: 1 tab(s) orally 2 times a day  CEFAZOLIN IVPB  2000 mg in IV solution 50 ml, IV intermittent, every 12 hours, infuse over 30 minutes       through 4/16/2021   icd 10: M86.9:   clopidogrel 75 mg oral tablet: 1 tab(s) orally once a day  Co Q-10 100 mg oral capsule: 1 cap(s) orally once a day  famotidine 20 mg oral tablet: 1 tab(s) orally once a day  fenofibrate 48 mg oral tablet: 1 tab(s) orally once a day  Flomax 0.4 mg oral capsule: 1 cap(s) orally once a day  Flush per protocol and flush after antibiotic infusion:   folic acid 1 mg oral tablet: 1 tab(s) orally once a day  glimepiride 4 mg oral tablet: 1 tab(s) orally 2 times a day  Glucosamine Chondroitin oral capsule: 1 cap(s) orally once a day  hydrALAZINE 50 mg oral tablet: 1 tab(s) orally 2 times a day  Lasix 20 mg oral tablet: 1 tab(s) orally every other day  multivitamin: 1 tab(s) orally once a day  Norvasc 10 mg oral tablet: 1 tab(s) orally once a day at 6pm  omega-3 polyunsaturated fatty acids 1200 mg oral capsule: 1 cap(s) orally once a day  polyethylene glycol 3350 oral powder for reconstitution: 17 gram(s) orally once a day, As needed, Constipation  Propecia 1 mg oral tablet: 1 tab(s) orally once a day  ROLLING WALKER:   senna oral tablet: 1 tab(s) orally once a day, As needed, Constipation  Will need CBC/ CMP/ ESR/ CRP weekly, fax to the attention of Dr. Maguire at [phone]:    acetaminophen 325 mg oral tablet: 2 tab(s) orally every 6 hours, As needed, Mild Pain (1 - 3), Moderate Pain (4 - 6)  Actos 30 mg oral tablet: 1 tab(s) orally once a day  allopurinol 100 mg oral tablet: 1 tab(s) orally once a day  atorvastatin 40 mg oral tablet: 1 tab(s) orally once a day  carvedilol 25 mg oral tablet: 1 tab(s) orally 2 times a day  CEFAZOLIN IVPB  2000 mg in IV solution 50 ml, IV intermittent, every 12 hours, infuse over 30 minutes       through 4/16/2021   icd 10: M86.9:   clopidogrel 75 mg oral tablet: 1 tab(s) orally once a day  Co Q-10 100 mg oral capsule: 1 cap(s) orally once a day  famotidine 20 mg oral tablet: 1 tab(s) orally once a day  fenofibrate 48 mg oral tablet: 1 tab(s) orally once a day  Flomax 0.4 mg oral capsule: 1 cap(s) orally once a day  Flush per protocol and flush after antibiotic infusion:   folic acid 1 mg oral tablet: 1 tab(s) orally once a day  glimepiride 4 mg oral tablet: 1 tab(s) orally 2 times a day  Glucosamine Chondroitin oral capsule: 1 cap(s) orally once a day  hydrALAZINE 50 mg oral tablet: 1 tab(s) orally 2 times a day  Lasix 20 mg oral tablet: 1 tab(s) orally every other day  multivitamin: 1 tab(s) orally once a day  Norvasc 10 mg oral tablet: 1 tab(s) orally once a day at 6pm  omega-3 polyunsaturated fatty acids 1200 mg oral capsule: 1 cap(s) orally once a day  oxycodone-acetaminophen 5 mg-325 mg oral tablet: 1 tab(s) orally every 8 hours, As Needed -Severe Pain (7 - 10) MDD:4 tabs   polyethylene glycol 3350 oral powder for reconstitution: 17 gram(s) orally once a day, As needed, Constipation  Propecia 1 mg oral tablet: 1 tab(s) orally once a day  ROLLING WALKER:   senna oral tablet: 1 tab(s) orally once a day, As needed, Constipation  Will need CBC/ CMP/ ESR/ CRP weekly, fax to the attention of Dr. Maguire at [phone]:

## 2021-03-03 NOTE — DISCHARGE NOTE PROVIDER - NSDCCPCAREPLAN_GEN_ALL_CORE_FT
PRINCIPAL DISCHARGE DIAGNOSIS  Diagnosis: Osteomyelitis of left foot  Assessment and Plan of Treatment: Follow up:  Please follow up with Dr. De Oliveira within 1 week of discharge from the hospital, please call [phone] for appointment and discuss that you recently were seen in the hospital.  *****  Wound Care: Please leave your left foot dressing clean, dry and intact until follow up.  *****  Weight bearing: Please weight bear as tolerated in a surgical shoe to left foot.  *****  Antibiotics:  Plan for IV antibiotics until 4/14/2021  *****  Nutrition is important, eat small frequent meals to help ensure you get adequate calories..  If you develop fever, chills, malaise, or change in mental status, seek immediate medical attention.      SECONDARY DISCHARGE DIAGNOSES  Diagnosis: Diabetes  Assessment and Plan of Treatment: Make sure you get your HgA1c checked every three months.  If you take oral diabetes medications, check your blood glucose at least two times a day.  If you take short-acting insulin, check your blood glucose before meals and at bedtime.  It's important not to skip any meals.  Keep a log of your blood glucose results and always take it with you to your doctor appointments.  Keep a list of your current medications including over the counter medications and bring this medication list with you to all your doctor appointments.  If you have not seen your ophthalmologist this year, call for appointment.  Check your feet daily for redness, sores, or openings, if noted call your podiatrist to be seen.  HgA1c this admission was 10.8.    Diagnosis: CKD (chronic kidney disease)  Assessment and Plan of Treatment: NSAIDs (ex: Ibuprofen, Advil, Celebrex, Naprosyn) and other agents can harm the kidneys.  Discuss all new medications with your doctor.  Blood pressure control is important.  Take all medication as prescribed.  Do not overconsume foods that are high in potassium, such as bananas, until you are instructed to do so by your primary care physician.     PRINCIPAL DISCHARGE DIAGNOSIS  Diagnosis: Osteomyelitis of left foot  Assessment and Plan of Treatment: Follow up:  Please follow up with Dr. De Oliveira within 1 week of discharge from the hospital, please call [phone] for appointment and discuss that you recently were seen in the hospital.  *****  Wound Care:  Wound Care: Please apply to left foot wound 1/4" plain packing, 4x4 gauze, kellie daily.   Please leave your left foot dressing clean, dry and intact until follow up.  Elevate Lower extremities with 2 pillows  *****  Weight bearing: Please weight bear as tolerated in a surgical shoe to left foot.  *****  Antibiotics:  Plan for IV antibiotics until 4/14/2021  *****  Nutrition is important, eat small frequent meals to help ensure you get adequate calories..  If you develop fever, chills, malaise, or change in mental status, seek immediate medical attention.      SECONDARY DISCHARGE DIAGNOSES  Diagnosis: Diabetes  Assessment and Plan of Treatment: Make sure you get your HgA1c checked every three months.  If you take oral diabetes medications, check your blood glucose at least two times a day.  If you take short-acting insulin, check your blood glucose before meals and at bedtime.  It's important not to skip any meals.  Keep a log of your blood glucose results and always take it with you to your doctor appointments.  Keep a list of your current medications including over the counter medications and bring this medication list with you to all your doctor appointments.  If you have not seen your ophthalmologist this year, call for appointment.  Check your feet daily for redness, sores, or openings, if noted call your podiatrist to be seen.  HgA1c this admission was 10.8.    Diagnosis: CKD (chronic kidney disease)  Assessment and Plan of Treatment: NSAIDs (ex: Ibuprofen, Advil, Celebrex, Naprosyn) and other agents can harm the kidneys.  Discuss all new medications with your doctor.  Blood pressure control is important.  Take all medication as prescribed.  Do not overconsume foods that are high in potassium, such as bananas, until you are instructed to do so by your primary care physician.

## 2021-03-03 NOTE — PROGRESS NOTE ADULT - SUBJECTIVE AND OBJECTIVE BOX
Patient is a 81y old  Male who presents with a chief complaint of Retained foreign body in foot (02 Mar 2021 18:34)      INTERVAL HPI/OVERNIGHT EVENTS:   Pt is scheduled for Left foot incision and drainage with foreign body removal with Dr. Chuy De Oliveira at 1:30PM. Patient is aware of procedure and is NPO since midnight.    MEDICATIONS  (STANDING):  allopurinol 100 milliGRAM(s) Oral daily  amLODIPine   Tablet 10 milliGRAM(s) Oral daily  atorvastatin 40 milliGRAM(s) Oral at bedtime  carvedilol 25 milliGRAM(s) Oral every 12 hours  clopidogrel Tablet 75 milliGRAM(s) Oral daily  dextrose 40% Gel 15 Gram(s) Oral once  dextrose 5%. 1000 milliLiter(s) (50 mL/Hr) IV Continuous <Continuous>  dextrose 5%. 1000 milliLiter(s) (100 mL/Hr) IV Continuous <Continuous>  dextrose 50% Injectable 25 Gram(s) IV Push once  dextrose 50% Injectable 12.5 Gram(s) IV Push once  dextrose 50% Injectable 25 Gram(s) IV Push once  famotidine    Tablet 20 milliGRAM(s) Oral daily  fenofibrate Tablet 48 milliGRAM(s) Oral daily  folic acid 1 milliGRAM(s) Oral daily  glucagon  Injectable 1 milliGRAM(s) IntraMuscular once  hydrALAZINE 50 milliGRAM(s) Oral two times a day  insulin lispro (ADMELOG) corrective regimen sliding scale   SubCutaneous every 6 hours  multivitamin 1 Tablet(s) Oral daily  tamsulosin 0.4 milliGRAM(s) Oral at bedtime    MEDICATIONS  (PRN):      Allergies    Avapro (Hives)  enalapril (Unknown)  losartan (Other)  seasonal allergies-outdoor (Urticaria; Rhinorrhea; Sneezing)    Intolerances    losartan (Other)      Vital Signs Last 24 Hrs  T(C): 36.8 (03 Mar 2021 04:47), Max: 37.1 (02 Mar 2021 22:19)  T(F): 98.2 (03 Mar 2021 04:47), Max: 98.8 (02 Mar 2021 22:19)  HR: 69 (03 Mar 2021 04:47) (67 - 79)  BP: 145/69 (03 Mar 2021 04:47) (128/63 - 166/73)  BP(mean): --  RR: 18 (03 Mar 2021 04:47) (16 - 18)  SpO2: 98% (03 Mar 2021 04:47) (98% - 99%)    LABS:                        9.8    3.14  )-----------( 243      ( 03 Mar 2021 06:01 )             30.1     03-02    139  |  102  |  29<H>  ----------------------------<  264<H>  3.8   |  27  |  2.20<H>    Ca    11.5<H>      02 Mar 2021 12:31    TPro  8.1  /  Alb  4.4  /  TBili  0.3  /  DBili  x   /  AST  28  /  ALT  19  /  AlkPhos  73  03-02    PT/INR - ( 02 Mar 2021 12:31 )   PT: 12.2 sec;   INR: 1.02 ratio         PTT - ( 02 Mar 2021 12:31 )  PTT:36.9 sec    CAPILLARY BLOOD GLUCOSE      POCT Blood Glucose.: 142 mg/dL (03 Mar 2021 05:39)  POCT Blood Glucose.: 196 mg/dL (02 Mar 2021 22:25)  POCT Blood Glucose.: 110 mg/dL (02 Mar 2021 18:11)  POCT Blood Glucose.: 178 mg/dL (02 Mar 2021 15:48)      RADIOLOGY & ADDITIONAL TESTS:    Plan:   To OR today at ____ with  ______ for _______.   CXR on sunrise.  EKG on sunrise.  Medical/Cardiac clearance since ____ and documented in chart.  Consent signed and in chart.  Procedure was explained to patient in detail. All alternatives, risks and complications were discussed. All questions answered. Patient is a 81y old  Male who presents with a chief complaint of Retained foreign body in foot (02 Mar 2021 18:34)      INTERVAL HPI/OVERNIGHT EVENTS:   Pt is scheduled for Left foot incision and drainage with foreign body removal with Dr. Chuy De Oliveira at 1:30PM. Patient is aware of procedure and is NPO since midnight.    MEDICATIONS  (STANDING):  allopurinol 100 milliGRAM(s) Oral daily  amLODIPine   Tablet 10 milliGRAM(s) Oral daily  atorvastatin 40 milliGRAM(s) Oral at bedtime  carvedilol 25 milliGRAM(s) Oral every 12 hours  clopidogrel Tablet 75 milliGRAM(s) Oral daily  dextrose 40% Gel 15 Gram(s) Oral once  dextrose 5%. 1000 milliLiter(s) (50 mL/Hr) IV Continuous <Continuous>  dextrose 5%. 1000 milliLiter(s) (100 mL/Hr) IV Continuous <Continuous>  dextrose 50% Injectable 25 Gram(s) IV Push once  dextrose 50% Injectable 12.5 Gram(s) IV Push once  dextrose 50% Injectable 25 Gram(s) IV Push once  famotidine    Tablet 20 milliGRAM(s) Oral daily  fenofibrate Tablet 48 milliGRAM(s) Oral daily  folic acid 1 milliGRAM(s) Oral daily  glucagon  Injectable 1 milliGRAM(s) IntraMuscular once  hydrALAZINE 50 milliGRAM(s) Oral two times a day  insulin lispro (ADMELOG) corrective regimen sliding scale   SubCutaneous every 6 hours  multivitamin 1 Tablet(s) Oral daily  tamsulosin 0.4 milliGRAM(s) Oral at bedtime    MEDICATIONS  (PRN):      Allergies    Avapro (Hives)  enalapril (Unknown)  losartan (Other)  seasonal allergies-outdoor (Urticaria; Rhinorrhea; Sneezing)    Intolerances    losartan (Other)      Vital Signs Last 24 Hrs  T(C): 36.8 (03 Mar 2021 04:47), Max: 37.1 (02 Mar 2021 22:19)  T(F): 98.2 (03 Mar 2021 04:47), Max: 98.8 (02 Mar 2021 22:19)  HR: 69 (03 Mar 2021 04:47) (67 - 79)  BP: 145/69 (03 Mar 2021 04:47) (128/63 - 166/73)  BP(mean): --  RR: 18 (03 Mar 2021 04:47) (16 - 18)  SpO2: 98% (03 Mar 2021 04:47) (98% - 99%)    LABS:                        9.8    3.14  )-----------( 243      ( 03 Mar 2021 06:01 )             30.1     03-02    139  |  102  |  29<H>  ----------------------------<  264<H>  3.8   |  27  |  2.20<H>    Ca    11.5<H>      02 Mar 2021 12:31    TPro  8.1  /  Alb  4.4  /  TBili  0.3  /  DBili  x   /  AST  28  /  ALT  19  /  AlkPhos  73  03-02    PT/INR - ( 02 Mar 2021 12:31 )   PT: 12.2 sec;   INR: 1.02 ratio         PTT - ( 02 Mar 2021 12:31 )  PTT:36.9 sec    CAPILLARY BLOOD GLUCOSE      POCT Blood Glucose.: 142 mg/dL (03 Mar 2021 05:39)  POCT Blood Glucose.: 196 mg/dL (02 Mar 2021 22:25)  POCT Blood Glucose.: 110 mg/dL (02 Mar 2021 18:11)  POCT Blood Glucose.: 178 mg/dL (02 Mar 2021 15:48)      RADIOLOGY & ADDITIONAL TESTS:    Plan:   Pt is scheduled for Left foot incision and drainage with foreign body removal with Dr. Chuy De Oliveira at 1:30PM. Patient is aware of procedure and is NPO since midnight.  CXR on sunrise.  EKG on sunrise.  COVID negative  Medical/Cardiac clearance since 3/2 and documented in chart.  Consent signed and in chart.  Procedure was explained to patient in detail. All alternatives, risks and complications were discussed. All questions answered. Patient is a 81y old  Male who presents with a chief complaint of Retained foreign body in foot (02 Mar 2021 18:34)      INTERVAL HPI/OVERNIGHT EVENTS:   Pt is scheduled for Left foot incision and drainage with foreign body removal with Dr. Chuy De Oliveira at 1:30PM. Patient is aware of procedure and is NPO since midnight.    MEDICATIONS  (STANDING):  allopurinol 100 milliGRAM(s) Oral daily  amLODIPine   Tablet 10 milliGRAM(s) Oral daily  atorvastatin 40 milliGRAM(s) Oral at bedtime  carvedilol 25 milliGRAM(s) Oral every 12 hours  clopidogrel Tablet 75 milliGRAM(s) Oral daily  dextrose 40% Gel 15 Gram(s) Oral once  dextrose 5%. 1000 milliLiter(s) (50 mL/Hr) IV Continuous <Continuous>  dextrose 5%. 1000 milliLiter(s) (100 mL/Hr) IV Continuous <Continuous>  dextrose 50% Injectable 25 Gram(s) IV Push once  dextrose 50% Injectable 12.5 Gram(s) IV Push once  dextrose 50% Injectable 25 Gram(s) IV Push once  famotidine    Tablet 20 milliGRAM(s) Oral daily  fenofibrate Tablet 48 milliGRAM(s) Oral daily  folic acid 1 milliGRAM(s) Oral daily  glucagon  Injectable 1 milliGRAM(s) IntraMuscular once  hydrALAZINE 50 milliGRAM(s) Oral two times a day  insulin lispro (ADMELOG) corrective regimen sliding scale   SubCutaneous every 6 hours  multivitamin 1 Tablet(s) Oral daily  tamsulosin 0.4 milliGRAM(s) Oral at bedtime    MEDICATIONS  (PRN):      Allergies    Avapro (Hives)  enalapril (Unknown)  losartan (Other)  seasonal allergies-outdoor (Urticaria; Rhinorrhea; Sneezing)    Intolerances    losartan (Other)      Vital Signs Last 24 Hrs  T(C): 36.8 (03 Mar 2021 04:47), Max: 37.1 (02 Mar 2021 22:19)  T(F): 98.2 (03 Mar 2021 04:47), Max: 98.8 (02 Mar 2021 22:19)  HR: 69 (03 Mar 2021 04:47) (67 - 79)  BP: 145/69 (03 Mar 2021 04:47) (128/63 - 166/73)  BP(mean): --  RR: 18 (03 Mar 2021 04:47) (16 - 18)  SpO2: 98% (03 Mar 2021 04:47) (98% - 99%)    LABS:                        9.8    3.14  )-----------( 243      ( 03 Mar 2021 06:01 )             30.1     03-02    139  |  102  |  29<H>  ----------------------------<  264<H>  3.8   |  27  |  2.20<H>    Ca    11.5<H>      02 Mar 2021 12:31    TPro  8.1  /  Alb  4.4  /  TBili  0.3  /  DBili  x   /  AST  28  /  ALT  19  /  AlkPhos  73  03-02    PT/INR - ( 02 Mar 2021 12:31 )   PT: 12.2 sec;   INR: 1.02 ratio         PTT - ( 02 Mar 2021 12:31 )  PTT:36.9 sec    CAPILLARY BLOOD GLUCOSE      POCT Blood Glucose.: 142 mg/dL (03 Mar 2021 05:39)  POCT Blood Glucose.: 196 mg/dL (02 Mar 2021 22:25)  POCT Blood Glucose.: 110 mg/dL (02 Mar 2021 18:11)  POCT Blood Glucose.: 178 mg/dL (02 Mar 2021 15:48)      RADIOLOGY & ADDITIONAL TESTS:

## 2021-03-03 NOTE — PROGRESS NOTE ADULT - PROBLEM SELECTOR PLAN 8
-Patient with a history of CKD 3 with baseline creatinine 1.5-2 based on previous records  - Trend BMP  -Hold home lasix in the setting of SAPNA and patient to be NPO after midnight, can restart as tolerated  -Renally dose medications  -Avoid nephrotoxins

## 2021-03-03 NOTE — BRIEF OPERATIVE NOTE - OPERATION/FINDINGS
s/p LF abscess incision and drainage and bone biopsy  -Good intraop bleeding  -Low concern for viability  -3 cc of purulence and liquefactive necrosis, high concern for residual bone infection, pt may need long term IV abx/PICC pending bone biopsy results s/p LF abscess incision and drainage and bone biopsy  -Good intraop bleeding  -Low concern for viability  -Moderate/High concern for residual bone infection.   -3 cc of purulence and liquefactive necrosis, high concern for residual bone infection, pt may need long term IV abx/PICC pending bone biopsy results

## 2021-03-03 NOTE — PROGRESS NOTE ADULT - SUBJECTIVE AND OBJECTIVE BOX
Patient is a 81y old  Male who presents with a chief complaint of Retained foreign body in foot (03 Mar 2021 06:19)      SUBJECTIVE / OVERNIGHT EVENTS: KAYLEEN over night. Pain well controlled. No reported fevers, chills, n/v CP SOB, abd pain, or dysuria     MEDICATIONS  (STANDING):  allopurinol 100 milliGRAM(s) Oral daily  amLODIPine   Tablet 10 milliGRAM(s) Oral daily  atorvastatin 40 milliGRAM(s) Oral at bedtime  carvedilol 25 milliGRAM(s) Oral every 12 hours  clopidogrel Tablet 75 milliGRAM(s) Oral daily  dextrose 40% Gel 15 Gram(s) Oral once  dextrose 5%. 1000 milliLiter(s) (50 mL/Hr) IV Continuous <Continuous>  dextrose 5%. 1000 milliLiter(s) (100 mL/Hr) IV Continuous <Continuous>  dextrose 50% Injectable 25 Gram(s) IV Push once  dextrose 50% Injectable 12.5 Gram(s) IV Push once  dextrose 50% Injectable 25 Gram(s) IV Push once  famotidine    Tablet 20 milliGRAM(s) Oral daily  fenofibrate Tablet 48 milliGRAM(s) Oral daily  folic acid 1 milliGRAM(s) Oral daily  glucagon  Injectable 1 milliGRAM(s) IntraMuscular once  hydrALAZINE 50 milliGRAM(s) Oral two times a day  insulin lispro (ADMELOG) corrective regimen sliding scale   SubCutaneous every 6 hours  multivitamin 1 Tablet(s) Oral daily  tamsulosin 0.4 milliGRAM(s) Oral at bedtime    MEDICATIONS  (PRN):      CAPILLARY BLOOD GLUCOSE      POCT Blood Glucose.: 142 mg/dL (03 Mar 2021 05:39)  POCT Blood Glucose.: 196 mg/dL (02 Mar 2021 22:25)  POCT Blood Glucose.: 110 mg/dL (02 Mar 2021 18:11)  POCT Blood Glucose.: 178 mg/dL (02 Mar 2021 15:48)    I&O's Summary      T(C): 36.8 (03-03-21 @ 04:47), Max: 37.1 (03-02-21 @ 22:19)  HR: 69 (03-03-21 @ 04:47) (67 - 79)  BP: 145/69 (03-03-21 @ 04:47) (128/63 - 166/73)  RR: 18 (03-03-21 @ 04:47) (16 - 18)  SpO2: 98% (03-03-21 @ 04:47) (98% - 99%)    Gen: AOx3, NAD  CV: RRR, S1+S2 normal, no murmurs  Resp: CTAB, no resp distress  Abd: Soft, nontender, +BS  Ext: No LE edema, no wounds  Skin: No thrombophlebitis, left foot granular wound, no drainage, no erythema  Msk: No low back pain, no arthralgias, no joint swelling  Neuro: No sensory deficits, no motor deficits    LABS:                        9.8    3.14  )-----------( 243      ( 03 Mar 2021 06:01 )             30.1     WBC Trend: 3.14<--, 4.93<--  03-03    138  |  103  |  32<H>  ----------------------------<  134<H>  3.6   |  26  |  2.33<H>    Ca    10.9<H>      03 Mar 2021 06:01  Phos  2.3     03-03  Mg     2.0     03-03    TPro  6.8  /  Alb  3.5  /  TBili  0.3  /  DBili  x   /  AST  23  /  ALT  17  /  AlkPhos  59  03-03    Creatinine Trend: 2.33<--, 2.20<--  PT/INR - ( 03 Mar 2021 08:26 )   PT: 12.6 sec;   INR: 1.07 ratio         PTT - ( 03 Mar 2021 08:26 )  PTT:35.5 sec            RADIOLOGY & ADDITIONAL TESTS:    Imaging Personally Reviewed:    Consultant(s) Notes Reviewed:      Care Discussed with Consultants/Other Providers:

## 2021-03-03 NOTE — BRIEF OPERATIVE NOTE - SPECIMENS
path: 1- LF soft tissue. 2-LF 5th met bone biopsy. micro: 1-deep wound culture. 2-LF 5th met clean margin

## 2021-03-03 NOTE — PROGRESS NOTE ADULT - ASSESSMENT
81 year old male with PMH HTN, CAD with 4 stents (more than 5 years ago), BPH, HLD, gout, GERD, DM2 and CKD stage 3 who presents for retained foreign body in his left foot pending OR today

## 2021-03-03 NOTE — DISCHARGE NOTE PROVIDER - NSDCFUSCHEDAPPT_GEN_ALL_CORE_FT
ROSY JOSEPH ; 05/12/2021 ; NPP Urology [facility]  ROSY JOSEPH ; 05/12/2021 ; NP Urology [facility]  ROSY JOSEPH ; 05/14/2021 ; [facility] [address]

## 2021-03-03 NOTE — BRIEF OPERATIVE NOTE - NSICDXBRIEFPOSTOP_GEN_ALL_CORE_FT
POST-OP DIAGNOSIS:  Osteomyelitis of left foot 03-Mar-2021 14:23:03  [facility] Landrum  Abscess of left foot 03-Mar-2021 14:22:52  H[facility] Landrum

## 2021-03-03 NOTE — CONSULT NOTE ADULT - SUBJECTIVE AND OBJECTIVE BOX
Vascular Cardiology Consult Note    SERVICE CONSULT: [phone]              EMAIL [email or web address]   OFFICE [phone]    CC:  Retained foreign body in foot    HPI:  80 y/o M w/ PMHX HTN, HLD, DM, CAD s/p prior PCI, last cardiac cath in 2016: FOREST p/m/d RCA, residual 70% mLAD, 50% mLCX, CKD Stage 3-4, SHARATH, who was reports stepping on a glass bottle 1.5 months ago, seen in ED for extraction, however noted subsequent swelling and pain, MRI showed retained foreign body, planned for I and D and removal of foreign body today with Dr. De Oliveira. Denies C/P or SOB. Reports ambulation for 30 minutes without any cardiac symptoms. EKG reviewed no ischemic changes. CXR clear. Due to non-revascularized CAD as out patient he was recommended on last visit in Fall 2020 for NST, repeat NST not yet performed. X-ray foot showed erosive changes are present about the fifth metatarsal head and lateral base of the proximal fifth phalanx, new when compared with prior exam, consistent with osteomyelitis. Vascular Cardiology consulted.      Allergies  Avapro (Hives)  enalapril (Unknown)  losartan (Other)  seasonal allergies-outdoor (Urticaria; Rhinorrhea; Sneezing)    Intolerances  losartan (Other)  	  MEDICATIONS:  amLODIPine   Tablet 10 milliGRAM(s) Oral daily  carvedilol 25 milliGRAM(s) Oral every 12 hours  clopidogrel Tablet 75 milliGRAM(s) Oral daily  hydrALAZINE 50 milliGRAM(s) Oral two times a day  tamsulosin 0.4 milliGRAM(s) Oral at bedtime  famotidine    Tablet 20 milliGRAM(s) Oral daily  allopurinol 100 milliGRAM(s) Oral daily  atorvastatin 40 milliGRAM(s) Oral at bedtime  fenofibrate Tablet 48 milliGRAM(s) Oral daily  insulin lispro (ADMELOG) corrective regimen sliding scale   SubCutaneous every 6 hours  folic acid 1 milliGRAM(s) Oral daily  multivitamin 1 Tablet(s) Oral daily    PAST MEDICAL & SURGICAL HISTORY:  OA (osteoarthritis)  H/O gastroesophageal reflux (GERD)  Chronic kidney disease (CKD)  SHARATH (obstructive sleep apnea) non compliant with CPAP  Ganglion cyst of wrist, left  CAD (coronary artery disease)  Diabetes  Hyperlipidemia  Hypertension  BPH (Benign Prostatic Hypertrophy)  Pneumonia  H/O: Rotator Cuff Tear  Renal Calculi  Diabetes Mellitus Type II  Hyperlipemia  Hypertension  Bilateral rotator cuff syndrome  History of lumbar laminectomy  Cervical fusion syndrome  Trigger finger of both hands  Bilateral cataracts  lumbar laminectomy  Laminectomy with Spinal Fusion  History of Arthroscopy    FAMILY HISTORY:  Family history of cerebrovascular accident (Mother)    SOCIAL HISTORY:  unchanged    REVIEW OF SYSTEMS:  CONSTITUTIONAL: No fever  EYES: No eye pain, visual disturbances  ENMT:  No sinus or throat pain  NECK: No pain or stiffness  RESPIRATORY:    CARDIOVASCULAR:    GASTROINTESTINAL: No abdominal or epigastric pain. No nausea, vomiting, or hematemesis; No melena or hematochezia.  GENITOURINARY: No dysuria, hematuria  NEUROLOGICAL: No headaches, memory loss, loss of strength, numbness, or tremors  SKIN:   LYMPH Nodes: No enlarged glands  ENDOCRINE: No heat or cold intolerance; No hair loss  MUSCULOSKELETAL: No joint pain or swelling; No muscle, back, or extremity pain  PSYCHIATRIC: No depression, anxiety  HEME/LYMPH: No easy bruising, or bleeding gums  ALLERY AND IMMUNOLOGIC: No hives or eczema	    [ x] All others negative	    PHYSICAL EXAM:  T(C): 36.8 (03-03-21 @ 04:47), Max: 37.1 (03-02-21 @ 22:19)  HR: 69 (03-03-21 @ 04:47) (67 - 79)  BP: 145/69 (03-03-21 @ 04:47) (128/63 - 153/68)  RR: 18 (03-03-21 @ 04:47) (16 - 18)  SpO2: 98% (03-03-21 @ 04:47) (98% - 99%)  I&O's Summary    Appearance: NAD 	  HEENT: NC/AT  Cardiovascular: No JVD, RRR, S1 and S2    Respiratory:  CTA B/L  Psychiatry:  AAO x 3  Gastrointestinal:  Soft, Non-tender, + BS	  Skin: No rashes, No ecchymoses, No cyanosis	  Neurologic: No focal deficit noted   Extremities: Bilateral Calves Soft,  No LE Edema N/L    Vascular Pulse Exam:  Right DP: palpable  Left D : palpable   Right PT: palpable   Left PT: palpable    Foot Exam:        LABS:	 	    CBC Full  -  ( 03 Mar 2021 06:01 )  WBC Count : 3.14 K/uL  Hemoglobin : 9.8 g/dL  Hematocrit : 30.1 %  Platelet Count - Automated : 243 K/uL  Mean Cell Volume : 95.0 fl  Mean Cell Hemoglobin : 30.9 pg  Mean Cell Hemoglobin Concentration : 32.6 gm/dL  Auto Neutrophil # : x  Auto Lymphocyte # : x  Auto Monocyte # : x  Auto Eosinophil # : x  Auto Basophil # : x  Auto Neutrophil % : x  Auto Lymphocyte % : x  Auto Monocyte % : x  Auto Eosinophil % : x  Auto Basophil % : x    03-03    138  |  103  |  32<H>  ----------------------------<  134<H>  3.6   |  26  |  2.33<H>  03-02    139  |  102  |  29<H>  ----------------------------<  264<H>  3.8   |  27  |  2.20<H>    Ca    10.9<H>      03 Mar 2021 06:01  Ca    11.5<H>      02 Mar 2021 12:31  Phos  2.3     03-03  Mg     2.0     03-03    TPro  6.8  /  Alb  3.5  /  TBili  0.3  /  DBili  x   /  AST  23  /  ALT  17  /  AlkPhos  59  03-03  TPro  8.1  /  Alb  4.4  /  TBili  0.3  /  DBili  x   /  AST  28  /  ALT  19  /  AlkPhos  73  03-02      Assessment:  1. CAD s/p PCI      Non-revascularized mLAD 70%  2. HTN  3. HLD  4. CKD stage 3-4  5. Retained Foreign Body L Foot      Concern for Osteomyelitis      Plan:  1. Due to non-revascularized CAD, would recommend to avoid systemic sedation.     Recommend local sedation.      If more than local sedation required, patient would require a stress test prior to procedure.  2. Patient currently without any cardiac complaints.      EKG reviewed showing no ischemia.      Echo from 2020 reviewed showed preserved EF and wall motion.      Currently euvolemic.  3. Continue anti-hypertensive regimen.  4. Continue Statin.  5. On ASA and Plavix prior to admission for non-revascularized CAD.      Recommended to continue DAPT therapy.  6. Currently Lasix on hold in setting of CKD. Cr. 2.3.  7. ABX as per ID and Podiatry.   8. Continue excellent Podiatry care.  9. Discussed with Dr. De Oliveira and Dr. Abraham.      Thank you  ESEQUIEL Burns, MPAS  Vascular Cardiology Service    Please call with any questions:   Service Line: [phone]  Office [phone]  email:  [email or web address]     Vascular Cardiology Consult Note    SERVICE CONSULT: [phone]              EMAIL [email or web address]   OFFICE [phone]    CC:  Retained foreign body in foot    HPI:  82 y/o M w/ PMHX HTN, HLD, DM, CAD s/p prior PCI, last cardiac cath in 2016: FOREST p/m/d RCA, residual 70% mLAD, 50% mLCX, CKD Stage 3-4, SHARATH, who was reports stepping on a glass bottle 1.5 months ago, seen in ED for extraction, however noted subsequent swelling and pain, MRI showed retained foreign body, planned for I and D and removal of foreign body today with Dr. De Oliveira. Denies C/P or SOB. Reports ambulation for 30 minutes without any cardiac symptoms. EKG reviewed no ischemic changes. CXR clear. Due to non-revascularized CAD as out patient he was recommended on last visit in Fall 2020 for NST, repeat NST not yet performed. X-ray foot showed erosive changes are present about the fifth metatarsal head and lateral base of the proximal fifth phalanx, new when compared with prior exam, consistent with osteomyelitis. Vascular Cardiology consulted.      Allergies  Avapro (Hives)  enalapril (Unknown)  losartan (Other)  seasonal allergies-outdoor (Urticaria; Rhinorrhea; Sneezing)    Intolerances  losartan (Other)  	  MEDICATIONS:  amLODIPine   Tablet 10 milliGRAM(s) Oral daily  carvedilol 25 milliGRAM(s) Oral every 12 hours  clopidogrel Tablet 75 milliGRAM(s) Oral daily  hydrALAZINE 50 milliGRAM(s) Oral two times a day  tamsulosin 0.4 milliGRAM(s) Oral at bedtime  famotidine    Tablet 20 milliGRAM(s) Oral daily  allopurinol 100 milliGRAM(s) Oral daily  atorvastatin 40 milliGRAM(s) Oral at bedtime  fenofibrate Tablet 48 milliGRAM(s) Oral daily  insulin lispro (ADMELOG) corrective regimen sliding scale   SubCutaneous every 6 hours  folic acid 1 milliGRAM(s) Oral daily  multivitamin 1 Tablet(s) Oral daily    PAST MEDICAL & SURGICAL HISTORY:  OA (osteoarthritis)  H/O gastroesophageal reflux (GERD)  Chronic kidney disease (CKD)  SHARATH (obstructive sleep apnea) non compliant with CPAP  Ganglion cyst of wrist, left  CAD (coronary artery disease)  Diabetes  Hyperlipidemia  Hypertension  BPH (Benign Prostatic Hypertrophy)  Pneumonia  H/O: Rotator Cuff Tear  Renal Calculi  Diabetes Mellitus Type II  Hyperlipemia  Hypertension  Bilateral rotator cuff syndrome  History of lumbar laminectomy  Cervical fusion syndrome  Trigger finger of both hands  Bilateral cataracts  lumbar laminectomy  Laminectomy with Spinal Fusion  History of Arthroscopy    FAMILY HISTORY:  Family history of cerebrovascular accident (Mother)    SOCIAL HISTORY:  unchanged    REVIEW OF SYSTEMS:  CONSTITUTIONAL: No fever  EYES: No eye pain, visual disturbances  ENMT:  No sinus or throat pain  NECK: No pain or stiffness  RESPIRATORY:  No sOB  CARDIOVASCULAR: No C/P  GASTROINTESTINAL: No abdominal or epigastric pain. No nausea, vomiting, or hematemesis; No melena or hematochezia.  GENITOURINARY: No dysuria, hematuria  NEUROLOGICAL: No headaches, memory loss, loss of strength, numbness, or tremors  SKIN: L foot infection on admission   LYMPH Nodes: No enlarged glands  ENDOCRINE: No heat or cold intolerance; No hair loss  MUSCULOSKELETAL: L foot pain on admission   PSYCHIATRIC: No depression, anxiety  HEME/LYMPH: No easy bruising, or bleeding gums  ALLERY AND IMMUNOLOGIC: No hives or eczema	    [ x] All others negative	    PHYSICAL EXAM:  T(C): 36.8 (03-03-21 @ 04:47), Max: 37.1 (03-02-21 @ 22:19)  HR: 69 (03-03-21 @ 04:47) (67 - 79)  BP: 145/69 (03-03-21 @ 04:47) (128/63 - 153/68)  RR: 18 (03-03-21 @ 04:47) (16 - 18)  SpO2: 98% (03-03-21 @ 04:47) (98% - 99%)  I&O's Summary    Appearance: NAD 	  HEENT: NC/AT  Cardiovascular: No JVD, RRR, S1 and S2    Respiratory:  CTA B/L  Psychiatry:  AAO x 3  Gastrointestinal:  Soft, Non-tender, + BS	  Skin: No rashes, No ecchymoses, No cyanosis	  Neurologic: No focal deficit noted   Extremities: Bilateral Calves Soft,  No LE Edema N/L    Vascular Pulse Exam:  Right DP: palpable  Left D : palpable   Right PT: palpable   Left PT: palpable    Foot Exam:        LABS:	 	    CBC Full  -  ( 03 Mar 2021 06:01 )  WBC Count : 3.14 K/uL  Hemoglobin : 9.8 g/dL  Hematocrit : 30.1 %  Platelet Count - Automated : 243 K/uL  Mean Cell Volume : 95.0 fl  Mean Cell Hemoglobin : 30.9 pg  Mean Cell Hemoglobin Concentration : 32.6 gm/dL  Auto Neutrophil # : x  Auto Lymphocyte # : x  Auto Monocyte # : x  Auto Eosinophil # : x  Auto Basophil # : x  Auto Neutrophil % : x  Auto Lymphocyte % : x  Auto Monocyte % : x  Auto Eosinophil % : x  Auto Basophil % : x    03-03    138  |  103  |  32<H>  ----------------------------<  134<H>  3.6   |  26  |  2.33<H>  03-02    139  |  102  |  29<H>  ----------------------------<  264<H>  3.8   |  27  |  2.20<H>    Ca    10.9<H>      03 Mar 2021 06:01  Ca    11.5<H>      02 Mar 2021 12:31  Phos  2.3     03-03  Mg     2.0     03-03    TPro  6.8  /  Alb  3.5  /  TBili  0.3  /  DBili  x   /  AST  23  /  ALT  17  /  AlkPhos  59  03-03  TPro  8.1  /  Alb  4.4  /  TBili  0.3  /  DBili  x   /  AST  28  /  ALT  19  /  AlkPhos  73  03-02      Assessment:  1. CAD s/p PCI      Non-revascularized mLAD 70%  2. HTN  3. HLD  4. CKD stage 3-4  5. Retained Foreign Body L Foot      Concern for Osteomyelitis      Plan:  1. Due to non-revascularized CAD, would recommend to avoid systemic sedation.     Recommend local sedation.      If more than local sedation required, patient would require a stress test prior to procedure.  2. Patient currently without any cardiac complaints.      EKG reviewed showing no ischemia.      Echo from 2020 reviewed showed preserved EF and wall motion.      Currently euvolemic.  3. Continue anti-hypertensive regimen.  4. Continue Statin.  5. On ASA and Plavix prior to admission for non-revascularized CAD.      Recommended to continue DAPT therapy.  6. Currently Lasix on hold in setting of CKD. Cr. 2.3.  7. ABX as per ID and Podiatry.   8. Continue excellent Podiatry care.  9. Discussed with Dr. De Oliveira and Dr. Abraham.      Thank you  ESEQUIEL Burns, [facility]  Vascular Cardiology Service    Please call with any questions:   Service Line: [phone]  Office [phone]  email:  [email or web address]

## 2021-03-03 NOTE — DISCHARGE NOTE PROVIDER - PROVIDER TOKENS
PROVIDER:[TOKEN:[[id number]]] PROVIDER:[TOKEN:[[id number]]],PROVIDER:[TOKEN:[[id number]]]

## 2021-03-03 NOTE — BRIEF OPERATIVE NOTE - COMMENTS
s/p LF abscess incision and drainage and bone biopsy  -Good intraop bleeding  -Low concern for viability  -3 cc of purulence and liquefactive necrosis, high concern for residual bone infection, pt may need long term IV abx/PICC pending bone biopsy results  -Left ankle tourniquet for 12 minutes

## 2021-03-03 NOTE — CONSULT NOTE ADULT - ATTENDING COMMENTS
Doing well post operatively  He has a strong palpable pedal pulse  no further inpatient cardiac or vascular testing    d/w Dr. Yennifer Abraham
Pt care and plan discussed and reviewed with NP. Plan as outlined above edited by me to reflect our discussion.

## 2021-03-03 NOTE — DISCHARGE NOTE PROVIDER - HOSPITAL COURSE
81 year old male with PMH HTN, CAD with 4 stents (more than 5 years ago), BPH, HLD, gout, GERD, DM2 and CKD stage 3 who presents for retained foreign body in his left foot s/p OR.  Pt with OM  of L foot , will need abx for 6 weeks till 04/14/2021 81 year old male with PMH HTN, CAD with 4 stents (more than 5 years ago), BPH, HLD, gout, GERD, DM2 and CKD stage 3 who presents for retained foreign body in his left foot s/p OR.    Problem/Plan - 1   ·  Problem: Retained foreign body.  Plan: - s/p I&D with foreign body removal and bone biopsy-- consistent with osteomyelitis  - c/w cefazolin 2gm q12h x 6 weeks  - PICC line to be placed 3/8  - BCx ngtd.     Problem/Plan - 2   ·  Problem: HTN (hypertension).  Plan: - c/w home dose of amlodipine 10mg daily, carvedilol 25mg BID and hydralazine 50mg BID  - c/w lasix 20 mg po daily.     Problem/Plan - 3   ·  Problem: CAD (coronary artery disease).  Plan: -Patient with a history of CAD with 4 stents, continue home dose of carvedilol 25mg BID  - c/w Plavix daily, Lipitor 40mg daily and fenofibrate.     Problem/Plan - 4   ·  Problem: BPH (benign prostatic hyperplasia).  Plan: - c/w tamsulosin 0.4mg daily. Patient also takes finasteride 1mg daily for hair loss but pharmacy does not have, will hold for now.     Problem/Plan - 5   ·  Problem: Gout.  Plan: - c/w allopurinol 100mg daily.     Problem/Plan - 6   Problem: GERD (gastroesophageal reflux disease). Plan: - c/w home famotidine 20mg daily.    Problem/Plan - 7   ·  Problem: Diabetes mellitus.  Plan: - fs fairly controlled  - c/w lantus 10 units at bedtime with sliding scale insulin.     Problem/Plan - 8   ·  Problem: Chronic kidney disease (CKD), stage III (moderate).  Plan: - CKD 3 with baseline creatinine 1.5-2 based on previous records.       Pt with OM  of L foot , will need abx for 6 weeks till 04/14/2021 81 year old male with PMH HTN, CAD with 4 stents (more than 5 years ago), BPH, HLD, gout, GERD, DM2 and CKD stage 3 who presents for retained foreign body in his left foot s/p OR.    Problem/Plan - 1   ·  Problem: Retained foreign body.  Plan: - s/p I&D with foreign body removal and bone biopsy  -No systemic signs of infection  -Deep tissue culture with s. aureus, prior culture with MSSA - consistent with osteomyelitis  -Since prior cx with MSSA, can start cefazolin 2 grams IV q 12 hours (renally dosed)  PICC line placed 3/8 - will need IV antibiotics for at least 6 weeks until 4/14/21.  -Weekly CBC/ CMP/ ESR/ CRP to be faxed to and monitored by Dr. Maguire    Problem/Plan - 2   ·  Problem: HTN (hypertension).  Plan: - c/w home dose of amlodipine 10mg daily, carvedilol 25mg BID and hydralazine 50mg BID  - c/w lasix 20 mg po daily.     Problem/Plan - 3   ·  Problem: CAD (coronary artery disease).  Plan: -Patient with a history of CAD with 4 stents, continue home dose of carvedilol 25mg BID  - c/w Plavix daily, Lipitor 40mg daily and fenofibrate.     Problem/Plan - 4   ·  Problem: BPH (benign prostatic hyperplasia).  Plan: - c/w tamsulosin 0.4mg daily. Patient also takes finasteride 1mg daily for hair loss but pharmacy does not have, will hold for now.     Problem/Plan - 5   ·  Problem: Gout.  Plan: - c/w allopurinol 100mg daily.     Problem/Plan - 6   Problem: GERD (gastroesophageal reflux disease). Plan: - c/w home famotidine 20mg daily.    Problem/Plan - 7   ·  Problem: Diabetes mellitus.  Plan: - fs fairly controlled  - c/w lantus 10 units at bedtime with sliding scale insulin.     Problem/Plan - 8   ·  Problem: Chronic kidney disease (CKD), stage III (moderate).  Plan: - CKD 3 with baseline creatinine 1.5-2 based on previous records. 81 year old male with PMH HTN, CAD with 4 stents (more than 5 years ago), BPH, HLD, gout, GERD, DM2 and CKD stage 3 who presents for retained foreign body in his left foot s/p OR.    Problem/Plan - 1   ·  Problem: Retained foreign body.  Plan: - s/p I&D with foreign body removal and bone biopsy  -No systemic signs of infection  -Deep tissue culture with s. aureus, prior culture with MSSA - consistent with osteomyelitis  -Since prior cx with MSSA, can start cefazolin 2 grams IV q 12 hours (renally dosed)  PICC line placed 3/8 - will need IV antibiotics for at least 6 weeks until 4/14/21.  -Weekly CBC/ CMP/ ESR/ CRP to be faxed to and monitored by Dr. Maguire    Problem/Plan - 2   ·  Problem: HTN (hypertension).  Plan: - c/w home dose of amlodipine 10mg daily, carvedilol 25mg BID and hydralazine 50mg BID  - c/w lasix 20 mg po daily.     Problem/Plan - 3   ·  Problem: CAD (coronary artery disease).  Plan: -Patient with a history of CAD with 4 stents, continue home dose of carvedilol 25mg BID  - c/w Plavix daily, Lipitor 40mg daily and fenofibrate.   - resumed home regimen of ASA 81mg every other day as per d/w Dr. Abraham    Problem/Plan - 4   ·  Problem: BPH (benign prostatic hyperplasia).  Plan: - c/w tamsulosin 0.4mg daily. Patient also takes finasteride 1mg daily for hair loss but pharmacy does not have, will hold for now.     Problem/Plan - 5   ·  Problem: Gout.  Plan: - c/w allopurinol 100mg daily.     Problem/Plan - 6   Problem: GERD (gastroesophageal reflux disease). Plan: - c/w home famotidine 20mg daily.    Problem/Plan - 7   ·  Problem: Diabetes mellitus.  Plan: - fs fairly controlled  - c/w lantus 10 units at bedtime with sliding scale insulin.     Problem/Plan - 8   ·  Problem: Chronic kidney disease (CKD), stage III (moderate).  Plan: - CKD 3 with baseline creatinine 1.5-2 based on previous records.

## 2021-03-03 NOTE — DISCHARGE NOTE PROVIDER - NSDCFUADDINST_GEN_ALL_CORE_FT
Podiatry Discharge Instructions:  Follow up: Please follow up with Dr. De Oliveira within 1 week of discharge from the hospital, please call [phone] for appointment and discuss that you recently were seen in the hospital.  Wound Care: Please apply to left foot wound 1/4" plain packing, 4x4 gauze, kellie daily.   Weight bearing: Please weight bear as tolerated in a surgical shoe to left foot.  Antibiotics: Please continue as instructed. Podiatry Discharge Instructions:  Follow up: Please follow up with Dr. De Oliveira within 1 week of discharge from the hospital, please call [phone] for appointment and discuss that you recently were seen in the hospital.  Wound Care: Please leave your left foot dressing clean, dry and intact until follow up.  Weight bearing: Please weight bear as tolerated in a surgical shoe to left foot.  Antibiotics: Please continue as instructed. - Follow up with Dr. De Oliveira within 1 week.  - Continue IV antibiotics until 4/14/2021.    - CBC/ CMP/ ESR/ CRP to be drawn weekly and faxed to Dr. Maguire's office.  - Follow up with your Endocrinologist within 1-2 weeks.

## 2021-03-03 NOTE — DISCHARGE NOTE PROVIDER - CARE PROVIDER_API CALL
Bassem Maguire)  Internal Medicine  [address] [place] [address]  Phone: [phone]  Fax: [phone]  Follow Up Time:    Bassem Maguire)  Internal Medicine  [address] [place] [address]  Phone: [phone]  Fax: [phone]  Follow Up Time:     Chuy De Oliveira (ELISSAM)  Surgery  [address] [place] [address]  Phone: [phone]  Fax: [phone]  Follow Up Time:

## 2021-03-03 NOTE — BRIEF OPERATIVE NOTE - NSICDXBRIEFPROCEDURE_GEN_ALL_CORE_FT
PROCEDURES:  Biopsy, bone, foot, open 03-Mar-2021 14:22:11  Hudson Landrum  Incision and drainage of abscess of left foot 03-Mar-2021 14:21:20  Hudson Landrum

## 2021-03-03 NOTE — DISCHARGE NOTE PROVIDER - NSDCFUADDAPPT_GEN_ALL_CORE_FT
- ABX till 4/14/21.  -Will need CBC/ CMP/ ESR/ CRP weekly on discharge faxed to office at [phone]

## 2021-03-04 LAB
ANION GAP SERPL CALC-SCNC: 11 MMOL/L — SIGNIFICANT CHANGE UP (ref 5–17)
BUN SERPL-MCNC: 28 MG/DL — HIGH (ref 7–23)
CALCIUM SERPL-MCNC: 11.1 MG/DL — HIGH (ref 8.4–10.5)
CHLORIDE SERPL-SCNC: 105 MMOL/L — SIGNIFICANT CHANGE UP (ref 96–108)
CO2 SERPL-SCNC: 24 MMOL/L — SIGNIFICANT CHANGE UP (ref 22–31)
CREAT SERPL-MCNC: 2.06 MG/DL — HIGH (ref 0.5–1.3)
GLUCOSE SERPL-MCNC: 106 MG/DL — HIGH (ref 70–99)
HCT VFR BLD CALC: 29.8 % — LOW (ref 39–50)
HGB BLD-MCNC: 9.7 G/DL — LOW (ref 13–17)
MCHC RBC-ENTMCNC: 30.9 PG — SIGNIFICANT CHANGE UP (ref 27–34)
MCHC RBC-ENTMCNC: 32.6 GM/DL — SIGNIFICANT CHANGE UP (ref 32–36)
MCV RBC AUTO: 94.9 FL — SIGNIFICANT CHANGE UP (ref 80–100)
NRBC # BLD: 0 /100 WBCS — SIGNIFICANT CHANGE UP (ref 0–0)
PLATELET # BLD AUTO: 236 K/UL — SIGNIFICANT CHANGE UP (ref 150–400)
POTASSIUM SERPL-MCNC: 3.6 MMOL/L — SIGNIFICANT CHANGE UP (ref 3.5–5.3)
POTASSIUM SERPL-SCNC: 3.6 MMOL/L — SIGNIFICANT CHANGE UP (ref 3.5–5.3)
RBC # BLD: 3.14 M/UL — LOW (ref 4.2–5.8)
RBC # FLD: 13.3 % — SIGNIFICANT CHANGE UP (ref 10.3–14.5)
SARS-COV-2 IGG SERPL QL IA: NEGATIVE — SIGNIFICANT CHANGE UP
SARS-COV-2 IGM SERPL IA-ACNC: 1.11 INDEX — SIGNIFICANT CHANGE UP
SODIUM SERPL-SCNC: 140 MMOL/L — SIGNIFICANT CHANGE UP (ref 135–145)
WBC # BLD: 4.54 K/UL — SIGNIFICANT CHANGE UP (ref 3.8–10.5)
WBC # FLD AUTO: 4.54 K/UL — SIGNIFICANT CHANGE UP (ref 3.8–10.5)

## 2021-03-04 PROCEDURE — 99232 SBSQ HOSP IP/OBS MODERATE 35: CPT

## 2021-03-04 PROCEDURE — 99233 SBSQ HOSP IP/OBS HIGH 50: CPT

## 2021-03-04 RX ORDER — POTASSIUM CHLORIDE 20 MEQ
20 PACKET (EA) ORAL ONCE
Refills: 0 | Status: COMPLETED | OUTPATIENT
Start: 2021-03-04 | End: 2021-03-04

## 2021-03-04 RX ADMIN — CARVEDILOL PHOSPHATE 25 MILLIGRAM(S): 80 CAPSULE, EXTENDED RELEASE ORAL at 05:43

## 2021-03-04 RX ADMIN — Medication 50 MILLIGRAM(S): at 18:08

## 2021-03-04 RX ADMIN — OXYCODONE AND ACETAMINOPHEN 1 TABLET(S): 5; 325 TABLET ORAL at 09:37

## 2021-03-04 RX ADMIN — Medication 4: at 18:07

## 2021-03-04 RX ADMIN — OXYCODONE AND ACETAMINOPHEN 1 TABLET(S): 5; 325 TABLET ORAL at 03:15

## 2021-03-04 RX ADMIN — CLOPIDOGREL BISULFATE 75 MILLIGRAM(S): 75 TABLET, FILM COATED ORAL at 13:11

## 2021-03-04 RX ADMIN — Medication 1 TABLET(S): at 13:11

## 2021-03-04 RX ADMIN — Medication 1 MILLIGRAM(S): at 13:12

## 2021-03-04 RX ADMIN — FAMOTIDINE 20 MILLIGRAM(S): 10 INJECTION INTRAVENOUS at 13:11

## 2021-03-04 RX ADMIN — Medication 48 MILLIGRAM(S): at 13:11

## 2021-03-04 RX ADMIN — OXYCODONE AND ACETAMINOPHEN 1 TABLET(S): 5; 325 TABLET ORAL at 03:45

## 2021-03-04 RX ADMIN — Medication 20 MILLIEQUIVALENT(S): at 18:08

## 2021-03-04 RX ADMIN — OXYCODONE AND ACETAMINOPHEN 1 TABLET(S): 5; 325 TABLET ORAL at 13:42

## 2021-03-04 RX ADMIN — Medication 100 MILLIGRAM(S): at 13:11

## 2021-03-04 RX ADMIN — AMLODIPINE BESYLATE 10 MILLIGRAM(S): 2.5 TABLET ORAL at 05:43

## 2021-03-04 RX ADMIN — ATORVASTATIN CALCIUM 40 MILLIGRAM(S): 80 TABLET, FILM COATED ORAL at 21:49

## 2021-03-04 RX ADMIN — OXYCODONE AND ACETAMINOPHEN 1 TABLET(S): 5; 325 TABLET ORAL at 13:12

## 2021-03-04 RX ADMIN — TAMSULOSIN HYDROCHLORIDE 0.4 MILLIGRAM(S): 0.4 CAPSULE ORAL at 21:49

## 2021-03-04 RX ADMIN — Medication 50 MILLIGRAM(S): at 05:44

## 2021-03-04 RX ADMIN — OXYCODONE AND ACETAMINOPHEN 1 TABLET(S): 5; 325 TABLET ORAL at 09:07

## 2021-03-04 RX ADMIN — CARVEDILOL PHOSPHATE 25 MILLIGRAM(S): 80 CAPSULE, EXTENDED RELEASE ORAL at 18:08

## 2021-03-04 NOTE — PROGRESS NOTE ADULT - SUBJECTIVE AND OBJECTIVE BOX
Vascular Cardiology Progress Note    SERVICE CONSULT: [phone]              EMAIL [email or web address]   OFFICE [phone]    CC:  Retained foreign body in foot     Doing well.  Seen at bedside with podiatry residents.  The foot was re-wrapped.   Strong pedal pulses  no complains.  No CP or SOB.      Allergies  Avapro (Hives)  enalapril (Unknown)  losartan (Other)  seasonal allergies-outdoor (Urticaria; Rhinorrhea; Sneezing)    Intolerances  losartan (Other)  	   MEDICATIONS  (STANDING):  allopurinol 100 milliGRAM(s) Oral daily  amLODIPine   Tablet 10 milliGRAM(s) Oral daily  atorvastatin 40 milliGRAM(s) Oral at bedtime  carvedilol 25 milliGRAM(s) Oral every 12 hours  clopidogrel Tablet 75 milliGRAM(s) Oral daily  dextrose 40% Gel 15 Gram(s) Oral once  dextrose 5%. 1000 milliLiter(s) (50 mL/Hr) IV Continuous <Continuous>  dextrose 5%. 1000 milliLiter(s) (100 mL/Hr) IV Continuous <Continuous>  dextrose 50% Injectable 25 Gram(s) IV Push once  dextrose 50% Injectable 12.5 Gram(s) IV Push once  dextrose 50% Injectable 25 Gram(s) IV Push once  famotidine    Tablet 20 milliGRAM(s) Oral daily  fenofibrate Tablet 48 milliGRAM(s) Oral daily  folic acid 1 milliGRAM(s) Oral daily  glucagon  Injectable 1 milliGRAM(s) IntraMuscular once  hydrALAZINE 50 milliGRAM(s) Oral two times a day  insulin lispro (ADMELOG) corrective regimen sliding scale   SubCutaneous three times a day before meals  insulin lispro (ADMELOG) corrective regimen sliding scale   SubCutaneous at bedtime  multivitamin 1 Tablet(s) Oral daily  potassium chloride    Tablet ER 20 milliEquivalent(s) Oral once  tamsulosin 0.4 milliGRAM(s) Oral at bedtime    PAST MEDICAL & SURGICAL HISTORY:  OA (osteoarthritis)  H/O gastroesophageal reflux (GERD)  Chronic kidney disease (CKD)  SHARATH (obstructive sleep apnea) non compliant with CPAP  Ganglion cyst of wrist, left  CAD (coronary artery disease)  Diabetes  Hyperlipidemia  Hypertension  BPH (Benign Prostatic Hypertrophy)  Pneumonia  H/O: Rotator Cuff Tear  Renal Calculi  Diabetes Mellitus Type II  Hyperlipemia  Hypertension  Bilateral rotator cuff syndrome  History of lumbar laminectomy  Cervical fusion syndrome  Trigger finger of both hands  Bilateral cataracts  lumbar laminectomy  Laminectomy with Spinal Fusion  History of Arthroscopy    FAMILY HISTORY:  Family history of cerebrovascular accident (Mother)    SOCIAL HISTORY:  unchanged    REVIEW OF SYSTEMS:  CONSTITUTIONAL: No fever  EYES: No eye pain, visual disturbances  ENMT:  No sinus or throat pain  NECK: No pain or stiffness  RESPIRATORY:    CARDIOVASCULAR:    GASTROINTESTINAL: No abdominal or epigastric pain. No nausea, vomiting, or hematemesis; No melena or hematochezia.  GENITOURINARY: No dysuria, hematuria  NEUROLOGICAL: No headaches, memory loss, loss of strength, numbness, or tremors  SKIN:   LYMPH Nodes: No enlarged glands  ENDOCRINE: No heat or cold intolerance; No hair loss  MUSCULOSKELETAL: No joint pain or swelling; No muscle, back, or extremity pain  PSYCHIATRIC: No depression, anxiety  HEME/LYMPH: No easy bruising, or bleeding gums  ALLERY AND IMMUNOLOGIC: No hives or eczema	    [ x] All others negative	     ICU Vital Signs Last 24 Hrs  T(C): 36.6 (04 Mar 2021 05:45), Max: 36.7 (03 Mar 2021 12:05)  T(F): 97.9 (04 Mar 2021 05:45), Max: 98.1 (03 Mar 2021 12:05)  HR: 69 (04 Mar 2021 05:45) (57 - 77)  BP: 151/73 (04 Mar 2021 05:45) (116/65 - 173/54)  BP(mean): 113 (03 Mar 2021 17:00) (106 - 120)  ABP: --  ABP(mean): --  RR: 18 (04 Mar 2021 05:45) (16 - 18)  SpO2: 98% (04 Mar 2021 05:45) (96% - 100%)      Appearance: NAD 	  HEENT: NC/AT  Cardiovascular: No JVD, RRR, S1 and S2    Respiratory:  CTA B/L  Psychiatry:  AAO x 3  Gastrointestinal:  Soft, Non-tender, + BS	  Skin: No rashes, No ecchymoses, No cyanosis	  Neurologic: No focal deficit noted   Extremities: Bilateral Calves Soft,  No LE Edema N/L    Vascular Pulse Exam:  Right DP: palpable  Left D : palpable   Right PT: palpable   Left PT: palpable                             9.7    4.54  )-----------( 236      ( 04 Mar 2021 07:07 )             29.8       Assessment:  1. CAD s/p PCI      Non-revascularized mLAD 70%  2. HTN  3. HLD  4. CKD stage 3-4  5. Retained Foreign Body L Foot      Concern for Osteomyelitis      Plan:  1 Appreciate podiatry help with this case  2.  Watch BP for now  3.  Defer to podiatry/ primary antibiotics/ picc line   4.  Continue antiplatelet and statin therapy   5.  No further cardiac/vascular testing      Jarad   [phone]

## 2021-03-04 NOTE — PHYSICAL THERAPY INITIAL EVALUATION ADULT - ADDITIONAL COMMENTS
Pt lives in a pvt home w/ spouse, 3-4 steps to enter, 1 flight inside (pt stays on first level). PTA pt reports being independent w/ all functional mobility & utilized a straight cane for ambulation.

## 2021-03-04 NOTE — PROGRESS NOTE ADULT - ASSESSMENT
81 year old male with PMH HTN, CAD with 4 stents (more than 5 years ago), BPH, HLD, gout, GERD, DM2 and CKD stage 3 who presents for retained foreign body in his left foot s/p OR.

## 2021-03-04 NOTE — PROGRESS NOTE ADULT - ASSESSMENT
80 yo male patient s/p Left foot 5th MPJ I&D with bone biopsy (DOS 3/3/21)  - Pt seen and evaluated  - Afebrile, WBC 4.54  - Left foot surgical site with intact sutures left open centrally, no active drainage or purulence expressed, no signs of infection.  - Flushed, packed and DSD  - Please have home visiting nurse to change dressing   - WBAT to left heel in post op shoe  - High concern for residual bone infection - rec ID consult for long term IV Abx (appreciated)  - Stable for discharge once PICC placement and final ID recs  - Discussed with attending 80 yo male patient s/p Left foot 5th MPJ I&D with bone biopsy (DOS 3/3/21)  - Pt seen and evaluated  - Afebrile, WBC 4.54  - Left foot surgical site with intact sutures left open centrally, no active drainage or purulence expressed, no signs of infection.  - Intraop finding negative for residual foreign body and only hypergranulating tissue   - Flushed, packed and DSD  - WBAT to left heel in post op shoe  - High concern for residual bone infection - rec ID consult for long term IV Abx (appreciated)  - Stable for discharge once PICC placement and final ID recs  - Discussed with attending

## 2021-03-04 NOTE — PROGRESS NOTE ADULT - ASSESSMENT
81 year old male with HTN, CAD with stents, BPH, HLD, gout, GERD, DM2, CKD3 presenting for foreign body in his left foot, initially was in the ED 1/15/21, was cleaned and discharged on augmentin. Was following with outpatient Podiatry with recent MRI demonstrating piece of glass retained so sent to the ED. XR here with erosive changes on the fifth metatarsal head and lateral base of the proximal fifth phalanx consistent with OM. s/p OR with Podiatry for I&D with concern for residual bone infection.    Afebrile, WBC WNL.  No systemic signs of infection.    Recommend:  #Left foot wound with retained glass, XR concerning for OM  -F/U path and bone culture - so far no growth  -Definitive abx plan based on culture and path    #CKD3  -Continue to monitor Sarkis Maguire MD  Pager [phone]  After 5pm/weekends call [phone]

## 2021-03-04 NOTE — PROGRESS NOTE ADULT - SUBJECTIVE AND OBJECTIVE BOX
Patient is a 81y old  Male who presents with a chief complaint of Retained foreign body in foot (03 Mar 2021 14:27)       INTERVAL HPI/OVERNIGHT EVENTS:  Patient seen and evaluated at bedside.  Pt is resting comfortable in NAD. Denies N/V/F/C.    Allergies    Avapro (Hives)  enalapril (Unknown)  losartan (Other)  seasonal allergies-outdoor (Urticaria; Rhinorrhea; Sneezing)    Intolerances    losartan (Other)      Vital Signs Last 24 Hrs  T(C): 36.6 (04 Mar 2021 05:45), Max: 36.7 (03 Mar 2021 12:05)  T(F): 97.9 (04 Mar 2021 05:45), Max: 98.1 (03 Mar 2021 12:05)  HR: 69 (04 Mar 2021 05:45) (57 - 77)  BP: 151/73 (04 Mar 2021 05:45) (116/65 - 173/54)  BP(mean): 113 (03 Mar 2021 17:00) (106 - 120)  RR: 18 (04 Mar 2021 05:45) (16 - 18)  SpO2: 98% (04 Mar 2021 05:45) (96% - 100%)    LABS:                        9.7    4.54  )-----------( 236      ( 04 Mar 2021 07:07 )             29.8     03-04    140  |  105  |  28<H>  ----------------------------<  106<H>  3.6   |  24  |  2.06<H>    Ca    11.1<H>      04 Mar 2021 07:03  Phos  2.3     03-03  Mg     2.0     03-04    TPro  6.5  /  Alb  3.4  /  TBili  0.2  /  DBili  <0.1  /  AST  24  /  ALT  14  /  AlkPhos  55  03-04    PT/INR - ( 03 Mar 2021 08:26 )   PT: 12.6 sec;   INR: 1.07 ratio         PTT - ( 03 Mar 2021 08:26 )  PTT:35.5 sec    CAPILLARY BLOOD GLUCOSE      POCT Blood Glucose.: 82 mg/dL (04 Mar 2021 08:21)  POCT Blood Glucose.: 126 mg/dL (03 Mar 2021 21:45)  POCT Blood Glucose.: 73 mg/dL (03 Mar 2021 17:53)  POCT Blood Glucose.: 104 mg/dL (03 Mar 2021 14:43)  POCT Blood Glucose.: 127 mg/dL (03 Mar 2021 12:20)      Lower Extremity Physical Exam:  Vascular: DP/PT 2/4, B/L, CFT intact B/L, Temperature gradient warm to warm on L, warm to cool on R   Neuro: Epicritic sensation intact to the level of digits, B/L.  Musculoskeletal/Ortho: unremarkable    s/p Left foot 5th MPJ I&D with bone biopsy (DOS 3/3/21)  Left foot surgical site with intact sutures left open centrally, no active drainage or purulence expressed, no signs of infection.    RADIOLOGY & ADDITIONAL TESTS:

## 2021-03-04 NOTE — PROGRESS NOTE ADULT - SUBJECTIVE AND OBJECTIVE BOX
CC: Patient is a 81y old  Male who presents with a chief complaint of Retained foreign body in foot (04 Mar 2021 14:37)    ID following for left  foot infection concerning for OM    Interval History/ROS: Patient s/p I&D with Podiatry with cultures and biopsy pending. No fevers, WBC WNL.    Rest of ROS negative.    Allergies  Avapro (Hives)  enalapril (Unknown)  losartan (Other)  seasonal allergies-outdoor (Urticaria; Rhinorrhea; Sneezing)    ANTIMICROBIALS:      OTHER MEDS:  acetaminophen   Tablet .. 650 milliGRAM(s) Oral every 6 hours PRN  allopurinol 100 milliGRAM(s) Oral daily  amLODIPine   Tablet 10 milliGRAM(s) Oral daily  atorvastatin 40 milliGRAM(s) Oral at bedtime  carvedilol 25 milliGRAM(s) Oral every 12 hours  clopidogrel Tablet 75 milliGRAM(s) Oral daily  dextrose 40% Gel 15 Gram(s) Oral once  dextrose 5%. 1000 milliLiter(s) IV Continuous <Continuous>  dextrose 5%. 1000 milliLiter(s) IV Continuous <Continuous>  dextrose 50% Injectable 25 Gram(s) IV Push once  dextrose 50% Injectable 12.5 Gram(s) IV Push once  dextrose 50% Injectable 25 Gram(s) IV Push once  famotidine    Tablet 20 milliGRAM(s) Oral daily  fenofibrate Tablet 48 milliGRAM(s) Oral daily  folic acid 1 milliGRAM(s) Oral daily  glucagon  Injectable 1 milliGRAM(s) IntraMuscular once  hydrALAZINE 50 milliGRAM(s) Oral two times a day  insulin lispro (ADMELOG) corrective regimen sliding scale   SubCutaneous three times a day before meals  insulin lispro (ADMELOG) corrective regimen sliding scale   SubCutaneous at bedtime  multivitamin 1 Tablet(s) Oral daily  oxycodone    5 mG/acetaminophen 325 mG 1 Tablet(s) Oral every 4 hours PRN  potassium chloride    Tablet ER 20 milliEquivalent(s) Oral once  tamsulosin 0.4 milliGRAM(s) Oral at bedtime    PE:    Vital Signs Last 24 Hrs  T(C): 37.2 (04 Mar 2021 16:33), Max: 37.2 (04 Mar 2021 16:33)  T(F): 98.9 (04 Mar 2021 16:33), Max: 98.9 (04 Mar 2021 16:33)  HR: 66 (04 Mar 2021 16:33) (57 - 81)  BP: 144/66 (04 Mar 2021 16:33) (116/65 - 156/71)  BP(mean): --  RR: 18 (04 Mar 2021 16:33) (18 - 18)  SpO2: 98% (04 Mar 2021 16:33) (95% - 100%)    Gen: AOx3, NAD  CV: S1+S2 normal, no murmurs  Resp: Clear bilat, no resp distress  Abd: Soft, nontender, +BS  Ext: No LE edema, no wounds  : No Mathew  IV/Skin: No thrombophlebitis, left foot bandaged  Neuro: no focal deficits    LABS:                          9.7    4.54  )-----------( 236      ( 04 Mar 2021 07:07 )             29.8       03-04    140  |  105  |  28<H>  ----------------------------<  106<H>  3.6   |  24  |  2.06<H>    Ca    11.1<H>      04 Mar 2021 07:03  Phos  2.3     03-03  Mg     2.0     03-04    TPro  6.5  /  Alb  3.4  /  TBili  0.2  /  DBili  <0.1  /  AST  24  /  ALT  14  /  AlkPhos  55  03-04    MICROBIOLOGY:  v  .Tissue Other  03-03-21   No growth  --    No polymorphonuclear cells seen per low power field  No organisms seen      .Blood Blood-Peripheral  03-02-21   No growth to date.  --  --    RADIOLOGY:    < from: Xray Chest 2 Views PA/Lat (03.02.21 @ 12:59) >  IMPRESSION:  Clear lungs.    < end of copied text >

## 2021-03-04 NOTE — PHYSICAL THERAPY INITIAL EVALUATION ADULT - PERTINENT HX OF CURRENT PROBLEM, REHAB EVAL
81 y.o. M PMH HTN, CAD w/ stents, BPH, HLD, gout, GERD, DM2 & CKD stage 3 who presents for retained foreign body in his left foot. States that a glass bottle broke & a piece stuck in his L foot about 1.5 months ago. Foot was swollen & painful so he went to ER Jan 15th, cleaned out by podiatry.~ 3 days later, he noticed that his pain & swelling returned. Now s/p L foot incision & drainage & foreign body removal.

## 2021-03-04 NOTE — PROGRESS NOTE ADULT - SUBJECTIVE AND OBJECTIVE BOX
Patient denies pain in his left foot. No new complaints.    GENERAL: No fevers, no chills.  EYES: No blurry vision,  No photophobia  ENT: No sore throat.  No dysphagia  Cardiovascular: No chest pain, palpitations, orthopnea  Pulmonary: No cough, no wheezing. No shortness of breath  Gastrointestinal: No abdominal pain, no diarrhea, no constipation.    Musculoskeletal: No weakness.  No myalgias.  Dermatology:  No rashes.  Neuro: No Headache.  No vertigo.  No dizziness.  Psych: No anxiety, no depression.  Denies suicidal thoughts.    MEDICATIONS  (STANDING):  allopurinol 100 milliGRAM(s) Oral daily  amLODIPine   Tablet 10 milliGRAM(s) Oral daily  atorvastatin 40 milliGRAM(s) Oral at bedtime  carvedilol 25 milliGRAM(s) Oral every 12 hours  clopidogrel Tablet 75 milliGRAM(s) Oral daily  dextrose 40% Gel 15 Gram(s) Oral once  dextrose 5%. 1000 milliLiter(s) (50 mL/Hr) IV Continuous <Continuous>  dextrose 5%. 1000 milliLiter(s) (100 mL/Hr) IV Continuous <Continuous>  dextrose 50% Injectable 25 Gram(s) IV Push once  dextrose 50% Injectable 12.5 Gram(s) IV Push once  dextrose 50% Injectable 25 Gram(s) IV Push once  famotidine    Tablet 20 milliGRAM(s) Oral daily  fenofibrate Tablet 48 milliGRAM(s) Oral daily  folic acid 1 milliGRAM(s) Oral daily  glucagon  Injectable 1 milliGRAM(s) IntraMuscular once  hydrALAZINE 50 milliGRAM(s) Oral two times a day  insulin lispro (ADMELOG) corrective regimen sliding scale   SubCutaneous three times a day before meals  insulin lispro (ADMELOG) corrective regimen sliding scale   SubCutaneous at bedtime  multivitamin 1 Tablet(s) Oral daily  potassium chloride    Tablet ER 20 milliEquivalent(s) Oral once  tamsulosin 0.4 milliGRAM(s) Oral at bedtime    MEDICATIONS  (PRN):  acetaminophen   Tablet .. 650 milliGRAM(s) Oral every 6 hours PRN Mild Pain (1 - 3), Moderate Pain (4 - 6)  oxycodone    5 mG/acetaminophen 325 mG 1 Tablet(s) Oral every 4 hours PRN Severe Pain (7 - 10)    Vital Signs Last 24 Hrs  T(C): 36.4 (04 Mar 2021 13:21), Max: 36.8 (04 Mar 2021 09:38)  T(F): 97.5 (04 Mar 2021 13:21), Max: 98.2 (04 Mar 2021 09:38)  HR: 73 (04 Mar 2021 13:21) (57 - 73)  BP: 131/84 (04 Mar 2021 13:21) (116/65 - 167/88)  BP(mean): 113 (03 Mar 2021 17:00) (109 - 120)  RR: 18 (04 Mar 2021 13:21) (18 - 18)  SpO2: 97% (04 Mar 2021 13:21) (95% - 100%)    Gen: AOx3, NAD  CV: RRR, S1+S2 normal, no murmurs  Resp: CTAB, no resp distress  Abd: Soft, nontender, +BS  Ext: No LE edema, no wounds  Skin: No thrombophlebitis, left foot granular wound, no drainage, no erythema  Msk: No low back pain, no arthralgias, no joint swelling  Neuro: No sensory deficits, no motor deficits    .  LABS:                         9.7    4.54  )-----------( 236      ( 04 Mar 2021 07:07 )             29.8     03-04    140  |  105  |  28<H>  ----------------------------<  106<H>  3.6   |  24  |  2.06<H>    Ca    11.1<H>      04 Mar 2021 07:03  Phos  2.3     03-03  Mg     2.0     03-04    TPro  6.5  /  Alb  3.4  /  TBili  0.2  /  DBili  <0.1  /  AST  24  /  ALT  14  /  AlkPhos  55  03-04    PT/INR - ( 03 Mar 2021 08:26 )   PT: 12.6 sec;   INR: 1.07 ratio         PTT - ( 03 Mar 2021 08:26 )  PTT:35.5 sec          RADIOLOGY, EKG & ADDITIONAL TESTS: Reviewed.

## 2021-03-04 NOTE — PROGRESS NOTE ADULT - ATTENDING COMMENTS
disposition: dc home with no antibiotics vs. home with iv antibiotics-- decision yet to be made  discussed with Dr. ANGELA, NP Anuja Gomez D.O.  Hospitalist Pager # [phone]

## 2021-03-05 LAB
-  AMPICILLIN/SULBACTAM: SIGNIFICANT CHANGE UP
-  CEFAZOLIN: SIGNIFICANT CHANGE UP
-  CLINDAMYCIN: SIGNIFICANT CHANGE UP
-  ERYTHROMYCIN: SIGNIFICANT CHANGE UP
-  GENTAMICIN: SIGNIFICANT CHANGE UP
-  OXACILLIN: SIGNIFICANT CHANGE UP
-  PENICILLIN: SIGNIFICANT CHANGE UP
-  RIFAMPIN: SIGNIFICANT CHANGE UP
-  TETRACYCLINE: SIGNIFICANT CHANGE UP
-  TRIMETHOPRIM/SULFAMETHOXAZOLE: SIGNIFICANT CHANGE UP
-  VANCOMYCIN: SIGNIFICANT CHANGE UP
ALBUMIN SERPL ELPH-MCNC: 3.6 G/DL — SIGNIFICANT CHANGE UP (ref 3.3–5)
ALP SERPL-CCNC: 63 U/L — SIGNIFICANT CHANGE UP (ref 40–120)
ALT FLD-CCNC: 11 U/L — SIGNIFICANT CHANGE UP (ref 10–45)
ANION GAP SERPL CALC-SCNC: 9 MMOL/L — SIGNIFICANT CHANGE UP (ref 5–17)
AST SERPL-CCNC: 21 U/L — SIGNIFICANT CHANGE UP (ref 10–40)
BASOPHILS # BLD AUTO: 0.02 K/UL — SIGNIFICANT CHANGE UP (ref 0–0.2)
BASOPHILS NFR BLD AUTO: 0.5 % — SIGNIFICANT CHANGE UP (ref 0–2)
BILIRUB SERPL-MCNC: 0.3 MG/DL — SIGNIFICANT CHANGE UP (ref 0.2–1.2)
BUN SERPL-MCNC: 25 MG/DL — HIGH (ref 7–23)
CALCIUM SERPL-MCNC: 10.8 MG/DL — HIGH (ref 8.4–10.5)
CHLORIDE SERPL-SCNC: 104 MMOL/L — SIGNIFICANT CHANGE UP (ref 96–108)
CO2 SERPL-SCNC: 23 MMOL/L — SIGNIFICANT CHANGE UP (ref 22–31)
CREAT SERPL-MCNC: 1.99 MG/DL — HIGH (ref 0.5–1.3)
EOSINOPHIL # BLD AUTO: 0.24 K/UL — SIGNIFICANT CHANGE UP (ref 0–0.5)
EOSINOPHIL NFR BLD AUTO: 6 % — SIGNIFICANT CHANGE UP (ref 0–6)
GLUCOSE SERPL-MCNC: 168 MG/DL — HIGH (ref 70–99)
HCT VFR BLD CALC: 32.5 % — LOW (ref 39–50)
HGB BLD-MCNC: 10.4 G/DL — LOW (ref 13–17)
IMM GRANULOCYTES NFR BLD AUTO: 0.3 % — SIGNIFICANT CHANGE UP (ref 0–1.5)
LYMPHOCYTES # BLD AUTO: 0.93 K/UL — LOW (ref 1–3.3)
LYMPHOCYTES # BLD AUTO: 23.4 % — SIGNIFICANT CHANGE UP (ref 13–44)
MAGNESIUM SERPL-MCNC: 2.1 MG/DL — SIGNIFICANT CHANGE UP (ref 1.6–2.6)
MCHC RBC-ENTMCNC: 30.8 PG — SIGNIFICANT CHANGE UP (ref 27–34)
MCHC RBC-ENTMCNC: 32 GM/DL — SIGNIFICANT CHANGE UP (ref 32–36)
MCV RBC AUTO: 96.2 FL — SIGNIFICANT CHANGE UP (ref 80–100)
METHOD TYPE: SIGNIFICANT CHANGE UP
MONOCYTES # BLD AUTO: 0.66 K/UL — SIGNIFICANT CHANGE UP (ref 0–0.9)
MONOCYTES NFR BLD AUTO: 16.6 % — HIGH (ref 2–14)
NEUTROPHILS # BLD AUTO: 2.12 K/UL — SIGNIFICANT CHANGE UP (ref 1.8–7.4)
NEUTROPHILS NFR BLD AUTO: 53.2 % — SIGNIFICANT CHANGE UP (ref 43–77)
NRBC # BLD: 0 /100 WBCS — SIGNIFICANT CHANGE UP (ref 0–0)
PLATELET # BLD AUTO: 234 K/UL — SIGNIFICANT CHANGE UP (ref 150–400)
POTASSIUM SERPL-MCNC: 3.9 MMOL/L — SIGNIFICANT CHANGE UP (ref 3.5–5.3)
POTASSIUM SERPL-SCNC: 3.9 MMOL/L — SIGNIFICANT CHANGE UP (ref 3.5–5.3)
PROT SERPL-MCNC: 6.8 G/DL — SIGNIFICANT CHANGE UP (ref 6–8.3)
RBC # BLD: 3.38 M/UL — LOW (ref 4.2–5.8)
RBC # FLD: 13.4 % — SIGNIFICANT CHANGE UP (ref 10.3–14.5)
SODIUM SERPL-SCNC: 136 MMOL/L — SIGNIFICANT CHANGE UP (ref 135–145)
WBC # BLD: 3.98 K/UL — SIGNIFICANT CHANGE UP (ref 3.8–10.5)
WBC # FLD AUTO: 3.98 K/UL — SIGNIFICANT CHANGE UP (ref 3.8–10.5)

## 2021-03-05 PROCEDURE — 99233 SBSQ HOSP IP/OBS HIGH 50: CPT

## 2021-03-05 PROCEDURE — 99232 SBSQ HOSP IP/OBS MODERATE 35: CPT

## 2021-03-05 RX ORDER — POLYETHYLENE GLYCOL 3350 17 G/17G
17 POWDER, FOR SOLUTION ORAL DAILY
Refills: 0 | Status: DISCONTINUED | OUTPATIENT
Start: 2021-03-05 | End: 2021-03-09

## 2021-03-05 RX ORDER — CEFAZOLIN SODIUM 1 G
VIAL (EA) INJECTION
Refills: 0 | Status: DISCONTINUED | OUTPATIENT
Start: 2021-03-05 | End: 2021-03-09

## 2021-03-05 RX ORDER — CEFAZOLIN SODIUM 1 G
2000 VIAL (EA) INJECTION EVERY 12 HOURS
Refills: 0 | Status: DISCONTINUED | OUTPATIENT
Start: 2021-03-06 | End: 2021-03-09

## 2021-03-05 RX ORDER — FUROSEMIDE 40 MG
20 TABLET ORAL DAILY
Refills: 0 | Status: DISCONTINUED | OUTPATIENT
Start: 2021-03-05 | End: 2021-03-08

## 2021-03-05 RX ORDER — CEFAZOLIN SODIUM 1 G
2000 VIAL (EA) INJECTION ONCE
Refills: 0 | Status: COMPLETED | OUTPATIENT
Start: 2021-03-05 | End: 2021-03-05

## 2021-03-05 RX ORDER — SENNA PLUS 8.6 MG/1
1 TABLET ORAL DAILY
Refills: 0 | Status: DISCONTINUED | OUTPATIENT
Start: 2021-03-05 | End: 2021-03-09

## 2021-03-05 RX ADMIN — Medication 48 MILLIGRAM(S): at 13:41

## 2021-03-05 RX ADMIN — CARVEDILOL PHOSPHATE 25 MILLIGRAM(S): 80 CAPSULE, EXTENDED RELEASE ORAL at 17:39

## 2021-03-05 RX ADMIN — FAMOTIDINE 20 MILLIGRAM(S): 10 INJECTION INTRAVENOUS at 11:36

## 2021-03-05 RX ADMIN — Medication 2: at 09:08

## 2021-03-05 RX ADMIN — Medication 1 TABLET(S): at 11:36

## 2021-03-05 RX ADMIN — ATORVASTATIN CALCIUM 40 MILLIGRAM(S): 80 TABLET, FILM COATED ORAL at 21:50

## 2021-03-05 RX ADMIN — Medication 2: at 17:43

## 2021-03-05 RX ADMIN — Medication 100 MILLIGRAM(S): at 13:40

## 2021-03-05 RX ADMIN — CLOPIDOGREL BISULFATE 75 MILLIGRAM(S): 75 TABLET, FILM COATED ORAL at 11:36

## 2021-03-05 RX ADMIN — Medication 1 MILLIGRAM(S): at 11:36

## 2021-03-05 RX ADMIN — Medication 100 MILLIGRAM(S): at 11:30

## 2021-03-05 RX ADMIN — AMLODIPINE BESYLATE 10 MILLIGRAM(S): 2.5 TABLET ORAL at 05:48

## 2021-03-05 RX ADMIN — Medication 2: at 13:40

## 2021-03-05 RX ADMIN — POLYETHYLENE GLYCOL 3350 17 GRAM(S): 17 POWDER, FOR SOLUTION ORAL at 13:39

## 2021-03-05 RX ADMIN — Medication 50 MILLIGRAM(S): at 17:39

## 2021-03-05 RX ADMIN — CARVEDILOL PHOSPHATE 25 MILLIGRAM(S): 80 CAPSULE, EXTENDED RELEASE ORAL at 05:48

## 2021-03-05 RX ADMIN — Medication 50 MILLIGRAM(S): at 05:48

## 2021-03-05 RX ADMIN — Medication 650 MILLIGRAM(S): at 05:50

## 2021-03-05 RX ADMIN — Medication 650 MILLIGRAM(S): at 07:00

## 2021-03-05 RX ADMIN — TAMSULOSIN HYDROCHLORIDE 0.4 MILLIGRAM(S): 0.4 CAPSULE ORAL at 21:50

## 2021-03-05 NOTE — PROGRESS NOTE ADULT - SUBJECTIVE AND OBJECTIVE BOX
CC: Patient is a 81y old  Male who presents with a chief complaint of Retained foreign body in foot (05 Mar 2021 11:06)    ID following for OM    Interval History/ROS: Patient with deep wound culture now with S. aureus pending sensies. Prior culture with MSSA.     Rest of ROS negative.    Allergies  Avapro (Hives)  enalapril (Unknown)  losartan (Other)  seasonal allergies-outdoor (Urticaria; Rhinorrhea; Sneezing)    ANTIMICROBIALS:  ceFAZolin   IVPB      OTHER MEDS:  acetaminophen   Tablet .. 650 milliGRAM(s) Oral every 6 hours PRN  allopurinol 100 milliGRAM(s) Oral daily  amLODIPine   Tablet 10 milliGRAM(s) Oral daily  atorvastatin 40 milliGRAM(s) Oral at bedtime  carvedilol 25 milliGRAM(s) Oral every 12 hours  clopidogrel Tablet 75 milliGRAM(s) Oral daily  dextrose 40% Gel 15 Gram(s) Oral once  dextrose 5%. 1000 milliLiter(s) IV Continuous <Continuous>  dextrose 5%. 1000 milliLiter(s) IV Continuous <Continuous>  dextrose 50% Injectable 25 Gram(s) IV Push once  dextrose 50% Injectable 12.5 Gram(s) IV Push once  dextrose 50% Injectable 25 Gram(s) IV Push once  famotidine    Tablet 20 milliGRAM(s) Oral daily  fenofibrate Tablet 48 milliGRAM(s) Oral daily  folic acid 1 milliGRAM(s) Oral daily  glucagon  Injectable 1 milliGRAM(s) IntraMuscular once  hydrALAZINE 50 milliGRAM(s) Oral two times a day  insulin lispro (ADMELOG) corrective regimen sliding scale   SubCutaneous three times a day before meals  insulin lispro (ADMELOG) corrective regimen sliding scale   SubCutaneous at bedtime  multivitamin 1 Tablet(s) Oral daily  oxycodone    5 mG/acetaminophen 325 mG 1 Tablet(s) Oral every 4 hours PRN  tamsulosin 0.4 milliGRAM(s) Oral at bedtime    PE:    Vital Signs Last 24 Hrs  T(C): 36.4 (05 Mar 2021 09:52), Max: 37.2 (04 Mar 2021 16:33)  T(F): 97.6 (05 Mar 2021 09:52), Max: 98.9 (04 Mar 2021 16:33)  HR: 65 (05 Mar 2021 09:52) (60 - 81)  BP: 153/75 (05 Mar 2021 09:52) (131/84 - 162/68)  BP(mean): --  RR: 18 (05 Mar 2021 09:52) (18 - 18)  SpO2: 96% (05 Mar 2021 09:52) (94% - 99%)    Gen: AOx3, NAD  CV: S1+S2 normal, no murmurs  Resp: Clear bilat, no resp distress  Abd: Soft, nontender, +BS  Ext: No LE edema, no wounds  : No Mathew  IV/Skin: No thrombophlebitis, left foot bandaged  Neuro: no focal deficits    LABS:                          10.4   3.98  )-----------( 234      ( 05 Mar 2021 07:20 )             32.5       03-05    136  |  104  |  25<H>  ----------------------------<  168<H>  3.9   |  23  |  1.99<H>    Ca    10.8<H>      05 Mar 2021 07:19  Mg     2.1     03-05    TPro  6.8  /  Alb  3.6  /  TBili  0.3  /  DBili  x   /  AST  21  /  ALT  11  /  AlkPhos  63  03-05    MICROBIOLOGY:  v  .Tissue Other  03-03-21   No growth  --    No polymorphonuclear cells seen per low power field  No organisms seen      .Surgical Swab left foot deep wound  03-03-21   Few Staphylococcus aureus  --  --      .Blood Blood-Peripheral  03-02-21   No growth to date.  --  --    RADIOLOGY:    < from: Xray Chest 2 Views PA/Lat (03.02.21 @ 12:59) >  IMPRESSION:  Clear lungs.    < end of copied text >

## 2021-03-05 NOTE — PROGRESS NOTE ADULT - ASSESSMENT
80 yo M s/p Left foot 5th MPJ I&D with bone biopsy (DOS 3/3/21)    - Pt seen and evaluated  - Afebrile, no leukocytosis   - Left foot surgical site with intact sutures left open centrally, scant sanguinous drainage, no  purulence expressed, no acute signs of infection  - Intraop finding negative for residual foreign body and only hypergranulating tissue   - Flushed, steri strips applied over central surgical opening, redressed with DSD  - High concern for residual OM, will treat with long term IV abx   - OR clean bone margin currently no growth, OR swab growing staph  - Pod stable for discharge pending IV PICC and final ID recommendations  - WBAT to left heel in post op shoe  - Discussed with attending

## 2021-03-05 NOTE — PROGRESS NOTE ADULT - PROBLEM SELECTOR PLAN 8
- Patient with a history of CKD 3 with baseline creatinine 1.5-2 based on previous records  - Trend BMP  -Hold home lasix in the setting of SAPNA and patient to be NPO after midnight, can restart as tolerated  -Renally dose medications  -Avoid nephrotoxins

## 2021-03-05 NOTE — CONSULT NOTE ADULT - REASON FOR ADMISSION
Retained foreign body in foot

## 2021-03-05 NOTE — PROGRESS NOTE ADULT - ATTENDING COMMENTS
disposition: dc home with with iv antibiotics, once home infusion arranged  discussed with Dr. ANGELA, Dr. Abraham, NP Eladia Gomez D.O.  Hospitalist Pager # [phone]

## 2021-03-05 NOTE — CONSULT NOTE ADULT - CONSULT REASON
Outpatient Cardiologist
Left foot wound with foreign body
long term IV abx
risk stratification
Left foot foreign body injury

## 2021-03-05 NOTE — PROGRESS NOTE ADULT - SUBJECTIVE AND OBJECTIVE BOX
No new complaints.    GENERAL: No fevers, no chills.  EYES: No blurry vision,  No photophobia  ENT: No sore throat.  No dysphagia  Cardiovascular: No chest pain, palpitations, orthopnea  Pulmonary: No cough, no wheezing. No shortness of breath  Gastrointestinal: No abdominal pain, no diarrhea, no constipation.    Musculoskeletal: No weakness.  No myalgias.  Dermatology:  No rashes.  Neuro: No Headache.  No vertigo.  No dizziness.  Psych: No anxiety, no depression.  Denies suicidal thoughts.    MEDICATIONS  (STANDING):  allopurinol 100 milliGRAM(s) Oral daily  amLODIPine   Tablet 10 milliGRAM(s) Oral daily  atorvastatin 40 milliGRAM(s) Oral at bedtime  carvedilol 25 milliGRAM(s) Oral every 12 hours  ceFAZolin   IVPB      ceFAZolin   IVPB 2000 milliGRAM(s) IV Intermittent once  clopidogrel Tablet 75 milliGRAM(s) Oral daily  dextrose 40% Gel 15 Gram(s) Oral once  dextrose 5%. 1000 milliLiter(s) (50 mL/Hr) IV Continuous <Continuous>  dextrose 5%. 1000 milliLiter(s) (100 mL/Hr) IV Continuous <Continuous>  dextrose 50% Injectable 25 Gram(s) IV Push once  dextrose 50% Injectable 12.5 Gram(s) IV Push once  dextrose 50% Injectable 25 Gram(s) IV Push once  famotidine    Tablet 20 milliGRAM(s) Oral daily  fenofibrate Tablet 48 milliGRAM(s) Oral daily  folic acid 1 milliGRAM(s) Oral daily  glucagon  Injectable 1 milliGRAM(s) IntraMuscular once  hydrALAZINE 50 milliGRAM(s) Oral two times a day  insulin lispro (ADMELOG) corrective regimen sliding scale   SubCutaneous three times a day before meals  insulin lispro (ADMELOG) corrective regimen sliding scale   SubCutaneous at bedtime  multivitamin 1 Tablet(s) Oral daily  tamsulosin 0.4 milliGRAM(s) Oral at bedtime    MEDICATIONS  (PRN):  acetaminophen   Tablet .. 650 milliGRAM(s) Oral every 6 hours PRN Mild Pain (1 - 3), Moderate Pain (4 - 6)  oxycodone    5 mG/acetaminophen 325 mG 1 Tablet(s) Oral every 4 hours PRN Severe Pain (7 - 10)  polyethylene glycol 3350 17 Gram(s) Oral daily PRN Constipation  senna 1 Tablet(s) Oral daily PRN Constipation    Vital Signs Last 24 Hrs  T(C): 36.4 (05 Mar 2021 09:52), Max: 37.2 (04 Mar 2021 16:33)  T(F): 97.6 (05 Mar 2021 09:52), Max: 98.9 (04 Mar 2021 16:33)  HR: 65 (05 Mar 2021 09:52) (60 - 81)  BP: 153/75 (05 Mar 2021 09:52) (131/84 - 162/68)  BP(mean): --  RR: 18 (05 Mar 2021 09:52) (18 - 18)  SpO2: 96% (05 Mar 2021 09:52) (94% - 99%)    Gen: AOx3, NAD  CV: RRR, S1+S2 normal, no murmurs  Resp: CTAB, no resp distress  Abd: Soft, nontender, +BS  Ext: No LE edema, no wounds  Skin: No thrombophlebitis, left foot granular wound, no drainage, no erythema  Msk: No low back pain, no arthralgias, no joint swelling, left foot dressed  Neuro: No sensory deficits, no motor deficits    .  LABS:                         10.4   3.98  )-----------( 234      ( 05 Mar 2021 07:20 )             32.5     03-05    136  |  104  |  25<H>  ----------------------------<  168<H>  3.9   |  23  |  1.99<H>    Ca    10.8<H>      05 Mar 2021 07:19  Mg     2.1     03-05    TPro  6.8  /  Alb  3.6  /  TBili  0.3  /  DBili  x   /  AST  21  /  ALT  11  /  AlkPhos  63  03-05              RADIOLOGY, EKG & ADDITIONAL TESTS: Reviewed.

## 2021-03-05 NOTE — PROGRESS NOTE ADULT - ASSESSMENT
81 year old male with HTN, CAD with stents, BPH, HLD, gout, GERD, DM2, CKD3 presenting for foreign body in his left foot, initially was in the ED 1/15/21, was cleaned and discharged on augmentin. Was following with outpatient Podiatry with recent MRI demonstrating piece of glass retained so sent to the ED. XR here with erosive changes on the fifth metatarsal head and lateral base of the proximal fifth phalanx consistent with OM. s/p OR with Podiatry for I&D with concern for residual bone infection.    Afebrile, WBC WNL.  No systemic signs of infection.  Deep tissue culture with s. aureus, prior culture with MSSA  Discussed with podiatry and concern for residual OM    Recommend:  #Left foot wound with retained glass, XR concerning for OM  -Deep culture with S aureus - f/u sensies  -Since prior cx with MSSA, can start cefazolin 2 grams IV q 12 hours (renally dosed)  -Will need PICC and at least 6 weeks  -Will need CBC/ CMP/ ESR/ CRP weekly on discharge faxed to my office at [phone]    #CKD3  -Continue to monitor Cr while on abx and adjust accordingly    Bassem Maguire MD  Pager [phone]  After 5pm/weekends call [phone]    Discussed with primary team, Podiatry team.

## 2021-03-05 NOTE — PROGRESS NOTE ADULT - SUBJECTIVE AND OBJECTIVE BOX
Patient is a 81y old  Male who presents with a chief complaint of Retained foreign body in foot (03 Mar 2021 14:27)       INTERVAL HPI/OVERNIGHT EVENTS:  Patient seen and evaluated at bedside.  Pt is resting comfortable in NAD. Denies N/V/F/C.    Allergies    Avapro (Hives)  enalapril (Unknown)  losartan (Other)  seasonal allergies-outdoor (Urticaria; Rhinorrhea; Sneezing)    Intolerances    losartan (Other)      Vital Signs Last 24 Hrs  T(C): 36.6 (04 Mar 2021 05:45), Max: 36.7 (03 Mar 2021 12:05)  T(F): 97.9 (04 Mar 2021 05:45), Max: 98.1 (03 Mar 2021 12:05)  HR: 69 (04 Mar 2021 05:45) (57 - 77)  BP: 151/73 (04 Mar 2021 05:45) (116/65 - 173/54)  BP(mean): 113 (03 Mar 2021 17:00) (106 - 120)  RR: 18 (04 Mar 2021 05:45) (16 - 18)  SpO2: 98% (04 Mar 2021 05:45) (96% - 100%)    LABS:                        9.7    4.54  )-----------( 236      ( 04 Mar 2021 07:07 )             29.8     03-04    140  |  105  |  28<H>  ----------------------------<  106<H>  3.6   |  24  |  2.06<H>    Ca    11.1<H>      04 Mar 2021 07:03  Phos  2.3     03-03  Mg     2.0     03-04    TPro  6.5  /  Alb  3.4  /  TBili  0.2  /  DBili  <0.1  /  AST  24  /  ALT  14  /  AlkPhos  55  03-04    PT/INR - ( 03 Mar 2021 08:26 )   PT: 12.6 sec;   INR: 1.07 ratio         PTT - ( 03 Mar 2021 08:26 )  PTT:35.5 sec    CAPILLARY BLOOD GLUCOSE      POCT Blood Glucose.: 82 mg/dL (04 Mar 2021 08:21)  POCT Blood Glucose.: 126 mg/dL (03 Mar 2021 21:45)  POCT Blood Glucose.: 73 mg/dL (03 Mar 2021 17:53)  POCT Blood Glucose.: 104 mg/dL (03 Mar 2021 14:43)  POCT Blood Glucose.: 127 mg/dL (03 Mar 2021 12:20)      Lower Extremity Physical Exam:  Vascular: DP/PT 2/4, B/L, CFT intact B/L, Temperature gradient warm to warm on L, warm to cool on R   Neuro: Epicritic sensation intact to the level of digits, B/L.  Musculoskeletal/Ortho: unremarkable    s/p Left foot 5th MPJ I&D with bone biopsy (DOS 3/3/21)  Left foot surgical site with intact sutures left open centrally, scant sanguinous drainage, no purulence expressed, no signs of infection

## 2021-03-05 NOTE — PROGRESS NOTE ADULT - SUBJECTIVE AND OBJECTIVE BOX
Vascular Cardiology Progress Note    SERVICE CONSULT: [phone]              EMAIL [email or web address]   OFFICE [phone]    CC:  Retained foreign body in foot    Interval Events:  Doing well.  No C/P or SOB.  L foot post-op discomfort tolerated.    Allergies  Avapro (Hives)  enalapril (Unknown)  losartan (Other)  seasonal allergies-outdoor (Urticaria; Rhinorrhea; Sneezing)    Intolerances  losartan (Other)  	  MEDICATIONS  (STANDING):  allopurinol 100 milliGRAM(s) Oral daily  amLODIPine   Tablet 10 milliGRAM(s) Oral daily  atorvastatin 40 milliGRAM(s) Oral at bedtime  carvedilol 25 milliGRAM(s) Oral every 12 hours  clopidogrel Tablet 75 milliGRAM(s) Oral daily  famotidine    Tablet 20 milliGRAM(s) Oral daily  fenofibrate Tablet 48 milliGRAM(s) Oral daily  folic acid 1 milliGRAM(s) Oral daily  glucagon  Injectable 1 milliGRAM(s) IntraMuscular once  hydrALAZINE 50 milliGRAM(s) Oral two times a day  insulin lispro (ADMELOG) corrective regimen sliding scale   SubCutaneous three times a day before meals  insulin lispro (ADMELOG) corrective regimen sliding scale   SubCutaneous at bedtime  multivitamin 1 Tablet(s) Oral daily  tamsulosin 0.4 milliGRAM(s) Oral at bedtime    PAST MEDICAL & SURGICAL HISTORY:  OA (osteoarthritis)  H/O gastroesophageal reflux (GERD)  Chronic kidney disease (CKD)  SHARATH (obstructive sleep apnea) non compliant with CPAP  Ganglion cyst of wrist, left  CAD (coronary artery disease)  Diabetes  Hyperlipidemia  Hypertension  BPH (Benign Prostatic Hypertrophy)  Pneumonia  H/O: Rotator Cuff Tear  Renal Calculi  Diabetes Mellitus Type II  Hyperlipemia  Hypertension  Bilateral rotator cuff syndrome  History of lumbar laminectomy  Cervical fusion syndrome  Trigger finger of both hands  Bilateral cataracts  lumbar laminectomy  Laminectomy with Spinal Fusion  History of Arthroscopy    FAMILY HISTORY:  Family history of cerebrovascular accident (Mother)    SOCIAL HISTORY:  unchanged    REVIEW OF SYSTEMS:  CONSTITUTIONAL: No fever  EYES: No eye pain  ENT:  No sinus or throat pain  NECK: No pain or stiffness  RESPIRATORY: No SOB  CARDIOVASCULAR: No C/P  GASTROINTESTINAL: No abdominal or epigastric pain.   GENITOURINARY: No hematuria  NEUROLOGICAL: No memory loss, loss of strength  SKIN: L foot dressing C/D/I  LYMPH Nodes: No enlarged glands  ENDOCRINE: No heat or cold intolerance noted  MUSCULOSKELETAL: L foot post-op discomfort tolerated.  PSYCHIATRIC: No depression, anxiety  HEME/LYMPH: No bleeding gums  ALLERGY AND IMMUNOLOGIC: No hives	  [ x] All others negative	    Vital Signs Last 24 Hrs  T(C): 36.4 (05 Mar 2021 09:52), Max: 37.2 (04 Mar 2021 16:33)  T(F): 97.6 (05 Mar 2021 09:52), Max: 98.9 (04 Mar 2021 16:33)  HR: 65 (05 Mar 2021 09:52) (60 - 81)  BP: 153/75 (05 Mar 2021 09:52) (131/84 - 162/68)  RR: 18 (05 Mar 2021 09:52) (18 - 18)  SpO2: 96% (05 Mar 2021 09:52) (94% - 99%)    Appearance: NAD 	  HEENT: NC/AT  Cardiovascular: No JVD, RRR, S1 and S2    Respiratory:  CTA B/L  Psychiatry:  AAO x 3  Gastrointestinal:  Soft, Non-tender, + BS	  Skin: No rashes, No ecchymoses, No cyanosis	  Neurologic: No focal deficit noted   Extremities: Bilateral Calves Soft,  No LE Edema N/L  L Foot in ACE wrap    Vascular Pulse Exam:  Right DP: palpable  Left D : palpable   Right PT: palpable   Left PT: palpable       Labs:                        10.4   3.98  )-----------( 234      ( 05 Mar 2021 07:20 )             32.5       03-05    136  |  104  |  25<H>  ----------------------------<  168<H>  3.9   |  23  |  1.99<H>    Ca    10.8<H>      05 Mar 2021 07:19  Mg     2.1     03-05    TPro  6.8  /  Alb  3.6  /  TBili  0.3  /  DBili  x   /  AST  21  /  ALT  11  /  AlkPhos  63  03-05      Assessment:  1. CAD s/p PCI      Non-revascularized mLAD 70%  2. HTN  3. HLD  4. CKD stage 3-4  5. Retained Foreign Body L Foot      Concern for Osteomyelitis      Plan:  1. S/p Left foot 5th MPJ I&D with bone biopsy.  2. ABX recommendations as per ID. Podiatry recommending long term ABX.  3. Continue Plavix. Recommend resuming ASA for non-revascularized CAD.  4. Monitor BP on Norvasc, Coreg, Hydralazine.  5. Renal function down trending back to 1.9.     Lasix 20 mg every other day pending resumption.  6. Continue Statin.   7. Appreciate excellent Podiatry care.   7. Discussed with Primary Team.    Thank you,  ESEQUIEL Burns, [facility]  Vascular Cardiology Service   [phone]

## 2021-03-05 NOTE — CONSULT NOTE ADULT - ASSESSMENT
Interventional Radiology    Evaluate for Procedure:     HPI: 81 year old male with HTN, CAD with stents, BPH, HLD, gout, GERD, DM2, CKD3 presenting for foreign body in his left foot s/p OR with Podiatry for I&D with concern for residual bone infection requiring central venous access for IV abx. Given pt's CKD stage 3 pt not a candidate for PICC to preserve arm vein for future fistula access if needed.    Allergies: Avapro (Hives)  enalapril (Unknown)  losartan (Other)  seasonal allergies-outdoor (Urticaria; Rhinorrhea; Sneezing)    Medications (Abx/Cardiac/Anticoagulation/Blood Products)    amLODIPine   Tablet: 10 milliGRAM(s) Oral (03-05 @ 05:48)  carvedilol: 25 milliGRAM(s) Oral (03-05 @ 05:48)  ceFAZolin   IVPB: 100 mL/Hr IV Intermittent (03-05 @ 13:40)  clopidogrel Tablet: 75 milliGRAM(s) Oral (03-05 @ 11:36)  hydrALAZINE: 50 milliGRAM(s) Oral (03-05 @ 05:48)  tamsulosin: 0.4 milliGRAM(s) Oral (03-04 @ 21:49)    Data:    T(C): 36.6  HR: 69  BP: 151/77  RR: 18  SpO2: 98%    -WBC 3.98 / HgB 10.4 / Hct 32.5 / Plt 234  -Na 136 / Cl 104 / BUN 25 / Glucose 168  -K 3.9 / CO2 23 / Cr 1.99  -ALT 11 / Alk Phos 63 / T.Bili 0.3  -INR 1.07 / PTT 35.5    Radiology:     Assessment/Plan: 81y Male with OM requiring central venous access for long term IV abx.     - Will plan for tunneled CVC on 3/8.  - NEED COVID TEST WITHIN 72HRS OF PROCEDURE.  - Pt  Do not need to be NPO.  - D/w Eladia JOVEL

## 2021-03-06 LAB
-  AMPICILLIN/SULBACTAM: SIGNIFICANT CHANGE UP
-  CEFAZOLIN: SIGNIFICANT CHANGE UP
-  CLINDAMYCIN: SIGNIFICANT CHANGE UP
-  ERYTHROMYCIN: SIGNIFICANT CHANGE UP
-  GENTAMICIN: SIGNIFICANT CHANGE UP
-  OXACILLIN: SIGNIFICANT CHANGE UP
-  PENICILLIN: SIGNIFICANT CHANGE UP
-  RIFAMPIN: SIGNIFICANT CHANGE UP
-  TETRACYCLINE: SIGNIFICANT CHANGE UP
-  TRIMETHOPRIM/SULFAMETHOXAZOLE: SIGNIFICANT CHANGE UP
-  VANCOMYCIN: SIGNIFICANT CHANGE UP
ANION GAP SERPL CALC-SCNC: 11 MMOL/L — SIGNIFICANT CHANGE UP (ref 5–17)
BUN SERPL-MCNC: 23 MG/DL — SIGNIFICANT CHANGE UP (ref 7–23)
CALCIUM SERPL-MCNC: 11.5 MG/DL — HIGH (ref 8.4–10.5)
CHLORIDE SERPL-SCNC: 105 MMOL/L — SIGNIFICANT CHANGE UP (ref 96–108)
CO2 SERPL-SCNC: 24 MMOL/L — SIGNIFICANT CHANGE UP (ref 22–31)
CREAT SERPL-MCNC: 1.92 MG/DL — HIGH (ref 0.5–1.3)
GLUCOSE SERPL-MCNC: 127 MG/DL — HIGH (ref 70–99)
HCT VFR BLD CALC: 32 % — LOW (ref 39–50)
HGB BLD-MCNC: 10.6 G/DL — LOW (ref 13–17)
MCHC RBC-ENTMCNC: 31.2 PG — SIGNIFICANT CHANGE UP (ref 27–34)
MCHC RBC-ENTMCNC: 33.1 GM/DL — SIGNIFICANT CHANGE UP (ref 32–36)
MCV RBC AUTO: 94.1 FL — SIGNIFICANT CHANGE UP (ref 80–100)
METHOD TYPE: SIGNIFICANT CHANGE UP
NRBC # BLD: 0 /100 WBCS — SIGNIFICANT CHANGE UP (ref 0–0)
PLATELET # BLD AUTO: 251 K/UL — SIGNIFICANT CHANGE UP (ref 150–400)
POTASSIUM SERPL-MCNC: 3.9 MMOL/L — SIGNIFICANT CHANGE UP (ref 3.5–5.3)
POTASSIUM SERPL-SCNC: 3.9 MMOL/L — SIGNIFICANT CHANGE UP (ref 3.5–5.3)
RBC # BLD: 3.4 M/UL — LOW (ref 4.2–5.8)
RBC # FLD: 13.3 % — SIGNIFICANT CHANGE UP (ref 10.3–14.5)
SARS-COV-2 RNA SPEC QL NAA+PROBE: SIGNIFICANT CHANGE UP
SODIUM SERPL-SCNC: 140 MMOL/L — SIGNIFICANT CHANGE UP (ref 135–145)
WBC # BLD: 4.11 K/UL — SIGNIFICANT CHANGE UP (ref 3.8–10.5)
WBC # FLD AUTO: 4.11 K/UL — SIGNIFICANT CHANGE UP (ref 3.8–10.5)

## 2021-03-06 PROCEDURE — 99232 SBSQ HOSP IP/OBS MODERATE 35: CPT

## 2021-03-06 RX ORDER — HEPARIN SODIUM 5000 [USP'U]/ML
5000 INJECTION INTRAVENOUS; SUBCUTANEOUS EVERY 12 HOURS
Refills: 0 | Status: DISCONTINUED | OUTPATIENT
Start: 2021-03-06 | End: 2021-03-09

## 2021-03-06 RX ADMIN — Medication 20 MILLIGRAM(S): at 05:54

## 2021-03-06 RX ADMIN — FAMOTIDINE 20 MILLIGRAM(S): 10 INJECTION INTRAVENOUS at 12:32

## 2021-03-06 RX ADMIN — Medication 1 TABLET(S): at 12:33

## 2021-03-06 RX ADMIN — Medication 100 MILLIGRAM(S): at 12:33

## 2021-03-06 RX ADMIN — ATORVASTATIN CALCIUM 40 MILLIGRAM(S): 80 TABLET, FILM COATED ORAL at 21:05

## 2021-03-06 RX ADMIN — HEPARIN SODIUM 5000 UNIT(S): 5000 INJECTION INTRAVENOUS; SUBCUTANEOUS at 17:50

## 2021-03-06 RX ADMIN — Medication 1 MILLIGRAM(S): at 12:33

## 2021-03-06 RX ADMIN — CLOPIDOGREL BISULFATE 75 MILLIGRAM(S): 75 TABLET, FILM COATED ORAL at 12:33

## 2021-03-06 RX ADMIN — TAMSULOSIN HYDROCHLORIDE 0.4 MILLIGRAM(S): 0.4 CAPSULE ORAL at 21:05

## 2021-03-06 RX ADMIN — Medication 4: at 21:58

## 2021-03-06 RX ADMIN — Medication 100 MILLIGRAM(S): at 05:54

## 2021-03-06 RX ADMIN — Medication 2: at 09:55

## 2021-03-06 RX ADMIN — CARVEDILOL PHOSPHATE 25 MILLIGRAM(S): 80 CAPSULE, EXTENDED RELEASE ORAL at 17:48

## 2021-03-06 RX ADMIN — SENNA PLUS 1 TABLET(S): 8.6 TABLET ORAL at 17:57

## 2021-03-06 RX ADMIN — Medication 48 MILLIGRAM(S): at 13:26

## 2021-03-06 RX ADMIN — Medication 50 MILLIGRAM(S): at 17:47

## 2021-03-06 RX ADMIN — Medication 4: at 13:26

## 2021-03-06 RX ADMIN — Medication 2: at 17:48

## 2021-03-06 RX ADMIN — Medication 100 MILLIGRAM(S): at 17:50

## 2021-03-06 RX ADMIN — AMLODIPINE BESYLATE 10 MILLIGRAM(S): 2.5 TABLET ORAL at 05:54

## 2021-03-06 RX ADMIN — CARVEDILOL PHOSPHATE 25 MILLIGRAM(S): 80 CAPSULE, EXTENDED RELEASE ORAL at 05:54

## 2021-03-06 RX ADMIN — Medication 50 MILLIGRAM(S): at 05:54

## 2021-03-06 NOTE — PROGRESS NOTE ADULT - PROBLEM SELECTOR PLAN 8
- Patient with a history of CKD 3 with baseline creatinine 1.5-2 based on previous records. Trend BMP  -Renally dose medications  -Avoid nephrotoxins - Patient with a history of CKD 3 with baseline creatinine 1.5-2 based on previous records.     Hypercalcemic- ? to osteo. PTH and Vit D levels ordered.

## 2021-03-06 NOTE — PROGRESS NOTE ADULT - SUBJECTIVE AND OBJECTIVE BOX
Patient is a 81y old  Male who presents with a chief complaint of Retained foreign body in foot (05 Mar 2021 16:59)      SUBJECTIVE / OVERNIGHT EVENTS: Pt sitting up in chair, feels well.     Vital Signs Last 24 Hrs  T(C): 36.6 (06 Mar 2021 13:41), Max: 36.9 (06 Mar 2021 05:50)  T(F): 97.8 (06 Mar 2021 13:41), Max: 98.5 (06 Mar 2021 05:50)  HR: 70 (06 Mar 2021 13:41) (61 - 70)  BP: 152/66 (06 Mar 2021 13:41) (130/62 - 160/71)  BP(mean): --  RR: 18 (06 Mar 2021 13:41) (18 - 18)  SpO2: 97% (06 Mar 2021 13:41) (96% - 97%)    MEDICATIONS  (STANDING):  allopurinol 100 milliGRAM(s) Oral daily  amLODIPine   Tablet 10 milliGRAM(s) Oral daily  atorvastatin 40 milliGRAM(s) Oral at bedtime  carvedilol 25 milliGRAM(s) Oral every 12 hours  ceFAZolin   IVPB      ceFAZolin   IVPB 2000 milliGRAM(s) IV Intermittent every 12 hours  clopidogrel Tablet 75 milliGRAM(s) Oral daily  dextrose 40% Gel 15 Gram(s) Oral once  dextrose 5%. 1000 milliLiter(s) (50 mL/Hr) IV Continuous <Continuous>  dextrose 5%. 1000 milliLiter(s) (100 mL/Hr) IV Continuous <Continuous>  dextrose 50% Injectable 25 Gram(s) IV Push once  dextrose 50% Injectable 12.5 Gram(s) IV Push once  dextrose 50% Injectable 25 Gram(s) IV Push once  famotidine    Tablet 20 milliGRAM(s) Oral daily  fenofibrate Tablet 48 milliGRAM(s) Oral daily  folic acid 1 milliGRAM(s) Oral daily  furosemide    Tablet 20 milliGRAM(s) Oral daily  glucagon  Injectable 1 milliGRAM(s) IntraMuscular once  hydrALAZINE 50 milliGRAM(s) Oral two times a day  insulin lispro (ADMELOG) corrective regimen sliding scale   SubCutaneous three times a day before meals  insulin lispro (ADMELOG) corrective regimen sliding scale   SubCutaneous at bedtime  multivitamin 1 Tablet(s) Oral daily  tamsulosin 0.4 milliGRAM(s) Oral at bedtime    MEDICATIONS  (PRN):  acetaminophen   Tablet .. 650 milliGRAM(s) Oral every 6 hours PRN Mild Pain (1 - 3), Moderate Pain (4 - 6)  oxycodone    5 mG/acetaminophen 325 mG 1 Tablet(s) Oral every 4 hours PRN Severe Pain (7 - 10)  polyethylene glycol 3350 17 Gram(s) Oral daily PRN Constipation  senna 1 Tablet(s) Oral daily PRN Constipation        CAPILLARY BLOOD GLUCOSE      POCT Blood Glucose.: 226 mg/dL (06 Mar 2021 13:23)  POCT Blood Glucose.: 175 mg/dL (06 Mar 2021 09:30)  POCT Blood Glucose.: 170 mg/dL (05 Mar 2021 22:24)  POCT Blood Glucose.: 173 mg/dL (05 Mar 2021 17:38)    I&O's Summary    05 Mar 2021 07:01  -  06 Mar 2021 07:00  --------------------------------------------------------  IN: 830 mL / OUT: 850 mL / NET: -20 mL    06 Mar 2021 07:01  -  06 Mar 2021 14:45  --------------------------------------------------------  IN: 540 mL / OUT: 550 mL / NET: -10 mL        PHYSICAL EXAM:  GENERAL: NAD, well-developed  HEAD:  Atraumatic, Normocephalic  EYES: EOMI, PERRLA, conjunctiva and sclera clear  NECK: Supple, No JVD  CHEST/LUNG: Clear to auscultation bilaterally; No wheeze  HEART: Regular rate and rhythm; No murmurs, rubs, or gallops  ABDOMEN: Soft, Nontender, Nondistended; Bowel sounds present  EXTREMITIES:  L foot bandaged  PSYCH: AAOx3  NEUROLOGY: non-focal  SKIN: No rashes or lesions    LABS:                        10.6   4.11  )-----------( 251      ( 06 Mar 2021 07:28 )             32.0     03-06    140  |  105  |  23  ----------------------------<  127<H>  3.9   |  24  |  1.92<H>    Ca    11.5<H>      06 Mar 2021 07:21  Mg     2.1     03-05    TPro  6.8  /  Alb  3.6  /  TBili  0.3  /  DBili  x   /  AST  21  /  ALT  11  /  AlkPhos  63  03-05              RADIOLOGY & ADDITIONAL TESTS:    Imaging Personally Reviewed:    Consultant(s) Notes Reviewed:      Care Discussed with Consultants/Other Providers:

## 2021-03-07 LAB
24R-OH-CALCIDIOL SERPL-MCNC: 35.7 NG/ML — SIGNIFICANT CHANGE UP (ref 30–80)
ANION GAP SERPL CALC-SCNC: 12 MMOL/L — SIGNIFICANT CHANGE UP (ref 5–17)
BUN SERPL-MCNC: 29 MG/DL — HIGH (ref 7–23)
CALCIUM SERPL-MCNC: 10.8 MG/DL — HIGH (ref 8.4–10.5)
CALCIUM SERPL-MCNC: 10.8 MG/DL — HIGH (ref 8.4–10.5)
CHLORIDE SERPL-SCNC: 106 MMOL/L — SIGNIFICANT CHANGE UP (ref 96–108)
CO2 SERPL-SCNC: 23 MMOL/L — SIGNIFICANT CHANGE UP (ref 22–31)
CREAT SERPL-MCNC: 1.95 MG/DL — HIGH (ref 0.5–1.3)
CULTURE RESULTS: SIGNIFICANT CHANGE UP
CULTURE RESULTS: SIGNIFICANT CHANGE UP
GLUCOSE SERPL-MCNC: 122 MG/DL — HIGH (ref 70–99)
POTASSIUM SERPL-MCNC: 3.7 MMOL/L — SIGNIFICANT CHANGE UP (ref 3.5–5.3)
POTASSIUM SERPL-SCNC: 3.7 MMOL/L — SIGNIFICANT CHANGE UP (ref 3.5–5.3)
PTH-INTACT FLD-MCNC: 57 PG/ML — SIGNIFICANT CHANGE UP (ref 15–65)
SODIUM SERPL-SCNC: 141 MMOL/L — SIGNIFICANT CHANGE UP (ref 135–145)
SPECIMEN SOURCE: SIGNIFICANT CHANGE UP
SPECIMEN SOURCE: SIGNIFICANT CHANGE UP
VIT D25+D1,25 OH+D1,25 PNL SERPL-MCNC: 21.9 PG/ML — SIGNIFICANT CHANGE UP (ref 19.9–79.3)

## 2021-03-07 PROCEDURE — 99232 SBSQ HOSP IP/OBS MODERATE 35: CPT

## 2021-03-07 RX ORDER — INSULIN GLARGINE 100 [IU]/ML
10 INJECTION, SOLUTION SUBCUTANEOUS AT BEDTIME
Refills: 0 | Status: DISCONTINUED | OUTPATIENT
Start: 2021-03-07 | End: 2021-03-09

## 2021-03-07 RX ADMIN — HEPARIN SODIUM 5000 UNIT(S): 5000 INJECTION INTRAVENOUS; SUBCUTANEOUS at 05:38

## 2021-03-07 RX ADMIN — CARVEDILOL PHOSPHATE 25 MILLIGRAM(S): 80 CAPSULE, EXTENDED RELEASE ORAL at 18:18

## 2021-03-07 RX ADMIN — HEPARIN SODIUM 5000 UNIT(S): 5000 INJECTION INTRAVENOUS; SUBCUTANEOUS at 18:18

## 2021-03-07 RX ADMIN — POLYETHYLENE GLYCOL 3350 17 GRAM(S): 17 POWDER, FOR SOLUTION ORAL at 12:07

## 2021-03-07 RX ADMIN — CARVEDILOL PHOSPHATE 25 MILLIGRAM(S): 80 CAPSULE, EXTENDED RELEASE ORAL at 05:39

## 2021-03-07 RX ADMIN — Medication 50 MILLIGRAM(S): at 05:38

## 2021-03-07 RX ADMIN — Medication 1 TABLET(S): at 12:04

## 2021-03-07 RX ADMIN — CLOPIDOGREL BISULFATE 75 MILLIGRAM(S): 75 TABLET, FILM COATED ORAL at 12:04

## 2021-03-07 RX ADMIN — FAMOTIDINE 20 MILLIGRAM(S): 10 INJECTION INTRAVENOUS at 12:04

## 2021-03-07 RX ADMIN — AMLODIPINE BESYLATE 10 MILLIGRAM(S): 2.5 TABLET ORAL at 05:38

## 2021-03-07 RX ADMIN — Medication 100 MILLIGRAM(S): at 05:36

## 2021-03-07 RX ADMIN — Medication 100 MILLIGRAM(S): at 18:18

## 2021-03-07 RX ADMIN — Medication 50 MILLIGRAM(S): at 18:18

## 2021-03-07 RX ADMIN — INSULIN GLARGINE 10 UNIT(S): 100 INJECTION, SOLUTION SUBCUTANEOUS at 21:39

## 2021-03-07 RX ADMIN — Medication 4: at 13:34

## 2021-03-07 RX ADMIN — Medication 100 MILLIGRAM(S): at 12:04

## 2021-03-07 RX ADMIN — ATORVASTATIN CALCIUM 40 MILLIGRAM(S): 80 TABLET, FILM COATED ORAL at 21:14

## 2021-03-07 RX ADMIN — TAMSULOSIN HYDROCHLORIDE 0.4 MILLIGRAM(S): 0.4 CAPSULE ORAL at 21:14

## 2021-03-07 RX ADMIN — Medication 20 MILLIGRAM(S): at 05:39

## 2021-03-07 RX ADMIN — Medication 48 MILLIGRAM(S): at 12:05

## 2021-03-07 RX ADMIN — Medication 1 MILLIGRAM(S): at 12:04

## 2021-03-07 NOTE — PROGRESS NOTE ADULT - PROBLEM SELECTOR PLAN 8
- Patient with a history of CKD 3 with baseline creatinine 1.5-2 based on previous records.     Hypercalcemic- ? to osteo. PTH and Vit D levels pending.

## 2021-03-07 NOTE — PROGRESS NOTE ADULT - PROBLEM SELECTOR PLAN 7
-Patient with a history of DM on Actos 30mg daily and Glimepiride 4mg BID  - c/w ISS -Patient with a history of DM on Actos 30mg daily and Glimepiride 4mg BID. A1C 8.4. Fingersticks intermittently elevated, pm Lantus added.

## 2021-03-07 NOTE — PROGRESS NOTE ADULT - SUBJECTIVE AND OBJECTIVE BOX
Patient is a 81y old  Male who presents with a chief complaint of Retained foreign body in foot (06 Mar 2021 14:44)      SUBJECTIVE / OVERNIGHT EVENTS: Pt sitting up in chair, feels well.     Vital Signs Last 24 Hrs  T(C): 36.3 (07 Mar 2021 09:45), Max: 36.8 (06 Mar 2021 17:16)  T(F): 97.4 (07 Mar 2021 09:45), Max: 98.3 (06 Mar 2021 17:16)  HR: 65 (07 Mar 2021 09:45) (62 - 70)  BP: 147/78 (07 Mar 2021 09:45) (129/64 - 160/81)  BP(mean): --  RR: 18 (07 Mar 2021 09:45) (18 - 18)  SpO2: 99% (07 Mar 2021 09:45) (96% - 99%)    MEDICATIONS  (STANDING):  allopurinol 100 milliGRAM(s) Oral daily  amLODIPine   Tablet 10 milliGRAM(s) Oral daily  atorvastatin 40 milliGRAM(s) Oral at bedtime  carvedilol 25 milliGRAM(s) Oral every 12 hours  ceFAZolin   IVPB      ceFAZolin   IVPB 2000 milliGRAM(s) IV Intermittent every 12 hours  clopidogrel Tablet 75 milliGRAM(s) Oral daily  dextrose 40% Gel 15 Gram(s) Oral once  dextrose 5%. 1000 milliLiter(s) (50 mL/Hr) IV Continuous <Continuous>  dextrose 5%. 1000 milliLiter(s) (100 mL/Hr) IV Continuous <Continuous>  dextrose 50% Injectable 25 Gram(s) IV Push once  dextrose 50% Injectable 12.5 Gram(s) IV Push once  dextrose 50% Injectable 25 Gram(s) IV Push once  famotidine    Tablet 20 milliGRAM(s) Oral daily  fenofibrate Tablet 48 milliGRAM(s) Oral daily  folic acid 1 milliGRAM(s) Oral daily  furosemide    Tablet 20 milliGRAM(s) Oral daily  glucagon  Injectable 1 milliGRAM(s) IntraMuscular once  heparin   Injectable 5000 Unit(s) SubCutaneous every 12 hours  hydrALAZINE 50 milliGRAM(s) Oral two times a day  insulin lispro (ADMELOG) corrective regimen sliding scale   SubCutaneous three times a day before meals  insulin lispro (ADMELOG) corrective regimen sliding scale   SubCutaneous at bedtime  multivitamin 1 Tablet(s) Oral daily  tamsulosin 0.4 milliGRAM(s) Oral at bedtime    MEDICATIONS  (PRN):  acetaminophen   Tablet .. 650 milliGRAM(s) Oral every 6 hours PRN Mild Pain (1 - 3), Moderate Pain (4 - 6)  oxycodone    5 mG/acetaminophen 325 mG 1 Tablet(s) Oral every 4 hours PRN Severe Pain (7 - 10)  polyethylene glycol 3350 17 Gram(s) Oral daily PRN Constipation  senna 1 Tablet(s) Oral daily PRN Constipation        CAPILLARY BLOOD GLUCOSE      POCT Blood Glucose.: 161 mg/dL (07 Mar 2021 09:07)  POCT Blood Glucose.: 306 mg/dL (06 Mar 2021 21:48)  POCT Blood Glucose.: 191 mg/dL (06 Mar 2021 17:36)  POCT Blood Glucose.: 226 mg/dL (06 Mar 2021 13:23)    I&O's Summary    06 Mar 2021 07:01  -  07 Mar 2021 07:00  --------------------------------------------------------  IN: 1210 mL / OUT: 1700 mL / NET: -490 mL    07 Mar 2021 07:01  -  07 Mar 2021 10:39  --------------------------------------------------------  IN: 320 mL / OUT: 250 mL / NET: 70 mL        PHYSICAL EXAM:  GENERAL: NAD, well-developed  HEAD:  Atraumatic, Normocephalic  EYES: EOMI, PERRLA, conjunctiva and sclera clear  NECK: Supple, No JVD  CHEST/LUNG: Clear to auscultation bilaterally; No wheeze  HEART: Regular rate and rhythm; No murmurs, rubs, or gallops  ABDOMEN: Soft, Nontender, Nondistended; Bowel sounds present  EXTREMITIES:  L foot bandaged  PSYCH: AAOx3  NEUROLOGY: non-focal  SKIN: No rashes or lesions    LABS:                        10.6   4.11  )-----------( 251      ( 06 Mar 2021 07:28 )             32.0     03-07    141  |  106  |  29<H>  ----------------------------<  122<H>  3.7   |  23  |  1.95<H>    Ca    10.8<H>      07 Mar 2021 07:04                RADIOLOGY & ADDITIONAL TESTS:    Imaging Personally Reviewed:    Consultant(s) Notes Reviewed:      Care Discussed with Consultants/Other Providers: Nurse

## 2021-03-08 LAB
ALBUMIN SERPL ELPH-MCNC: 3.6 G/DL — SIGNIFICANT CHANGE UP (ref 3.3–5)
ALP SERPL-CCNC: 61 U/L — SIGNIFICANT CHANGE UP (ref 40–120)
ALT FLD-CCNC: 9 U/L — LOW (ref 10–45)
ANION GAP SERPL CALC-SCNC: 11 MMOL/L — SIGNIFICANT CHANGE UP (ref 5–17)
AST SERPL-CCNC: 18 U/L — SIGNIFICANT CHANGE UP (ref 10–40)
BILIRUB SERPL-MCNC: 0.3 MG/DL — SIGNIFICANT CHANGE UP (ref 0.2–1.2)
BUN SERPL-MCNC: 33 MG/DL — HIGH (ref 7–23)
CALCIUM SERPL-MCNC: 11.3 MG/DL — HIGH (ref 8.4–10.5)
CHLORIDE SERPL-SCNC: 104 MMOL/L — SIGNIFICANT CHANGE UP (ref 96–108)
CO2 SERPL-SCNC: 25 MMOL/L — SIGNIFICANT CHANGE UP (ref 22–31)
CREAT SERPL-MCNC: 2.14 MG/DL — HIGH (ref 0.5–1.3)
CULTURE RESULTS: SIGNIFICANT CHANGE UP
CULTURE RESULTS: SIGNIFICANT CHANGE UP
GLUCOSE SERPL-MCNC: 114 MG/DL — HIGH (ref 70–99)
HCT VFR BLD CALC: 31.3 % — LOW (ref 39–50)
HGB BLD-MCNC: 10.4 G/DL — LOW (ref 13–17)
MCHC RBC-ENTMCNC: 31 PG — SIGNIFICANT CHANGE UP (ref 27–34)
MCHC RBC-ENTMCNC: 33.2 GM/DL — SIGNIFICANT CHANGE UP (ref 32–36)
MCV RBC AUTO: 93.4 FL — SIGNIFICANT CHANGE UP (ref 80–100)
NRBC # BLD: 0 /100 WBCS — SIGNIFICANT CHANGE UP (ref 0–0)
ORGANISM # SPEC MICROSCOPIC CNT: SIGNIFICANT CHANGE UP
PLATELET # BLD AUTO: 256 K/UL — SIGNIFICANT CHANGE UP (ref 150–400)
POTASSIUM SERPL-MCNC: 3.8 MMOL/L — SIGNIFICANT CHANGE UP (ref 3.5–5.3)
POTASSIUM SERPL-SCNC: 3.8 MMOL/L — SIGNIFICANT CHANGE UP (ref 3.5–5.3)
PROT SERPL-MCNC: 6.9 G/DL — SIGNIFICANT CHANGE UP (ref 6–8.3)
RBC # BLD: 3.35 M/UL — LOW (ref 4.2–5.8)
RBC # FLD: 13.3 % — SIGNIFICANT CHANGE UP (ref 10.3–14.5)
SODIUM SERPL-SCNC: 140 MMOL/L — SIGNIFICANT CHANGE UP (ref 135–145)
SPECIMEN SOURCE: SIGNIFICANT CHANGE UP
SPECIMEN SOURCE: SIGNIFICANT CHANGE UP
WBC # BLD: 4.87 K/UL — SIGNIFICANT CHANGE UP (ref 3.8–10.5)
WBC # FLD AUTO: 4.87 K/UL — SIGNIFICANT CHANGE UP (ref 3.8–10.5)

## 2021-03-08 PROCEDURE — 99232 SBSQ HOSP IP/OBS MODERATE 35: CPT

## 2021-03-08 PROCEDURE — 99233 SBSQ HOSP IP/OBS HIGH 50: CPT

## 2021-03-08 PROCEDURE — 71045 X-RAY EXAM CHEST 1 VIEW: CPT | Mod: 26

## 2021-03-08 RX ORDER — SODIUM CHLORIDE 9 MG/ML
10 INJECTION INTRAMUSCULAR; INTRAVENOUS; SUBCUTANEOUS
Refills: 0 | Status: DISCONTINUED | OUTPATIENT
Start: 2021-03-08 | End: 2021-03-09

## 2021-03-08 RX ORDER — FUROSEMIDE 40 MG
20 TABLET ORAL
Refills: 0 | Status: DISCONTINUED | OUTPATIENT
Start: 2021-03-08 | End: 2021-03-09

## 2021-03-08 RX ORDER — CHLORHEXIDINE GLUCONATE 213 G/1000ML
1 SOLUTION TOPICAL
Refills: 0 | Status: DISCONTINUED | OUTPATIENT
Start: 2021-03-08 | End: 2021-03-09

## 2021-03-08 RX ADMIN — Medication 2: at 08:56

## 2021-03-08 RX ADMIN — ATORVASTATIN CALCIUM 40 MILLIGRAM(S): 80 TABLET, FILM COATED ORAL at 21:22

## 2021-03-08 RX ADMIN — Medication 50 MILLIGRAM(S): at 17:49

## 2021-03-08 RX ADMIN — HEPARIN SODIUM 5000 UNIT(S): 5000 INJECTION INTRAVENOUS; SUBCUTANEOUS at 17:49

## 2021-03-08 RX ADMIN — CARVEDILOL PHOSPHATE 25 MILLIGRAM(S): 80 CAPSULE, EXTENDED RELEASE ORAL at 17:49

## 2021-03-08 RX ADMIN — Medication 6: at 12:59

## 2021-03-08 RX ADMIN — HEPARIN SODIUM 5000 UNIT(S): 5000 INJECTION INTRAVENOUS; SUBCUTANEOUS at 05:32

## 2021-03-08 RX ADMIN — Medication 20 MILLIGRAM(S): at 05:32

## 2021-03-08 RX ADMIN — INSULIN GLARGINE 10 UNIT(S): 100 INJECTION, SOLUTION SUBCUTANEOUS at 21:22

## 2021-03-08 RX ADMIN — Medication 1 TABLET(S): at 12:24

## 2021-03-08 RX ADMIN — CARVEDILOL PHOSPHATE 25 MILLIGRAM(S): 80 CAPSULE, EXTENDED RELEASE ORAL at 05:32

## 2021-03-08 RX ADMIN — TAMSULOSIN HYDROCHLORIDE 0.4 MILLIGRAM(S): 0.4 CAPSULE ORAL at 21:22

## 2021-03-08 RX ADMIN — Medication 100 MILLIGRAM(S): at 17:48

## 2021-03-08 RX ADMIN — Medication 2: at 17:49

## 2021-03-08 RX ADMIN — AMLODIPINE BESYLATE 10 MILLIGRAM(S): 2.5 TABLET ORAL at 05:32

## 2021-03-08 RX ADMIN — CLOPIDOGREL BISULFATE 75 MILLIGRAM(S): 75 TABLET, FILM COATED ORAL at 12:24

## 2021-03-08 RX ADMIN — Medication 50 MILLIGRAM(S): at 05:32

## 2021-03-08 RX ADMIN — Medication 48 MILLIGRAM(S): at 12:24

## 2021-03-08 RX ADMIN — Medication 100 MILLIGRAM(S): at 05:32

## 2021-03-08 RX ADMIN — FAMOTIDINE 20 MILLIGRAM(S): 10 INJECTION INTRAVENOUS at 12:24

## 2021-03-08 RX ADMIN — Medication 1 MILLIGRAM(S): at 12:25

## 2021-03-08 RX ADMIN — Medication 100 MILLIGRAM(S): at 12:27

## 2021-03-08 NOTE — DIETITIAN INITIAL EVALUATION ADULT. - CHIEF COMPLAINT
This is a "81 year old male with PMH HTN, CAD with 4 stents (more than 5 years ago), BPH, HLD, gout, GERD, DM2 and CKD stage 3 who presents for retained foreign body in his left foot s/p OR." PICC line placed for antibiotics, d/c planning.

## 2021-03-08 NOTE — PROGRESS NOTE ADULT - SUBJECTIVE AND OBJECTIVE BOX
Patient is a 81y old  Male who presents with a chief complaint of Retained foreign body in foot (08 Mar 2021 11:40)       INTERVAL HPI/OVERNIGHT EVENTS:  Patient seen and evaluated at bedside.  Pt is resting comfortable in NAD. Denies N/V/F/C.  Pain rated at X/10    Allergies    Avapro (Hives)  enalapril (Unknown)  losartan (Other)  seasonal allergies-outdoor (Urticaria; Rhinorrhea; Sneezing)    Intolerances    losartan (Other)      Vital Signs Last 24 Hrs  T(C): 36.6 (08 Mar 2021 08:20), Max: 37.1 (07 Mar 2021 16:49)  T(F): 97.9 (08 Mar 2021 08:20), Max: 98.8 (07 Mar 2021 20:17)  HR: 60 (08 Mar 2021 08:20) (60 - 72)  BP: 145/50 (08 Mar 2021 08:20) (118/61 - 149/76)  BP(mean): --  RR: 18 (08 Mar 2021 08:20) (18 - 18)  SpO2: 98% (08 Mar 2021 08:20) (96% - 99%)    LABS:                        10.4   4.87  )-----------( 256      ( 08 Mar 2021 07:15 )             31.3     03-08    140  |  104  |  33<H>  ----------------------------<  114<H>  3.8   |  25  |  2.14<H>    Ca    11.3<H>      08 Mar 2021 07:14    TPro  6.9  /  Alb  3.6  /  TBili  0.3  /  DBili  x   /  AST  18  /  ALT  9<L>  /  AlkPhos  61  03-08        CAPILLARY BLOOD GLUCOSE      POCT Blood Glucose.: 169 mg/dL (08 Mar 2021 08:53)  POCT Blood Glucose.: 223 mg/dL (07 Mar 2021 21:17)  POCT Blood Glucose.: 147 mg/dL (07 Mar 2021 17:29)  POCT Blood Glucose.: 216 mg/dL (07 Mar 2021 13:26)      Lower Extremity Physical Exam:    Vascular: DP/PT 2/4, B/L, CFT intact B/L, Temperature gradient warm to warm on L, warm to cool on R   Neuro: Epicritic sensation intact to the level of digits, B/L.  Musculoskeletal/Ortho: unremarkable    s/p Left foot 5th MPJ I&D with bone biopsy (DOS 3/3/21)  Left foot surgical site with intact sutures left open centrally, scant sanguinous drainage, no purulence expressed, no signs of infection    RADIOLOGY & ADDITIONAL TESTS:

## 2021-03-08 NOTE — PROGRESS NOTE ADULT - ASSESSMENT
80 yo M s/p Left foot 5th MPJ I&D with bone biopsy (DOS 3/3/21)    - Pt seen and evaluated  - Left foot surgical site with intact sutures left open centrally, scant sanguinous drainage, no  purulence expressed, no acute signs of infection  - Intraop finding negative for residual foreign body and only hypergranulating tissue   - Flushed, steri strips applied over central surgical opening, redressed with DSD  - High concern for residual OM, will treat with long term IV abx   - OR clean bone margin and OR swab growing MSSA  - Pod stable for discharge pending IV PICC and final ID recommendations  - WBAT to left heel in post op shoe  - Discussed with attending

## 2021-03-08 NOTE — PROGRESS NOTE ADULT - SUBJECTIVE AND OBJECTIVE BOX
Patient has no complaints.    GENERAL: No fevers, no chills.  EYES: No blurry vision,  No photophobia  ENT: No sore throat.  No dysphagia  Cardiovascular: No chest pain, palpitations, orthopnea  Pulmonary: No cough, no wheezing. No shortness of breath  Gastrointestinal: No abdominal pain, no diarrhea, no constipation.  Musculoskeletal: No weakness.  No myalgias.  Dermatology:  No rashes.  Neuro: No Headache.  No vertigo.  No dizziness.  Psych: No anxiety, no depression.  Denies suicidal thoughts.    MEDICATIONS  (STANDING):  allopurinol 100 milliGRAM(s) Oral daily  amLODIPine   Tablet 10 milliGRAM(s) Oral daily  atorvastatin 40 milliGRAM(s) Oral at bedtime  carvedilol 25 milliGRAM(s) Oral every 12 hours  ceFAZolin   IVPB      ceFAZolin   IVPB 2000 milliGRAM(s) IV Intermittent every 12 hours  clopidogrel Tablet 75 milliGRAM(s) Oral daily  dextrose 40% Gel 15 Gram(s) Oral once  dextrose 5%. 1000 milliLiter(s) (50 mL/Hr) IV Continuous <Continuous>  dextrose 5%. 1000 milliLiter(s) (100 mL/Hr) IV Continuous <Continuous>  dextrose 50% Injectable 25 Gram(s) IV Push once  dextrose 50% Injectable 12.5 Gram(s) IV Push once  dextrose 50% Injectable 25 Gram(s) IV Push once  famotidine    Tablet 20 milliGRAM(s) Oral daily  fenofibrate Tablet 48 milliGRAM(s) Oral daily  folic acid 1 milliGRAM(s) Oral daily  furosemide    Tablet 20 milliGRAM(s) Oral daily  glucagon  Injectable 1 milliGRAM(s) IntraMuscular once  heparin   Injectable 5000 Unit(s) SubCutaneous every 12 hours  hydrALAZINE 50 milliGRAM(s) Oral two times a day  insulin glargine Injectable (LANTUS) 10 Unit(s) SubCutaneous at bedtime  insulin lispro (ADMELOG) corrective regimen sliding scale   SubCutaneous three times a day before meals  insulin lispro (ADMELOG) corrective regimen sliding scale   SubCutaneous at bedtime  multivitamin 1 Tablet(s) Oral daily  tamsulosin 0.4 milliGRAM(s) Oral at bedtime    MEDICATIONS  (PRN):  acetaminophen   Tablet .. 650 milliGRAM(s) Oral every 6 hours PRN Mild Pain (1 - 3), Moderate Pain (4 - 6)  oxycodone    5 mG/acetaminophen 325 mG 1 Tablet(s) Oral every 4 hours PRN Severe Pain (7 - 10)  polyethylene glycol 3350 17 Gram(s) Oral daily PRN Constipation  senna 1 Tablet(s) Oral daily PRN Constipation    Vital Signs Last 24 Hrs  T(C): 36.6 (08 Mar 2021 08:20), Max: 37.1 (07 Mar 2021 16:49)  T(F): 97.9 (08 Mar 2021 08:20), Max: 98.8 (07 Mar 2021 20:17)  HR: 60 (08 Mar 2021 08:20) (60 - 72)  BP: 145/50 (08 Mar 2021 08:20) (118/61 - 149/76)  BP(mean): --  RR: 18 (08 Mar 2021 08:20) (18 - 18)  SpO2: 98% (08 Mar 2021 08:20) (96% - 99%)    PHYSICAL EXAM:  GENERAL: NAD, well-developed  HEAD:  Atraumatic, Normocephalic  EYES: EOMI, PERRLA, conjunctiva and sclera clear  NECK: Supple, No JVD  CHEST/LUNG: Clear to auscultation bilaterally; No wheeze  HEART: Regular rate and rhythm; No murmurs, rubs, or gallops  ABDOMEN: Soft, Nontender, Nondistended; Bowel sounds present  EXTREMITIES:  L foot bandaged  PSYCH: AAOx3  NEUROLOGY: non-focal  SKIN: No rashes or lesions    .  LABS:                         10.4   4.87  )-----------( 256      ( 08 Mar 2021 07:15 )             31.3     03-08    140  |  104  |  33<H>  ----------------------------<  114<H>  3.8   |  25  |  2.14<H>    Ca    11.3<H>      08 Mar 2021 07:14    TPro  6.9  /  Alb  3.6  /  TBili  0.3  /  DBili  x   /  AST  18  /  ALT  9<L>  /  AlkPhos  61  03-08              RADIOLOGY, EKG & ADDITIONAL TESTS: Reviewed.

## 2021-03-08 NOTE — DIETITIAN INITIAL EVALUATION ADULT. - ORAL INTAKE PTA/DIET HISTORY
Pt reports good PO intake and appetite PTA, consumes 3 meals per day consisting of a variety of foods. Confirms NKFA. Reports trying to limit intake of concentrated sweets in the diet 2/2 history of T2DM. Pt denies chewing/swallowing difficulty, nausea, vomiting, diarrhea, constipation PTA. Takes multivitamin, fish oil, and vitamin D at home.

## 2021-03-08 NOTE — DIETITIAN INITIAL EVALUATION ADULT. - PROBLEM SELECTOR PLAN 3
-Patient with a history of CAD with 4 stents, continue home dose of carvedilol 25mg BID  -Takes plavix 75mg daily, will restart, spoke with podiatry who states that surgery not invasive  -Continue lipitor 40mg daily and fenofibrate 48mg daily

## 2021-03-08 NOTE — DIETITIAN INITIAL EVALUATION ADULT. - PHYSCIAL ASSESSMENT
Skin: free of pressure injuries per nursing flow sheets, noted with surgical incision to left 5th toe. well nourished/overweight

## 2021-03-08 NOTE — DIETITIAN INITIAL EVALUATION ADULT. - OTHER INFO
Pt with T2DM, checks BG 1x per day, with values </=120mg/dL in the morning. HbA1c: 8.3% (3/3), takes Glimepiride and Actos per report. States BG elevated in-house because "insulin doesn't work for my body", reports he is only on oral medications at home and BG is better controlled.     Weights: pt reports weight has been stable, reports wt is ~180lbs. Weight per [facility] reports weight 182-188lbs over the last year. Current dosing weight is 185lbs.     Since admission pt reports good appetite and PO intake, consuming > 75% of meals. No acute GI distress noted, no BM noted per chart.  Patient with no nutrition-related questions at this time but amendable to brief review. Provided education on Heart-Healthy Nutrition Therapy including sources of lean protein, heart healthy fats, limiting high sodium foods and incorporating fruits, vegetables and whole grains in the diet. Also discussed Carbohydrate Consistent diet including sources of carbohydrates, pairing protein with carbohydrates, limiting sugar sweetened beverages in diet and the importance of consistent eating pattern to help optimize glycemic control. Written materials provided. Pt made aware RD remains available as needed.

## 2021-03-08 NOTE — PROGRESS NOTE ADULT - ATTENDING COMMENTS
disposition: dc pending picc line placement and arrangement for home care agency for home antibiotics  discussed with NP Julián Gomez D.O.  Hospitalist Pager # [phone]

## 2021-03-08 NOTE — DIETITIAN INITIAL EVALUATION ADULT. - PROBLEM/PLAN-1
From: Daria Almanza  To: Román Lynne MD  Sent: 5/14/2019 6:26 AM CDT  Subject: After-Visit Question    Hello:  At my appointment last Tuesday, you mentioned that I was to wait a couple of weeks before having blood drawn for an A1C. It’s on my calendar for next week. However, you mentioned that I can only have (I think) 2 A1C’s in a year. If I have one now (the last one was 2/2019) what about the appointment that I’ve scheduled with you for late October? Will I be able to have another one then? I’m confused.    Please let me know.    Thank you,  Daria Almanza  
DISPLAY PLAN FREE TEXT

## 2021-03-08 NOTE — DIETITIAN INITIAL EVALUATION ADULT. - PERTINENT LABORATORY DATA
POCT Blood Glucose.: 266 mg/dL (03-08-21 @ 12:55)  POCT Blood Glucose.: 169 mg/dL (03-08-21 @ 08:53)  POCT Blood Glucose.: 223 mg/dL (03-07-21 @ 21:17)  POCT Blood Glucose.: 147 mg/dL (03-07-21 @ 17:29)  BUN 33, creatinine 2.14 HbA1c: 8.3% (3/5)

## 2021-03-09 ENCOUNTER — TRANSCRIPTION ENCOUNTER (OUTPATIENT)
Age: 81
End: 2021-03-09

## 2021-03-09 VITALS
OXYGEN SATURATION: 98 % | TEMPERATURE: 98 F | DIASTOLIC BLOOD PRESSURE: 68 MMHG | RESPIRATION RATE: 18 BRPM | HEART RATE: 65 BPM | SYSTOLIC BLOOD PRESSURE: 147 MMHG

## 2021-03-09 LAB
ANION GAP SERPL CALC-SCNC: 11 MMOL/L — SIGNIFICANT CHANGE UP (ref 5–17)
BUN SERPL-MCNC: 33 MG/DL — HIGH (ref 7–23)
CALCIUM SERPL-MCNC: 11.8 MG/DL — HIGH (ref 8.4–10.5)
CHLORIDE SERPL-SCNC: 104 MMOL/L — SIGNIFICANT CHANGE UP (ref 96–108)
CO2 SERPL-SCNC: 27 MMOL/L — SIGNIFICANT CHANGE UP (ref 22–31)
CREAT SERPL-MCNC: 2.19 MG/DL — HIGH (ref 0.5–1.3)
GLUCOSE SERPL-MCNC: 123 MG/DL — HIGH (ref 70–99)
POTASSIUM SERPL-MCNC: 3.8 MMOL/L — SIGNIFICANT CHANGE UP (ref 3.5–5.3)
POTASSIUM SERPL-SCNC: 3.8 MMOL/L — SIGNIFICANT CHANGE UP (ref 3.5–5.3)
SODIUM SERPL-SCNC: 142 MMOL/L — SIGNIFICANT CHANGE UP (ref 135–145)

## 2021-03-09 PROCEDURE — 99232 SBSQ HOSP IP/OBS MODERATE 35: CPT

## 2021-03-09 PROCEDURE — 97116 GAIT TRAINING THERAPY: CPT

## 2021-03-09 PROCEDURE — 97112 NEUROMUSCULAR REEDUCATION: CPT

## 2021-03-09 PROCEDURE — 85610 PROTHROMBIN TIME: CPT

## 2021-03-09 PROCEDURE — 84132 ASSAY OF SERUM POTASSIUM: CPT

## 2021-03-09 PROCEDURE — 88311 DECALCIFY TISSUE: CPT

## 2021-03-09 PROCEDURE — 85014 HEMATOCRIT: CPT

## 2021-03-09 PROCEDURE — 83605 ASSAY OF LACTIC ACID: CPT

## 2021-03-09 PROCEDURE — U0003: CPT

## 2021-03-09 PROCEDURE — 85027 COMPLETE CBC AUTOMATED: CPT

## 2021-03-09 PROCEDURE — 36569 INSJ PICC 5 YR+ W/O IMAGING: CPT

## 2021-03-09 PROCEDURE — 85652 RBC SED RATE AUTOMATED: CPT

## 2021-03-09 PROCEDURE — 80053 COMPREHEN METABOLIC PANEL: CPT

## 2021-03-09 PROCEDURE — 82330 ASSAY OF CALCIUM: CPT

## 2021-03-09 PROCEDURE — 85730 THROMBOPLASTIN TIME PARTIAL: CPT

## 2021-03-09 PROCEDURE — 83735 ASSAY OF MAGNESIUM: CPT

## 2021-03-09 PROCEDURE — 85025 COMPLETE CBC W/AUTO DIFF WBC: CPT

## 2021-03-09 PROCEDURE — 83970 ASSAY OF PARATHORMONE: CPT

## 2021-03-09 PROCEDURE — 86901 BLOOD TYPING SEROLOGIC RH(D): CPT

## 2021-03-09 PROCEDURE — 71045 X-RAY EXAM CHEST 1 VIEW: CPT

## 2021-03-09 PROCEDURE — 86140 C-REACTIVE PROTEIN: CPT

## 2021-03-09 PROCEDURE — 99285 EMERGENCY DEPT VISIT HI MDM: CPT

## 2021-03-09 PROCEDURE — 87040 BLOOD CULTURE FOR BACTERIA: CPT

## 2021-03-09 PROCEDURE — 87070 CULTURE OTHR SPECIMN AEROBIC: CPT

## 2021-03-09 PROCEDURE — 82803 BLOOD GASES ANY COMBINATION: CPT

## 2021-03-09 PROCEDURE — 71046 X-RAY EXAM CHEST 2 VIEWS: CPT

## 2021-03-09 PROCEDURE — 80048 BASIC METABOLIC PNL TOTAL CA: CPT

## 2021-03-09 PROCEDURE — 85018 HEMOGLOBIN: CPT

## 2021-03-09 PROCEDURE — 86769 SARS-COV-2 COVID-19 ANTIBODY: CPT

## 2021-03-09 PROCEDURE — 73630 X-RAY EXAM OF FOOT: CPT

## 2021-03-09 PROCEDURE — C1751: CPT

## 2021-03-09 PROCEDURE — 82947 ASSAY GLUCOSE BLOOD QUANT: CPT

## 2021-03-09 PROCEDURE — U0005: CPT

## 2021-03-09 PROCEDURE — 83036 HEMOGLOBIN GLYCOSYLATED A1C: CPT

## 2021-03-09 PROCEDURE — 87077 CULTURE AEROBIC IDENTIFY: CPT

## 2021-03-09 PROCEDURE — 82962 GLUCOSE BLOOD TEST: CPT

## 2021-03-09 PROCEDURE — 86850 RBC ANTIBODY SCREEN: CPT

## 2021-03-09 PROCEDURE — 82310 ASSAY OF CALCIUM: CPT

## 2021-03-09 PROCEDURE — 87075 CULTR BACTERIA EXCEPT BLOOD: CPT

## 2021-03-09 PROCEDURE — 82306 VITAMIN D 25 HYDROXY: CPT

## 2021-03-09 PROCEDURE — 82652 VIT D 1 25-DIHYDROXY: CPT

## 2021-03-09 PROCEDURE — 80076 HEPATIC FUNCTION PANEL: CPT

## 2021-03-09 PROCEDURE — 86900 BLOOD TYPING SEROLOGIC ABO: CPT

## 2021-03-09 PROCEDURE — 88305 TISSUE EXAM BY PATHOLOGIST: CPT

## 2021-03-09 PROCEDURE — 97161 PT EVAL LOW COMPLEX 20 MIN: CPT

## 2021-03-09 PROCEDURE — 84100 ASSAY OF PHOSPHORUS: CPT

## 2021-03-09 PROCEDURE — 84295 ASSAY OF SERUM SODIUM: CPT

## 2021-03-09 PROCEDURE — 87186 SC STD MICRODIL/AGAR DIL: CPT

## 2021-03-09 PROCEDURE — 82435 ASSAY OF BLOOD CHLORIDE: CPT

## 2021-03-09 PROCEDURE — 99233 SBSQ HOSP IP/OBS HIGH 50: CPT

## 2021-03-09 RX ORDER — SENNA PLUS 8.6 MG/1
1 TABLET ORAL
Qty: 0 | Refills: 0 | DISCHARGE
Start: 2021-03-09

## 2021-03-09 RX ORDER — POLYETHYLENE GLYCOL 3350 17 G/17G
17 POWDER, FOR SOLUTION ORAL
Qty: 119 | Refills: 0
Start: 2021-03-09 | End: 2021-03-15

## 2021-03-09 RX ORDER — FUROSEMIDE 40 MG
1 TABLET ORAL
Qty: 0 | Refills: 0 | DISCHARGE

## 2021-03-09 RX ORDER — SENNA PLUS 8.6 MG/1
1 TABLET ORAL
Qty: 7 | Refills: 0
Start: 2021-03-09 | End: 2021-03-15

## 2021-03-09 RX ORDER — ACETAMINOPHEN 500 MG
2 TABLET ORAL
Qty: 0 | Refills: 0 | DISCHARGE
Start: 2021-03-09

## 2021-03-09 RX ORDER — POLYETHYLENE GLYCOL 3350 17 G/17G
17 POWDER, FOR SOLUTION ORAL
Qty: 0 | Refills: 0 | DISCHARGE
Start: 2021-03-09

## 2021-03-09 RX ADMIN — Medication 4: at 13:02

## 2021-03-09 RX ADMIN — CARVEDILOL PHOSPHATE 25 MILLIGRAM(S): 80 CAPSULE, EXTENDED RELEASE ORAL at 05:23

## 2021-03-09 RX ADMIN — Medication 2: at 09:20

## 2021-03-09 RX ADMIN — Medication 20 MILLIGRAM(S): at 09:20

## 2021-03-09 RX ADMIN — Medication 1 TABLET(S): at 11:57

## 2021-03-09 RX ADMIN — CLOPIDOGREL BISULFATE 75 MILLIGRAM(S): 75 TABLET, FILM COATED ORAL at 11:57

## 2021-03-09 RX ADMIN — CHLORHEXIDINE GLUCONATE 1 APPLICATION(S): 213 SOLUTION TOPICAL at 09:19

## 2021-03-09 RX ADMIN — Medication 50 MILLIGRAM(S): at 05:23

## 2021-03-09 RX ADMIN — AMLODIPINE BESYLATE 10 MILLIGRAM(S): 2.5 TABLET ORAL at 05:23

## 2021-03-09 RX ADMIN — Medication 1 MILLIGRAM(S): at 11:57

## 2021-03-09 RX ADMIN — Medication 100 MILLIGRAM(S): at 11:57

## 2021-03-09 RX ADMIN — Medication 48 MILLIGRAM(S): at 13:02

## 2021-03-09 RX ADMIN — Medication 100 MILLIGRAM(S): at 05:24

## 2021-03-09 RX ADMIN — HEPARIN SODIUM 5000 UNIT(S): 5000 INJECTION INTRAVENOUS; SUBCUTANEOUS at 05:24

## 2021-03-09 RX ADMIN — FAMOTIDINE 20 MILLIGRAM(S): 10 INJECTION INTRAVENOUS at 11:57

## 2021-03-09 NOTE — DISCHARGE NOTE NURSING/CASE MANAGEMENT/SOCIAL WORK - NSDCFUADDAPPT_GEN_ALL_CORE_FT
- ABX till 4/14/21.  -Will need CBC/ CMP/ ESR/ CRP weekly on discharge faxed to office at [phone]

## 2021-03-09 NOTE — PROGRESS NOTE ADULT - REASON FOR ADMISSION
Retained foreign body in foot

## 2021-03-09 NOTE — PROGRESS NOTE ADULT - SUBJECTIVE AND OBJECTIVE BOX
Vascular Cardiology Progress Note    SERVICE CONSULT: [phone]              [email or web address]   OFFICE [phone]    CC:  Retained foreign body in foot    Interval Events:  Doing well.  Resting comfortably.  No C/P or SOB.  No L foot pain reported.  S/p PICC line.    Allergies  Avapro (Hives)  enalapril (Unknown)  losartan (Other)  seasonal allergies-outdoor (Urticaria; Rhinorrhea; Sneezing)    Intolerances  losartan (Other)  	  MEDICATIONS  (STANDING):  allopurinol 100 milliGRAM(s) Oral daily  amLODIPine   Tablet 10 milliGRAM(s) Oral daily  atorvastatin 40 milliGRAM(s) Oral at bedtime  carvedilol 25 milliGRAM(s) Oral every 12 hours  ceFAZolin   IVPB      ceFAZolin   IVPB 2000 milliGRAM(s) IV Intermittent every 12 hours  chlorhexidine 4% Liquid 1 Application(s) Topical <User Schedule>  clopidogrel Tablet 75 milliGRAM(s) Oral daily  famotidine    Tablet 20 milliGRAM(s) Oral daily  fenofibrate Tablet 48 milliGRAM(s) Oral daily  folic acid 1 milliGRAM(s) Oral daily  furosemide    Tablet 20 milliGRAM(s) Oral <User Schedule>  glucagon  Injectable 1 milliGRAM(s) IntraMuscular once  heparin   Injectable 5000 Unit(s) SubCutaneous every 12 hours  hydrALAZINE 50 milliGRAM(s) Oral two times a day  insulin glargine Injectable (LANTUS) 10 Unit(s) SubCutaneous at bedtime  insulin lispro (ADMELOG) corrective regimen sliding scale   SubCutaneous three times a day before meals  insulin lispro (ADMELOG) corrective regimen sliding scale   SubCutaneous at bedtime  multivitamin 1 Tablet(s) Oral daily  tamsulosin 0.4 milliGRAM(s) Oral at bedtime      PAST MEDICAL & SURGICAL HISTORY:  OA (osteoarthritis)  H/O gastroesophageal reflux (GERD)  Chronic kidney disease (CKD)  SHARATH (obstructive sleep apnea) non compliant with CPAP  Ganglion cyst of wrist, left  CAD (coronary artery disease)  Diabetes  Hyperlipidemia  Hypertension  BPH (Benign Prostatic Hypertrophy)  Pneumonia  H/O: Rotator Cuff Tear  Renal Calculi  Diabetes Mellitus Type II  Hyperlipemia  Hypertension  Bilateral rotator cuff syndrome  History of lumbar laminectomy  Cervical fusion syndrome  Trigger finger of both hands  Bilateral cataracts  lumbar laminectomy  Laminectomy with Spinal Fusion  History of Arthroscopy    FAMILY HISTORY:  Family history of cerebrovascular accident (Mother)    SOCIAL HISTORY:  unchanged    REVIEW OF SYSTEMS:  CONSTITUTIONAL: No fever  EYES: No eye pain  ENT:  No sinus or throat pain  NECK: No pain or stiffness  RESPIRATORY: No SOB  CARDIOVASCULAR: No C/P  GASTROINTESTINAL: No abdominal or epigastric pain.   GENITOURINARY: No hematuria  NEUROLOGICAL: No memory loss, loss of strength  SKIN: L foot dressing C/D/I  LYMPH Nodes: No enlarged glands  ENDOCRINE: No heat or cold intolerance noted  MUSCULOSKELETAL: prior L foot post-op discomfort  PSYCHIATRIC: No depression, anxiety  HEME/LYMPH: No bleeding gums  ALLERGY AND IMMUNOLOGIC: No hives	  [ x] All others negative	    Vital Signs Last 24 Hrs  Vital Signs Last 24 Hrs  T(C): 36.9 (09 Mar 2021 08:36), Max: 37.1 (08 Mar 2021 21:18)  T(F): 98.4 (09 Mar 2021 08:36), Max: 98.7 (08 Mar 2021 21:18)  HR: 67 (09 Mar 2021 08:36) (61 - 69)  BP: 124/62 (09 Mar 2021 08:36) (124/62 - 157/77)  RR: 18 (09 Mar 2021 08:36) (18 - 18)  SpO2: 98% (09 Mar 2021 08:36) (96% - 100%)    Appearance: NAD 	  HEENT: NC/AT  Cardiovascular: No JVD, RRR, S1 and S2    Respiratory:  CTA B/L  Psychiatry:  AAO x 3  Gastrointestinal:  Soft, Non-tender, + BS	  Skin: No rashes, No ecchymoses, No cyanosis	  Neurologic: No focal deficit noted   Extremities: Bilateral Calves Soft,  No LE Edema N/L  L Foot in ACE wrap  R UE PICC Line    Vascular Pulse Exam:  Right DP: palpable  Left D : palpable   Right PT: palpable   Left PT: palpable       Labs:                    10.4   4.87  )-----------( 256      ( 08 Mar 2021 07:15 )             31.3     03-09    142  |  104  |  33<H>  ----------------------------<  123<H>  3.8   |  27  |  2.19<H>    Ca    11.8<H>      09 Mar 2021 07:10    TPro  6.9  /  Alb  3.6  /  TBili  0.3  /  DBili  x   /  AST  18  /  ALT  9<L>  /  AlkPhos  61  03-08      Assessment:  1. CAD s/p PCI      Non-revascularized mLAD 70%  2. HTN  3. HLD  4. CKD stage 3-4  5. Retained Foreign Body L Foot      Concern for Osteomyelitis      Plan:  1. S/p Left foot 5th MPJ I&D with bone biopsy.  2. ABX recommendations as per ID. Long term ABX. s/p PICC.  3. Continue Plavix. Recommend resuming ASA for non-revascularized CAD.  4. Monitor BP on Norvasc, Coreg, Hydralazine.  5. Monitor Renal function.     On Lasix 20 mg every other day.   6. Continue Statin.   7. Appreciate excellent Podiatry care.   8. Awaiting home ABX set-up.     Thank you,  ESEQUIEL Burns, MPAS  Vascular Cardiology Service   [phone]

## 2021-03-09 NOTE — PROGRESS NOTE ADULT - PROVIDER SPECIALTY LIST ADULT
Infectious Disease
Podiatry
Vascular Cardiology
Vascular Cardiology
Infectious Disease
Vascular Cardiology
Hospitalist
Infectious Disease
Vascular Cardiology
Infectious Disease
Podiatry
Hospitalist

## 2021-03-09 NOTE — PROGRESS NOTE ADULT - PROBLEM SELECTOR PLAN 3
-Patient with a history of CAD with 4 stents, continue home dose of carvedilol 25mg BID  - c/w Plavix daily  - Continue Lipitor 40mg daily and fenofibrate
-Patient with a history of CAD with 4 stents, continue home dose of carvedilol 25mg BID  - c/w Plavix daily, Lipitor 40mg daily and fenofibrate
-Patient with a history of CAD with 4 stents, continue home dose of carvedilol 25mg BID  - c/w Plavix daily, Lipitor 40mg daily and fenofibrate
-Patient with a history of CAD with 4 stents, continue home dose of carvedilol 25mg BID  - c/w Plavix  - Continue Lipitor 40mg daily and fenofibrate
-Patient with a history of CAD with 4 stents, continue home dose of carvedilol 25mg BID  - c/w Plavix daily, Lipitor 40mg daily and fenofibrate
-Patient with a history of CAD with 4 stents, continue home dose of carvedilol 25mg BID  - c/w Plavix daily, Lipitor 40mg daily and fenofibrate
-Patient with a history of CAD with 4 stents, continue home dose of carvedilol 25mg BID  - c/w Plavix  - Continue Lipitor 40mg daily and fenofibrate

## 2021-03-09 NOTE — PROGRESS NOTE ADULT - SUBJECTIVE AND OBJECTIVE BOX
Patient has no complaints.    GENERAL: No fevers, no chills.  EYES: No blurry vision,  No photophobia  ENT: No sore throat.  No dysphagia  Cardiovascular: No chest pain, palpitations, orthopnea  Pulmonary: No cough, no wheezing. No shortness of breath  Gastrointestinal: No abdominal pain, no diarrhea, no constipation.  Musculoskeletal: No weakness.  No myalgias.  Dermatology:  No rashes.  Neuro: No Headache.  No vertigo.  No dizziness.  Psych: No anxiety, no depression.  Denies suicidal thoughts.    MEDICATIONS  (STANDING):  allopurinol 100 milliGRAM(s) Oral daily  amLODIPine   Tablet 10 milliGRAM(s) Oral daily  atorvastatin 40 milliGRAM(s) Oral at bedtime  carvedilol 25 milliGRAM(s) Oral every 12 hours  ceFAZolin   IVPB      ceFAZolin   IVPB 2000 milliGRAM(s) IV Intermittent every 12 hours  chlorhexidine 4% Liquid 1 Application(s) Topical <User Schedule>  clopidogrel Tablet 75 milliGRAM(s) Oral daily  dextrose 40% Gel 15 Gram(s) Oral once  dextrose 5%. 1000 milliLiter(s) (50 mL/Hr) IV Continuous <Continuous>  dextrose 5%. 1000 milliLiter(s) (100 mL/Hr) IV Continuous <Continuous>  dextrose 50% Injectable 25 Gram(s) IV Push once  dextrose 50% Injectable 12.5 Gram(s) IV Push once  dextrose 50% Injectable 25 Gram(s) IV Push once  famotidine    Tablet 20 milliGRAM(s) Oral daily  fenofibrate Tablet 48 milliGRAM(s) Oral daily  folic acid 1 milliGRAM(s) Oral daily  furosemide    Tablet 20 milliGRAM(s) Oral <User Schedule>  glucagon  Injectable 1 milliGRAM(s) IntraMuscular once  heparin   Injectable 5000 Unit(s) SubCutaneous every 12 hours  hydrALAZINE 50 milliGRAM(s) Oral two times a day  insulin glargine Injectable (LANTUS) 10 Unit(s) SubCutaneous at bedtime  insulin lispro (ADMELOG) corrective regimen sliding scale   SubCutaneous three times a day before meals  insulin lispro (ADMELOG) corrective regimen sliding scale   SubCutaneous at bedtime  multivitamin 1 Tablet(s) Oral daily  tamsulosin 0.4 milliGRAM(s) Oral at bedtime    MEDICATIONS  (PRN):  acetaminophen   Tablet .. 650 milliGRAM(s) Oral every 6 hours PRN Mild Pain (1 - 3), Moderate Pain (4 - 6)  oxycodone    5 mG/acetaminophen 325 mG 1 Tablet(s) Oral every 4 hours PRN Severe Pain (7 - 10)  polyethylene glycol 3350 17 Gram(s) Oral daily PRN Constipation  senna 1 Tablet(s) Oral daily PRN Constipation  sodium chloride 0.9% lock flush 10 milliLiter(s) IV Push every 1 hour PRN Pre/post blood products, medications, blood draw, and to maintain line patency    Vital Signs Last 24 Hrs  T(C): 36.9 (09 Mar 2021 08:36), Max: 37.1 (08 Mar 2021 21:18)  T(F): 98.4 (09 Mar 2021 08:36), Max: 98.7 (08 Mar 2021 21:18)  HR: 67 (09 Mar 2021 08:36) (61 - 69)  BP: 124/62 (09 Mar 2021 08:36) (124/62 - 157/77)  BP(mean): --  RR: 18 (09 Mar 2021 08:36) (18 - 18)  SpO2: 98% (09 Mar 2021 08:36) (96% - 100%)    PHYSICAL EXAM:  GENERAL: NAD, well-developed  HEAD:  Atraumatic, Normocephalic  EYES: EOMI, PERRLA, conjunctiva and sclera clear  NECK: Supple, No JVD  CHEST/LUNG: Clear to auscultation bilaterally; No wheeze  HEART: Regular rate and rhythm; No murmurs, rubs, or gallops  ABDOMEN: Soft, Nontender, Nondistended; Bowel sounds present  EXTREMITIES:  L foot bandaged  PSYCH: AAOx3  NEUROLOGY: non-focal  SKIN: No rashes or lesions    .  LABS:                         10.4   4.87  )-----------( 256      ( 08 Mar 2021 07:15 )             31.3     03-09    142  |  104  |  33<H>  ----------------------------<  123<H>  3.8   |  27  |  2.19<H>    Ca    11.8<H>      09 Mar 2021 07:10    TPro  6.9  /  Alb  3.6  /  TBili  0.3  /  DBili  x   /  AST  18  /  ALT  9<L>  /  AlkPhos  61  03-08              RADIOLOGY, EKG & ADDITIONAL TESTS: Reviewed.

## 2021-03-09 NOTE — PROGRESS NOTE ADULT - PROBLEM SELECTOR PROBLEM 9
Need for prophylactic measure
Need for prophylactic measure
Preop examination
Need for prophylactic measure

## 2021-03-09 NOTE — PROGRESS NOTE ADULT - PROBLEM SELECTOR PLAN 4
- c/w tamsulosin 0.4mg daily. Patient also takes finasteride 1mg daily for hair loss but pharmacy does not have, will hold for now
-Patient with a history of BPH, continue home dose of tamsulosin 0.4mg daily  -Note, patient also takes finasteride 1mg daily for hair loss but pharmacy does not have, will hold for now
- c/w tamsulosin 0.4mg daily. Patient also takes finasteride 1mg daily for hair loss but pharmacy does not have, will hold for now
- c/w tamsulosin 0.4mg daily. Patient also takes finasteride 1mg daily for hair loss but pharmacy does not have, will hold for now
- c/w tamsulosin 0.4mg daily  -Note, patient also takes finasteride 1mg daily for hair loss but pharmacy does not have, will hold for now
- c/w tamsulosin 0.4mg daily. Patient also takes finasteride 1mg daily for hair loss but pharmacy does not have, will hold for now
-Patient with a history of BPH, continue home dose of tamsulosin 0.4mg daily  -Note, patient also takes finasteride 1mg daily for hair loss but pharmacy does not have, will hold for now

## 2021-03-09 NOTE — PROGRESS NOTE ADULT - PROBLEM SELECTOR PLAN 1
-Evaluated by podiatry- s/p I&D with foreign body removal and bone biopsy   -Xray of L foot with erosive changes are present about the fifth metatarsal head and lateral base of the proximal fifth phalanx, new when compared with prior exam, consistent with osteomyelitis  - ID following rec monitoring off abx for now-- discussed with Dr. Maguire today, he will follow up with podiatry today  - BCx ngtd
-Evaluated by podiatry- s/p I&D with foreign body removal and bone biopsy   -Xray of L foot with erosive changes are present about the fifth metatarsal head and lateral base of the proximal fifth phalanx, new when compared with prior exam, consistent with osteomyelitis  - On cefazolin 2gm q12h x 6 weeks (minimum)  - PICC line ordered, script given to case management for home infusion   - BCx ngtd
-Evaluated by podiatry-for OR today for I&D with foreign body removal and bone biopsy   -Xray of L foot with erosive changes are present about the fifth metatarsal head and lateral base of the proximal fifth phalanx, new when compared with prior exam, consistent with osteomyelitis  - ID following rec monitoring off abx for now.  - BCx pending
-Evaluated by podiatry- s/p I&D with foreign body removal and bone biopsy   -Xray of L foot with erosive changes are present about the fifth metatarsal head and lateral base of the proximal fifth phalanx, new when compared with prior exam, consistent with osteomyelitis  - ID following-- likely osteomyelitis-- start cefazolin 2gm q12h x 6 weeks (minimum)  - PICC line ordered, script given to case management for home infusion   - BCx ngtd
- s/p I&D with foreign body removal and bone biopsy-- consistent with osteomyelitis  - c/w cefazolin 2gm q12h x 6 weeks  - PICC line to be placed 3/8  - BCx ngtd
- s/p I&D with foreign body removal and bone biopsy-- consistent with osteomyelitis  - c/w cefazolin 2gm q12h x 6 weeks  - PICC line to be placed 3/8  - BCx ngtd
-Evaluated by podiatry- s/p I&D with foreign body removal and bone biopsy   -Xray of L foot with erosive changes are present about the fifth metatarsal head and lateral base of the proximal fifth phalanx, new when compared with prior exam, consistent with osteomyelitis  - On cefazolin 2gm q12h x 6 weeks (minimum)  - PICC line ordered, script given to case management for home infusion   - BCx ngtd

## 2021-03-09 NOTE — PROGRESS NOTE ADULT - ASSESSMENT
81 year old male with HTN, CAD with stents, BPH, HLD, gout, GERD, DM2, CKD3 presenting for foreign body in his left foot, initially was in the ED 1/15/21, was cleaned and discharged on augmentin. Was following with outpatient Podiatry with recent MRI demonstrating piece of glass retained so sent to the ED. XR here with erosive changes on the fifth metatarsal head and lateral base of the proximal fifth phalanx consistent with OM. s/p OR with Podiatry for I&D with concern for residual bone infection.    Afebrile, WBC WNL.  No systemic signs of infection.  Deep tissue culture with MSSA  Discussed with podiatry and concern for residual OM    Recommend:  #Left foot wound with retained glass, XR concerning for OM  -Deep culture with MSSA  -Continue cefazolin 2 grams IV q 12 hours (renally dosed)  -Will need PICC and at least 6 weeks 4/14/21.  -Will need CBC/ CMP/ ESR/ CRP weekly on discharge faxed to my office at [phone]    #CKD3  -Continue to monitor Cr while on abx and adjust accordingly    Bassem Maguire MD  Pager [phone]  After 5pm/weekends call [phone]    Please call with questions.    Discussed with primary team.

## 2021-03-09 NOTE — PROGRESS NOTE ADULT - PROBLEM SELECTOR PROBLEM 1
Retained foreign body

## 2021-03-09 NOTE — PROGRESS NOTE ADULT - SUBJECTIVE AND OBJECTIVE BOX
CC: Patient is a 81y old  Male who presents with a chief complaint of Retained foreign body in foot (09 Mar 2021 12:34)    ID following for left foot infection    Interval History/ROS: Patient feels well. Has no new complaints. No fevers, no chills. Ambulating around the foor.    Rest of ROS negative.    Allergies  Avapro (Hives)  enalapril (Unknown)  losartan (Other)  seasonal allergies-outdoor (Urticaria; Rhinorrhea; Sneezing)    ANTIMICROBIALS:  ceFAZolin   IVPB    ceFAZolin   IVPB 2000 every 12 hours    OTHER MEDS:  acetaminophen   Tablet .. 650 milliGRAM(s) Oral every 6 hours PRN  allopurinol 100 milliGRAM(s) Oral daily  amLODIPine   Tablet 10 milliGRAM(s) Oral daily  atorvastatin 40 milliGRAM(s) Oral at bedtime  carvedilol 25 milliGRAM(s) Oral every 12 hours  chlorhexidine 4% Liquid 1 Application(s) Topical <User Schedule>  clopidogrel Tablet 75 milliGRAM(s) Oral daily  dextrose 40% Gel 15 Gram(s) Oral once  dextrose 5%. 1000 milliLiter(s) IV Continuous <Continuous>  dextrose 5%. 1000 milliLiter(s) IV Continuous <Continuous>  dextrose 50% Injectable 25 Gram(s) IV Push once  dextrose 50% Injectable 12.5 Gram(s) IV Push once  dextrose 50% Injectable 25 Gram(s) IV Push once  famotidine    Tablet 20 milliGRAM(s) Oral daily  fenofibrate Tablet 48 milliGRAM(s) Oral daily  folic acid 1 milliGRAM(s) Oral daily  furosemide    Tablet 20 milliGRAM(s) Oral <User Schedule>  glucagon  Injectable 1 milliGRAM(s) IntraMuscular once  heparin   Injectable 5000 Unit(s) SubCutaneous every 12 hours  hydrALAZINE 50 milliGRAM(s) Oral two times a day  insulin glargine Injectable (LANTUS) 10 Unit(s) SubCutaneous at bedtime  insulin lispro (ADMELOG) corrective regimen sliding scale   SubCutaneous three times a day before meals  insulin lispro (ADMELOG) corrective regimen sliding scale   SubCutaneous at bedtime  multivitamin 1 Tablet(s) Oral daily  oxycodone    5 mG/acetaminophen 325 mG 1 Tablet(s) Oral every 4 hours PRN  polyethylene glycol 3350 17 Gram(s) Oral daily PRN  senna 1 Tablet(s) Oral daily PRN  sodium chloride 0.9% lock flush 10 milliLiter(s) IV Push every 1 hour PRN  tamsulosin 0.4 milliGRAM(s) Oral at bedtime      PE:    Vital Signs Last 24 Hrs  T(C): 36.5 (09 Mar 2021 13:15), Max: 37.1 (08 Mar 2021 21:18)  T(F): 97.7 (09 Mar 2021 13:15), Max: 98.7 (08 Mar 2021 21:18)  HR: 65 (09 Mar 2021 13:15) (61 - 69)  BP: 147/68 (09 Mar 2021 13:15) (124/62 - 157/77)  BP(mean): --  RR: 18 (09 Mar 2021 13:15) (18 - 18)  SpO2: 98% (09 Mar 2021 13:15) (96% - 100%)    Gen: AOx3, NAD  CV: S1+S2 normal, no murmurs  Resp: Clear bilat, no resp distress  Abd: Soft, nontender, +BS  Ext: No LE edema, no wounds  : No Mathew  IV/Skin: No thrombophlebitis, left foot surgical site with sutures in place, no drainage, no erythema  Neuro: no focal deficits    LABS:                          10.4   4.87  )-----------( 256      ( 08 Mar 2021 07:15 )             31.3       03-09    142  |  104  |  33<H>  ----------------------------<  123<H>  3.8   |  27  |  2.19<H>    Ca    11.8<H>      09 Mar 2021 07:10    TPro  6.9  /  Alb  3.6  /  TBili  0.3  /  DBili  x   /  AST  18  /  ALT  9<L>  /  AlkPhos  61  03-08    MICROBIOLOGY:  v  .Tissue Other  03-03-21   Rare Staphylococcus aureus  --  Staphylococcus aureus      .Surgical Swab left foot deep wound  03-03-21   Few Staphylococcus aureus  --  Staphylococcus aureus      .Blood Blood-Peripheral  03-02-21   No Growth Final  --  --    RADIOLOGY:    < from: Xray Chest 1 View- PORTABLE-Urgent (Xray Chest 1 View- PORTABLE-Urgent .) (03.08.21 @ 12:50) >  Impression:    The heart is normal in size. Lungs are clear. No pleural effusion. No pneumothorax. A PICC line is seen on the right and the tip is in superior vena cava. Orthopedic hardware is seen in the cervical spine.    < end of copied text >

## 2021-03-09 NOTE — CHART NOTE - NSCHARTNOTEFT_GEN_A_CORE
Okay to place bedside PICC in this patient with stage 3 CKD.    Loco Bradley MD, RPVI  Chief Resident, Interventional Radiology  [facility]: [phone] (p) [phone]  [facility]: [phone]) [phone]) [phone]
Cardiology recommending to resume ASA at discharge.  Patient confirms that he takes ASA 81mg PO every other day per his Cardiologist.  Spoke with Dr. Abraham, confirms that patient to continue daily Plavix and resume ASA 81mg PO every other day.  Spoke with patient to impart instructions to resume ASA per prior regimen - verbalized understanding.    Kaye Patrick NP  [phone]

## 2021-03-09 NOTE — PROGRESS NOTE ADULT - PROBLEM SELECTOR PLAN 6
- c/w home famotidine 20mg daily

## 2021-03-09 NOTE — PROGRESS NOTE ADULT - PROBLEM SELECTOR PLAN 2
- c/w home dose of amlodipine 10mg daily, carvedilol 25mg BID and hydralazine 50mg BID  - resumed lasix 20 mg po daily
- c/w home dose of amlodipine 10mg daily, carvedilol 25mg BID and hydralazine 50mg BID  -Hold home lasix in the setting of slight SAPNA and prolonged NPO status tomorrow
- c/w home dose of amlodipine 10mg daily, carvedilol 25mg BID and hydralazine 50mg BID  - c/w lasix 20 mg po daily
- c/w home dose of amlodipine 10mg daily, carvedilol 25mg BID and hydralazine 50mg BID  - resume lasix 20 mg po daily
- c/w home dose of amlodipine 10mg daily, carvedilol 25mg BID and hydralazine 50mg BID  - c/w lasix 20 mg po daily
- c/w home dose of amlodipine 10mg daily, carvedilol 25mg BID and hydralazine 50mg BID  - resumed lasix 20 mg po daily
- c/w home dose of amlodipine 10mg daily, carvedilol 25mg BID and hydralazine 50mg BID  - resume lasix on 3/5

## 2021-03-09 NOTE — PROGRESS NOTE ADULT - PROBLEM SELECTOR PLAN 9
- dvt ppx: SCDs
-Patient with >4 METS  -RCRI score 2-class 3 risk, 10.1% 30 day risk of death, MI or cardiac arrest  -Patient moderate risk for low risk procedure
- dvt ppx: SCDs
- dvt ppx: SCDs  -GI prophylaxis with home dose of famotidine

## 2021-03-09 NOTE — PROGRESS NOTE ADULT - ATTENDING COMMENTS
disposition: dc arrangement with home antibiotics-- likely 3/9  1 hour spent in preparation of discharge    Milli Gomez D.O.  Hospitalist Pager # [phone] disposition: dc arrangement with home antibiotics-- likely 3/9  1 hour spent in preparation of discharge  discussed with np any Gomez D.O.  Hospitalist Pager # [phone]

## 2021-03-09 NOTE — DISCHARGE NOTE NURSING/CASE MANAGEMENT/SOCIAL WORK - PATIENT PORTAL LINK FT
You can access the FollowMyHealth Patient Portal offered by [facility] by registering at the following website: [email or web address]. By joining [facility]’s FollowMyHealth portal, you will also be able to view your health information using other applications (apps) compatible with our system.

## 2021-03-09 NOTE — PROGRESS NOTE ADULT - PROBLEM SELECTOR PROBLEM 8
Chronic kidney disease (CKD), stage III (moderate)

## 2021-04-12 ENCOUNTER — APPOINTMENT (OUTPATIENT)
Dept: NEPHROLOGY | Facility: CLINIC | Age: 81
End: 2021-04-12
Payer: MEDICARE

## 2021-04-12 VITALS
WEIGHT: 188.49 LBS | DIASTOLIC BLOOD PRESSURE: 74 MMHG | HEART RATE: 63 BPM | SYSTOLIC BLOOD PRESSURE: 162 MMHG | HEIGHT: 69 IN | BODY MASS INDEX: 27.92 KG/M2 | OXYGEN SATURATION: 97 % | TEMPERATURE: 97.1 F

## 2021-04-12 PROCEDURE — 99072 ADDL SUPL MATRL&STAF TM PHE: CPT

## 2021-04-12 PROCEDURE — 99212 OFFICE O/P EST SF 10 MIN: CPT

## 2021-04-12 NOTE — PHYSICAL EXAM
[General Appearance - Alert] : alert [General Appearance - In No Acute Distress] : in no acute distress [General Appearance - Well Nourished] : well nourished [General Appearance - Well Developed] : well developed [Sclera] : the sclera and conjunctiva were normal [Outer Ear] : the ears and nose were normal in appearance [Neck Appearance] : the appearance of the neck was normal [Neck Cervical Mass (___cm)] : no neck mass was observed [Jugular Venous Distention Increased] : there was no jugular-venous distention [Respiration, Rhythm And Depth] : normal respiratory rhythm and effort [Exaggerated Use Of Accessory Muscles For Inspiration] : no accessory muscle use [Auscultation Breath Sounds / Voice Sounds] : lungs were clear to auscultation bilaterally [Apical Impulse] : the apical impulse was normal [Heart Rate And Rhythm] : heart rate was normal and rhythm regular [Heart Sounds] : normal S1 and S2 [Heart Sounds Gallop] : no gallops [Edema] : there was no peripheral edema [Bowel Sounds] : normal bowel sounds [Abdomen Soft] : soft [Abdomen Tenderness] : non-tender [Cervical Lymph Nodes Enlarged Posterior Bilaterally] : posterior cervical [Supraclavicular Lymph Nodes Enlarged Bilaterally] : supraclavicular [Cervical Lymph Nodes Enlarged Anterior Bilaterally] : anterior cervical [Axillary Lymph Nodes Enlarged Bilaterally] : axillary [No CVA Tenderness] : no ~M costovertebral angle tenderness [No Spinal Tenderness] : no spinal tenderness [Abnormal Walk] : normal gait [Nail Clubbing] : no clubbing  or cyanosis of the fingernails [Musculoskeletal - Swelling] : no joint swelling seen [Skin Color & Pigmentation] : normal skin color and pigmentation [] : no rash [Oriented To Time, Place, And Person] : oriented to person, place, and time

## 2021-04-13 LAB
ALBUMIN SERPL ELPH-MCNC: 4.2 G/DL
ANION GAP SERPL CALC-SCNC: 9 MMOL/L
BASOPHILS # BLD AUTO: 0.04 K/UL
BASOPHILS NFR BLD AUTO: 0.9 %
BUN SERPL-MCNC: 27 MG/DL
CALCIUM SERPL-MCNC: 10.9 MG/DL
CHLORIDE SERPL-SCNC: 103 MMOL/L
CO2 SERPL-SCNC: 26 MMOL/L
CREAT SERPL-MCNC: 1.69 MG/DL
EOSINOPHIL # BLD AUTO: 0.26 K/UL
EOSINOPHIL NFR BLD AUTO: 5.9 %
ESTIMATED AVERAGE GLUCOSE: 171 MG/DL
GLUCOSE SERPL-MCNC: 219 MG/DL
HBA1C MFR BLD HPLC: 7.6 %
HCT VFR BLD CALC: 37 %
HGB BLD-MCNC: 11.7 G/DL
IMM GRANULOCYTES NFR BLD AUTO: 0.2 %
LYMPHOCYTES # BLD AUTO: 0.91 K/UL
LYMPHOCYTES NFR BLD AUTO: 20.5 %
MAN DIFF?: NORMAL
MCHC RBC-ENTMCNC: 31.5 PG
MCHC RBC-ENTMCNC: 31.6 GM/DL
MCV RBC AUTO: 99.7 FL
MONOCYTES # BLD AUTO: 0.48 K/UL
MONOCYTES NFR BLD AUTO: 10.8 %
NEUTROPHILS # BLD AUTO: 2.74 K/UL
NEUTROPHILS NFR BLD AUTO: 61.7 %
PHOSPHATE SERPL-MCNC: 3 MG/DL
PLATELET # BLD AUTO: 206 K/UL
POTASSIUM SERPL-SCNC: 4.4 MMOL/L
RBC # BLD: 3.71 M/UL
RBC # FLD: 15.1 %
SODIUM SERPL-SCNC: 137 MMOL/L
WBC # FLD AUTO: 4.44 K/UL

## 2021-04-13 NOTE — ASSESSMENT
[FreeTextEntry1] : A case of CKD stage III with diabetes, hypertension, CAD, diabetic retinopathy,  nephrolithiasis, hypercalcemia and bilateral renal cysts. Patient had a wound over left toe because of glass injury and subsequent infection and patient was admitted to hospital and was started antibiotics. Patient  continues to get IV antibiotic (Cefazol)  four more days. Patient has come for follow up of CKD. Mild increase in systolic BP. Patient says BP is normal at home. Advised to start Farxiga 10 mg PO daily. \par Lab test discussed with the patient. Serum creatinine has decreased to 1.68 mg/l. Advised to follow after 3 months.

## 2021-04-13 NOTE — HISTORY OF PRESENT ILLNESS
[Stage 3] : stage 3 [Hypertensive Nephropathy] : hypertensive nephropathy [Nocturia] : nocturia [Antihypertensives] : antihypertensives [Good Compliance] : good compliance  [Edema] : no edema [Pruritus] : no pruritus [Malaise] : no malaise [Weakness] : no weakness [Anorexia] : no anorexia [Nausea] : no nausea [Vomiting] : no vomiting [FreeTextEntry1] : A case of CKD stage III with diabetes, hypertension, CAD, diabetic retinopathy,  nephrolithiasis, hypercalcemia and bilateral renal cysts. Patient had a wound over left toe because of glass injury and subsequent infection and patient was admitted to hospital and was started antibiotics. Patient  continues to get IV antibiotic (Cefazol)  four more days. Patient has come for follow up of CKD.

## 2021-04-14 ENCOUNTER — APPOINTMENT (OUTPATIENT)
Age: 81
End: 2021-04-14
Payer: MEDICARE

## 2021-04-14 VITALS
WEIGHT: 191 LBS | BODY MASS INDEX: 28.29 KG/M2 | TEMPERATURE: 98.8 F | HEIGHT: 69 IN | SYSTOLIC BLOOD PRESSURE: 154 MMHG | DIASTOLIC BLOOD PRESSURE: 69 MMHG | HEART RATE: 76 BPM | OXYGEN SATURATION: 99 %

## 2021-04-14 DIAGNOSIS — M86.9 OSTEOMYELITIS, UNSPECIFIED: ICD-10-CM

## 2021-04-14 PROCEDURE — 99072 ADDL SUPL MATRL&STAF TM PHE: CPT

## 2021-04-14 PROCEDURE — 99214 OFFICE O/P EST MOD 30 MIN: CPT

## 2021-04-19 ENCOUNTER — NON-APPOINTMENT (OUTPATIENT)
Age: 81
End: 2021-04-19

## 2021-05-02 PROBLEM — M86.9 FOOT OSTEOMYELITIS, LEFT: Status: ACTIVE | Noted: 2021-05-02

## 2021-05-02 NOTE — HISTORY OF PRESENT ILLNESS
[FreeTextEntry1] : 81 year old male with HTN, CAD with stents, BPH, HLD, gout, GERD, DM2, CKD3 admitted to [facility] for foreign body in his left foot, initially was in the ED 1/15/21, was cleaned and discharged on augmentin. Was following with outpatient Podiatry with recent MRI demonstrating piece of glass retained so sent to the ED. XR here with erosive changes on the fifth metatarsal head and lateral base of the proximal fifth phalanx consistent with OM. s/p OR with Podiatry for I&D with concern for residual bone infection. Deep tissue culture with MSSA. Discussed with podiatry and concern for residual OM. He was discharged to complete Cefazolin 2 grams IV q 12 hours to complete 6 weeks on 4/14/21. He presents for hospital followup. He feels well. No complaints. Left foot has healed well.\par

## 2021-05-02 NOTE — PHYSICAL EXAM
[General Appearance - Alert] : alert [General Appearance - In No Acute Distress] : in no acute distress [General Appearance - Well Nourished] : well nourished [General Appearance - Well-Appearing] : healthy appearing [Sclera] : the sclera and conjunctiva were normal [PERRL With Normal Accommodation] : pupils were equal in size, round, reactive to light [Extraocular Movements] : extraocular movements were intact [Outer Ear] : the ears and nose were normal in appearance [Examination Of The Oral Cavity] : the lips and gums were normal [Hearing Threshold Finger Rub Not [place]] : hearing was normal [Oropharynx] : the oropharynx was normal with no thrush [Neck Appearance] : the appearance of the neck was normal [Neck Cervical Mass (___cm)] : no neck mass was observed [Respiration, Rhythm And Depth] : normal respiratory rhythm and effort [Exaggerated Use Of Accessory Muscles For Inspiration] : no accessory muscle use [Auscultation Breath Sounds / Voice Sounds] : lungs were clear to auscultation bilaterally [Heart Rate And Rhythm] : heart rate was normal and rhythm regular [Heart Sounds] : normal S1 and S2 [Murmurs] : no murmurs [Edema] : there was no peripheral edema [Bowel Sounds] : normal bowel sounds [Abdomen Soft] : soft [Abdomen Tenderness] : non-tender [No Palpable Adenopathy] : no palpable adenopathy [Musculoskeletal - Swelling] : no joint swelling [Range of Motion to Joints] : range of motion to joints [Motor Tone] : muscle strength and tone were normal [Skin Color & Pigmentation] : normal skin color and pigmentation [Skin Lesions] : no skin lesions [] : no rash [FreeTextEntry1] : Left foot wound healed, no erythema, nontender, no swelling [Sensation] : the sensory exam was normal to light touch and pinprick [Motor Exam] : the motor exam was normal [No Focal Deficits] : no focal deficits [Oriented To Time, Place, And Person] : oriented to person, place, and time [Affect] : the affect was normal

## 2021-05-02 NOTE — ASSESSMENT
[FreeTextEntry1] : 81 year old male with HTN, CAD with stents, BPH, HLD, gout, GERD, DM2, CKD3 admitted to [facility] for foreign body in his left foot, initially was in the ED 1/15/21, was cleaned and discharged on augmentin. Was following with outpatient Podiatry with recent MRI demonstrating piece of glass retained so sent to the ED. XR here with erosive changes on the fifth metatarsal head and lateral base of the proximal fifth phalanx consistent with OM. s/p OR with Podiatry for I&D with concern for residual bone infection. Deep tissue culture with MSSA. Discussed with podiatry and concern for residual OM. He was discharged to complete Cefazolin 2 grams IV q 12 hours to complete 6 weeks on 4/14/21. \par \par Recommend:\par #Left foot wound with retained glass, XR concerning for OM\par -Deep culture with MSSA\par -Continue cefazolin 2 grams IV q 12 hours (renally dosed) to complete 6 weeks on 4/14/21.\par \par #CKD3\par -Continue to monitor Cr while on abx and adjust accordingly\par \par

## 2021-05-12 ENCOUNTER — APPOINTMENT (OUTPATIENT)
Dept: UROLOGY | Facility: CLINIC | Age: 81
End: 2021-05-12
Payer: MEDICARE

## 2021-05-12 ENCOUNTER — OUTPATIENT (OUTPATIENT)
Dept: OUTPATIENT SERVICES | Facility: HOSPITAL | Age: 81
LOS: 1 days | End: 2021-05-12
Payer: MEDICARE

## 2021-05-12 VITALS
HEIGHT: 69 IN | WEIGHT: 191 LBS | DIASTOLIC BLOOD PRESSURE: 71 MMHG | HEART RATE: 61 BPM | RESPIRATION RATE: 17 BRPM | BODY MASS INDEX: 28.29 KG/M2 | TEMPERATURE: 98 F | SYSTOLIC BLOOD PRESSURE: 148 MMHG

## 2021-05-12 DIAGNOSIS — H26.9 UNSPECIFIED CATARACT: Chronic | ICD-10-CM

## 2021-05-12 DIAGNOSIS — Z98.89 OTHER SPECIFIED POSTPROCEDURAL STATES: Chronic | ICD-10-CM

## 2021-05-12 DIAGNOSIS — Q76.1 KLIPPEL-FEIL SYNDROME: Chronic | ICD-10-CM

## 2021-05-12 DIAGNOSIS — M65.30 TRIGGER FINGER, UNSPECIFIED FINGER: Chronic | ICD-10-CM

## 2021-05-12 DIAGNOSIS — R35.0 FREQUENCY OF MICTURITION: ICD-10-CM

## 2021-05-12 DIAGNOSIS — M75.101 UNSPECIFIED ROTATOR CUFF TEAR OR RUPTURE OF RIGHT SHOULDER, NOT SPECIFIED AS TRAUMATIC: Chronic | ICD-10-CM

## 2021-05-12 PROCEDURE — 76775 US EXAM ABDO BACK WALL LIM: CPT | Mod: 26

## 2021-05-12 PROCEDURE — 99213 OFFICE O/P EST LOW 20 MIN: CPT

## 2021-05-12 PROCEDURE — 76775 US EXAM ABDO BACK WALL LIM: CPT

## 2021-05-14 ENCOUNTER — APPOINTMENT (OUTPATIENT)
Dept: CARDIOLOGY | Facility: CLINIC | Age: 81
End: 2021-05-14
Payer: MEDICARE

## 2021-05-14 VITALS
DIASTOLIC BLOOD PRESSURE: 69 MMHG | SYSTOLIC BLOOD PRESSURE: 148 MMHG | BODY MASS INDEX: 27.4 KG/M2 | HEIGHT: 69 IN | WEIGHT: 185 LBS | OXYGEN SATURATION: 99 % | HEART RATE: 64 BPM

## 2021-05-14 PROCEDURE — 99072 ADDL SUPL MATRL&STAF TM PHE: CPT

## 2021-05-14 PROCEDURE — 99214 OFFICE O/P EST MOD 30 MIN: CPT

## 2021-05-14 NOTE — HISTORY OF PRESENT ILLNESS
[FreeTextEntry1] : 5/14/2021\par \par He was hospitalized for L 5th met head lesion\par he has healed. \par Picc line was removed April 16th\par He no longer on antibiotics.\par He is walking alittle bit.\par Overall he feels weak.\par No chest pain\par He overall just feels weak\par His left knee is bothering him.\par He is eating and drinking ok\par Creatinine 1.69\par Denies any blood in the urine or stool \par A1C 7.6 was 8.1\par \par PCP is kira price [phone] i called him an left a message with his staff\par \par Meds:\par ASA, Plavix, Atorava 80\par Coreg 25mg BID, Hydral 50mg BID, Norvasc 10mg, Lasix 20mg      \par flomax\par \par \par \par Initial eval\par 79 year old active man with CAD s/p RCA FOREST x 3 and unrevascularized 70% mLAD, HTN, HLD, DM, BPH, OA, Gout presents for routine follow-up.\par \par He gets short of breath with minimal exertion.  This is new for him.  Started a few weeks ago.  It is not getting worse.  He is concerned about this.  He is short of breath even with walking at the park.   Increased fatigue.  \par No CP or Palpitations.  BP at home is 150.  Elevated in the office\par \par \par Cardiac Hx:\par 3/16 Cath - RCA PCI with FOREST x 3. LAD 70% --but nuc stress without anterior ischemia. \par 5/16 NST - Basal inf. / lateral ischemia\par 5/16 Echo - Normal LV, RV, EF      \par 6/16 Renal Duplex - No GATITO\par \par Meds:\par ASA, Plavix, Atorava 80\par Coreg 25mg BID, Hydral 50mg BID, Norvasc 10mg, Lasix 20mg      \par \par Allergies:\par ACE/ARB allergy noted

## 2021-05-14 NOTE — DISCUSSION/SUMMARY
[FreeTextEntry1] : Assessment:\par 1. CAD s/p RCA FOREST x 3 and unrevascularized 70% mLAD (2016 NST without anterior ischemia)\par 2. HTN / HLD / DM\par 3. 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 Maintain DAPT w/ ASA 81mg daily and Plavix 75mg daily given unrevascularized mLAD\par 2. Atorvastatin to 40mg qhs (age > 75)\par 3. Continue Norvasc 10mg daily, Coreg 25mg BID, Hydralzine 50mg BID\par 4.  Continue diet and exercise\par 5.  Will reach out to Dr. Ford for continued cancer surveillance.  \par

## 2021-05-23 NOTE — ASSESSMENT
[FreeTextEntry1] : Continue twice daily flomax\par Added vesicare 10mg daily\par reassess in 3 months\par \par Uroflow and PVR next visit

## 2021-05-23 NOTE — HISTORY OF PRESENT ILLNESS
[None] : no symptoms [FreeTextEntry1] : See notes from last visit\par Pt stated flomax twice daily but still has daytime frequency and nocturia\par he did not notice any improvement with twice daily flomax\par \par Renal sono: no stones

## 2021-05-25 ENCOUNTER — OUTPATIENT (OUTPATIENT)
Dept: OUTPATIENT SERVICES | Facility: HOSPITAL | Age: 81
LOS: 1 days | Discharge: HOME | End: 2021-05-25

## 2021-05-25 DIAGNOSIS — H26.9 UNSPECIFIED CATARACT: Chronic | ICD-10-CM

## 2021-05-25 DIAGNOSIS — D64.9 ANEMIA, UNSPECIFIED: ICD-10-CM

## 2021-05-25 DIAGNOSIS — M75.101 UNSPECIFIED ROTATOR CUFF TEAR OR RUPTURE OF RIGHT SHOULDER, NOT SPECIFIED AS TRAUMATIC: Chronic | ICD-10-CM

## 2021-05-25 DIAGNOSIS — Q76.1 KLIPPEL-FEIL SYNDROME: Chronic | ICD-10-CM

## 2021-05-25 DIAGNOSIS — Z98.89 OTHER SPECIFIED POSTPROCEDURAL STATES: Chronic | ICD-10-CM

## 2021-05-25 DIAGNOSIS — M65.30 TRIGGER FINGER, UNSPECIFIED FINGER: Chronic | ICD-10-CM

## 2021-05-26 ENCOUNTER — APPOINTMENT (OUTPATIENT)
Dept: HEMATOLOGY ONCOLOGY | Facility: CLINIC | Age: 81
End: 2021-05-26
Payer: MEDICARE

## 2021-05-26 VITALS
HEART RATE: 70 BPM | DIASTOLIC BLOOD PRESSURE: 69 MMHG | OXYGEN SATURATION: 98 % | RESPIRATION RATE: 16 BRPM | SYSTOLIC BLOOD PRESSURE: 160 MMHG | WEIGHT: 194.01 LBS | BODY MASS INDEX: 28.65 KG/M2 | TEMPERATURE: 98 F

## 2021-05-26 PROCEDURE — 99213 OFFICE O/P EST LOW 20 MIN: CPT

## 2021-05-27 DIAGNOSIS — N20.0 CALCULUS OF KIDNEY: ICD-10-CM

## 2021-05-27 DIAGNOSIS — N40.1 BENIGN PROSTATIC HYPERPLASIA WITH LOWER URINARY TRACT SYMPTOMS: ICD-10-CM

## 2021-05-27 DIAGNOSIS — N18.30 CHRONIC KIDNEY DISEASE, STAGE 3 UNSPECIFIED: ICD-10-CM

## 2021-06-04 ENCOUNTER — RESULT REVIEW (OUTPATIENT)
Age: 81
End: 2021-06-04

## 2021-06-07 ENCOUNTER — OUTPATIENT (OUTPATIENT)
Dept: OUTPATIENT SERVICES | Facility: HOSPITAL | Age: 81
LOS: 1 days | End: 2021-06-07
Payer: MEDICARE

## 2021-06-07 ENCOUNTER — APPOINTMENT (OUTPATIENT)
Dept: CT IMAGING | Facility: IMAGING CENTER | Age: 81
End: 2021-06-07
Payer: MEDICARE

## 2021-06-07 DIAGNOSIS — H26.9 UNSPECIFIED CATARACT: Chronic | ICD-10-CM

## 2021-06-07 DIAGNOSIS — M75.101 UNSPECIFIED ROTATOR CUFF TEAR OR RUPTURE OF RIGHT SHOULDER, NOT SPECIFIED AS TRAUMATIC: Chronic | ICD-10-CM

## 2021-06-07 DIAGNOSIS — M65.30 TRIGGER FINGER, UNSPECIFIED FINGER: Chronic | ICD-10-CM

## 2021-06-07 DIAGNOSIS — Q76.1 KLIPPEL-FEIL SYNDROME: Chronic | ICD-10-CM

## 2021-06-07 DIAGNOSIS — C23 MALIGNANT NEOPLASM OF GALLBLADDER: ICD-10-CM

## 2021-06-07 DIAGNOSIS — Z98.89 OTHER SPECIFIED POSTPROCEDURAL STATES: Chronic | ICD-10-CM

## 2021-06-07 PROCEDURE — 71250 CT THORAX DX C-: CPT | Mod: 26

## 2021-06-07 PROCEDURE — 71250 CT THORAX DX C-: CPT

## 2021-06-20 NOTE — HISTORY OF PRESENT ILLNESS
[Disease: _____________________] : Disease: [unfilled] [T: ___] : T[unfilled] [N: ___] : N[unfilled] [M: ___] : M[unfilled] [AJCC Stage: ____] : AJCC Stage: [unfilled] [de-identified] : 80 M w/ h/o CKD, HTN, DM, CAD s/p stents, SHARATH presents for evaluation of an incidentally found Stage I gallbladder cancer after a cholecystectomy. \par \sue Tapia noted progressive RUQ abdominal pain in the beginning of June 2020. He presented to [facility] on 6/12 with same complaints and underwent a abdominal US which was indeterminant but showed a thickened wall. Denied any change in BMs, change in appetite or weight loss. After obtaining out patient cardiac clearance he underwent a lap choley on 6/17/20. Intra op GB noted to be chronically inflamed with a large gallstone noted. 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 The cystic duct was negative for malignancy or dysplasia. No LV1 or PNI noted. Post op course unremarkable. \par \sue Tapia subsequently referred to medical oncology for further evaluation. \par \par 7/9/20: CT Chest: ill defined b/l groundglass opacities, 1.3 indeterminant adrenal lesion, 1.4 thyroid nodule \par 7/23/20: MRI A/P: limited scan for biliary pathology \par 9/3/20: PET/CT: AMADO [de-identified] : adenocarcinoma  [FreeTextEntry1] : surveillance [de-identified] : denies any c/o. was hospitalized with a foot infection but has since recovered. Eating and drinking well.

## 2021-06-24 ENCOUNTER — APPOINTMENT (OUTPATIENT)
Dept: MRI IMAGING | Facility: IMAGING CENTER | Age: 81
End: 2021-06-24
Payer: MEDICARE

## 2021-06-24 ENCOUNTER — OUTPATIENT (OUTPATIENT)
Dept: OUTPATIENT SERVICES | Facility: HOSPITAL | Age: 81
LOS: 1 days | End: 2021-06-24
Payer: MEDICARE

## 2021-06-24 DIAGNOSIS — H26.9 UNSPECIFIED CATARACT: Chronic | ICD-10-CM

## 2021-06-24 DIAGNOSIS — Z98.89 OTHER SPECIFIED POSTPROCEDURAL STATES: Chronic | ICD-10-CM

## 2021-06-24 DIAGNOSIS — M75.101 UNSPECIFIED ROTATOR CUFF TEAR OR RUPTURE OF RIGHT SHOULDER, NOT SPECIFIED AS TRAUMATIC: Chronic | ICD-10-CM

## 2021-06-24 DIAGNOSIS — M65.30 TRIGGER FINGER, UNSPECIFIED FINGER: Chronic | ICD-10-CM

## 2021-06-24 DIAGNOSIS — C23 MALIGNANT NEOPLASM OF GALLBLADDER: ICD-10-CM

## 2021-06-24 DIAGNOSIS — Q76.1 KLIPPEL-FEIL SYNDROME: Chronic | ICD-10-CM

## 2021-06-24 PROCEDURE — A9585: CPT

## 2021-06-24 PROCEDURE — 72197 MRI PELVIS W/O & W/DYE: CPT

## 2021-06-24 PROCEDURE — 74183 MRI ABD W/O CNTR FLWD CNTR: CPT | Mod: 26

## 2021-06-24 PROCEDURE — 72197 MRI PELVIS W/O & W/DYE: CPT | Mod: 26

## 2021-06-24 PROCEDURE — 74183 MRI ABD W/O CNTR FLWD CNTR: CPT

## 2021-07-02 NOTE — PRE-ANESTHESIA EVALUATION ADULT - NSANTHSUBSTSD_GEN_ALL_CORE
VISION   No corrective lenses  Tool used: Franco   Right eye:        10/10 (20/20)  Left eye:          10/10 (20/20)  Visual Acuity: Pass      HEARING FREQUENCY    Right Ear:      1000 Hz RESPONSE- on Level: 40 db (Conditioning sound)   1000 Hz: RESPONSE- on Level:   20 db    2000 Hz: RESPONSE- on Level:   20 db    4000 Hz: RESPONSE- on Level:   20 db     Left Ear:      4000 Hz: RESPONSE- on Level:   20 db    2000 Hz: RESPONSE- on Level:   20 db    1000 Hz: RESPONSE- on Level:   20 db     500 Hz: RESPONSE- on Level: 25 db    Right Ear:    500 Hz: RESPONSE- on Level: 25 db    Hearing Acuity: Pass    Hearing Assessment: normal      Sharon Perea MA         No

## 2021-09-08 ENCOUNTER — APPOINTMENT (OUTPATIENT)
Dept: UROLOGY | Facility: CLINIC | Age: 81
End: 2021-09-08
Payer: MEDICARE

## 2021-09-08 VITALS
SYSTOLIC BLOOD PRESSURE: 162 MMHG | DIASTOLIC BLOOD PRESSURE: 74 MMHG | HEIGHT: 69 IN | HEART RATE: 67 BPM | WEIGHT: 194 LBS | TEMPERATURE: 98.2 F | RESPIRATION RATE: 17 BRPM | BODY MASS INDEX: 28.73 KG/M2

## 2021-09-08 DIAGNOSIS — R35.0 FREQUENCY OF MICTURITION: ICD-10-CM

## 2021-09-08 DIAGNOSIS — N52.9 MALE ERECTILE DYSFUNCTION, UNSPECIFIED: ICD-10-CM

## 2021-09-08 PROCEDURE — 99213 OFFICE O/P EST LOW 20 MIN: CPT

## 2021-09-08 RX ORDER — SOLIFENACIN SUCCINATE 10 MG/1
10 TABLET ORAL DAILY
Qty: 90 | Refills: 3 | Status: DISCONTINUED | COMMUNITY
Start: 2021-05-12 | End: 2021-09-08

## 2021-09-13 ENCOUNTER — APPOINTMENT (OUTPATIENT)
Dept: NEPHROLOGY | Facility: CLINIC | Age: 81
End: 2021-09-13
Payer: MEDICARE

## 2021-09-13 VITALS
DIASTOLIC BLOOD PRESSURE: 73 MMHG | OXYGEN SATURATION: 96 % | SYSTOLIC BLOOD PRESSURE: 138 MMHG | BODY MASS INDEX: 28.06 KG/M2 | TEMPERATURE: 97.3 F | WEIGHT: 190 LBS | HEART RATE: 63 BPM

## 2021-09-13 PROCEDURE — 99212 OFFICE O/P EST SF 10 MIN: CPT

## 2021-09-14 NOTE — HISTORY OF PRESENT ILLNESS
[Stage 3] : stage 3 [Hypertensive Nephropathy] : hypertensive nephropathy [Nocturia] : nocturia [Antihypertensives] : antihypertensives [Good Compliance] : good compliance  [Edema] : no edema [Pruritus] : no pruritus [Malaise] : no malaise [Weakness] : no weakness [Anorexia] : no anorexia [Nausea] : no nausea [Vomiting] : no vomiting [FreeTextEntry1] : A case of CKD stage III with diabetes, hypertension, CAD, diabetic retinopathy,  nephrolithiasis, hypercalcemia and bilateral renal cysts. Patient complains of nocturia and enlarged prostate.

## 2021-09-14 NOTE — ASSESSMENT
[FreeTextEntry1] : A case of CKD stage III with diabetes, hypertension, CAD, diabetic retinopathy,  nephrolithiasis, hypercalcemia and bilateral renal cysts. Patient complains of nocturia and enlarged prostate. Weight is stable. BP is controlled. Patient is being followed up by urologist. Advised renal function tests and urine analysis. \par Lab tests evaluated.\par CKD. Serum creatinine 1.7 mg/dl. Renal function stable.\par Diabetes mellitus. Blood sugar 176 mg/dl; HbA1C 8%. Not fully controlled Advised to consult endocrinologist for better control of blood sugar.\par Hypercalcemia; Serum calcium 10.7 mg/dl. May be related to multiple multiple precancerous comorbidities including s/p gall bladder cancer, lung and thyroid nodules.\par Nocturia. Advised to follow urology.

## 2021-09-14 NOTE — REVIEW OF SYSTEMS
[Loss Of Hearing] : hearing loss [Palpitations] : palpitations [SOB on Exertion] : shortness of breath during exertion [Hesitancy] : urinary hesitancy [Nocturia] : nocturia [Sleep Disturbances] : sleep disturbances [Negative] : ENT [Fever] : no fever [Chills] : no chills [Feeling Poorly] : not feeling poorly [Feeling Tired] : not feeling tired [Recent Weight Gain (___ Lbs)] : no recent weight gain [Recent Weight Loss (___ Lbs)] : no recent weight loss [Heart Rate Is Slow] : the heart rate was not slow [Heart Rate Is Fast] : the heart rate was not fast [Chest Pain] : no chest pain [Lower Ext Edema] : no extremity edema [Shortness Of Breath] : no shortness of breath [Wheezing] : no wheezing [Abdominal Pain] : no abdominal pain [Vomiting] : no vomiting [Constipation] : no constipation [Diarrhea] : no diarrhea [Heartburn] : no heartburn [Arthralgias] : no arthralgias [Itching] : no itching [Dry Skin] : no dry skin [Dizziness] : no dizziness [Fainting] : no fainting [Anxiety] : no anxiety [Depression] : no depression [Muscle Weakness] : no muscle weakness [Easy Bleeding] : no tendency for easy bleeding [Easy Bruising] : no tendency for easy bruising

## 2021-09-15 LAB
ALBUMIN SERPL ELPH-MCNC: 4.2 G/DL
ANION GAP SERPL CALC-SCNC: 11 MMOL/L
APPEARANCE: CLEAR
BACTERIA: NEGATIVE
BILIRUBIN URINE: NEGATIVE
BLOOD URINE: NEGATIVE
BUN SERPL-MCNC: 27 MG/DL
CALCIUM SERPL-MCNC: 10.7 MG/DL
CHLORIDE SERPL-SCNC: 102 MMOL/L
CHOLEST SERPL-MCNC: 204 MG/DL
CO2 SERPL-SCNC: 25 MMOL/L
COLOR: YELLOW
CREAT SERPL-MCNC: 1.7 MG/DL
ESTIMATED AVERAGE GLUCOSE: 183 MG/DL
GLUCOSE QUALITATIVE U: NORMAL
GLUCOSE SERPL-MCNC: 176 MG/DL
HBA1C MFR BLD HPLC: 8 %
HDLC SERPL-MCNC: 41 MG/DL
HYALINE CASTS: 1 /LPF
KETONES URINE: NEGATIVE
LDLC SERPL CALC-MCNC: 112 MG/DL
LEUKOCYTE ESTERASE URINE: NEGATIVE
MICROSCOPIC-UA: NORMAL
NITRITE URINE: NEGATIVE
NONHDLC SERPL-MCNC: 163 MG/DL
PH URINE: 6
PHOSPHATE SERPL-MCNC: 3.1 MG/DL
POTASSIUM SERPL-SCNC: 4 MMOL/L
PROTEIN URINE: ABNORMAL
RED BLOOD CELLS URINE: 1 /HPF
SODIUM SERPL-SCNC: 138 MMOL/L
SPECIFIC GRAVITY URINE: 1.02
SQUAMOUS EPITHELIAL CELLS: 0 /HPF
TRIGL SERPL-MCNC: 254 MG/DL
UROBILINOGEN URINE: NORMAL
WHITE BLOOD CELLS URINE: 1 /HPF

## 2021-09-19 NOTE — ASSESSMENT
[FreeTextEntry1] : Plan:\par BPH/LUTS: referred to Dr. Kike Vizcarra\par Kidney stones: kidney ultrasound and follow up in 9 months\par ED: will rx if pt requests

## 2021-09-19 NOTE — HISTORY OF PRESENT ILLNESS
[FreeTextEntry1] : 80 y/o man\par \par BPH/LUTS\par -still reporting daytime frequency and nocturia (q1h)\par - stopped taking Vesicare after approximately 1 week due to severe constipation\par -still on Flomax 0.8mg\par - Uroflow: \par Peak flow 5.6 ml/s, \par voided 36 (urinated approx 50 min before)\par - PVR 0\par \par Renal Sono: no stones (last ULS 12/2020)\par \par ED: not pursuing treatment at this time

## 2021-09-19 NOTE — PHYSICAL EXAM
[General Appearance - Well Developed] : well developed [General Appearance - Well Nourished] : well nourished [Normal Appearance] : normal appearance [Well Groomed] : well groomed [General Appearance - In No Acute Distress] : no acute distress [Abdomen Soft] : soft [Abdomen Tenderness] : non-tender [Costovertebral Angle Tenderness] : no ~M costovertebral angle tenderness [Urinary Bladder Findings] : the bladder was normal on palpation [Skin Color & Pigmentation] : normal skin color and pigmentation [] : no respiratory distress [Respiration, Rhythm And Depth] : normal respiratory rhythm and effort [Exaggerated Use Of Accessory Muscles For Inspiration] : no accessory muscle use [Oriented To Time, Place, And Person] : oriented to person, place, and time [Normal Station and Gait] : the gait and station were normal for the patient's age

## 2021-09-26 NOTE — ASU DISCHARGE PLAN (ADULT/PEDIATRIC). - PATIENT/GUARDIAN NAME (SIGNATURE): ____________________________________
Nephrology  Patient: Stuart Florez Date:2021   : 1977 Attending: Toñito Cuenca MD   43 year old male        Chief complaint:  IRENE, hyperkalemia, DKA    Admission HPI:  This is a 43 year old male with a past medical history significant for alcohlic hepatitis/cirrhosis who had presented to the ER yesterday morning with c/o weakness. He then left AMA, but returned again after a fall.  His labs upon presetnation were significant for K of 5.6, glucose 592,  Cr 4.5, TB 21. He was admitted for further evaluation.     Nephrology has been consulted for evaluation and management of IRENE, acidosis and hyperkalemia.      Mr. Florez is well known to our service. He had been admitted in late 2021 with acute alcoholic hepatitis, sepsis/SBP and IRENE.  His IRENE was attributed to ATN in the setting of sepsis and progression of HRS. His creatinine had peaked at 4.4, and improved to 2.6 prior to discharge on .  His creatinine today remains elevated at 3.86.      Mr. Florez is lethargic today and unable to add to this HPI.     Recent events/subjective:  :  Now on CVVH.  Anuric, sitting up in chair, but still lethargic.   -s/p EGD. Up in chair. remans on CVVH and on pressors.  Denies sob.  - No issues overnight. Tolerating net loss on CVVH. Off pressors. Denies sob, cp or any N/V.  :  Continues on CVVH.  No shortness of breath.  Off pressors x 48 hours     : On CVVH. Tolerating 150 ml / hr. Off pressors. No SOB    : Off CVVH.  Plan K 5.4 and remains anuric. . Plan for IHD today.    :  HD ended after only 2.5 hours yesterday, per patient demand.  UF 650ml. Permcath placement this AM.   :  Plan for IHD this afternoon. Hemodynamically stable. CT of head showing subcutaneous abscess vs seroma  : refused HD , afebrile, no Chest pain , no SOB     Past Medical History:   Diagnosis Date   • Asthma     childhood   • Chronic pain     left heel, left calf   • Essential (primary)  hypertension    • Fracture 2017    left heel, boot on   • Pneumonia    • RAD (reactive airway disease)    • Tobacco use disorder 6/15/2017       Past Surgical History:   Procedure Laterality Date   • Hernia repair      at age 21   • [place] tooth extraction Left        Social History     Socioeconomic History   • Marital status: /Civil Union     Spouse name: Not on file   • Number of children: Not on file   • Years of education: Not on file   • Highest education level: Not on file   Occupational History   • Not on file   Tobacco Use   • Smoking status: Current Every Day Smoker     Packs/day: 0.25     Types: Cigarettes     Last attempt to quit: 2019     Years since quittin.4   • Smokeless tobacco: Never Used   Vaping Use   • Vaping Use: never used   Substance and Sexual Activity   • Alcohol use: Yes     Comment: 6 beers, 2 pints whiskey   • Drug use: Yes     Frequency: 3.0 times per week     Types: Marijuana   • Sexual activity: Yes     Partners: Female   Other Topics Concern   • Not on file   Social History Narrative   • Not on file     Social Determinants of Health     Financial Resource Strain:    • Social Determinants: Financial Resource Strain:    Food Insecurity:    • Social Determinants: Food Insecurity:    Transportation Needs:    • Lack of Transportation (Medical):    • Lack of Transportation (Non-Medical):    Physical Activity:    • Days of Exercise per Week:    • Minutes of Exercise per Session:    Stress:    • Social Determinants: Stress:    Social Connections:    • Social Determinants: Social Connections:    Intimate Partner Violence: Not At Risk   • Social Determinants: Intimate Partner Violence Past Fear: No   • Social Determinants: Intimate Partner Violence Current Fear: No       Family History   Problem Relation Age of Onset   • Cancer Father         bone vargas   • Diabetes Father    • COPD Paternal Grandfather        ALLERGIES:   Allergen Reactions   • Chocolate   (Food Or Med)  HIVES   • Hay Fever & [Chlorpheniramine-Ppa Cr] RASH         Review of Systems:  Positives stated above in HPI.  Full ROS completed and is otherwise negative.         Vital Last Value 24 Hour Range   Temperature 98.8 °F (37.1 °C) Temp  Min: 98.6 °F (37 °C)  Max: 98.8 °F (37.1 °C)   Pulse 84 Pulse  Min: 72  Max: 94   Respiratory 16 Resp  Min: 14  Max: 20   Blood Pressure 102/53 BP  Min: 91/51  Max: 122/58   Art BP (!) 97/97 No data recorded   Pulse Oximetry 98 % SpO2  Min: 96 %  Max: 98 %     Vital Today Admitted   Weight 107.5 kg (236 lb 15.9 oz) Weight: 122.5 kg (270 lb 1.6 oz)   Height N/A Height: 5' 11\" (180.3 cm)   BMI N/A BMI (Calculated): 32.1     Weight over the past 48 Hours:  Patient Vitals for the past 48 hrs:   Weight   09/24/21 0948 106.6 kg (235 lb 0.2 oz)   09/24/21 1323 107.5 kg (236 lb 15.9 oz)        Hemodynamics:      Last Value 24 Hour Range   CVP   No data recorded   PAS/PAD   No data recorded   PCWP   No data recorded   CO   No data recorded   CI   No data recorded   SVR   No data recorded   SV02   No data recorded     Intake/Output:    Last Stool Occurrence: 2 (09/25/21 1440)    No intake/output data recorded.    I/O last 3 completed shifts:  In: 120 [P.O.:120]  Out: 3000 [Other:3000]      Intake/Output Summary (Last 24 hours) at 9/25/2021 2331  Last data filed at 9/25/2021 1142  Gross per 24 hour   Intake 120 ml   Output 3000 ml   Net -2880 ml       Medications/Infusions:  Medications Prior to Admission   Medication Sig Dispense Refill   • doxycycline monohydrate (ADOXA) 100 MG tablet Take 1 tablet by mouth 2 times daily for 10 days. 20 tablet 0   • ciprofloxacin (CIPRO) 500 MG tablet Take 1 tablet by mouth daily. 30 tablet 3   • folic acid (FOLATE) 1 MG tablet Take 1 tablet by mouth daily. Do not start before September 12, 2021. 30 tablet 0   • levOCARNitine (CARNITOR) 1 GM/10ML solution Take 20 mLs by mouth 2 times daily (with meals). 118 mL 3   • melatonin 3 MG Take 1 tablet by mouth  nightly. 30 tablet 0   • sodium bicarbonate 650 MG tablet Take 1 tablet by mouth 2 times daily. 30 tablet 0   • thiamine (VITAMIN B1) 100 MG tablet Take 1 tablet by mouth daily. Do not start before September 12, 2021. 30 tablet 0   • zinc sulfate (ZINCATE) 220 (50 Zn) MG capsule Take 1 capsule by mouth daily (with breakfast). Do not start before September 12, 2021. 30 capsule 0   • pantoprazole (PROTONIX) 40 MG tablet Take 1 tablet by mouth daily. Do not start before September 12, 2021. 30 tablet 0   • lactulose (CHRONULAC) 10 GM/15ML solution Take 15 mLs by mouth 2 times daily. 946 mL 3   • spironolactone (ALDACTONE) 50 MG tablet Take 1 tablet by mouth daily. 90 tablet 3   • rifAXIMin (XIFAXAN) 550 MG Tab Take 1 tablet by mouth every 12 hours. 60 tablet 3   • furosemide (LASIX) 20 MG tablet Take 2 tablets by mouth daily. 90 tablet 3   • midodrine (PROAMATINE) 5 MG tablet Take 3 tablets by mouth 3 times daily (before meals). 270 tablet 0   • lactulose (CHRONULAC) 10 GM/15ML solution Take 15 mLs by mouth 2 times daily. 946 mL 11     • insulin glargine  24 Units Subcutaneous Nightly   • insulin lispro   Subcutaneous Daily with breakfast   • insulin lispro   Subcutaneous Daily with lunch   • insulin lispro   Subcutaneous Daily with dinner   • acyclovir  400 mg Intravenous Once per day on Mon Wed Fri   • lactulose  20 g Oral BID   • cefdinir  300 mg Oral Once per day on Mon Wed Fri   • furosemide  80 mg Intravenous Once per day on Sun Tue Thu Sat   • insulin lispro   Subcutaneous TID WC   • pantoprazole  40 mg Oral 2 times per day   • melatonin  9 mg Oral Nightly   • heparin (porcine)  5,000 Units Subcutaneous 3 times per day   • folic acid  1 mg Oral Daily   • midodrine  15 mg Oral TID AC   • rifAXIMin  550 mg Oral 2 times per day   • thiamine  100 mg Oral Daily   • zinc sulfate  220 mg Oral Daily with breakfast   • sodium chloride (PF)  2 mL Intracatheter 2 times per day     • sodium chloride 0.9% infusion Stopped  (09/24/21 0230)   • sodium chloride 0.9% infusion         Physical Exam:    General: awake no acute distress  HEENT: Head atraumatic, normocephalic.  Moist oral mucus membranes.  Sclera cteric.   Neck: Supple, rachea midline.  Chest/Lungs: Normal effort.  Symmetrical expansion.  Clear to auscultation.  No wheezes, rales or rhonchi.  CVS: Regular rate and rhythm. S1,S2 well heard.  no pericardial rub heard.  Abdomen: Soft, non tender, + distended.  No hepatosplenomegaly.  Neuro: awake, answering questions   Skin: Warm, dry, intact.  No rashes.  + jaundice  Extremities:  ++ edema.      Laboratory Results:  Recent Labs   Lab 09/25/21 0423 09/24/21 2118 09/24/21  0544 09/23/21  0331 09/21/21  0411 09/20/21  1545 09/20/21  0319   SODIUM 137 136 135 135 138 135 136   POTASSIUM 4.8 5.1 5.0 5.0 4.5 4.7 4.5   CHLORIDE 104 103 103 104 106 105 104   CO2 24 26 24 26 28 27 27   ANIONGAP 14 12 13 10 9* 8* 10   BUN 42* 39* 41* 26* 12 10 10   CREATININE 3.50* 3.14* 3.15* 2.03* 1.02 1.05 0.90   GLUCOSE 115* 155* 234* 277* 130* 258* 219*   CALCIUM 8.3* 8.2* 8.3* 7.9* 8.1* 8.1* 8.3*   ALBUMIN 3.0* 3.1* 3.2* 3.2* 3.2*  --  3.5*   PHOS  --   --   --  4.6 2.1* 2.0* 1.8*   MG  --   --   --   --  2.6* 2.6* 2.6*   * 118* 69* 72* 160*  --  185*   GPT 75* 73* 66* 67* 107*  --  102*   ALKPT 514* 497* 463* 459* 581*  --  519*   BILIRUBIN 24.0* 24.5* 24.1* 24.4* 23.5*  --  22.0*       Recent Labs   Lab 09/25/21 0423 09/24/21 2118 09/24/21  0544   WBC 18.1* 18.5* 20.7*   HGB 8.0* 8.0* 8.4*   HCT 24.4* 24.6* 26.0*   PLT 61* 68* 74*       No results found    No results found    Urine Panel  No results found    Imaging/Procedures:    Impression, Plan & Recommendations:      Acute renal failure with no h/o CKD  - Episode of IRENE in late August 2021 attributed to ATN in the setting of sepsis and progression of HRS. Cr had peaked at 4.4 and improved to 2.6 as of 9/12  - Current IRENE: suspect worsening ATN in the setting of septi/vasodilatory  shock, deterioration of liver function and DKA.  o Anuric  o Baseline creatinine: was 0.6-0.7  - Interventions:  o Received SPA, octreotide and temporarily required pressors.  Continues on midodrine    o Diuretics:  see below  o CVVH 9/16-9/21  - Initiated for management of acidosis/anuria  • Transition to IHD 9/22 and continue MWF schedule  o HD access:  RIJ permcath placement 9/23  o HD consent obtained and in chart  o Treatment ended again early today (9/24) due to hypotension and patient demand to discontinue.  Refused IHD 9/25.        Volume management: pulmonary hypertension, ascites, edema   - Diuretics PTA:  lasix 40mg, spironolactone 50mg daily? 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

## 2021-10-25 ENCOUNTER — APPOINTMENT (OUTPATIENT)
Dept: UROLOGY | Facility: CLINIC | Age: 81
End: 2021-10-25
Payer: MEDICARE

## 2021-10-25 VITALS
DIASTOLIC BLOOD PRESSURE: 66 MMHG | WEIGHT: 190 LBS | HEIGHT: 69 IN | RESPIRATION RATE: 17 BRPM | HEART RATE: 69 BPM | BODY MASS INDEX: 28.14 KG/M2 | SYSTOLIC BLOOD PRESSURE: 160 MMHG | TEMPERATURE: 97.7 F

## 2021-10-25 DIAGNOSIS — R35.1 NOCTURIA: ICD-10-CM

## 2021-10-25 PROCEDURE — 99213 OFFICE O/P EST LOW 20 MIN: CPT

## 2021-10-25 NOTE — PHYSICAL EXAM
[General Appearance - Well Developed] : well developed [General Appearance - Well Nourished] : well nourished [Normal Appearance] : normal appearance [Well Groomed] : well groomed [General Appearance - In No Acute Distress] : no acute distress [Abdomen Soft] : soft [Abdomen Tenderness] : non-tender [Costovertebral Angle Tenderness] : no ~M costovertebral angle tenderness [Urinary Bladder Findings] : the bladder was normal on palpation [Edema] : no peripheral edema [] : no respiratory distress [Respiration, Rhythm And Depth] : normal respiratory rhythm and effort [Exaggerated Use Of Accessory Muscles For Inspiration] : no accessory muscle use [Affect] : the affect was normal [Mood] : the mood was normal [Not Anxious] : not anxious [Normal Station and Gait] : the gait and station were normal for the patient's age [FreeTextEntry1] : well healed lap scars

## 2021-10-25 NOTE — HISTORY OF PRESENT ILLNESS
[FreeTextEntry1] : 81 M w/ h/o CKD stage III, DM (A1c = 8%), CAD s/p stents on asa81/plavix, SHARATH fitted for CPAP before, Stage I gallbladder cancer s/p cholecystectomy, and BPH LUTS with nocturia on Tamsulosin 0.8 mg\par \par Tried Vesicare w severe constipation.\par \par DTF q2 hrs\par Nocturia 4-5\par Slightly weak stream\par \par Lasix 20 mg\par a1c = 8%\par Dx w SHARATH but CPAP "overpowered him"\par \par Drinks 1 cup of coffee / day regular\par \par Daily BM but hard stool, strains\par \par Hx of smoking, quit 50 years ago, smoked for approx 20 years x 1 PPD \par \par PVR = 0 (9/2021)\par Flow = 5.6

## 2021-10-25 NOTE — ASSESSMENT
[FreeTextEntry1] : Counseled the patient on constipation and how it can affect the urinary tract. We discussed increasing water intake, daily fruits and vegetables, and sources of fiber.  We recommended 4 servings of whole fruit per day, excluding dried fruit or juices.  We also recommended supplementing soluble fiber intake with gummy fiber #2/day.\par \par Caffeine - cut down and switch to decaf only\par \par Counseled pt on other contributing factors to nocturia/polyuria\par Uncontrolled diabetes - rec follow up with endocrine/pcp \par SHARATH - rec follow up with sleep physician to see if can adjust CPAP\par \par \par BPH - perform cystoscopy at next visit\par hx of smoking - cystoscopy \par Cont tamsulosin 0.8

## 2021-10-28 NOTE — PROGRESS NOTE ADULT - SUBJECTIVE AND OBJECTIVE BOX
CC: Patient is a 81y old  Male who presents with a chief complaint of admitted for retained foreign body in foot (08 Mar 2021 14:35)    ID following for left foot infection    Interval History/ROS: Patient remains with sutures in left foot wound. No fevers, no chills.    Rest of ROS negative.    Allergies  Avapro (Hives)  enalapril (Unknown)  losartan (Other)  seasonal allergies-outdoor (Urticaria; Rhinorrhea; Sneezing)    ANTIMICROBIALS:  ceFAZolin   IVPB    ceFAZolin   IVPB 2000 every 12 hours    OTHER MEDS:  acetaminophen   Tablet .. 650 milliGRAM(s) Oral every 6 hours PRN  allopurinol 100 milliGRAM(s) Oral daily  amLODIPine   Tablet 10 milliGRAM(s) Oral daily  atorvastatin 40 milliGRAM(s) Oral at bedtime  carvedilol 25 milliGRAM(s) Oral every 12 hours  chlorhexidine 4% Liquid 1 Application(s) Topical <User Schedule>  clopidogrel Tablet 75 milliGRAM(s) Oral daily  dextrose 40% Gel 15 Gram(s) Oral once  dextrose 5%. 1000 milliLiter(s) IV Continuous <Continuous>  dextrose 5%. 1000 milliLiter(s) IV Continuous <Continuous>  dextrose 50% Injectable 25 Gram(s) IV Push once  dextrose 50% Injectable 12.5 Gram(s) IV Push once  dextrose 50% Injectable 25 Gram(s) IV Push once  famotidine    Tablet 20 milliGRAM(s) Oral daily  fenofibrate Tablet 48 milliGRAM(s) Oral daily  folic acid 1 milliGRAM(s) Oral daily  furosemide    Tablet 20 milliGRAM(s) Oral <User Schedule>  glucagon  Injectable 1 milliGRAM(s) IntraMuscular once  heparin   Injectable 5000 Unit(s) SubCutaneous every 12 hours  hydrALAZINE 50 milliGRAM(s) Oral two times a day  insulin glargine Injectable (LANTUS) 10 Unit(s) SubCutaneous at bedtime  insulin lispro (ADMELOG) corrective regimen sliding scale   SubCutaneous three times a day before meals  insulin lispro (ADMELOG) corrective regimen sliding scale   SubCutaneous at bedtime  multivitamin 1 Tablet(s) Oral daily  oxycodone    5 mG/acetaminophen 325 mG 1 Tablet(s) Oral every 4 hours PRN  polyethylene glycol 3350 17 Gram(s) Oral daily PRN  senna 1 Tablet(s) Oral daily PRN  sodium chloride 0.9% lock flush 10 milliLiter(s) IV Push every 1 hour PRN  tamsulosin 0.4 milliGRAM(s) Oral at bedtime      PE:    Vital Signs Last 24 Hrs  T(C): 36.6 (08 Mar 2021 16:47), Max: 37.1 (07 Mar 2021 20:17)  T(F): 97.9 (08 Mar 2021 16:47), Max: 98.8 (07 Mar 2021 20:17)  HR: 63 (08 Mar 2021 16:47) (60 - 72)  BP: 139/75 (08 Mar 2021 16:47) (118/61 - 149/76)  BP(mean): --  RR: 18 (08 Mar 2021 16:47) (18 - 18)  SpO2: 99% (08 Mar 2021 16:47) (96% - 100%)    Gen: AOx3, NAD  CV: S1+S2 normal, no murmurs  Resp: Clear bilat, no resp distress  Abd: Soft, nontender, +BS  Ext: No LE edema, no wounds  : No Mathew  IV/Skin: No thrombophlebitis, left foot surgical site with sutures in place, no drainage, no erythema  Neuro: no focal deficits    LABS:                          10.4   4.87  )-----------( 256      ( 08 Mar 2021 07:15 )             31.3       03-08    140  |  104  |  33<H>  ----------------------------<  114<H>  3.8   |  25  |  2.14<H>    Ca    11.3<H>      08 Mar 2021 07:14    TPro  6.9  /  Alb  3.6  /  TBili  0.3  /  DBili  x   /  AST  18  /  ALT  9<L>  /  AlkPhos  61  03-08    MICROBIOLOGY:  v  .Tissue Other  03-03-21   Rare Staphylococcus aureus  --  Staphylococcus aureus      .Surgical Swab left foot deep wound  03-03-21   Few Staphylococcus aureus  --  Staphylococcus aureus      .Blood Blood-Peripheral  03-02-21   No Growth Final  --  --    RADIOLOGY:    < from: Xray Chest 1 View- PORTABLE-Urgent (Xray Chest 1 View- PORTABLE-Urgent .) (03.08.21 @ 12:50) >  Impression:    The heart is normal in size. Lungs are clear. No pleural effusion. No pneumothorax. A PICC line is seen on the right and the tip is in superior vena cava. Orthopedic hardware is seen in the cervical spine.    < end of copied text >   Yes

## 2021-11-02 ENCOUNTER — APPOINTMENT (OUTPATIENT)
Dept: PULMONOLOGY | Facility: CLINIC | Age: 81
End: 2021-11-02
Payer: MEDICARE

## 2021-11-02 VITALS
BODY MASS INDEX: 28.29 KG/M2 | HEART RATE: 68 BPM | WEIGHT: 191 LBS | HEIGHT: 69 IN | TEMPERATURE: 97.2 F | SYSTOLIC BLOOD PRESSURE: 164 MMHG | OXYGEN SATURATION: 99 % | DIASTOLIC BLOOD PRESSURE: 74 MMHG

## 2021-11-02 PROCEDURE — 99214 OFFICE O/P EST MOD 30 MIN: CPT

## 2021-11-02 NOTE — HISTORY OF PRESENT ILLNESS
[TextBox_4] : 81 year old male returns with h/o nocturia. He wakes up every 2 hours to urinate. He has h/o sleep apnea and could not tolerate the CPAP. \par His weight is stable. \par He would like to be treated for sleep apnea. He is seeing his urologist soon.

## 2021-11-02 NOTE — DISCUSSION/SUMMARY
[FreeTextEntry1] : The patient has sleep apnea by history. Will do a home sleep study to evaluate the patient,

## 2021-11-16 ENCOUNTER — APPOINTMENT (OUTPATIENT)
Dept: PULMONOLOGY | Facility: CLINIC | Age: 81
End: 2021-11-16
Payer: MEDICARE

## 2021-11-16 PROCEDURE — 95806 SLEEP STUDY UNATT&RESP EFFT: CPT

## 2021-11-25 ENCOUNTER — OUTPATIENT (OUTPATIENT)
Dept: OUTPATIENT SERVICES | Facility: HOSPITAL | Age: 81
LOS: 1 days | Discharge: HOME | End: 2021-11-25

## 2021-11-25 DIAGNOSIS — Z98.89 OTHER SPECIFIED POSTPROCEDURAL STATES: Chronic | ICD-10-CM

## 2021-11-25 DIAGNOSIS — H26.9 UNSPECIFIED CATARACT: Chronic | ICD-10-CM

## 2021-11-25 DIAGNOSIS — Q76.1 KLIPPEL-FEIL SYNDROME: Chronic | ICD-10-CM

## 2021-11-25 DIAGNOSIS — M65.30 TRIGGER FINGER, UNSPECIFIED FINGER: Chronic | ICD-10-CM

## 2021-11-25 DIAGNOSIS — M75.101 UNSPECIFIED ROTATOR CUFF TEAR OR RUPTURE OF RIGHT SHOULDER, NOT SPECIFIED AS TRAUMATIC: Chronic | ICD-10-CM

## 2021-11-25 DIAGNOSIS — D64.9 ANEMIA, UNSPECIFIED: ICD-10-CM

## 2021-11-29 ENCOUNTER — APPOINTMENT (OUTPATIENT)
Dept: HEMATOLOGY ONCOLOGY | Facility: CLINIC | Age: 81
End: 2021-11-29
Payer: MEDICARE

## 2021-11-29 ENCOUNTER — NON-APPOINTMENT (OUTPATIENT)
Age: 81
End: 2021-11-29

## 2021-11-29 VITALS
TEMPERATURE: 97.2 F | WEIGHT: 189.6 LBS | RESPIRATION RATE: 16 BRPM | HEART RATE: 77 BPM | SYSTOLIC BLOOD PRESSURE: 166 MMHG | HEIGHT: 69.02 IN | OXYGEN SATURATION: 98 % | DIASTOLIC BLOOD PRESSURE: 72 MMHG | BODY MASS INDEX: 28.08 KG/M2

## 2021-11-29 PROCEDURE — 99213 OFFICE O/P EST LOW 20 MIN: CPT

## 2021-11-29 RX ORDER — EMPAGLIFLOZIN 25 MG/1
25 TABLET, FILM COATED ORAL DAILY
Refills: 0 | Status: ACTIVE | COMMUNITY
Start: 2021-11-29

## 2021-12-13 ENCOUNTER — APPOINTMENT (OUTPATIENT)
Dept: NEPHROLOGY | Facility: CLINIC | Age: 81
End: 2021-12-13

## 2021-12-16 ENCOUNTER — APPOINTMENT (OUTPATIENT)
Dept: UROLOGY | Facility: CLINIC | Age: 81
End: 2021-12-16
Payer: MEDICARE

## 2021-12-16 ENCOUNTER — OUTPATIENT (OUTPATIENT)
Dept: OUTPATIENT SERVICES | Facility: HOSPITAL | Age: 81
LOS: 1 days | End: 2021-12-16
Payer: MEDICARE

## 2021-12-16 VITALS
SYSTOLIC BLOOD PRESSURE: 166 MMHG | RESPIRATION RATE: 16 BRPM | DIASTOLIC BLOOD PRESSURE: 64 MMHG | BODY MASS INDEX: 27.99 KG/M2 | HEART RATE: 69 BPM | HEIGHT: 69 IN | WEIGHT: 189 LBS | OXYGEN SATURATION: 98 % | TEMPERATURE: 97.5 F

## 2021-12-16 DIAGNOSIS — H26.9 UNSPECIFIED CATARACT: Chronic | ICD-10-CM

## 2021-12-16 DIAGNOSIS — N40.1 BENIGN PROSTATIC HYPERPLASIA WITH LOWER URINARY TRACT SYMPTOMS: ICD-10-CM

## 2021-12-16 DIAGNOSIS — R35.0 FREQUENCY OF MICTURITION: ICD-10-CM

## 2021-12-16 DIAGNOSIS — Q76.1 KLIPPEL-FEIL SYNDROME: Chronic | ICD-10-CM

## 2021-12-16 DIAGNOSIS — M65.30 TRIGGER FINGER, UNSPECIFIED FINGER: Chronic | ICD-10-CM

## 2021-12-16 DIAGNOSIS — Z98.89 OTHER SPECIFIED POSTPROCEDURAL STATES: Chronic | ICD-10-CM

## 2021-12-16 DIAGNOSIS — M75.101 UNSPECIFIED ROTATOR CUFF TEAR OR RUPTURE OF RIGHT SHOULDER, NOT SPECIFIED AS TRAUMATIC: Chronic | ICD-10-CM

## 2021-12-16 PROCEDURE — 52000 CYSTOURETHROSCOPY: CPT

## 2021-12-18 NOTE — HISTORY OF PRESENT ILLNESS
[Disease: _____________________] : Disease: [unfilled] [T: ___] : T[unfilled] [N: ___] : N[unfilled] [M: ___] : M[unfilled] [AJCC Stage: ____] : AJCC Stage: [unfilled] [de-identified] : 80 M w/ h/o CKD, HTN, DM, CAD s/p stents, SHARATH presents for evaluation of an incidentally found Stage I gallbladder cancer after a cholecystectomy. \par \par Willie noted progressive RUQ abdominal pain in the beginning of June 2020. He presented to [facility] ER on 6/12 with same complaints and underwent a abdominal US which was indeterminant but showed a thickened wall. Denied any change in BMs, change in appetite or weight loss. After obtaining out patient cardiac clearance he underwent a lap choley on 6/17/20. Intra op GB noted to be chronically inflamed with a large gallstone noted. 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 The cystic duct was negative for malignancy or dysplasia. No LV1 or PNI noted. Post op course unremarkable. \par \sue Tapia subsequently referred to medical oncology for further evaluation. \par \par 7/9/20: CT Chest: ill defined b/l groundglass opacities, 1.3 indeterminant adrenal lesion, 1.4 thyroid nodule \par 7/23/20: MRI A/P: limited scan for biliary pathology \par 9/3/20: PET/CT: AMADO\par 7/2021: CT chest: AMADO, MRI Abd/Pel: AMADO \par  [de-identified] : adenocarcinoma  [FreeTextEntry1] : surveillance [de-identified] : feels well. denies any c/o.

## 2022-01-15 ENCOUNTER — TRANSCRIPTION ENCOUNTER (OUTPATIENT)
Age: 82
End: 2022-01-15

## 2022-01-21 ENCOUNTER — APPOINTMENT (OUTPATIENT)
Dept: UROLOGY | Facility: CLINIC | Age: 82
End: 2022-01-21
Payer: MEDICARE

## 2022-01-21 VITALS
DIASTOLIC BLOOD PRESSURE: 73 MMHG | TEMPERATURE: 97.6 F | HEIGHT: 69 IN | BODY MASS INDEX: 27.99 KG/M2 | WEIGHT: 189 LBS | SYSTOLIC BLOOD PRESSURE: 159 MMHG | RESPIRATION RATE: 16 BRPM | HEART RATE: 66 BPM

## 2022-01-21 PROCEDURE — 99214 OFFICE O/P EST MOD 30 MIN: CPT

## 2022-01-21 NOTE — HISTORY OF PRESENT ILLNESS
[FreeTextEntry1] : Patient is a 80 yo M who presents for BPH/LUTS.\par LUTS mainly frequency and nocturia.  Mainly bothered by nocturia q2 hrs.\par He denies weak stream or straining.\par Just taking flomax BID.\par Referred by Dr Kike Vizcarra.\par AUA SS 5, bother 2.\par \par PRIOR BPH/LUTS hx:\par -still reporting daytime frequency and nocturia (q1h)\par - stopped taking Vesicare after approximately 1 week due to severe constipation\par -still on Flomax 0.8mg\par - Uroflow: \par Peak flow 5.6 ml/s, \par voided 36 (urinated approx 50 min before)\par - PVR 0\par \par He sees Dr Denis for stones.

## 2022-01-21 NOTE — ASSESSMENT
[FreeTextEntry1] : Patient is a 80 yo M who presents for BPH/LUTS.\par \par He has mild LUTS.  He is bothered by nocturia and lack of sleep as a result.\par He is interested in intervention\par I discussed with pt treatment options such as BPH procedures such as Urolift, Rezum water vapor, greenlight laser, button TUVP, TURP\par Discussed with pt MIST procedures at length - Urolift vs Rezum water vaporization of prostate procedure.  Discussed with pt lower risk of bleeding and retrograde ejaculation, with potential to come off his medications.  \par D/w pt that rezum would have worsened irritative voiding symptoms for approx 1 mo and very low risk of retention due to initial prostate swelling.\par D/w pt that for Urolift there is more immediate change/improvement in LUTS, however implant would stay in place. Discussed with pt that both are relatively new technology and only several years of follow up and unclear durability. \par \par Would perform cysto and TRUS to assess

## 2022-01-21 NOTE — ED PROVIDER NOTE - CADM POA PRESS ULCER
Referred by: Mansoor Mohr MD; Medical Diagnosis (from order):    Note    Authorization     Payor: JANEL Michele #       Discipline: PT     Date Span:  10/22/21 - 2/10-22     Number of visits:  8     Notes: 16 total        Authorization     Payor: JANEL Michele #       Discipline: PT     Date Span:  10/22/21 - 2/28-22     Number of visits:  4     Notes:  20 total    13 weeks post op  Daily Treatment Note    Visit:  19     SUBJECTIVE                                                                                                               Patient reports he continues to work out at the [facility] and his exercises are going well.   Functional Change: Stairs reciprocal   Current functional limitations: Walking  Standing  Bending/squatting  Stairs  Pain / Symptoms:  Pain rating (out of 10): Current: 0   Location: Right knee    OBJECTIVE                                                                                                                     Observation:   Comments / Details:     Decreased right knee swelling  Observed Gait:   Assistive Device: no assistive device   Analysis:;   • Right: decreased knee flexion    Range of Motion (ROM)   (degrees unless noted; active unless noted; norms in ( ); negative=lacking to 0, positive=beyond 0)   Knee:    - Flexion (150):        • Right: Passive: 120 pain    - Extension (0-10):        • Right: WNL Passive: 0      TREATMENT                                                                                                                  Therapeutic Exercise:  Upright bike 10 min  Standing calf, hamstring  And quadriceps stretch  Heel raise off step 10x  SL heel raises off step  Standing free squats 10x  Balance on tilt board squats 10x  Tandem balance on Air Ex  SLS on Air Ex  Tandem balance with ball toss  SLS with ball toss  Side stepping with band  Monster walks with band  Anterior lunges 2 x 10  Shuttle MVP 1.5 bands right leg 2 x 10              Skilled  input: verbal instruction/cues, tactile instruction/cues and facilitation    Writer verbally educated and received verbal consent for hand placement, positioning of patient, and techniques to be performed today from patient for clothing adjustments for techniques, therapist position for techniques, hand placement and palpation for techniques and modality application as described above and how they are pertinent to the patient's plan of care.    Home Exercise Program/Education Materials: QS- 3 x 20  Side lying hip abduction in brace 10x  Prone hip extension in the brace.   Supine STAIGHT LEG RAISE in the brace 10x  Ankle pumps  Heel slides with strap  Seated calf stretch  Supine passive knee extension stretch        Vasopneumatic Compression (34426) Game Ready  Location: right knee  Position: supine with leg(s) elevated  Compression: low  Duration: 15 minutes  Results: decreased pain  Reaction: no adverse reaction to treatment      ASSESSMENT                                                                                                             Good tolerance to exercise to beginning SL strengthening. Patient continues to make good progress.   Pain/symptoms after session (out of 10): 0  To date the patient has made gains as expected as reported. Patient continues to have impairments and functional deficits as noted.  Patient will continue to benefit from skilled care as outlined.  Patient Education:   Results of above outlined education: Verbalizes understanding, Demonstrates understanding and Needs reinforcement      PLAN                                                                                                                           Updates to plan of care: extend current plan of care    Suggestions for next session as indicated: Progress per plan of care  Balance reaches  Step downs              GOALS                                                                                                                            Decrease pain/symptoms to 0/10 Progressing  Improve involved strength to 5 Progressing  Improve involved ROM to WNL PRogressing  The above improvements in impairments to assist in obtaining goals listed below  Long Term Goals: to be met by end of plan of care  1. Patient will demonstrate ability to negotiate level and unlevel surfaces at variable velocities, including change of direction without increased pain or instability to return to age appropriate and community activities at prior level of function. Not met  2. Patient will bend/squat  as needed daily for tasks for independent living in home, community, and at work tasks  as an umpire.  Progressing  3. Patient will stand for 60 minutes for tasks for independent living in home, community, and for work tasks  as umpire. Progressing  4. Patient will be independent with progressed and modified home exercise program. MET  5. Lower Extremity Functional Scale: Patient will complete form to reflect an improved raw score to greater than or equal to 72/80 to indicate patient reported improvement in function/disability/impairment (minimal detectable change: 9 points). NA       Therapy procedure time and total treatment time can be found documented on the Time Entry flowsheet   No

## 2022-02-07 ENCOUNTER — APPOINTMENT (OUTPATIENT)
Dept: PULMONOLOGY | Facility: CLINIC | Age: 82
End: 2022-02-07
Payer: MEDICARE

## 2022-02-07 VITALS
BODY MASS INDEX: 27.25 KG/M2 | DIASTOLIC BLOOD PRESSURE: 64 MMHG | HEART RATE: 67 BPM | HEIGHT: 69 IN | TEMPERATURE: 98.1 F | WEIGHT: 184 LBS | OXYGEN SATURATION: 98 % | SYSTOLIC BLOOD PRESSURE: 142 MMHG

## 2022-02-07 DIAGNOSIS — G47.33 OBSTRUCTIVE SLEEP APNEA (ADULT) (PEDIATRIC): ICD-10-CM

## 2022-02-07 PROCEDURE — 99213 OFFICE O/P EST LOW 20 MIN: CPT

## 2022-02-07 NOTE — REVIEW OF SYSTEMS
[Recent Wt Loss (___ Lbs)] : ~T recent [unfilled] lb weight loss [Nocturia] : nocturia [Negative] : Endocrine

## 2022-02-07 NOTE — HISTORY OF PRESENT ILLNESS
[TextBox_4] : The patient with h/o sleep apnea returns for a follow up. \par The home  sleep study reveals severe SHARATH. He cannot tolerate PAP. He says "it kills me". \par He sleeps for 6 hours a night and wakes up 3 times to urinate.  He uses an oral appliance.and he snores less with it. \par He has an enlarged prostate.  He is undergoing for a urological procedure this week.\par He lost 7 lbs weight since his sleep study.

## 2022-02-08 ENCOUNTER — NON-APPOINTMENT (OUTPATIENT)
Age: 82
End: 2022-02-08

## 2022-02-08 ENCOUNTER — APPOINTMENT (OUTPATIENT)
Dept: CARDIOLOGY | Facility: CLINIC | Age: 82
End: 2022-02-08
Payer: MEDICARE

## 2022-02-08 VITALS
SYSTOLIC BLOOD PRESSURE: 166 MMHG | HEIGHT: 69 IN | OXYGEN SATURATION: 100 % | BODY MASS INDEX: 27.4 KG/M2 | DIASTOLIC BLOOD PRESSURE: 86 MMHG | HEART RATE: 63 BPM | WEIGHT: 185 LBS

## 2022-02-08 PROCEDURE — 99215 OFFICE O/P EST HI 40 MIN: CPT

## 2022-02-08 PROCEDURE — 93000 ELECTROCARDIOGRAM COMPLETE: CPT

## 2022-02-08 NOTE — HISTORY OF PRESENT ILLNESS
[FreeTextEntry1] : 2/8/2022\par Left foot has healed.  \par No chest pain\par He is exercising without any symptoms - -dumbells, walking, stretching\par He feels good with exercise.  \par No blood in the urine or stool \par \par \par PCP is kira price [phone] i called him an left a message with his staff\par \par Meds:\par Jardiance\par ASA 81 every other day \par  Plavix 75\par Atorava 40\par Coreg 25mg BID\par  Hydral 50mg TID\par  Norvasc 10mg\par  Lasix 20mg      \par flomax\par \par   \par Cardiac Hx:\par 3/16 Cath - RCA PCI with FOREST x 3. LAD 70% --but nuc stress without anterior ischemia. \par 5/16 NST - Basal inf. / lateral ischemia\par 5/16 Echo - Normal LV, RV, EF      \par 6/16 Renal Duplex - No GATITO\par  \par \par Allergies:\par ACE/ARB allergy noted

## 2022-02-08 NOTE — DISCUSSION/SUMMARY
[FreeTextEntry1] : Assessment:\par 1. CAD s/p RCA FOREST x 3 and unrevascularized 70% mLAD (2016 NST without anterior ischemia)\par 2. HTN / HLD / DM\par 3. 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 Maintain DAPT w/ ASA 81mg daily and Plavix 75mg daily given unrevascularized mLAD\par 2. Atorvastatin to 40mg qhs (age > 75)\par 3. Continue Norvasc 10mg daily, Coreg 25mg BID.\par 4.  BP not at goal, increase hydralazine to 100mg TID **** rx sent in \par 5.  Continue diet and exercise\par 6 LDL in sept not at goal.  it was 112.  Will send lipid panel today, if still high will add zetia 10\par Need LDL < 70 ***\par 7.  Echocardiogram given his LAD lesion and poorly controlled BP \par 8. Return in 6 months. \par

## 2022-02-09 LAB
25(OH)D3 SERPL-MCNC: 56.1 NG/ML
ALBUMIN SERPL ELPH-MCNC: 4.5 G/DL
ALP BLD-CCNC: 91 U/L
ALT SERPL-CCNC: 28 U/L
ANION GAP SERPL CALC-SCNC: 16 MMOL/L
AST SERPL-CCNC: 21 U/L
BASOPHILS # BLD AUTO: 0.03 K/UL
BASOPHILS NFR BLD AUTO: 0.5 %
BILIRUB SERPL-MCNC: 0.4 MG/DL
BUN SERPL-MCNC: 45 MG/DL
CALCIUM SERPL-MCNC: 11.5 MG/DL
CHLORIDE SERPL-SCNC: 101 MMOL/L
CHOLEST SERPL-MCNC: 246 MG/DL
CO2 SERPL-SCNC: 22 MMOL/L
CREAT SERPL-MCNC: 1.92 MG/DL
EOSINOPHIL # BLD AUTO: 0.11 K/UL
EOSINOPHIL NFR BLD AUTO: 1.9 %
ESTIMATED AVERAGE GLUCOSE: 206 MG/DL
GLUCOSE SERPL-MCNC: 170 MG/DL
HBA1C MFR BLD HPLC: 8.8 %
HCT VFR BLD CALC: 46.3 %
HDLC SERPL-MCNC: 74 MG/DL
HGB BLD-MCNC: 15.3 G/DL
IMM GRANULOCYTES NFR BLD AUTO: 0.7 %
LDLC SERPL CALC-MCNC: 125 MG/DL
LYMPHOCYTES # BLD AUTO: 0.91 K/UL
LYMPHOCYTES NFR BLD AUTO: 15.8 %
MAN DIFF?: NORMAL
MCHC RBC-ENTMCNC: 32.6 PG
MCHC RBC-ENTMCNC: 33 GM/DL
MCV RBC AUTO: 98.7 FL
MONOCYTES # BLD AUTO: 0.7 K/UL
MONOCYTES NFR BLD AUTO: 12.1 %
NEUTROPHILS # BLD AUTO: 3.98 K/UL
NEUTROPHILS NFR BLD AUTO: 69 %
NONHDLC SERPL-MCNC: 172 MG/DL
PLATELET # BLD AUTO: 185 K/UL
POTASSIUM SERPL-SCNC: 4.4 MMOL/L
PROT SERPL-MCNC: 7.2 G/DL
RBC # BLD: 4.69 M/UL
RBC # FLD: 14.2 %
SODIUM SERPL-SCNC: 139 MMOL/L
TRIGL SERPL-MCNC: 233 MG/DL
WBC # FLD AUTO: 5.77 K/UL

## 2022-02-11 ENCOUNTER — APPOINTMENT (OUTPATIENT)
Dept: UROLOGY | Facility: CLINIC | Age: 82
End: 2022-02-11
Payer: MEDICARE

## 2022-02-11 ENCOUNTER — APPOINTMENT (OUTPATIENT)
Dept: UROLOGY | Facility: CLINIC | Age: 82
End: 2022-02-11

## 2022-02-11 ENCOUNTER — OUTPATIENT (OUTPATIENT)
Dept: OUTPATIENT SERVICES | Facility: HOSPITAL | Age: 82
LOS: 1 days | End: 2022-02-11
Payer: MEDICARE

## 2022-02-11 ENCOUNTER — NON-APPOINTMENT (OUTPATIENT)
Age: 82
End: 2022-02-11

## 2022-02-11 VITALS
SYSTOLIC BLOOD PRESSURE: 183 MMHG | RESPIRATION RATE: 16 BRPM | DIASTOLIC BLOOD PRESSURE: 71 MMHG | HEART RATE: 74 BPM | TEMPERATURE: 98.2 F

## 2022-02-11 DIAGNOSIS — H26.9 UNSPECIFIED CATARACT: Chronic | ICD-10-CM

## 2022-02-11 DIAGNOSIS — M75.101 UNSPECIFIED ROTATOR CUFF TEAR OR RUPTURE OF RIGHT SHOULDER, NOT SPECIFIED AS TRAUMATIC: Chronic | ICD-10-CM

## 2022-02-11 DIAGNOSIS — M65.30 TRIGGER FINGER, UNSPECIFIED FINGER: Chronic | ICD-10-CM

## 2022-02-11 DIAGNOSIS — R35.0 FREQUENCY OF MICTURITION: ICD-10-CM

## 2022-02-11 DIAGNOSIS — Z98.89 OTHER SPECIFIED POSTPROCEDURAL STATES: Chronic | ICD-10-CM

## 2022-02-11 DIAGNOSIS — Q76.1 KLIPPEL-FEIL SYNDROME: Chronic | ICD-10-CM

## 2022-02-11 DIAGNOSIS — N40.1 BENIGN PROSTATIC HYPERPLASIA WITH LOWER URINARY TRACT SYMPTOMS: ICD-10-CM

## 2022-02-11 PROCEDURE — 76775 US EXAM ABDO BACK WALL LIM: CPT | Mod: 26

## 2022-02-11 PROCEDURE — 52000 CYSTOURETHROSCOPY: CPT

## 2022-02-11 PROCEDURE — 76775 US EXAM ABDO BACK WALL LIM: CPT

## 2022-02-15 ENCOUNTER — RX RENEWAL (OUTPATIENT)
Age: 82
End: 2022-02-15

## 2022-03-04 ENCOUNTER — APPOINTMENT (OUTPATIENT)
Dept: CV DIAGNOSITCS | Facility: HOSPITAL | Age: 82
End: 2022-03-04

## 2022-03-04 ENCOUNTER — OUTPATIENT (OUTPATIENT)
Dept: OUTPATIENT SERVICES | Facility: HOSPITAL | Age: 82
LOS: 1 days | End: 2022-03-04
Payer: MEDICARE

## 2022-03-04 DIAGNOSIS — Z98.89 OTHER SPECIFIED POSTPROCEDURAL STATES: Chronic | ICD-10-CM

## 2022-03-04 DIAGNOSIS — M75.101 UNSPECIFIED ROTATOR CUFF TEAR OR RUPTURE OF RIGHT SHOULDER, NOT SPECIFIED AS TRAUMATIC: Chronic | ICD-10-CM

## 2022-03-04 DIAGNOSIS — M65.30 TRIGGER FINGER, UNSPECIFIED FINGER: Chronic | ICD-10-CM

## 2022-03-04 DIAGNOSIS — Q76.1 KLIPPEL-FEIL SYNDROME: Chronic | ICD-10-CM

## 2022-03-04 DIAGNOSIS — I25.10 ATHEROSCLEROTIC HEART DISEASE OF NATIVE CORONARY ARTERY WITHOUT ANGINA PECTORIS: ICD-10-CM

## 2022-03-04 DIAGNOSIS — H26.9 UNSPECIFIED CATARACT: Chronic | ICD-10-CM

## 2022-03-04 PROCEDURE — 93356 MYOCRD STRAIN IMG SPCKL TRCK: CPT

## 2022-03-04 PROCEDURE — 93306 TTE W/DOPPLER COMPLETE: CPT | Mod: 26

## 2022-03-04 PROCEDURE — 93306 TTE W/DOPPLER COMPLETE: CPT

## 2022-03-07 ENCOUNTER — APPOINTMENT (OUTPATIENT)
Dept: NEPHROLOGY | Facility: CLINIC | Age: 82
End: 2022-03-07
Payer: MEDICARE

## 2022-03-07 VITALS
HEART RATE: 68 BPM | OXYGEN SATURATION: 96 % | DIASTOLIC BLOOD PRESSURE: 62 MMHG | SYSTOLIC BLOOD PRESSURE: 114 MMHG | HEIGHT: 69 IN | WEIGHT: 184 LBS | TEMPERATURE: 98.2 F | BODY MASS INDEX: 27.25 KG/M2

## 2022-03-07 DIAGNOSIS — M1A.0220 IDIOPATHIC CHRONIC GOUT, LEFT ELBOW, W/OUT TOPHUS (TOPHI): ICD-10-CM

## 2022-03-07 DIAGNOSIS — M10.9 GOUT, UNSPECIFIED: ICD-10-CM

## 2022-03-07 PROCEDURE — 99212 OFFICE O/P EST SF 10 MIN: CPT

## 2022-03-07 RX ORDER — FENOFIBRATE 48 MG/1
48 TABLET ORAL DAILY
Qty: 30 | Refills: 0 | Status: ACTIVE | COMMUNITY
Start: 2022-03-07

## 2022-03-07 RX ORDER — GLIMEPIRIDE 4 MG/1
4 TABLET ORAL DAILY
Qty: 30 | Refills: 6 | Status: DISCONTINUED | COMMUNITY
Start: 2020-09-14 | End: 2022-03-07

## 2022-03-07 RX ORDER — DAPAGLIFLOZIN 10 MG/1
10 TABLET, FILM COATED ORAL DAILY
Qty: 90 | Refills: 3 | Status: DISCONTINUED | COMMUNITY
Start: 2021-06-16 | End: 2022-03-07

## 2022-03-07 RX ORDER — GLIPIZIDE 10 MG/1
10 TABLET ORAL
Qty: 180 | Refills: 3 | Status: ACTIVE | COMMUNITY
Start: 2022-03-07

## 2022-03-07 RX ORDER — ALLOPURINOL 100 MG/1
100 TABLET ORAL DAILY
Qty: 30 | Refills: 5 | Status: ACTIVE | COMMUNITY
Start: 2022-03-07

## 2022-03-07 RX ORDER — LINAGLIPTIN 5 MG/1
5 TABLET, FILM COATED ORAL DAILY
Qty: 3 | Refills: 0 | Status: DISCONTINUED | COMMUNITY
Start: 2020-03-09 | End: 2022-03-07

## 2022-03-07 RX ORDER — DAPAGLIFLOZIN 10 MG/1
10 TABLET, FILM COATED ORAL DAILY
Qty: 90 | Refills: 3 | Status: DISCONTINUED | COMMUNITY
Start: 2021-04-12 | End: 2022-03-07

## 2022-03-07 RX ORDER — ALPRAZOLAM 0.5 MG/1
0.5 TABLET ORAL
Qty: 2 | Refills: 0 | Status: DISCONTINUED | COMMUNITY
Start: 2021-06-09 | End: 2022-03-07

## 2022-03-07 RX ORDER — PIOGLITAZONE HYDROCHLORIDE 30 MG/1
30 TABLET ORAL DAILY
Qty: 30 | Refills: 0 | Status: ACTIVE | COMMUNITY
Start: 2022-03-07

## 2022-03-07 RX ORDER — FINASTERIDE 5 MG/1
5 TABLET, FILM COATED ORAL DAILY
Qty: 90 | Refills: 3 | Status: ACTIVE | COMMUNITY
Start: 2022-03-07

## 2022-03-07 NOTE — PHYSICAL EXAM
[General Appearance - Alert] : alert [General Appearance - In No Acute Distress] : in no acute distress [General Appearance - Well Nourished] : well nourished [General Appearance - Well Developed] : well developed [Sclera] : the sclera and conjunctiva were normal [Outer Ear] : the ears and nose were normal in appearance [Neck Appearance] : the appearance of the neck was normal [Neck Cervical Mass (___cm)] : no neck mass was observed [Jugular Venous Distention Increased] : there was no jugular-venous distention [Respiration, Rhythm And Depth] : normal respiratory rhythm and effort [Exaggerated Use Of Accessory Muscles For Inspiration] : no accessory muscle use [Auscultation Breath Sounds / Voice Sounds] : lungs were clear to auscultation bilaterally [Apical Impulse] : the apical impulse was normal [Heart Sounds] : normal S1 and S2 [Heart Rate And Rhythm] : heart rate was normal and rhythm regular [Heart Sounds Gallop] : no gallops [Edema] : there was no peripheral edema [Bowel Sounds] : normal bowel sounds [Abdomen Soft] : soft [Abdomen Tenderness] : non-tender [Cervical Lymph Nodes Enlarged Posterior Bilaterally] : posterior cervical [Cervical Lymph Nodes Enlarged Anterior Bilaterally] : anterior cervical [Supraclavicular Lymph Nodes Enlarged Bilaterally] : supraclavicular [Axillary Lymph Nodes Enlarged Bilaterally] : axillary [No CVA Tenderness] : no ~M costovertebral angle tenderness [No Spinal Tenderness] : no spinal tenderness [Abnormal Walk] : normal gait [Nail Clubbing] : no clubbing  or cyanosis of the fingernails [Musculoskeletal - Swelling] : no joint swelling seen [Skin Color & Pigmentation] : normal skin color and pigmentation [] : no rash [Oriented To Time, Place, And Person] : oriented to person, place, and time

## 2022-03-08 LAB
ALBUMIN SERPL ELPH-MCNC: 4.1 G/DL
ANION GAP SERPL CALC-SCNC: 12 MMOL/L
BUN SERPL-MCNC: 27 MG/DL
CALCIUM SERPL-MCNC: 11.1 MG/DL
CALCIUM SERPL-MCNC: 11.1 MG/DL
CHLORIDE SERPL-SCNC: 104 MMOL/L
CO2 SERPL-SCNC: 22 MMOL/L
CREAT SERPL-MCNC: 1.84 MG/DL
EGFR: 37 ML/MIN/1.73M2
ESTIMATED AVERAGE GLUCOSE: 212 MG/DL
GLUCOSE SERPL-MCNC: 124 MG/DL
HBA1C MFR BLD HPLC: 9 %
PARATHYROID HORMONE INTACT: 109 PG/ML
PHOSPHATE SERPL-MCNC: 3.4 MG/DL
POTASSIUM SERPL-SCNC: 4.4 MMOL/L
SODIUM SERPL-SCNC: 139 MMOL/L

## 2022-03-08 NOTE — HISTORY OF PRESENT ILLNESS
[Stage 3] : stage 3 [Hypertensive Nephropathy] : hypertensive nephropathy [Nocturia] : nocturia [Antihypertensives] : antihypertensives [Good Compliance] : good compliance  [Edema] : no edema [Pruritus] : no pruritus [Malaise] : no malaise [Weakness] : no weakness [Anorexia] : no anorexia [Nausea] : no nausea [Vomiting] : no vomiting [FreeTextEntry1] : A case of CKD stage III with diabetes, hypertension, CAD, diabetic retinopathy,  nephrolithiasis, hypercalcemia and bilateral renal cysts. Patient complains of uncontrolled blood sugar.  Patient has come for the follow-up of CKD.

## 2022-03-08 NOTE — REVIEW OF SYSTEMS
[Recent Weight Loss (___ Lbs)] : recent [unfilled] ~Ulb weight loss [Loss Of Hearing] : hearing loss [Palpitations] : palpitations [SOB on Exertion] : shortness of breath during exertion [Hesitancy] : urinary hesitancy [Nocturia] : nocturia [Sleep Disturbances] : sleep disturbances [Negative] : ENT [Fever] : no fever [Chills] : no chills [Feeling Poorly] : not feeling poorly [Feeling Tired] : not feeling tired [Recent Weight Gain (___ Lbs)] : no recent weight gain [Heart Rate Is Slow] : the heart rate was not slow [Heart Rate Is Fast] : the heart rate was not fast [Chest Pain] : no chest pain [Lower Ext Edema] : no extremity edema [Shortness Of Breath] : no shortness of breath [Wheezing] : no wheezing [Abdominal Pain] : no abdominal pain [Vomiting] : no vomiting [Constipation] : no constipation [Diarrhea] : no diarrhea [Heartburn] : no heartburn [Arthralgias] : no arthralgias [Itching] : no itching [Dry Skin] : no dry skin [Dizziness] : no dizziness [Fainting] : no fainting [Anxiety] : no anxiety [Depression] : no depression [Muscle Weakness] : no muscle weakness [Easy Bleeding] : no tendency for easy bleeding [Easy Bruising] : no tendency for easy bruising

## 2022-03-08 NOTE — ASSESSMENT
[FreeTextEntry1] : A case of CKD stage III with diabetes, hypertension, CAD, diabetic retinopathy,  nephrolithiasis, hypercalcemia and bilateral renal cysts. Patient complains of uncontrolled blood sugar.  Patient has come for the follow-up of CKD. Patient had blood tests by PCP which showed mild elevation of serum creatinine.Serum calcium was also high. Advised renal function test and PTH.\par Lab tests discussed with the patient.  There is a mild improvement in serum creatinine and GFR. A1C is 9% and serum calcium is 11.1 mg/dl. Advised to discontinue vit D. Advised RTC 3 months.

## 2022-03-11 ENCOUNTER — NON-APPOINTMENT (OUTPATIENT)
Age: 82
End: 2022-03-11

## 2022-03-19 DIAGNOSIS — N40.1 BENIGN PROSTATIC HYPERPLASIA WITH LOWER URINARY TRACT SYMPTOMS: ICD-10-CM

## 2022-03-22 ENCOUNTER — NON-APPOINTMENT (OUTPATIENT)
Age: 82
End: 2022-03-22

## 2022-05-03 ENCOUNTER — EMERGENCY (EMERGENCY)
Facility: HOSPITAL | Age: 82
LOS: 1 days | Discharge: HOME | End: 2022-05-03
Attending: EMERGENCY MEDICINE
Payer: MEDICARE

## 2022-05-03 VITALS
TEMPERATURE: 98 F | HEART RATE: 70 BPM | RESPIRATION RATE: 18 BRPM | OXYGEN SATURATION: 98 % | DIASTOLIC BLOOD PRESSURE: 81 MMHG | WEIGHT: 190.04 LBS | SYSTOLIC BLOOD PRESSURE: 195 MMHG | HEIGHT: 70 IN

## 2022-05-03 VITALS
RESPIRATION RATE: 20 BRPM | HEART RATE: 66 BPM | DIASTOLIC BLOOD PRESSURE: 74 MMHG | OXYGEN SATURATION: 96 % | SYSTOLIC BLOOD PRESSURE: 200 MMHG | TEMPERATURE: 98 F

## 2022-05-03 DIAGNOSIS — M75.101 UNSPECIFIED ROTATOR CUFF TEAR OR RUPTURE OF RIGHT SHOULDER, NOT SPECIFIED AS TRAUMATIC: Chronic | ICD-10-CM

## 2022-05-03 DIAGNOSIS — M65.30 TRIGGER FINGER, UNSPECIFIED FINGER: Chronic | ICD-10-CM

## 2022-05-03 DIAGNOSIS — Q76.1 KLIPPEL-FEIL SYNDROME: Chronic | ICD-10-CM

## 2022-05-03 DIAGNOSIS — Z98.89 OTHER SPECIFIED POSTPROCEDURAL STATES: Chronic | ICD-10-CM

## 2022-05-03 DIAGNOSIS — H26.9 UNSPECIFIED CATARACT: Chronic | ICD-10-CM

## 2022-05-03 LAB
ALBUMIN SERPL ELPH-MCNC: 4.2 G/DL — SIGNIFICANT CHANGE UP (ref 3.3–5)
ALP SERPL-CCNC: 62 U/L — SIGNIFICANT CHANGE UP (ref 40–120)
ALT FLD-CCNC: 23 U/L — SIGNIFICANT CHANGE UP (ref 10–45)
ANION GAP SERPL CALC-SCNC: 12 MMOL/L — SIGNIFICANT CHANGE UP (ref 5–17)
AST SERPL-CCNC: 25 U/L — SIGNIFICANT CHANGE UP (ref 10–40)
BASOPHILS # BLD AUTO: 0.02 K/UL — SIGNIFICANT CHANGE UP (ref 0–0.2)
BASOPHILS NFR BLD AUTO: 0.5 % — SIGNIFICANT CHANGE UP (ref 0–2)
BILIRUB SERPL-MCNC: 0.3 MG/DL — SIGNIFICANT CHANGE UP (ref 0.2–1.2)
BUN SERPL-MCNC: 44 MG/DL — HIGH (ref 7–23)
CALCIUM SERPL-MCNC: 10.5 MG/DL — SIGNIFICANT CHANGE UP (ref 8.4–10.5)
CHLORIDE SERPL-SCNC: 104 MMOL/L — SIGNIFICANT CHANGE UP (ref 96–108)
CO2 SERPL-SCNC: 23 MMOL/L — SIGNIFICANT CHANGE UP (ref 22–31)
CREAT SERPL-MCNC: 2.31 MG/DL — HIGH (ref 0.5–1.3)
EGFR: 28 ML/MIN/1.73M2 — LOW
EOSINOPHIL # BLD AUTO: 0.19 K/UL — SIGNIFICANT CHANGE UP (ref 0–0.5)
EOSINOPHIL NFR BLD AUTO: 5.2 % — SIGNIFICANT CHANGE UP (ref 0–6)
GLUCOSE SERPL-MCNC: 226 MG/DL — HIGH (ref 70–99)
HCT VFR BLD CALC: 39.3 % — SIGNIFICANT CHANGE UP (ref 39–50)
HGB BLD-MCNC: 12.7 G/DL — LOW (ref 13–17)
IMM GRANULOCYTES NFR BLD AUTO: 0.3 % — SIGNIFICANT CHANGE UP (ref 0–1.5)
LYMPHOCYTES # BLD AUTO: 0.83 K/UL — LOW (ref 1–3.3)
LYMPHOCYTES # BLD AUTO: 22.7 % — SIGNIFICANT CHANGE UP (ref 13–44)
MCHC RBC-ENTMCNC: 32.3 GM/DL — SIGNIFICANT CHANGE UP (ref 32–36)
MCHC RBC-ENTMCNC: 32.9 PG — SIGNIFICANT CHANGE UP (ref 27–34)
MCV RBC AUTO: 101.8 FL — HIGH (ref 80–100)
MONOCYTES # BLD AUTO: 0.46 K/UL — SIGNIFICANT CHANGE UP (ref 0–0.9)
MONOCYTES NFR BLD AUTO: 12.6 % — SIGNIFICANT CHANGE UP (ref 2–14)
NEUTROPHILS # BLD AUTO: 2.15 K/UL — SIGNIFICANT CHANGE UP (ref 1.8–7.4)
NEUTROPHILS NFR BLD AUTO: 58.7 % — SIGNIFICANT CHANGE UP (ref 43–77)
NRBC # BLD: 0 /100 WBCS — SIGNIFICANT CHANGE UP (ref 0–0)
PLATELET # BLD AUTO: 149 K/UL — LOW (ref 150–400)
POTASSIUM SERPL-MCNC: 4.1 MMOL/L — SIGNIFICANT CHANGE UP (ref 3.5–5.3)
POTASSIUM SERPL-SCNC: 4.1 MMOL/L — SIGNIFICANT CHANGE UP (ref 3.5–5.3)
PROT SERPL-MCNC: 6.5 G/DL — SIGNIFICANT CHANGE UP (ref 6–8.3)
RBC # BLD: 3.86 M/UL — LOW (ref 4.2–5.8)
RBC # FLD: 14.1 % — SIGNIFICANT CHANGE UP (ref 10.3–14.5)
SODIUM SERPL-SCNC: 139 MMOL/L — SIGNIFICANT CHANGE UP (ref 135–145)
TROPONIN T, HIGH SENSITIVITY RESULT: 40 NG/L — SIGNIFICANT CHANGE UP (ref 0–51)
TROPONIN T, HIGH SENSITIVITY RESULT: 41 NG/L — SIGNIFICANT CHANGE UP (ref 0–51)
WBC # BLD: 3.66 K/UL — LOW (ref 3.8–10.5)
WBC # FLD AUTO: 3.66 K/UL — LOW (ref 3.8–10.5)

## 2022-05-03 PROCEDURE — 85025 COMPLETE CBC W/AUTO DIFF WBC: CPT

## 2022-05-03 PROCEDURE — 99285 EMERGENCY DEPT VISIT HI MDM: CPT

## 2022-05-03 PROCEDURE — 99285 EMERGENCY DEPT VISIT HI MDM: CPT | Mod: 25

## 2022-05-03 PROCEDURE — 84484 ASSAY OF TROPONIN QUANT: CPT

## 2022-05-03 PROCEDURE — 71046 X-RAY EXAM CHEST 2 VIEWS: CPT

## 2022-05-03 PROCEDURE — 71046 X-RAY EXAM CHEST 2 VIEWS: CPT | Mod: 26

## 2022-05-03 PROCEDURE — 36415 COLL VENOUS BLD VENIPUNCTURE: CPT

## 2022-05-03 PROCEDURE — 80053 COMPREHEN METABOLIC PANEL: CPT

## 2022-05-03 PROCEDURE — 93005 ELECTROCARDIOGRAM TRACING: CPT

## 2022-05-03 RX ORDER — ACETAMINOPHEN 500 MG
975 TABLET ORAL ONCE
Refills: 0 | Status: COMPLETED | OUTPATIENT
Start: 2022-05-03 | End: 2022-05-03

## 2022-05-03 RX ORDER — LIDOCAINE 4 G/100G
1 CREAM TOPICAL ONCE
Refills: 0 | Status: COMPLETED | OUTPATIENT
Start: 2022-05-03 | End: 2022-05-03

## 2022-05-03 RX ADMIN — Medication 975 MILLIGRAM(S): at 16:34

## 2022-05-03 RX ADMIN — LIDOCAINE 1 PATCH: 4 CREAM TOPICAL at 16:35

## 2022-05-03 NOTE — ED PROVIDER NOTE - NS ED ATTENDING STATEMENT MOD
This was a shared visit with the ALISSON. I reviewed and verified the documentation and independently performed the documented:

## 2022-05-03 NOTE — ED PROVIDER NOTE - CROS ED GI ALL NEG
What kind of labs she is thinking about? I know she is going to have an appointment of the oncologist just for couple of days. Thank you. negative...

## 2022-05-03 NOTE — ED PROVIDER NOTE - PATIENT PORTAL LINK FT
You can access the FollowMyHealth Patient Portal offered by [facility] by registering at the following website: [email or web address]. By joining [employer]’s FollowMyHealth portal, you will also be able to view your health information using other applications (apps) compatible with our system.

## 2022-05-03 NOTE — ED ADULT TRIAGE NOTE - CHIEF COMPLAINT QUOTE
chest pain described as being located more on the left side, currently on ASA and plavix, has hx of 4 cardiac stents

## 2022-05-03 NOTE — ED ADULT NURSE NOTE - OBJECTIVE STATEMENT
Received pt alert and oriented x3, breathing even and unlabored no distress noted. Pt to ER with complaint  left substernal chest pain. Per patient constant dull ache. Per patient he was gardening a few days ago.  20G iV placed to right AC, labs drawn and sent, medicated as ordered.

## 2022-05-03 NOTE — ED PROVIDER NOTE - ATTENDING APP SHARED VISIT CONTRIBUTION OF CARE
L chest pain, sharp, worse with suddenly changing position but not with exertion ongoing for over last month, no associated dyspnea, lightheadedness, decreased exercise tolerance, nausea, vomiting.    Exam:  General: Patient well appearing, vital signs within normal limits  HEENT: airway patent with moist mucous membranes  Cardiac: RRR S1/S2 with strong peripheral pulses, no peripheral edema  Respiratory: lungs clear without respiratory distress, point tenderness along lower L chest wall reproduces complaint  GI: abdomen soft, non tender, non distended  Neuro: no gross neurologic deficits  Skin: warm, well perfused  Psych: normal mood and affect    Suspect MSK etiology of chest pain, EKG non diagnostic, given age and history of CAD plan for labs/troponin and CXR to r/o ACS, treat symptoms and likely refer to his cardiologist for outpatient workup if no actionable findings on Emergency Department evaluation.

## 2022-05-03 NOTE — ED PROVIDER NOTE - OBJECTIVE STATEMENT
83 y/o male PMH HTN, CAD s/p stents, BPH, HLD, gout, GERD, DM and CKD stage 3 now presenting to the ED with left sided sharp intermittent chest pain x1 month. Patient reported the pain is only reproducible and occurs with movement. Patient reports the pain is increasing in severity and frequency. Took ASA 162mg prior to ED arrival. Patient denied trauma, fall, SOB, back pain, abdominal pain, diaphoresis, N/V/D, fever chills, cough, rash    Cardiology Dr. Froylan Abraham   TTE 3/4/22  1. Normal mitral valve. Mild mitral regurgitation. 2. Thickened trileaflet aortic valve. Mild aortic regurgitation. 3. Normal left ventricular systolic function. No segmental wall motion abnormalities. 4. Mild diastolic dysfunction (Stage I). 5. Normal right ventricular size and function. *** Compared with echocardiogram of 9/16/2020, no significant changes noted.

## 2022-05-03 NOTE — ED ADULT NURSE REASSESSMENT NOTE - NS ED NURSE REASSESS COMMENT FT1
Patient d/c. Reviewed d/c paperwork with patients, all questions answered at this time. Patient verbalizes understanding. IV removed. Patient instructed to return to the ER for any worsening s/s including chest pain, SOB, fever, n/v/d. Patient alert and stable at time of d/c. Patient will follow up with his PCP in 2-3 days.

## 2022-05-03 NOTE — ED PROVIDER NOTE - NSFOLLOWUPINSTRUCTIONS_ED_ALL_ED_FT
Follow up with your Primary Care Physician within the next 2-3 days  Bring a copy of your test results with you to your appointment  Continue your current medication regimen  Return to the Emergency Room if you experience new or worsening symptoms abdominal pain, nausea, vomiting, fever chills, cough, chest pain, shortness of breath, dizziness, slurred speech, weakness, gait abnormality   TAKE TYLENOL EVERY 6 HOURS AS NEEDED FOR PAIN

## 2022-06-06 ENCOUNTER — APPOINTMENT (OUTPATIENT)
Dept: NEPHROLOGY | Facility: CLINIC | Age: 82
End: 2022-06-06
Payer: MEDICARE

## 2022-06-06 VITALS
HEART RATE: 67 BPM | HEIGHT: 69 IN | WEIGHT: 190 LBS | TEMPERATURE: 98.2 F | SYSTOLIC BLOOD PRESSURE: 158 MMHG | OXYGEN SATURATION: 97 % | DIASTOLIC BLOOD PRESSURE: 66 MMHG | BODY MASS INDEX: 28.14 KG/M2

## 2022-06-06 PROCEDURE — 99212 OFFICE O/P EST SF 10 MIN: CPT

## 2022-06-07 LAB
ALBUMIN SERPL ELPH-MCNC: 4.5 G/DL
ANION GAP SERPL CALC-SCNC: 11 MMOL/L
APPEARANCE: CLEAR
BACTERIA: NEGATIVE
BASOPHILS # BLD AUTO: 0.03 K/UL
BASOPHILS NFR BLD AUTO: 0.7 %
BILIRUBIN URINE: NEGATIVE
BLOOD URINE: NEGATIVE
BUN SERPL-MCNC: 50 MG/DL
CALCIUM SERPL-MCNC: 11 MG/DL
CHLORIDE SERPL-SCNC: 104 MMOL/L
CHOLEST SERPL-MCNC: 132 MG/DL
CO2 SERPL-SCNC: 25 MMOL/L
COLOR: NORMAL
CREAT SERPL-MCNC: 2.76 MG/DL
EGFR: 22 ML/MIN/1.73M2
EOSINOPHIL # BLD AUTO: 0.2 K/UL
EOSINOPHIL NFR BLD AUTO: 4.5 %
ESTIMATED AVERAGE GLUCOSE: 197 MG/DL
GLUCOSE QUALITATIVE U: ABNORMAL
GLUCOSE SERPL-MCNC: 148 MG/DL
HBA1C MFR BLD HPLC: 8.5 %
HCT VFR BLD CALC: 41.4 %
HDLC SERPL-MCNC: 56 MG/DL
HGB BLD-MCNC: 13.6 G/DL
HYALINE CASTS: 0 /LPF
IMM GRANULOCYTES NFR BLD AUTO: 0.2 %
KETONES URINE: NEGATIVE
LDLC SERPL CALC-MCNC: 59 MG/DL
LEUKOCYTE ESTERASE URINE: NEGATIVE
LYMPHOCYTES # BLD AUTO: 1.05 K/UL
LYMPHOCYTES NFR BLD AUTO: 23.8 %
MAN DIFF?: NORMAL
MCHC RBC-ENTMCNC: 32.9 GM/DL
MCHC RBC-ENTMCNC: 32.9 PG
MCV RBC AUTO: 100 FL
MICROSCOPIC-UA: NORMAL
MONOCYTES # BLD AUTO: 0.49 K/UL
MONOCYTES NFR BLD AUTO: 11.1 %
NEUTROPHILS # BLD AUTO: 2.64 K/UL
NEUTROPHILS NFR BLD AUTO: 59.7 %
NITRITE URINE: NEGATIVE
NONHDLC SERPL-MCNC: 76 MG/DL
PH URINE: 6
PHOSPHATE SERPL-MCNC: 3.4 MG/DL
PLATELET # BLD AUTO: 181 K/UL
POTASSIUM SERPL-SCNC: 4.4 MMOL/L
PROTEIN URINE: ABNORMAL
RBC # BLD: 4.14 M/UL
RBC # FLD: 13.2 %
RED BLOOD CELLS URINE: 0 /HPF
SODIUM SERPL-SCNC: 141 MMOL/L
SPECIFIC GRAVITY URINE: 1.02
SQUAMOUS EPITHELIAL CELLS: 0 /HPF
TRIGL SERPL-MCNC: 85 MG/DL
URATE SERPL-MCNC: 8 MG/DL
UROBILINOGEN URINE: NORMAL
WBC # FLD AUTO: 4.42 K/UL
WHITE BLOOD CELLS URINE: 0 /HPF

## 2022-06-07 NOTE — HISTORY OF PRESENT ILLNESS
[Stage 3] : stage 3 [Hypertensive Nephropathy] : hypertensive nephropathy [Nocturia] : nocturia [Antihypertensives] : antihypertensives [Good Compliance] : good compliance  [Edema] : no edema [Pruritus] : no pruritus [Malaise] : no malaise [Weakness] : no weakness [Anorexia] : no anorexia [Nausea] : no nausea [Vomiting] : no vomiting [FreeTextEntry1] : A case of CKD stage III with diabetes, hypertension, CAD, diabetic retinopathy,  nephrolithiasis, hypercalcemia and bilateral renal cysts. Patient had an episode of chest pain while gardening and was check to ER at [facility] and cleared. Patient feels comfortable and there are no new symptoms.

## 2022-06-07 NOTE — ASSESSMENT
[FreeTextEntry1] : A case of CKD stage III with diabetes, hypertension, CAD, diabetic retinopathy,  nephrolithiasis, hypercalcemia and bilateral renal cysts. Patient feels comfortable and there are no new symptoms.\par Weight is stable. BP is little but he says it is normal at home. \par Advised renal function tests, CBC and A1C and urine analysis.\par Lab tests discussed with the patient. There has been a significant rise in serum creatinine. It may be related to Jardiance. Advised to repeat BMP after two weeks, if serum creatinine continue patient will require w/u for SAPNA.\par Blood sugar is not fully controlled. A1C 8.5%. Advised better control of blood sugar. Advised RTC one month.

## 2022-06-08 ENCOUNTER — APPOINTMENT (OUTPATIENT)
Dept: UROLOGY | Facility: CLINIC | Age: 82
End: 2022-06-08
Payer: MEDICARE

## 2022-06-08 VITALS
TEMPERATURE: 98.3 F | WEIGHT: 185 LBS | RESPIRATION RATE: 17 BRPM | DIASTOLIC BLOOD PRESSURE: 64 MMHG | HEART RATE: 61 BPM | SYSTOLIC BLOOD PRESSURE: 132 MMHG | BODY MASS INDEX: 27.4 KG/M2 | HEIGHT: 69 IN

## 2022-06-08 DIAGNOSIS — N20.0 CALCULUS OF KIDNEY: ICD-10-CM

## 2022-06-08 DIAGNOSIS — N28.1 CYST OF KIDNEY, ACQUIRED: ICD-10-CM

## 2022-06-08 PROCEDURE — 99212 OFFICE O/P EST SF 10 MIN: CPT

## 2022-06-25 PROBLEM — N28.1 RENAL CYST: Status: ACTIVE | Noted: 2020-03-09

## 2022-06-25 NOTE — HISTORY OF PRESENT ILLNESS
[Urinary Urgency] : urinary urgency [Urinary Frequency] : urinary frequency [None] : None [FreeTextEntry1] : 82 yr old man\par kidney stones, CKD\par Pt seeing Dr. Anglin and considering Uro lift procedure.\par \par Here for follow up for kidney stones\par Renal sono (Feb 2022): bilateral cysts, no stones [Dysuria] : no dysuria [Hematuria - Gross] : no gross hematuria

## 2022-06-25 NOTE — PHYSICAL EXAM
[General Appearance - In No Acute Distress] : no acute distress [Skin Color & Pigmentation] : normal skin color and pigmentation [Edema] : no peripheral edema [] : no respiratory distress [Oriented To Time, Place, And Person] : oriented to person, place, and time [Normal Station and Gait] : the gait and station were normal for the patient's age [Abdomen Soft] : soft [Abdomen Tenderness] : non-tender [Costovertebral Angle Tenderness] : no ~M costovertebral angle tenderness

## 2022-06-29 ENCOUNTER — OUTPATIENT (OUTPATIENT)
Dept: OUTPATIENT SERVICES | Facility: HOSPITAL | Age: 82
LOS: 1 days | Discharge: HOME | End: 2022-06-29

## 2022-06-29 DIAGNOSIS — M75.101 UNSPECIFIED ROTATOR CUFF TEAR OR RUPTURE OF RIGHT SHOULDER, NOT SPECIFIED AS TRAUMATIC: Chronic | ICD-10-CM

## 2022-06-29 DIAGNOSIS — M65.30 TRIGGER FINGER, UNSPECIFIED FINGER: Chronic | ICD-10-CM

## 2022-06-29 DIAGNOSIS — Q76.1 KLIPPEL-FEIL SYNDROME: Chronic | ICD-10-CM

## 2022-06-29 DIAGNOSIS — D64.9 ANEMIA, UNSPECIFIED: ICD-10-CM

## 2022-06-29 DIAGNOSIS — H26.9 UNSPECIFIED CATARACT: Chronic | ICD-10-CM

## 2022-06-29 DIAGNOSIS — Z98.89 OTHER SPECIFIED POSTPROCEDURAL STATES: Chronic | ICD-10-CM

## 2022-07-05 NOTE — PATIENT PROFILE ADULT - INFLUENZA IMMUNIZATION DATE (APPROXIMATE)
RHEUMATOLOGY INFUSION RECORD  07/05/2022     Recent symptoms of:     Mouth ulcers  no  Sinus infection  no  Tooth infection  no  Sore throat  no  Coughing  no  Wheezing  no  Fever/Chills  no  Abdominal pain  no  Diarrhea  no  Dysuria  no  Skin rash/wounds  no  Skin ulcers  no  Are you pregnant?  no  Medication guide  no  Biologic Rx up to date  yes  Labs in 3 months  yes  Rheumatology appointment in 3 months  yes        Details:  Simponi no premeds     Prior reactions to infusions:   Tolerated well. Left NAD. Next appt made                           02-Mar-2020

## 2022-07-08 ENCOUNTER — OUTPATIENT (OUTPATIENT)
Dept: OUTPATIENT SERVICES | Facility: HOSPITAL | Age: 82
LOS: 1 days | End: 2022-07-08
Payer: MEDICARE

## 2022-07-08 ENCOUNTER — APPOINTMENT (OUTPATIENT)
Dept: CV DIAGNOSTICS | Facility: HOSPITAL | Age: 82
End: 2022-07-08

## 2022-07-08 DIAGNOSIS — M75.101 UNSPECIFIED ROTATOR CUFF TEAR OR RUPTURE OF RIGHT SHOULDER, NOT SPECIFIED AS TRAUMATIC: Chronic | ICD-10-CM

## 2022-07-08 DIAGNOSIS — M65.30 TRIGGER FINGER, UNSPECIFIED FINGER: Chronic | ICD-10-CM

## 2022-07-08 DIAGNOSIS — I25.10 ATHEROSCLEROTIC HEART DISEASE OF NATIVE CORONARY ARTERY WITHOUT ANGINA PECTORIS: ICD-10-CM

## 2022-07-08 DIAGNOSIS — Z98.89 OTHER SPECIFIED POSTPROCEDURAL STATES: Chronic | ICD-10-CM

## 2022-07-08 DIAGNOSIS — Q76.1 KLIPPEL-FEIL SYNDROME: Chronic | ICD-10-CM

## 2022-07-08 DIAGNOSIS — H26.9 UNSPECIFIED CATARACT: Chronic | ICD-10-CM

## 2022-07-08 PROCEDURE — 78452 HT MUSCLE IMAGE SPECT MULT: CPT | Mod: MH

## 2022-07-08 PROCEDURE — 78452 HT MUSCLE IMAGE SPECT MULT: CPT | Mod: 26,MH

## 2022-07-08 PROCEDURE — 93018 CV STRESS TEST I&R ONLY: CPT

## 2022-07-08 PROCEDURE — A9500: CPT

## 2022-07-08 PROCEDURE — 93016 CV STRESS TEST SUPVJ ONLY: CPT

## 2022-07-08 PROCEDURE — 93017 CV STRESS TEST TRACING ONLY: CPT

## 2022-07-18 ENCOUNTER — APPOINTMENT (OUTPATIENT)
Dept: NEPHROLOGY | Facility: CLINIC | Age: 82
End: 2022-07-18

## 2022-07-21 RX ORDER — ALPRAZOLAM 0.25 MG/1
0.25 TABLET ORAL
Qty: 2 | Refills: 0 | Status: ACTIVE | COMMUNITY
Start: 2022-07-21 | End: 1900-01-01

## 2022-07-28 ENCOUNTER — RESULT REVIEW (OUTPATIENT)
Age: 82
End: 2022-07-28

## 2022-07-28 ENCOUNTER — APPOINTMENT (OUTPATIENT)
Dept: MRI IMAGING | Facility: IMAGING CENTER | Age: 82
End: 2022-07-28

## 2022-07-28 ENCOUNTER — OUTPATIENT (OUTPATIENT)
Dept: OUTPATIENT SERVICES | Facility: HOSPITAL | Age: 82
LOS: 1 days | End: 2022-07-28
Payer: MEDICARE

## 2022-07-28 ENCOUNTER — APPOINTMENT (OUTPATIENT)
Dept: CT IMAGING | Facility: IMAGING CENTER | Age: 82
End: 2022-07-28

## 2022-07-28 DIAGNOSIS — Z98.89 OTHER SPECIFIED POSTPROCEDURAL STATES: Chronic | ICD-10-CM

## 2022-07-28 DIAGNOSIS — H26.9 UNSPECIFIED CATARACT: Chronic | ICD-10-CM

## 2022-07-28 DIAGNOSIS — Q76.1 KLIPPEL-FEIL SYNDROME: Chronic | ICD-10-CM

## 2022-07-28 DIAGNOSIS — M65.30 TRIGGER FINGER, UNSPECIFIED FINGER: Chronic | ICD-10-CM

## 2022-07-28 DIAGNOSIS — Z00.8 ENCOUNTER FOR OTHER GENERAL EXAMINATION: ICD-10-CM

## 2022-07-28 DIAGNOSIS — M75.101 UNSPECIFIED ROTATOR CUFF TEAR OR RUPTURE OF RIGHT SHOULDER, NOT SPECIFIED AS TRAUMATIC: Chronic | ICD-10-CM

## 2022-07-28 DIAGNOSIS — C23 MALIGNANT NEOPLASM OF GALLBLADDER: ICD-10-CM

## 2022-07-28 PROCEDURE — 72197 MRI PELVIS W/O & W/DYE: CPT

## 2022-07-28 PROCEDURE — 74183 MRI ABD W/O CNTR FLWD CNTR: CPT

## 2022-07-28 PROCEDURE — A9585: CPT

## 2022-07-28 PROCEDURE — 71250 CT THORAX DX C-: CPT | Mod: 26

## 2022-07-28 PROCEDURE — 72197 MRI PELVIS W/O & W/DYE: CPT | Mod: 26

## 2022-07-28 PROCEDURE — 74183 MRI ABD W/O CNTR FLWD CNTR: CPT | Mod: 26

## 2022-07-28 PROCEDURE — 71250 CT THORAX DX C-: CPT

## 2022-08-04 ENCOUNTER — APPOINTMENT (OUTPATIENT)
Dept: HEMATOLOGY ONCOLOGY | Facility: CLINIC | Age: 82
End: 2022-08-04

## 2022-08-04 VITALS
TEMPERATURE: 97.2 F | HEIGHT: 69.02 IN | WEIGHT: 190.92 LBS | RESPIRATION RATE: 16 BRPM | HEART RATE: 65 BPM | DIASTOLIC BLOOD PRESSURE: 66 MMHG | OXYGEN SATURATION: 98 % | BODY MASS INDEX: 28.28 KG/M2 | SYSTOLIC BLOOD PRESSURE: 154 MMHG

## 2022-08-04 PROCEDURE — 99213 OFFICE O/P EST LOW 20 MIN: CPT

## 2022-08-08 ENCOUNTER — APPOINTMENT (OUTPATIENT)
Dept: PULMONOLOGY | Facility: CLINIC | Age: 82
End: 2022-08-08

## 2022-08-09 ENCOUNTER — APPOINTMENT (OUTPATIENT)
Dept: CARDIOLOGY | Facility: CLINIC | Age: 82
End: 2022-08-09

## 2022-08-09 ENCOUNTER — NON-APPOINTMENT (OUTPATIENT)
Age: 82
End: 2022-08-09

## 2022-08-09 VITALS
DIASTOLIC BLOOD PRESSURE: 73 MMHG | HEART RATE: 59 BPM | SYSTOLIC BLOOD PRESSURE: 137 MMHG | BODY MASS INDEX: 27.75 KG/M2 | WEIGHT: 188 LBS | OXYGEN SATURATION: 98 %

## 2022-08-09 PROCEDURE — 99214 OFFICE O/P EST MOD 30 MIN: CPT

## 2022-08-09 NOTE — HISTORY OF PRESENT ILLNESS
[FreeTextEntry1] : 8/9/2022\par Doing ok\par had abnromal nuclear stress test\par Has plans for urolift, but not yet.\par Creatinine needs to be sent today in preparation for LHC\par  \par \par Meds:\par Jardiance\par ASA 81 every other day \par  Plavix 75\par Atorava 40\par Coreg 25mg BID\par  Hydral 50mg TID\par  Norvasc 10mg\par  Lasix 20mg      \par flomax\par \par   \par Cardiac Hx:\par 3/16 Cath - RCA PCI with FOREST x 3. LAD 70% --but nuc stress without anterior ischemia. \par 5/16 NST - Basal inf. / lateral ischemia\par 5/16 Echo - Normal LV, RV, EF      \par 6/16 Renal Duplex - No GATITO\par  \par \par Allergies:\par ACE/ARB allergy noted

## 2022-08-09 NOTE — DISCUSSION/SUMMARY
[FreeTextEntry1] : Assessment:\par 1. CAD s/p RCA FOREST x 3 and unrevascularized 70% mLAD (2016 NST without anterior ischemia)\par 2. HTN / HLD / DM\par 3. 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 Maintain DAPT w/ ASA 81mg daily and Plavix 75mg daily given unrevascularized mLAD\par 2. Atorvastatin to 40mg qhs (age > 75)\par 3. Continue Norvasc 10mg daily, Coreg 25mg BID.\par 4.  Abnormal nuclear stress test - will check creatinine today and if stable, will plan for LHC\par

## 2022-08-10 ENCOUNTER — NON-APPOINTMENT (OUTPATIENT)
Age: 82
End: 2022-08-10

## 2022-08-10 LAB
ANION GAP SERPL CALC-SCNC: 12 MMOL/L
BUN SERPL-MCNC: 35 MG/DL
CALCIUM SERPL-MCNC: 11 MG/DL
CHLORIDE SERPL-SCNC: 106 MMOL/L
CO2 SERPL-SCNC: 24 MMOL/L
CREAT SERPL-MCNC: 2.55 MG/DL
EGFR: 25 ML/MIN/1.73M2
GLUCOSE SERPL-MCNC: 153 MG/DL
POTASSIUM SERPL-SCNC: 4.3 MMOL/L
SODIUM SERPL-SCNC: 142 MMOL/L

## 2022-08-15 ENCOUNTER — APPOINTMENT (OUTPATIENT)
Dept: NEPHROLOGY | Facility: CLINIC | Age: 82
End: 2022-08-15

## 2022-08-15 VITALS
SYSTOLIC BLOOD PRESSURE: 143 MMHG | OXYGEN SATURATION: 97 % | DIASTOLIC BLOOD PRESSURE: 67 MMHG | HEIGHT: 69 IN | HEART RATE: 64 BPM | TEMPERATURE: 97.2 F | WEIGHT: 188.49 LBS | BODY MASS INDEX: 27.92 KG/M2

## 2022-08-15 DIAGNOSIS — N13.8 BENIGN PROSTATIC HYPERPLASIA WITH LOWER URINARY TRACT SYMPMS: ICD-10-CM

## 2022-08-15 DIAGNOSIS — N40.1 BENIGN PROSTATIC HYPERPLASIA WITH LOWER URINARY TRACT SYMPMS: ICD-10-CM

## 2022-08-15 DIAGNOSIS — N18.4 CHRONIC KIDNEY DISEASE, STAGE 4 (SEVERE): ICD-10-CM

## 2022-08-15 PROCEDURE — 99213 OFFICE O/P EST LOW 20 MIN: CPT

## 2022-08-16 LAB
ALBUMIN SERPL ELPH-MCNC: 4.4 G/DL
ANION GAP SERPL CALC-SCNC: 12 MMOL/L
APPEARANCE: CLEAR
BACTERIA: NEGATIVE
BASOPHILS # BLD AUTO: 0.02 K/UL
BASOPHILS NFR BLD AUTO: 0.5 %
BILIRUBIN URINE: NEGATIVE
BLOOD URINE: NEGATIVE
BUN SERPL-MCNC: 30 MG/DL
CALCIUM SERPL-MCNC: 10.8 MG/DL
CHLORIDE SERPL-SCNC: 104 MMOL/L
CO2 SERPL-SCNC: 23 MMOL/L
COLOR: YELLOW
CREAT SERPL-MCNC: 2.49 MG/DL
EGFR: 25 ML/MIN/1.73M2
EOSINOPHIL # BLD AUTO: 0.14 K/UL
EOSINOPHIL NFR BLD AUTO: 3.7 %
ESTIMATED AVERAGE GLUCOSE: 157 MG/DL
GLUCOSE QUALITATIVE U: ABNORMAL
GLUCOSE SERPL-MCNC: 121 MG/DL
HBA1C MFR BLD HPLC: 7.1 %
HCT VFR BLD CALC: 41.4 %
HGB BLD-MCNC: 13.1 G/DL
HYALINE CASTS: 0 /LPF
IMM GRANULOCYTES NFR BLD AUTO: 0.3 %
KETONES URINE: NEGATIVE
LEUKOCYTE ESTERASE URINE: NEGATIVE
LYMPHOCYTES # BLD AUTO: 0.9 K/UL
LYMPHOCYTES NFR BLD AUTO: 23.8 %
MAN DIFF?: NORMAL
MCHC RBC-ENTMCNC: 31.6 GM/DL
MCHC RBC-ENTMCNC: 33.1 PG
MCV RBC AUTO: 104.5 FL
MICROSCOPIC-UA: NORMAL
MONOCYTES # BLD AUTO: 0.42 K/UL
MONOCYTES NFR BLD AUTO: 11.1 %
NEUTROPHILS # BLD AUTO: 2.29 K/UL
NEUTROPHILS NFR BLD AUTO: 60.6 %
NITRITE URINE: NEGATIVE
PH URINE: 6
PHOSPHATE SERPL-MCNC: 2.6 MG/DL
PLATELET # BLD AUTO: 169 K/UL
POTASSIUM SERPL-SCNC: 4.3 MMOL/L
PROTEIN URINE: ABNORMAL
RBC # BLD: 3.96 M/UL
RBC # FLD: 14 %
RED BLOOD CELLS URINE: 0 /HPF
SODIUM SERPL-SCNC: 139 MMOL/L
SPECIFIC GRAVITY URINE: 1.02
SQUAMOUS EPITHELIAL CELLS: 0 /HPF
UROBILINOGEN URINE: NORMAL
WBC # FLD AUTO: 3.78 K/UL
WHITE BLOOD CELLS URINE: 0 /HPF

## 2022-08-16 NOTE — ASSESSMENT
[FreeTextEntry1] : A case of CKD stage IV with diabetes, hypertension, CAD, diabetic retinopathy,  nephrolithiasis, hypercalcemia and bilateral renal cysts. Patient had an abnormal nuclear stress test and is being considered for angioplasty and being  referred for use of contrast agent induced prevention of kidney injury.  Patient already had 4 stents in the past. Weight is stable. BP is controlled.\par Advised renal panel, CBC, A1C, and urine analysis.\par Lab tests discussed with the patient. Renal function stable. A1C is better 7.1%. Urine analysis is acceptable with mild proteinuria. \par Although there is a some risk associated with deterioration of renal function with contrast and angioplasty (showering of cholesterol emboli because of dislodging of plaques in aorta but it could be minimized with hydration  before and after the procedure.

## 2022-08-16 NOTE — HISTORY OF PRESENT ILLNESS
[Stage 3] : stage 3 [Hypertensive Nephropathy] : hypertensive nephropathy [Nocturia] : nocturia [Antihypertensives] : antihypertensives [Good Compliance] : good compliance  [Edema] : no edema [Pruritus] : no pruritus [Malaise] : no malaise [Weakness] : no weakness [Anorexia] : no anorexia [Nausea] : no nausea [Vomiting] : no vomiting [FreeTextEntry1] : A case of CKD stage IV with diabetes, hypertension, CAD, diabetic retinopathy,  nephrolithiasis, hypercalcemia and bilateral renal cysts. Patient had an abnormal nuclear stress test and is being considered for angioplasty and being  referred for use of contrast agent induced prevention of kidney injury.  Patient already had 4 stents in the past.

## 2022-08-17 ENCOUNTER — NON-APPOINTMENT (OUTPATIENT)
Age: 82
End: 2022-08-17

## 2022-08-19 NOTE — ED PROVIDER NOTE - SKIN, MLM
Recommend Initiate Ensure Enlive 8oz PO BID   Skin normal color for race, warm, dry and intact. No evidence of rash.

## 2022-09-02 NOTE — HISTORY OF PRESENT ILLNESS
[Disease: _____________________] : Disease: [unfilled] [T: ___] : T[unfilled] [N: ___] : N[unfilled] [M: ___] : M[unfilled] [AJCC Stage: ____] : AJCC Stage: [unfilled] [de-identified] : 80 M w/ h/o CKD, HTN, DM, CAD s/p stents, SHARATH presents for evaluation of an incidentally found Stage I gallbladder cancer after a cholecystectomy. \par \par Willie noted progressive RUQ abdominal pain in the beginning of June 2020. He presented to [facility] ER on 6/12 with same complaints and underwent a abdominal US which was indeterminant but showed a thickened wall. Denied any change in BMs, change in appetite or weight loss. After obtaining out patient cardiac clearance he underwent a lap choley on 6/17/20. Intra op GB noted to be chronically inflamed with a large gallstone noted. 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 The cystic duct was negative for malignancy or dysplasia. No LV1 or PNI noted. Post op course unremarkable. \par \sue Tapia subsequently referred to medical oncology for further evaluation. \par \par 7/9/20: CT Chest: ill defined b/l groundglass opacities, 1.3 indeterminant adrenal lesion, 1.4 thyroid nodule \par 7/23/20: MRI A/P: limited scan for biliary pathology \par 9/3/20: PET/CT: AMADO\par 7/2021: CT chest: AMADO, MRI Abd/Pel: AMADO \par 7/28/22: CT chest/MR abd/pel: AMADO\par  [de-identified] : adenocarcinoma  [FreeTextEntry1] : surveillance [de-identified] : Patient presented to the office for routine surveillance visit. He currently feels fine and reported no acute complaints. He admitted to adequate appetite, stable weight and regular bowel movements. He received his 2nd COVID booster vaccine earlier this spring 2022.

## 2022-09-06 ENCOUNTER — OUTPATIENT (OUTPATIENT)
Dept: OUTPATIENT SERVICES | Facility: HOSPITAL | Age: 82
LOS: 1 days | End: 2022-09-06
Payer: MEDICARE

## 2022-09-06 DIAGNOSIS — M65.30 TRIGGER FINGER, UNSPECIFIED FINGER: Chronic | ICD-10-CM

## 2022-09-06 DIAGNOSIS — Z11.52 ENCOUNTER FOR SCREENING FOR COVID-19: ICD-10-CM

## 2022-09-06 DIAGNOSIS — Z98.89 OTHER SPECIFIED POSTPROCEDURAL STATES: Chronic | ICD-10-CM

## 2022-09-06 DIAGNOSIS — M75.101 UNSPECIFIED ROTATOR CUFF TEAR OR RUPTURE OF RIGHT SHOULDER, NOT SPECIFIED AS TRAUMATIC: Chronic | ICD-10-CM

## 2022-09-06 DIAGNOSIS — H26.9 UNSPECIFIED CATARACT: Chronic | ICD-10-CM

## 2022-09-06 DIAGNOSIS — Q76.1 KLIPPEL-FEIL SYNDROME: Chronic | ICD-10-CM

## 2022-09-06 LAB — SARS-COV-2 RNA SPEC QL NAA+PROBE: DETECTED

## 2022-09-06 PROCEDURE — C9803: CPT

## 2022-09-06 PROCEDURE — U0005: CPT

## 2022-09-06 PROCEDURE — U0003: CPT

## 2022-09-07 ENCOUNTER — RX RENEWAL (OUTPATIENT)
Age: 82
End: 2022-09-07

## 2022-09-22 ENCOUNTER — TRANSCRIPTION ENCOUNTER (OUTPATIENT)
Age: 82
End: 2022-09-22

## 2022-09-22 ENCOUNTER — OUTPATIENT (OUTPATIENT)
Dept: OUTPATIENT SERVICES | Facility: HOSPITAL | Age: 82
LOS: 1 days | End: 2022-09-22
Payer: MEDICARE

## 2022-09-22 VITALS
HEART RATE: 70 BPM | TEMPERATURE: 98 F | OXYGEN SATURATION: 99 % | HEIGHT: 69 IN | WEIGHT: 188.05 LBS | RESPIRATION RATE: 16 BRPM | DIASTOLIC BLOOD PRESSURE: 66 MMHG | SYSTOLIC BLOOD PRESSURE: 154 MMHG

## 2022-09-22 VITALS
OXYGEN SATURATION: 97 % | HEART RATE: 66 BPM | DIASTOLIC BLOOD PRESSURE: 69 MMHG | RESPIRATION RATE: 12 BRPM | SYSTOLIC BLOOD PRESSURE: 133 MMHG

## 2022-09-22 DIAGNOSIS — Z98.89 OTHER SPECIFIED POSTPROCEDURAL STATES: Chronic | ICD-10-CM

## 2022-09-22 DIAGNOSIS — Q76.1 KLIPPEL-FEIL SYNDROME: Chronic | ICD-10-CM

## 2022-09-22 DIAGNOSIS — M65.30 TRIGGER FINGER, UNSPECIFIED FINGER: Chronic | ICD-10-CM

## 2022-09-22 DIAGNOSIS — M75.101 UNSPECIFIED ROTATOR CUFF TEAR OR RUPTURE OF RIGHT SHOULDER, NOT SPECIFIED AS TRAUMATIC: Chronic | ICD-10-CM

## 2022-09-22 DIAGNOSIS — R94.39 ABNORMAL RESULT OF OTHER CARDIOVASCULAR FUNCTION STUDY: ICD-10-CM

## 2022-09-22 DIAGNOSIS — H26.9 UNSPECIFIED CATARACT: Chronic | ICD-10-CM

## 2022-09-22 LAB
ALBUMIN SERPL ELPH-MCNC: 4.4 G/DL — SIGNIFICANT CHANGE UP (ref 3.3–5)
ALP SERPL-CCNC: 62 U/L — SIGNIFICANT CHANGE UP (ref 40–120)
ALT FLD-CCNC: 20 U/L — SIGNIFICANT CHANGE UP (ref 10–45)
ANION GAP SERPL CALC-SCNC: 9 MMOL/L — SIGNIFICANT CHANGE UP (ref 5–17)
AST SERPL-CCNC: 28 U/L — SIGNIFICANT CHANGE UP (ref 10–40)
BILIRUB SERPL-MCNC: 0.4 MG/DL — SIGNIFICANT CHANGE UP (ref 0.2–1.2)
BUN SERPL-MCNC: 36 MG/DL — HIGH (ref 7–23)
CALCIUM SERPL-MCNC: 10.8 MG/DL — HIGH (ref 8.4–10.5)
CHLORIDE SERPL-SCNC: 109 MMOL/L — HIGH (ref 96–108)
CO2 SERPL-SCNC: 24 MMOL/L — SIGNIFICANT CHANGE UP (ref 22–31)
CREAT SERPL-MCNC: 2.58 MG/DL — HIGH (ref 0.5–1.3)
EGFR: 24 ML/MIN/1.73M2 — LOW
GLUCOSE BLDC GLUCOMTR-MCNC: 109 MG/DL — HIGH (ref 70–99)
GLUCOSE SERPL-MCNC: 109 MG/DL — HIGH (ref 70–99)
HCT VFR BLD CALC: 36.2 % — LOW (ref 39–50)
HGB BLD-MCNC: 11.9 G/DL — LOW (ref 13–17)
MAGNESIUM SERPL-MCNC: 2.3 MG/DL — SIGNIFICANT CHANGE UP (ref 1.6–2.6)
MCHC RBC-ENTMCNC: 32.2 PG — SIGNIFICANT CHANGE UP (ref 27–34)
MCHC RBC-ENTMCNC: 32.9 GM/DL — SIGNIFICANT CHANGE UP (ref 32–36)
MCV RBC AUTO: 98.1 FL — SIGNIFICANT CHANGE UP (ref 80–100)
NRBC # BLD: 0 /100 WBCS — SIGNIFICANT CHANGE UP (ref 0–0)
PHOSPHATE SERPL-MCNC: 3.1 MG/DL — SIGNIFICANT CHANGE UP (ref 2.5–4.5)
PLATELET # BLD AUTO: 161 K/UL — SIGNIFICANT CHANGE UP (ref 150–400)
POTASSIUM SERPL-MCNC: 4.1 MMOL/L — SIGNIFICANT CHANGE UP (ref 3.5–5.3)
POTASSIUM SERPL-SCNC: 4.1 MMOL/L — SIGNIFICANT CHANGE UP (ref 3.5–5.3)
PROT SERPL-MCNC: 7.1 G/DL — SIGNIFICANT CHANGE UP (ref 6–8.3)
RBC # BLD: 3.69 M/UL — LOW (ref 4.2–5.8)
RBC # FLD: 14.5 % — SIGNIFICANT CHANGE UP (ref 10.3–14.5)
SODIUM SERPL-SCNC: 142 MMOL/L — SIGNIFICANT CHANGE UP (ref 135–145)
WBC # BLD: 4.32 K/UL — SIGNIFICANT CHANGE UP (ref 3.8–10.5)
WBC # FLD AUTO: 4.32 K/UL — SIGNIFICANT CHANGE UP (ref 3.8–10.5)

## 2022-09-22 PROCEDURE — 36415 COLL VENOUS BLD VENIPUNCTURE: CPT

## 2022-09-22 PROCEDURE — 82962 GLUCOSE BLOOD TEST: CPT

## 2022-09-22 PROCEDURE — C1887: CPT

## 2022-09-22 PROCEDURE — 85027 COMPLETE CBC AUTOMATED: CPT

## 2022-09-22 PROCEDURE — C1769: CPT

## 2022-09-22 PROCEDURE — 80053 COMPREHEN METABOLIC PANEL: CPT

## 2022-09-22 PROCEDURE — 83735 ASSAY OF MAGNESIUM: CPT

## 2022-09-22 PROCEDURE — 99152 MOD SED SAME PHYS/QHP 5/>YRS: CPT

## 2022-09-22 PROCEDURE — C1894: CPT

## 2022-09-22 PROCEDURE — 84100 ASSAY OF PHOSPHORUS: CPT

## 2022-09-22 PROCEDURE — 93454 CORONARY ARTERY ANGIO S&I: CPT | Mod: 26

## 2022-09-22 PROCEDURE — 93005 ELECTROCARDIOGRAM TRACING: CPT

## 2022-09-22 PROCEDURE — 93454 CORONARY ARTERY ANGIO S&I: CPT

## 2022-09-22 PROCEDURE — 93010 ELECTROCARDIOGRAM REPORT: CPT

## 2022-09-22 RX ORDER — FAMOTIDINE 10 MG/ML
1 INJECTION INTRAVENOUS
Qty: 0 | Refills: 0 | DISCHARGE

## 2022-09-22 RX ORDER — HYDRALAZINE HCL 50 MG
1 TABLET ORAL
Qty: 0 | Refills: 0 | DISCHARGE

## 2022-09-22 RX ORDER — GLIMEPIRIDE 1 MG
1 TABLET ORAL
Qty: 0 | Refills: 0 | DISCHARGE

## 2022-09-22 RX ORDER — FUROSEMIDE 40 MG
1 TABLET ORAL
Qty: 0 | Refills: 0 | DISCHARGE

## 2022-09-22 RX ORDER — SODIUM CHLORIDE 9 MG/ML
1000 INJECTION INTRAMUSCULAR; INTRAVENOUS; SUBCUTANEOUS
Refills: 0 | Status: DISCONTINUED | OUTPATIENT
Start: 2022-09-22 | End: 2022-10-06

## 2022-09-22 RX ORDER — TAMSULOSIN HYDROCHLORIDE 0.4 MG/1
1 CAPSULE ORAL
Qty: 0 | Refills: 0 | DISCHARGE

## 2022-09-22 RX ORDER — GLUCOSAMINE/CHONDROITIN/C/MANG 500-400 MG
1 CAPSULE ORAL
Qty: 0 | Refills: 0 | DISCHARGE

## 2022-09-22 RX ADMIN — SODIUM CHLORIDE 75 MILLILITER(S): 9 INJECTION INTRAMUSCULAR; INTRAVENOUS; SUBCUTANEOUS at 09:18

## 2022-09-22 NOTE — ASU DISCHARGE PLAN (ADULT/PEDIATRIC) - CARE PROVIDER_API CALL
Froylan Abraham (DO)  Cardiovascular Disease; Internal Medicine; Nuclear Cardiology  [address] [place] [address]  Phone: [phone]  Fax: [phone]  Established Patient  Follow Up Time: 2 weeks

## 2022-09-22 NOTE — H&P CARDIOLOGY - HISTORY OF PRESENT ILLNESS
Hermelindo Brunson   MRN: [id number]    Department:  [facility] Emergency Department   Date of Visit:  9/26/2018           Disclosure     Insurance plans vary and the physician(s) referred by the ER may not be covered by your plan.  Please contact your ins tell this physician (or your personal doctor if your instructions are to return to your personal doctor) about any new or lasting problems. The primary care or specialist physician will see patients referred from the [facility] Emergency Department.  Italia Clarke 81 year old male with PMH HTN, CAD with 4 stents (more than 5 years ago), BPH, HLD, gout, GERD, DM2 and CKD stage 3 who had a recently complained of weakness underwent a 7/8/22 nuclear stress test with Dr. Abraham which was abnormal presents today for [facility]. Denies chest pain, shortness of breath, syncope.  Patient denies any implantable device     Nuclear Stres Test 7/22  IMPRESSIONS:Abnormal Study  * Chest Pain: No chest pain with administration of  Regadenoson.  * Symptom: Shortness of breath, fatigue, cough resolved  spontaneously.  * HR Response: Appropriate.  * BP Response: Appropriate.  * Heart Rhythm: Sinus Bradycardia - Rare APD's - 58 BPM.  * Baseline ECG: Nonspecific ST-T wave abnormality in III.  * ECG Abnormalities: None.  * ECG Changes: No significant ischemic EKG changes.  * Arrhythmia: None.  * Review of raw data shows: The study is of adequate  technical quality  * The left ventricle was normal in size. There is a small,  mild defect in basal infero-lateral wall that is  reversible, suggestive of ischemia.  * There are medium sized, mild to moderate defects in  basal to mid inferior, basal inferoseptal walls that are  predominantly fixed, suggestive of infarction with mild  talya-infarct ischemia.  * There are medium sized, mild defects in basal anterior,  basal to mid anterolateral walls that are reversible,  suggestive of mild ischemia.  * Post-stress gated wall motion analysis was performed  (LVEF = 61 %;LVEDV = 105 ml.) revealing normal left  ventricular size and systolic function. There is reduced  systolic thickening of the basal inferior and basal  inferoseptal segments.  *** Compared with Nuclear/Stress test of 5/10/2016, mild  reversible defects of the basal anterior and basal to mid  anterolateral walls are now seen.    Echo 3/22  ------------------------------------------------------------------------  Conclusions:  1. Normal mitral valve. Mild mitral regurgitation.  2. Thickened trileaflet aortic valve. Mild aortic  regurgitation.  3. Normal left ventricular systolic function. No segmental  wall motion abnormalities.  4. Mild diastolic dysfunction (Stage I).  5. Normal right ventricular size and function.  *** Compared with echocardiogram of 9/16/2020, no  significant changes noted.

## 2022-09-22 NOTE — ASU DISCHARGE PLAN (ADULT/PEDIATRIC) - NS MD DC FALL RISK RISK
For information on Fall & Injury Prevention, visit: [email or web address] OR  [email or web address] OR  https://www.cdc.gov/steadi/patient.html

## 2022-09-22 NOTE — H&P CARDIOLOGY - NSICDXPASTMEDICALHX_GEN_ALL_CORE_FT
PAST MEDICAL HISTORY:  BPH (Benign Prostatic Hypertrophy)     CAD (coronary artery disease) stents x4( 03/2017)  last stress test 7/22  echo 3/22    Chronic kidney disease (CKD)     Diabetes 2    Diabetes Mellitus Type II     Ganglion cyst of wrist, left     H/O gastroesophageal reflux (GERD)     H/O: Rotator Cuff Tear     Hyperlipemia     Hyperlipidemia     Hypertension     Hypertension     OA (osteoarthritis)     SHARATH (obstructive sleep apnea) non compliant with CPAP    Pneumonia     Renal Calculi h/olithotripsy 2010

## 2022-10-04 ENCOUNTER — NON-APPOINTMENT (OUTPATIENT)
Age: 82
End: 2022-10-04

## 2022-10-04 ENCOUNTER — APPOINTMENT (OUTPATIENT)
Dept: CARDIOLOGY | Facility: CLINIC | Age: 82
End: 2022-10-04

## 2022-10-04 VITALS
SYSTOLIC BLOOD PRESSURE: 162 MMHG | DIASTOLIC BLOOD PRESSURE: 71 MMHG | BODY MASS INDEX: 27.7 KG/M2 | OXYGEN SATURATION: 98 % | HEART RATE: 61 BPM | HEIGHT: 69 IN | WEIGHT: 187 LBS

## 2022-10-04 VITALS — SYSTOLIC BLOOD PRESSURE: 148 MMHG | DIASTOLIC BLOOD PRESSURE: 69 MMHG

## 2022-10-04 PROBLEM — I25.10 ATHEROSCLEROTIC HEART DISEASE OF NATIVE CORONARY ARTERY WITHOUT ANGINA PECTORIS: Chronic | Status: ACTIVE | Noted: 2017-09-15

## 2022-10-04 PROCEDURE — 99214 OFFICE O/P EST MOD 30 MIN: CPT

## 2022-10-04 RX ORDER — ISOSORBIDE MONONITRATE 30 MG/1
30 TABLET, EXTENDED RELEASE ORAL
Qty: 45 | Refills: 1 | Status: DISCONTINUED | COMMUNITY
Start: 2022-09-23 | End: 2022-10-04

## 2022-10-10 NOTE — PHYSICAL EXAM
[General Appearance - Well Developed] : well developed [Heart Rate And Rhythm] : heart rate and rhythm were normal [Heart Sounds] : normal S1 and S2 [Nail Clubbing] : no clubbing of the fingernails [Cyanosis, Localized] : no localized cyanosis [Skin Color & Pigmentation] : normal skin color and pigmentation [] : no rash [No Venous Stasis] : no venous stasis [Skin Lesions] : no skin lesions [No Skin Ulcers] : no skin ulcer [No Xanthoma] : no  xanthoma was observed [Oriented To Time, Place, And Person] : oriented to person, place, and time [Affect] : the affect was normal [No Anxiety] : not feeling anxious [Mood] : the mood was normal

## 2022-10-10 NOTE — HISTORY OF PRESENT ILLNESS
[FreeTextEntry1] : 10/2022\par Doing ok\par Has plans for urolift, but not yet scheduled\par \par  \par \par Meds:\par \par -Imdur 15mg \par Jardiance\par ASA 81 every other day \par Actos\par Glipizide\par Repaglinide\par Amlodipine 10\par  Plavix 75\par Atorava 40\par Coreg 25mg BID\par  Hydral 100mg TID\par  Lasix 20mg \par flomax 0.4\par finasteride\par Zetia\par Fenofibrate\par Allopurinol\par \par  \par Cardiac Hx:\par 3/16 Cath - RCA PCI with FOREST x 3. LAD 70% --but nuc stress without anterior ischemia. \par 5/16 NST - Basal inf. / lateral ischemia\par 5/16 Echo - Normal LV, RV, EF \par 6/16 Renal Duplex - No GATITO\par 3/2022- Echo- normal LV and RV function, nl EF\par 8/2022- LHC \par LAD- There is a 70 % stenosis.  \par Cx- Angiography shows moderate atherosclerosis.  \par RCA- is a 40 % stenosis.  \par \par Allergies:\par ACE/ARB allergy noted \par \par \par

## 2022-10-10 NOTE — ASSESSMENT
[FreeTextEntry1] : Assessment:\par 1. CAD s/p RCA FOREST x 3 and unrevascularized 70% mLAD (2016 NST without anterior ischemia). LHC 9/2022 with mLAD 70%, unrevascularized\par 2. HTN / HLD / DM\par 3. 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 Maintain DAPT w/ ASA 81mg daily and Plavix 75mg daily given unrevascularized mLAD\par 2. Atorvastatin to 40mg qhs (age > 75)\par 3. Continue Norvasc 10mg daily, Coreg 25mg BID, hydralazine 100mg TID\par 4. STOP imdur; START Ranexa (given side effects from imdur) 500mg BID.\par 5. No further cardiac testing prior to urolift needed. OK to proceed urolift and OK to hold plavix 7 days prior to urolift but ASA must be continued.\par 6. RTC in 3 months

## 2022-11-14 ENCOUNTER — APPOINTMENT (OUTPATIENT)
Dept: NEPHROLOGY | Facility: CLINIC | Age: 82
End: 2022-11-14

## 2023-01-13 ENCOUNTER — NON-APPOINTMENT (OUTPATIENT)
Age: 83
End: 2023-01-13

## 2023-01-13 ENCOUNTER — APPOINTMENT (OUTPATIENT)
Dept: CARDIOLOGY | Facility: CLINIC | Age: 83
End: 2023-01-13
Payer: MEDICARE

## 2023-01-13 VITALS
SYSTOLIC BLOOD PRESSURE: 126 MMHG | WEIGHT: 180 LBS | DIASTOLIC BLOOD PRESSURE: 61 MMHG | HEIGHT: 69 IN | BODY MASS INDEX: 26.66 KG/M2 | OXYGEN SATURATION: 100 % | HEART RATE: 69 BPM

## 2023-01-13 PROCEDURE — 99214 OFFICE O/P EST MOD 30 MIN: CPT

## 2023-01-13 NOTE — PHYSICAL EXAM
[General Appearance - Well Developed] : well developed [Heart Rate And Rhythm] : heart rate and rhythm were normal [Heart Sounds] : normal S1 and S2 [Nail Clubbing] : no clubbing of the fingernails [Cyanosis, Localized] : no localized cyanosis [Skin Color & Pigmentation] : normal skin color and pigmentation [] : no rash [No Venous Stasis] : no venous stasis [Skin Lesions] : no skin lesions [No Skin Ulcers] : no skin ulcer [No Xanthoma] : no  xanthoma was observed [Oriented To Time, Place, And Person] : oriented to person, place, and time [Affect] : the affect was normal [Mood] : the mood was normal [No Anxiety] : not feeling anxious

## 2023-01-13 NOTE — ASSESSMENT
[FreeTextEntry1] : Assessment:\par 1. CAD s/p RCA FOREST x 3 and unrevascularized 70% mLAD (2016 NST without anterior ischemia). LHC 9/2022 with mLAD 70%, unrevascularized\par 2. HTN / HLD / DM\par 3. 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 Maintain DAPT w/ ASA 81mg daily and Plavix 75mg daily given unrevascularized mLAD\par 2. Atorvastatin to 40mg qhs (age > 75)\par 3. Continue Norvasc 10mg daily, Coreg 25mg BID, hydralazine 100mg TID\par 4.  Cotninue Ranexa (given side effects from imdur) 500mg BID.\par 5. Return in 6 months.

## 2023-01-13 NOTE — HISTORY OF PRESENT ILLNESS
[FreeTextEntry1] : 1/13/2023\par Was seen by urologist 2nd opinion - Ricardio Monisha -no Urolift needed\par NO CP or SOB\par wants to travel to [facility] in a few months. \par \par \par  Ranexa\par Jardiance\par ASA 81 every other day \par Actos\par Glipizide\par Repaglinide\par Amlodipine 10\par  Plavix 75\par Atorava 40\par Coreg 25mg BID\par  Hydral 100mg TID\par  Lasix 20mg \par flomax 0.4\par finasteride\par Zetia\par Fenofibrate\par Allopurinol\par \par \par 10/2022\par Doing ok\par Has plans for urolift, but not yet scheduled\par \par  \par \par Meds:\par \par \par  \par Cardiac Hx:\par 3/16 Cath - RCA PCI with FOREST x 3. LAD 70% --but nuc stress without anterior ischemia. \par 5/16 NST - Basal inf. / lateral ischemia\par 5/16 Echo - Normal LV, RV, EF \par 6/16 Renal Duplex - No GATITO\par 3/2022- Echo- normal LV and RV function, nl EF\par 8/2022- LHC \par LAD- There is a 70 % stenosis.  \par Cx- Angiography shows moderate atherosclerosis.  \par RCA- is a 40 % stenosis.  \par \par Allergies:\par ACE/ARB allergy noted \par \par \par

## 2023-02-15 NOTE — ASU DISCHARGE PLAN (ADULT/PEDIATRIC). - ACCOMPANYING ADULT'S SIGNATURE_______________________________________
1. Preoperative clearance  Patient has no major clinical predictors going for left total knee arthroplastic surgery. Patient has reasonable functional capacity as described above. I have ordered blood works, EKG, chest x-ray as requested by surgeon. If they are acceptable, I will consider patient as low to moderate risk for perioperative cardiac events with routine perioperative care. DVT prophylaxis as protocol  The above risk assessment was discussed with the patient and would like to proceed with the surgery. Patient will discuss the risk and benefit of the surgery with the surgeon.   The above note will be faxed to surgeon's office Statement Selected

## 2023-06-06 ENCOUNTER — APPOINTMENT (OUTPATIENT)
Dept: CARDIOLOGY | Facility: CLINIC | Age: 83
End: 2023-06-06

## 2023-06-12 ENCOUNTER — APPOINTMENT (OUTPATIENT)
Dept: UROLOGY | Facility: CLINIC | Age: 83
End: 2023-06-12

## 2023-07-14 ENCOUNTER — APPOINTMENT (OUTPATIENT)
Dept: CARDIOLOGY | Facility: CLINIC | Age: 83
End: 2023-07-14
Payer: MEDICARE

## 2023-07-14 ENCOUNTER — NON-APPOINTMENT (OUTPATIENT)
Age: 83
End: 2023-07-14

## 2023-07-14 VITALS
HEIGHT: 69 IN | DIASTOLIC BLOOD PRESSURE: 63 MMHG | OXYGEN SATURATION: 99 % | HEART RATE: 69 BPM | SYSTOLIC BLOOD PRESSURE: 148 MMHG | BODY MASS INDEX: 26.66 KG/M2 | WEIGHT: 180 LBS

## 2023-07-14 PROCEDURE — 99215 OFFICE O/P EST HI 40 MIN: CPT

## 2023-07-14 NOTE — PHYSICAL EXAM
[General Appearance - Well Developed] : well developed [Heart Rate And Rhythm] : heart rate and rhythm were normal [Heart Sounds] : normal S1 and S2 [Nail Clubbing] : no clubbing of the fingernails [Cyanosis, Localized] : no localized cyanosis [] : no rash [Skin Color & Pigmentation] : normal skin color and pigmentation [No Venous Stasis] : no venous stasis [Skin Lesions] : no skin lesions [No Skin Ulcers] : no skin ulcer [No Xanthoma] : no  xanthoma was observed [Oriented To Time, Place, And Person] : oriented to person, place, and time [Affect] : the affect was normal [Mood] : the mood was normal [No Anxiety] : not feeling anxious

## 2023-07-14 NOTE — HISTORY OF PRESENT ILLNESS
[FreeTextEntry1] : 7/14/2023\par He is planning on having a R hip replacement\par August 10th\par [facility]\par Dr. Alexi Venegas\par Fax:  [phone]\par no cp or angina\par \par Meds\par  Ranexa 500mg BID\par Jardiance\par ASA 81 every other day \par Actos\par Glipizide\par Repaglinide\par Amlodipine 10\par  Plavix 75\par Atorava 40\par Coreg 25mg BID\par  Hydral 100mg TID\par flomax 0.4\par finasteride\par Zetia\par Fenofibrate\par Allopurinol\par \par 1/13/2023\par Was seen by urologist 2nd opinion - Tom Bearden -no Urolift needed\par NO CP or SOB\par wants to travel to [facility] in a few months. \par \par \par  Ranexa\par Jardiance\par ASA 81 every other day \par Actos\par Glipizide\par Repaglinide\par Amlodipine 10\par  Plavix 75\par Atorava 40\par Coreg 25mg BID\par  Hydral 100mg TID\par  Lasix 20mg \par flomax 0.4\par finasteride\par Zetia\par Fenofibrate\par Allopurinol\par \par \par 10/2022\par Doing ok\par Has plans for urolift, but not yet scheduled\par \par  \par \par Meds:\par \par \par  \par Cardiac Hx:\par 3/16 Cath - RCA PCI with FOREST x 3. LAD 70% --but nuc stress without anterior ischemia. \par 5/16 NST - Basal inf. / lateral ischemia\par 5/16 Echo - Normal LV, RV, EF \par 6/16 Renal Duplex - No GATITO\par 3/2022- Echo- normal LV and RV function, nl EF\par 8/2022- LHC \par LAD- There is a 70 % stenosis.  \par Cx- Angiography shows moderate atherosclerosis.  \par RCA- is a 40 % stenosis.  \par \par Allergies:\par ACE/ARB allergy noted \par \par \par

## 2023-07-14 NOTE — ASSESSMENT
[FreeTextEntry1] : Assessment:\par 1. CAD s/p RCA FOREST x 3 and unrevascularized 70% mLAD (2016 NST without anterior ischemia). C 9/2022 with mLAD 70%, unrevascularized\par 2. HTN / HLD / DM\par 3. 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 Maintain DAPT w/ ASA 81mg daily and Plavix 75mg daily given unrevascularized mLAD - which is fairly distal and calcified.  \par 2.  He has no anginal symptoms, breathing ok, euvolemic on exam.  He is optimized for hip surgery.  He has a stress test with mild ischemia and cath with mid to distal calcified LAD.   There is a risk given his un-revascularized distal LAD lesion of enzyme leak or MI during the procedure.  \par 3.  He will talk to his doctor to see if a hip injection or conservative measures are reasonable to try first.  If he fails that and his quality of life is poor despite conservative measures, then he may proceed with hip surgery (need to balance cardiac risk vs. quality of life).  \par 2. Atorvastatin  40mg qhs (age > 75)\par 3. Continue Norvasc 10mg daily, Coreg 25mg BID, hydralazine 100mg TID\par 4.  Cotninue Ranexa (given side effects from imdur) 500mg BID.\par 5. Return  in September.

## 2023-08-14 NOTE — DIETITIAN INITIAL EVALUATION ADULT. - REASON
Attending Attestation (For Attendings USE Only)...
no overt signs of muscle and/or fat depletion noted upon visual assessment

## 2023-10-10 ENCOUNTER — INPATIENT (INPATIENT)
Facility: HOSPITAL | Age: 83
LOS: 1 days | Discharge: HOME | DRG: 378 | End: 2023-10-12
Attending: STUDENT IN AN ORGANIZED HEALTH CARE EDUCATION/TRAINING PROGRAM | Admitting: INTERNAL MEDICINE
Payer: MEDICARE

## 2023-10-10 VITALS
WEIGHT: 184.97 LBS | TEMPERATURE: 98 F | DIASTOLIC BLOOD PRESSURE: 73 MMHG | OXYGEN SATURATION: 97 % | SYSTOLIC BLOOD PRESSURE: 152 MMHG | RESPIRATION RATE: 18 BRPM | HEIGHT: 68 IN | HEART RATE: 72 BPM

## 2023-10-10 DIAGNOSIS — I10 ESSENTIAL (PRIMARY) HYPERTENSION: ICD-10-CM

## 2023-10-10 DIAGNOSIS — K62.5 HEMORRHAGE OF ANUS AND RECTUM: ICD-10-CM

## 2023-10-10 DIAGNOSIS — Z29.9 ENCOUNTER FOR PROPHYLACTIC MEASURES, UNSPECIFIED: ICD-10-CM

## 2023-10-10 DIAGNOSIS — E78.5 HYPERLIPIDEMIA, UNSPECIFIED: ICD-10-CM

## 2023-10-10 DIAGNOSIS — H26.9 UNSPECIFIED CATARACT: Chronic | ICD-10-CM

## 2023-10-10 DIAGNOSIS — Q76.1 KLIPPEL-FEIL SYNDROME: Chronic | ICD-10-CM

## 2023-10-10 DIAGNOSIS — M75.101 UNSPECIFIED ROTATOR CUFF TEAR OR RUPTURE OF RIGHT SHOULDER, NOT SPECIFIED AS TRAUMATIC: Chronic | ICD-10-CM

## 2023-10-10 DIAGNOSIS — N40.0 BENIGN PROSTATIC HYPERPLASIA WITHOUT LOWER URINARY TRACT SYMPTOMS: ICD-10-CM

## 2023-10-10 DIAGNOSIS — D64.9 ANEMIA, UNSPECIFIED: ICD-10-CM

## 2023-10-10 DIAGNOSIS — M10.9 GOUT, UNSPECIFIED: ICD-10-CM

## 2023-10-10 DIAGNOSIS — K21.9 GASTRO-ESOPHAGEAL REFLUX DISEASE WITHOUT ESOPHAGITIS: ICD-10-CM

## 2023-10-10 DIAGNOSIS — Z98.89 OTHER SPECIFIED POSTPROCEDURAL STATES: Chronic | ICD-10-CM

## 2023-10-10 DIAGNOSIS — I25.10 ATHEROSCLEROTIC HEART DISEASE OF NATIVE CORONARY ARTERY WITHOUT ANGINA PECTORIS: ICD-10-CM

## 2023-10-10 DIAGNOSIS — N18.30 CHRONIC KIDNEY DISEASE, STAGE 3 UNSPECIFIED: ICD-10-CM

## 2023-10-10 DIAGNOSIS — K92.1 MELENA: ICD-10-CM

## 2023-10-10 DIAGNOSIS — M65.30 TRIGGER FINGER, UNSPECIFIED FINGER: Chronic | ICD-10-CM

## 2023-10-10 DIAGNOSIS — E11.9 TYPE 2 DIABETES MELLITUS WITHOUT COMPLICATIONS: ICD-10-CM

## 2023-10-10 LAB
ALBUMIN SERPL ELPH-MCNC: 3.5 G/DL — SIGNIFICANT CHANGE UP (ref 3.3–5)
ALP SERPL-CCNC: 57 U/L — SIGNIFICANT CHANGE UP (ref 40–120)
ALT FLD-CCNC: 12 U/L — SIGNIFICANT CHANGE UP (ref 10–45)
ANION GAP SERPL CALC-SCNC: 10 MMOL/L — SIGNIFICANT CHANGE UP (ref 5–17)
APTT BLD: 35.3 SEC — SIGNIFICANT CHANGE UP (ref 24.5–35.6)
AST SERPL-CCNC: 21 U/L — SIGNIFICANT CHANGE UP (ref 10–40)
BASOPHILS # BLD AUTO: 0.02 K/UL — SIGNIFICANT CHANGE UP (ref 0–0.2)
BASOPHILS NFR BLD AUTO: 0.5 % — SIGNIFICANT CHANGE UP (ref 0–2)
BILIRUB SERPL-MCNC: 0.4 MG/DL — SIGNIFICANT CHANGE UP (ref 0.2–1.2)
BLD GP AB SCN SERPL QL: NEGATIVE — SIGNIFICANT CHANGE UP
BUN SERPL-MCNC: 36 MG/DL — HIGH (ref 7–23)
CALCIUM SERPL-MCNC: 10.3 MG/DL — SIGNIFICANT CHANGE UP (ref 8.4–10.5)
CHLORIDE SERPL-SCNC: 107 MMOL/L — SIGNIFICANT CHANGE UP (ref 96–108)
CO2 SERPL-SCNC: 22 MMOL/L — SIGNIFICANT CHANGE UP (ref 22–31)
CREAT SERPL-MCNC: 2.48 MG/DL — HIGH (ref 0.5–1.3)
EGFR: 25 ML/MIN/1.73M2 — LOW
EOSINOPHIL # BLD AUTO: 0.3 K/UL — SIGNIFICANT CHANGE UP (ref 0–0.5)
EOSINOPHIL NFR BLD AUTO: 7.2 % — HIGH (ref 0–6)
GLUCOSE BLDC GLUCOMTR-MCNC: 181 MG/DL — HIGH (ref 70–99)
GLUCOSE BLDC GLUCOMTR-MCNC: 199 MG/DL — HIGH (ref 70–99)
GLUCOSE SERPL-MCNC: 173 MG/DL — HIGH (ref 70–99)
HCT VFR BLD CALC: 22.1 % — LOW (ref 39–50)
HCT VFR BLD CALC: 30 % — LOW (ref 39–50)
HGB BLD-MCNC: 6.9 G/DL — CRITICAL LOW (ref 13–17)
HGB BLD-MCNC: 9.2 G/DL — LOW (ref 13–17)
IMM GRANULOCYTES NFR BLD AUTO: 0.5 % — SIGNIFICANT CHANGE UP (ref 0–0.9)
INR BLD: 1.08 RATIO — SIGNIFICANT CHANGE UP (ref 0.85–1.18)
LYMPHOCYTES # BLD AUTO: 0.69 K/UL — LOW (ref 1–3.3)
LYMPHOCYTES # BLD AUTO: 16.5 % — SIGNIFICANT CHANGE UP (ref 13–44)
MCHC RBC-ENTMCNC: 30.7 GM/DL — LOW (ref 32–36)
MCHC RBC-ENTMCNC: 31.1 PG — SIGNIFICANT CHANGE UP (ref 27–34)
MCHC RBC-ENTMCNC: 31.2 GM/DL — LOW (ref 32–36)
MCHC RBC-ENTMCNC: 31.5 PG — SIGNIFICANT CHANGE UP (ref 27–34)
MCV RBC AUTO: 102.7 FL — HIGH (ref 80–100)
MCV RBC AUTO: 99.5 FL — SIGNIFICANT CHANGE UP (ref 80–100)
MONOCYTES # BLD AUTO: 0.5 K/UL — SIGNIFICANT CHANGE UP (ref 0–0.9)
MONOCYTES NFR BLD AUTO: 12 % — SIGNIFICANT CHANGE UP (ref 2–14)
NEUTROPHILS # BLD AUTO: 2.64 K/UL — SIGNIFICANT CHANGE UP (ref 1.8–7.4)
NEUTROPHILS NFR BLD AUTO: 63.3 % — SIGNIFICANT CHANGE UP (ref 43–77)
NRBC # BLD: 0 /100 WBCS — SIGNIFICANT CHANGE UP (ref 0–0)
NRBC # BLD: 0 /100 WBCS — SIGNIFICANT CHANGE UP (ref 0–0)
PLATELET # BLD AUTO: 152 K/UL — SIGNIFICANT CHANGE UP (ref 150–400)
PLATELET # BLD AUTO: 211 K/UL — SIGNIFICANT CHANGE UP (ref 150–400)
POTASSIUM SERPL-MCNC: 4.3 MMOL/L — SIGNIFICANT CHANGE UP (ref 3.5–5.3)
POTASSIUM SERPL-SCNC: 4.3 MMOL/L — SIGNIFICANT CHANGE UP (ref 3.5–5.3)
PROT SERPL-MCNC: 6.3 G/DL — SIGNIFICANT CHANGE UP (ref 6–8.3)
PROTHROM AB SERPL-ACNC: 11.3 SEC — SIGNIFICANT CHANGE UP (ref 9.5–13)
RBC # BLD: 2.22 M/UL — LOW (ref 4.2–5.8)
RBC # BLD: 2.92 M/UL — LOW (ref 4.2–5.8)
RBC # FLD: 15.5 % — HIGH (ref 10.3–14.5)
RBC # FLD: 17.7 % — HIGH (ref 10.3–14.5)
RH IG SCN BLD-IMP: POSITIVE — SIGNIFICANT CHANGE UP
SODIUM SERPL-SCNC: 139 MMOL/L — SIGNIFICANT CHANGE UP (ref 135–145)
WBC # BLD: 3.85 K/UL — SIGNIFICANT CHANGE UP (ref 3.8–10.5)
WBC # BLD: 4.17 K/UL — SIGNIFICANT CHANGE UP (ref 3.8–10.5)
WBC # FLD AUTO: 3.85 K/UL — SIGNIFICANT CHANGE UP (ref 3.8–10.5)
WBC # FLD AUTO: 4.17 K/UL — SIGNIFICANT CHANGE UP (ref 3.8–10.5)

## 2023-10-10 PROCEDURE — 99222 1ST HOSP IP/OBS MODERATE 55: CPT | Mod: GC

## 2023-10-10 PROCEDURE — 99223 1ST HOSP IP/OBS HIGH 75: CPT | Mod: GC

## 2023-10-10 PROCEDURE — 99291 CRITICAL CARE FIRST HOUR: CPT

## 2023-10-10 RX ORDER — GLUCAGON INJECTION, SOLUTION 0.5 MG/.1ML
1 INJECTION, SOLUTION SUBCUTANEOUS ONCE
Refills: 0 | Status: DISCONTINUED | OUTPATIENT
Start: 2023-10-10 | End: 2023-10-12

## 2023-10-10 RX ORDER — DEXTROSE 50 % IN WATER 50 %
25 SYRINGE (ML) INTRAVENOUS ONCE
Refills: 0 | Status: DISCONTINUED | OUTPATIENT
Start: 2023-10-10 | End: 2023-10-12

## 2023-10-10 RX ORDER — DEXTROSE 50 % IN WATER 50 %
12.5 SYRINGE (ML) INTRAVENOUS ONCE
Refills: 0 | Status: DISCONTINUED | OUTPATIENT
Start: 2023-10-10 | End: 2023-10-12

## 2023-10-10 RX ORDER — PANTOPRAZOLE SODIUM 20 MG/1
40 TABLET, DELAYED RELEASE ORAL ONCE
Refills: 0 | Status: COMPLETED | OUTPATIENT
Start: 2023-10-10 | End: 2023-10-10

## 2023-10-10 RX ORDER — FENOFIBRATE,MICRONIZED 130 MG
48 CAPSULE ORAL DAILY
Refills: 0 | Status: DISCONTINUED | OUTPATIENT
Start: 2023-10-10 | End: 2023-10-12

## 2023-10-10 RX ORDER — SODIUM CHLORIDE 9 MG/ML
1000 INJECTION, SOLUTION INTRAVENOUS
Refills: 0 | Status: DISCONTINUED | OUTPATIENT
Start: 2023-10-10 | End: 2023-10-10

## 2023-10-10 RX ORDER — SODIUM CHLORIDE 9 MG/ML
1000 INJECTION, SOLUTION INTRAVENOUS
Refills: 0 | Status: DISCONTINUED | OUTPATIENT
Start: 2023-10-10 | End: 2023-10-12

## 2023-10-10 RX ORDER — RANOLAZINE 500 MG/1
500 TABLET, FILM COATED, EXTENDED RELEASE ORAL
Refills: 0 | Status: DISCONTINUED | OUTPATIENT
Start: 2023-10-10 | End: 2023-10-12

## 2023-10-10 RX ORDER — FINASTERIDE 5 MG/1
5 TABLET, FILM COATED ORAL DAILY
Refills: 0 | Status: DISCONTINUED | OUTPATIENT
Start: 2023-10-10 | End: 2023-10-12

## 2023-10-10 RX ORDER — OXYBUTYNIN CHLORIDE 5 MG
5 TABLET ORAL
Refills: 0 | Status: DISCONTINUED | OUTPATIENT
Start: 2023-10-10 | End: 2023-10-12

## 2023-10-10 RX ORDER — INSULIN LISPRO 100/ML
VIAL (ML) SUBCUTANEOUS AT BEDTIME
Refills: 0 | Status: DISCONTINUED | OUTPATIENT
Start: 2023-10-10 | End: 2023-10-12

## 2023-10-10 RX ORDER — FOLIC ACID 0.8 MG
1 TABLET ORAL
Qty: 0 | Refills: 0 | DISCHARGE

## 2023-10-10 RX ORDER — SODIUM CHLORIDE 9 MG/ML
1000 INJECTION, SOLUTION INTRAVENOUS
Refills: 0 | Status: DISCONTINUED | OUTPATIENT
Start: 2023-10-10 | End: 2023-10-11

## 2023-10-10 RX ORDER — SOD SULF/SODIUM/NAHCO3/KCL/PEG
4000 SOLUTION, RECONSTITUTED, ORAL ORAL ONCE
Refills: 0 | Status: COMPLETED | OUTPATIENT
Start: 2023-10-10 | End: 2023-10-10

## 2023-10-10 RX ORDER — ALLOPURINOL 300 MG
100 TABLET ORAL DAILY
Refills: 0 | Status: DISCONTINUED | OUTPATIENT
Start: 2023-10-10 | End: 2023-10-12

## 2023-10-10 RX ORDER — PANTOPRAZOLE SODIUM 20 MG/1
40 TABLET, DELAYED RELEASE ORAL EVERY 12 HOURS
Refills: 0 | Status: DISCONTINUED | OUTPATIENT
Start: 2023-10-10 | End: 2023-10-12

## 2023-10-10 RX ORDER — ATORVASTATIN CALCIUM 80 MG/1
40 TABLET, FILM COATED ORAL AT BEDTIME
Refills: 0 | Status: DISCONTINUED | OUTPATIENT
Start: 2023-10-10 | End: 2023-10-12

## 2023-10-10 RX ORDER — DEXTROSE 50 % IN WATER 50 %
15 SYRINGE (ML) INTRAVENOUS ONCE
Refills: 0 | Status: DISCONTINUED | OUTPATIENT
Start: 2023-10-10 | End: 2023-10-12

## 2023-10-10 RX ORDER — INSULIN LISPRO 100/ML
VIAL (ML) SUBCUTANEOUS
Refills: 0 | Status: DISCONTINUED | OUTPATIENT
Start: 2023-10-10 | End: 2023-10-12

## 2023-10-10 RX ADMIN — PANTOPRAZOLE SODIUM 40 MILLIGRAM(S): 20 TABLET, DELAYED RELEASE ORAL at 12:56

## 2023-10-10 RX ADMIN — Medication 48 MILLIGRAM(S): at 19:24

## 2023-10-10 RX ADMIN — SODIUM CHLORIDE 100 MILLILITER(S): 9 INJECTION, SOLUTION INTRAVENOUS at 17:05

## 2023-10-10 RX ADMIN — Medication 1: at 19:27

## 2023-10-10 RX ADMIN — Medication 100 MILLIGRAM(S): at 19:22

## 2023-10-10 RX ADMIN — Medication 4000 MILLILITER(S): at 21:47

## 2023-10-10 RX ADMIN — PANTOPRAZOLE SODIUM 40 MILLIGRAM(S): 20 TABLET, DELAYED RELEASE ORAL at 19:22

## 2023-10-10 RX ADMIN — RANOLAZINE 500 MILLIGRAM(S): 500 TABLET, FILM COATED, EXTENDED RELEASE ORAL at 19:24

## 2023-10-10 RX ADMIN — ATORVASTATIN CALCIUM 40 MILLIGRAM(S): 80 TABLET, FILM COATED ORAL at 22:27

## 2023-10-10 NOTE — ED ADULT NURSE REASSESSMENT NOTE - NS ED NURSE REASSESS COMMENT FT1
1 PRBC given. Consent in chart. Risks and benefits explained to patient. Patient verbalized understanding of risks and benefits. Patient aware of possible side effects. Vital signs stable. Second RN at bedside for confirmation.

## 2023-10-10 NOTE — ED ADULT NURSE REASSESSMENT NOTE - NS ED NURSE REASSESS COMMENT FT1
PRBCs  given. Consent in chart. Risks and benefits explained to patient. Patient verbalized understanding of risks and benefits. Patient aware of possible side effects. Vital signs stable. Second RN at bedside for confirmation.

## 2023-10-10 NOTE — H&P ADULT - PROBLEM SELECTOR PLAN 6
- hx of gout   - cw allopurinol - hx of CKD3  - Cr 2.48 on admission (appears near baseline)  - monitor creatinine   - avoid nephrotoxic agents   - renally dose meds

## 2023-10-10 NOTE — H&P ADULT - PROBLEM SELECTOR PLAN 1
Presents w/ multiple episodes w/ BRBPR on admission   Differential: Diverticular bleed vs Internal Hemorrhoids vs Ischemic colitis (less likely)   On admission: Hgb - 9.2 (Baseline Hgb - 11.9; 09/2022) pending 1U pRBC   + AJDE     Plan:   - Transfuse Hgb < 8; Maintain active T&S; CBC BID  - F/u GI recs --> colonoscopy on 10/11   - NPO Hb 9.2 on admission 2/2 multiple episodes w/ BRBPR  Differential: Diverticular bleed vs Internal Hemorrhoids vs Ischemic colitis (less likely)   - diverticulosis noted on MRI 7/22  On admission: Hgb - 9.2 (Baseline Hgb - 11.9; 09/2022) s/p 1U pRBC   + JADE     Plan:   - Transfuse Hgb < 8; Maintain active T&S;   - repeat CBC post transfusion  - protonix 40 BID  - F/u GI recs --> colonoscopy on 10/11   - Clear liquid diet

## 2023-10-10 NOTE — ED PROVIDER NOTE - CARE PLAN
1 Principal Discharge DX:	BRBPR (bright red blood per rectum)  Assessment and plan of treatment:	83-yo pmhx cad w/ 4 stents on asa/plavix, ckd3, htn, gerd, bph, who presents with rectal bleeding x3 since AM.

## 2023-10-10 NOTE — ED ADULT NURSE NOTE - OBJECTIVE STATEMENT
normal gait and station , no tenderness or deformities present  The patient is a 83y male presenting to the ED from home for complaints of rectal bleeding. Patient has a PMH of HTN, HLD, DM2, and Heart Disease (Aspirin & Plavix Usage). Patient reports he began having dark and bright red bloody stool this morning. Patient had 2 episodes @ home an a episode upon ED arrival. The stool presented as dark formed stool and upon arrival the stool was loose. He reports taking his aspirin and Plavix dose last night. Upon assessment PT is breathing spontaneous and unlabored on room air. No signs of respiratory distress @ this time. He is axo x4 and ambulatory with assist and a cane. Patient denies pain @ this time. Pt denies chest pain, palpitations, shortness of breath, headache, visual disturbances, numbness/tingling, fever, chills, diaphoresis,  nausea, vomiting, constipation, or urinary symptoms. Safety and comfort measures provided, bed locked and in lowest position, side rails up for safety. Call bell within reach. Awaiting results.

## 2023-10-10 NOTE — ED PROVIDER NOTE - OBJECTIVE STATEMENT
83-yo pmhx cad w/ 4 stents on asa/plavix, ckd3, htn, gerd, bph, who presents with rectal bleeding x3 since AM. Reports BRBPR on soft stools x3 which warranted presentation to ED. Denies diarrhea, vomiting, abdominal pain. Last took asa/plavix last night. No prior episodes of BRBPR.

## 2023-10-10 NOTE — ED PROVIDER NOTE - PROGRESS NOTE DETAILS
Dr. Georges:  Alerted by pt son that this is his 5th episode of BRBPR. Pt now endorsing dizziness. HR elevated and hypotensive relative to pt baseline. Put in for stat 1 U (initially ordered as emergent release but given that pt has type on file, blood bank says faster to order as 1 U PRBC in computer). GI fellow paged. Already aware of pt, advised of change in status. GI student at bedside currently. Consult fellow says down shortly and agree w plan for 1 U and request PPI. Dr. Georges:  Alerted by pt son that this is his 5th episode of BRBPR. Pt now endorsing dizziness. HR elevated to 100bpmand hypotensive relative to pt baseline. Put in for stat 1 U (initially ordered as emergent release but given that pt has type on file, blood bank says faster to order as 1 U PRBC in computer). GI fellow paged. Already aware of pt, advised of change in status. GI student at bedside currently. Consult fellow says down shortly and agree w plan for 1 U and request PPI. Pt receiving blood, seen by GI, reporst feels symptomatically better. Less tachy. Will admit to medicine for GI bleed w gi following. Rpt CBC per med discretion.

## 2023-10-10 NOTE — ED PROVIDER NOTE - PLAN OF CARE
83-yo pmhx cad w/ 4 stents on asa/plavix, ckd3, htn, gerd, bph, who presents with rectal bleeding x3 since AM.

## 2023-10-10 NOTE — H&P ADULT - ASSESSMENT
83M w/ CAD s/p FOREST x 4 (in 2016) on DAPT (ASA/Plavix), CKD3 (Baseline Cr - 2.5; 9/2022), HTN, GERD, BPH p/w BRBPR since this morning. Patient s/p 1U pRBC and admitted to medicine for further management. Pt hemodynamically stable and pending colonoscopy via GI on 10/11.  83M w/ CAD s/p FOREST x 4 (in 2016) on DAPT (ASA/Plavix), CKD3 (Baseline Cr - 2.5; 9/2022), HTN, GERD, BPH p/w BRBPR since this morning. Patient s/p 1U pRBC and admitted to medicine for further management.  83M w/ CAD s/p FOREST x 4 (in 2016) on DAPT (ASA/Plavix), CKD3 (Baseline Cr - 2.5; 9/2022), HTN, HLD, GERD, gout, diabetes, BPH p/w BRBPR since this morning. Patient s/p 1U pRBC and admitted to medicine for further management.

## 2023-10-10 NOTE — ED PROVIDER NOTE - NS ED ROS FT
REVIEW OF SYSTEMS:    CONSTITUTIONAL: No weakness, fevers or chills  EYES: No vision changes  ENT: No vertigo or throat pain   NECK: No pain or stiffness  RESPIRATORY: No cough, wheezing, hemoptysis; No shortness of breath  CARDIOVASCULAR: No chest pain or palpitations  GASTROINTESTINAL: No abdominal or epigastric pain. No nausea, vomiting, or hematemesis; No diarrhea or constipation. +BRBPR  GENITOURINARY: No dysuria, frequency or hematuria  NEUROLOGICAL: No numbness or weakness  PSYCH: No anxiety, depression, or behavior changes  All other review of systems is negative unless indicated above.

## 2023-10-10 NOTE — CONSULT NOTE ADULT - SUBJECTIVE AND OBJECTIVE BOX
INIITIAL GI CONSULTATION  HPI:  81 year old male with PMH HTN, CAD with 4 stents (more than 5 years ago), BPH, HLD, gout, GERD, DM2 and CKD stage       In ED, /70, HR 70s. Labs notable for Hb 9.2, from b/l 11.9 9/2022.    ASA/NSAIDs/anticoagulation:     Labs/Imaging reviewed.                        9.2    4.17  )-----------( 211      ( 10 Oct 2023 11:22 )             30.0     Last Hb:Hemoglobin: 9.2 g/dL (10-10-23 @ 11:22)               139   |  107   |  36                 Ca: 10.3   BMP:   ----------------------------< 173    Mg: x     (10-10-23 @ 11:22)             4.3    |  22    | 2.48               Ph: x        LFT:     TPro: 6.3 / Alb: 3.5 / TBili: 0.4 / DBili: x / AST: 21 / ALT: 12 / AlkPhos: 57   (10-10-23 @ 11:22)    Creatinine: 2.48 mg/dL      BUN/Cr: Blood Urea Nitrogen: 36 mg/dL (10-10-23 @ 11:22)  /Creatinine: 2.48 mg/dL (10-10-23 @ 11:22)    AST/ALTAspartate Aminotransferase (AST/SGOT): 21 U/L (10-10-23 @ 11:22)  /Alanine Aminotransferase (ALT/SGPT): 12 U/L (10-10-23 @ 11:22)    ALP Alkaline Phosphatase: 57 U/L (10-10-23 @ 11:22)    T/Dbili /  INR: INR: 1.08 ratio (10-10-23 @ 11:23)      EGD/[place]:      Imaging:      FamHx: ***no h/o GI malignancies known  PMH/PSH:  PAST MEDICAL & SURGICAL HISTORY:  Hypertension      Hyperlipemia      Diabetes Mellitus Type II      Renal Calculi  h/olithotripsy 2010      H/O: Rotator Cuff Tear      Pneumonia      BPH (Benign Prostatic Hypertrophy)      Hypertension      Hyperlipidemia      Diabetes  2      CAD (coronary artery disease)  stents x4( 03/2017)  last stress test 7/22  echo 3/22      Ganglion cyst of wrist, left      SHARATH (obstructive sleep apnea)  non compliant with CPAP      Chronic kidney disease (CKD)      H/O gastroesophageal reflux (GERD)      OA (osteoarthritis)      History of Arthroscopy  bilateral shoulder rotator cuff repair      Laminectomy with Spinal Fusion  cervical- 2009      lumbar lamenectomy  1998      Bilateral cataracts  sp extraction      Trigger finger of both hands  sp repair      Cervical fusion syndrome      History of lumbar laminectomy      Bilateral rotator cuff syndrome  operated both'          MEDS:  MEDICATIONS  (STANDING):    MEDICATIONS  (PRN):    Allergies    enalapril (Unknown)  seasonal allergies-outdoor (Urticaria; Rhinorrhea; Sneezing)  losartan (Other)  Avapro (Hives)    Intolerances    losartan (Other)      ROS neg except as listed above  Neg for F/C, weight loss, vision changes, SOB, PIMENTEL, CP, LE edema. GI ROS as listed in HPI    ______________________________________________________________________  PHYSICAL EXAM:  T(C): 36.4 (10-10-23 @ 11:09), Max: 36.6 (10-10-23 @ 10:26)  HR: 67 (10-10-23 @ 11:09)  BP: 166/71 (10-10-23 @ 11:09)  RR: 19 (10-10-23 @ 11:09)  SpO2: 100% (10-10-23 @ 11:09)  Wt(kg): --    GEN: NAD, normocephalic  CVS: Normal rate, HD stable  CHEST: No signs of respiratory distress, breathing comfortably, no accessory muscle usage  ABD: soft , nontender, nondistended, bowel sounds present  EXTR: no cyanosis, no clubbing, no edema  NEURO: Awake and alert, conversant  SKIN:  warm;  non icteric     INIITIAL GI CONSULTATION  HPI:  81 year old male with PMH HTN, CAD with 4 stents (more than 5 years ago), BPH, HLD, gout, GERD, DM2 and CKD. Patient was well until this AM when he had a bloody bowel movement. Per patient, he observed blood in both the stools as well as in the toilet bowl. He proceeded to have 2 additional bloody bowel movements at home at which point he decided to pursue care in the ED. In the ED, pt. had two additional bloody bowel movements where he noticed more blood than stools. In ED, /70, HR 70s. GI consulted for BRBPR concerning for acute GI bleed.    No history of blood per rectum. Has hx of laparoscopic cholecystectomy. MRI from 2022 shows colonic diverticulosis. No weight loss, fevers, or night sweats. No family history of colon cancer. Does not regularly take NSAIDS. Last dose of aspirin/plavix yesterday. No other anticoagulation. Occasional alcohol consumption (once per month) and quit smoking >50 years ago. Last colonoscopy >5 years ago, which was normal. Pt. does not recall receiving an endoscopy. Pt. last seen in hospital in September 2022. At the time. pt. had baseline hb of 11.9 and BUN of 36. Today, labs notable for Hb 9.2, from b/l 11.9 9/2022.    Patient was seen at bedside, endorsing both fatigue and light-headedness. He has had no abdominal pain. No hematemesis, nausea, or vomiting. No diarrhea. No gross blood loss per rectum, but rectal exam significant for red blood.     ASA/NSAIDs/anticoagulation: Aspirin/plavix     Labs/Imaging reviewed.                        9.2    4.17  )-----------( 211      ( 10 Oct 2023 11:22 )             30.0     Last Hb:Hemoglobin: 9.2 g/dL (10-10-23 @ 11:22)               139   |  107   |  36                 Ca: 10.3   BMP:   ----------------------------< 173    Mg: x     (10-10-23 @ 11:22)             4.3    |  22    | 2.48               Ph: x        LFT:     TPro: 6.3 / Alb: 3.5 / TBili: 0.4 / DBili: x / AST: 21 / ALT: 12 / AlkPhos: 57   (10-10-23 @ 11:22)    Creatinine: 2.48 mg/dL      BUN/Cr: Blood Urea Nitrogen: 36 mg/dL (10-10-23 @ 11:22)  /Creatinine: 2.48 mg/dL (10-10-23 @ 11:22)    AST/ALTAspartate Aminotransferase (AST/SGOT): 21 U/L (10-10-23 @ 11:22)  /Alanine Aminotransferase (ALT/SGPT): 12 U/L (10-10-23 @ 11:22)    ALP Alkaline Phosphatase: 57 U/L (10-10-23 @ 11:22)    T/Dbili /  INR: INR: 1.08 ratio (10-10-23 @ 11:23)      EGD/[place]:      Imaging:      FamHx: ***no h/o GI malignancies known  PMH/PSH:  PAST MEDICAL & SURGICAL HISTORY:  Hypertension      Hyperlipemia      Diabetes Mellitus Type II      Renal Calculi  h/olithotripsy 2010      H/O: Rotator Cuff Tear      Pneumonia      BPH (Benign Prostatic Hypertrophy)      Hypertension      Hyperlipidemia      Diabetes  2      CAD (coronary artery disease)  stents x4( 03/2017)  last stress test 7/22  echo 3/22      Ganglion cyst of wrist, left      HSARATH (obstructive sleep apnea)  non compliant with CPAP      Chronic kidney disease (CKD)      H/O gastroesophageal reflux (GERD)      OA (osteoarthritis)      History of Arthroscopy  bilateral shoulder rotator cuff repair      Laminectomy with Spinal Fusion  cervical- 2009      lumbar lamenectomy  1998      Bilateral cataracts  sp extraction      Trigger finger of both hands  sp repair      Cervical fusion syndrome      History of lumbar laminectomy      Bilateral rotator cuff syndrome  operated both'          MEDS:  MEDICATIONS  (STANDING):    MEDICATIONS  (PRN):    Allergies    enalapril (Unknown)  seasonal allergies-outdoor (Urticaria; Rhinorrhea; Sneezing)  losartan (Other)  Avapro (Hives)    Intolerances    losartan (Other)      ROS neg except as listed above  Neg for F/C, weight loss, vision changes, SOB, PIMENTEL, CP, LE edema. GI ROS as listed in HPI      ______________________________________________________________________  PHYSICAL EXAM:  T(C): 36.4 (10-10-23 @ 11:09), Max: 36.6 (10-10-23 @ 10:26)  HR: 67 (10-10-23 @ 11:09)  BP: 166/71 (10-10-23 @ 11:09)  RR: 19 (10-10-23 @ 11:09)  SpO2: 100% (10-10-23 @ 11:09)  Wt(kg): --    GEN: NAD, normocephalic  CVS: Normal rate, HD stable  CHEST: No signs of respiratory distress, breathing comfortably, no accessory muscle usage  ABD: soft , nontender, nondistended  EXTR: no cyanosis, no clubbing, no edema  NEURO: Awake and alert, conversant  SKIN:  warm;  non icteric  RECTAL: BRBPR, normal tone, no masses palpated     INIITIAL GI CONSULTATION  HPI:  81 year old male with PMH HTN, CAD with 4 stents (more than 5 years ago), BPH, HLD, gout, GERD, DM2 and CKD. Patient was well until this AM when he had a bloody bowel movement. Per patient, he observed blood in both the stools as well as in the toilet bowl. He proceeded to have 2 additional bloody bowel movements at home at which point he decided to pursue care in the ED. In the ED, pt. had two additional bloody bowel movements where he noticed more blood than stools. In ED, /70, HR 70s. GI consulted for BRBPR concerning for acute GI bleed.    No history of blood per rectum. Has hx of laparoscopic cholecystectomy. MRI from 2022 shows colonic diverticulosis. No weight loss, fevers, or night sweats. No family history of colon cancer. Does not regularly take NSAIDS. Last dose of aspirin/plavix yesterday. No other anticoagulation. Occasional alcohol consumption (once per month) and quit smoking >50 years ago. Last colonoscopy >5 years ago, which was normal. Pt. does not recall receiving an endoscopy. Pt. last seen in hospital in September 2022. At the time. pt. had baseline hb of 11.9 and BUN of 36. Today, labs notable for Hb 9.2, from b/l 11.9 9/2022.    Patient was seen at bedside, endorsing both fatigue and light-headedness. He has had no abdominal pain. No hematemesis, nausea, or vomiting. No diarrhea. No gross blood loss per rectum, but rectal exam significant for red blood. Patient and family advised that BP could continue to downtrend while receiving prep for colonoscopy.     ASA/NSAIDs/anticoagulation: Aspirin/plavix     Labs/Imaging reviewed.                        9.2    4.17  )-----------( 211      ( 10 Oct 2023 11:22 )             30.0     Last Hb:Hemoglobin: 9.2 g/dL (10-10-23 @ 11:22)               139   |  107   |  36                 Ca: 10.3   BMP:   ----------------------------< 173    Mg: x     (10-10-23 @ 11:22)             4.3    |  22    | 2.48               Ph: x        LFT:     TPro: 6.3 / Alb: 3.5 / TBili: 0.4 / DBili: x / AST: 21 / ALT: 12 / AlkPhos: 57   (10-10-23 @ 11:22)    Creatinine: 2.48 mg/dL      BUN/Cr: Blood Urea Nitrogen: 36 mg/dL (10-10-23 @ 11:22)  /Creatinine: 2.48 mg/dL (10-10-23 @ 11:22)    AST/ALTAspartate Aminotransferase (AST/SGOT): 21 U/L (10-10-23 @ 11:22)  /Alanine Aminotransferase (ALT/SGPT): 12 U/L (10-10-23 @ 11:22)    ALP Alkaline Phosphatase: 57 U/L (10-10-23 @ 11:22)    T/Dbili /  INR: INR: 1.08 ratio (10-10-23 @ 11:23)      Imaging:      FamHx: ***no h/o GI malignancies known  PMH/PSH:  PAST MEDICAL & SURGICAL HISTORY:  Hypertension      Hyperlipemia      Diabetes Mellitus Type II      Renal Calculi  h/olithotripsy 2010      H/O: Rotator Cuff Tear      Pneumonia      BPH (Benign Prostatic Hypertrophy)      Hypertension      Hyperlipidemia      Diabetes  2      CAD (coronary artery disease)  stents x4( 03/2017)  last stress test 7/22  echo 3/22      Ganglion cyst of wrist, left      SHARATH (obstructive sleep apnea)  non compliant with CPAP      Chronic kidney disease (CKD)      H/O gastroesophageal reflux (GERD)      OA (osteoarthritis)      History of Arthroscopy  bilateral shoulder rotator cuff repair      Laminectomy with Spinal Fusion  cervical- 2009      lumbar lamenectomy  1998      Bilateral cataracts  sp extraction      Trigger finger of both hands  sp repair      Cervical fusion syndrome      History of lumbar laminectomy      Bilateral rotator cuff syndrome  operated both'          MEDS:  MEDICATIONS  (STANDING):    MEDICATIONS  (PRN):    Allergies    enalapril (Unknown)  seasonal allergies-outdoor (Urticaria; Rhinorrhea; Sneezing)  losartan (Other)  Avapro (Hives)    Intolerances    losartan (Other)      ROS neg except as listed above  Neg for F/C, weight loss, vision changes, SOB, PIMENTEL, CP, LE edema. GI ROS as listed in HPI      ______________________________________________________________________  PHYSICAL EXAM:  T(C): 36.4 (10-10-23 @ 11:09), Max: 36.6 (10-10-23 @ 10:26)  HR: 67 (10-10-23 @ 11:09)  BP: 166/71 (10-10-23 @ 11:09)  RR: 19 (10-10-23 @ 11:09)  SpO2: 100% (10-10-23 @ 11:09)  Wt(kg): --    GEN: NAD, normocephalic  CVS: Normal rate, HD stable  CHEST: No signs of respiratory distress, breathing comfortably, no accessory muscle usage  ABD: soft , nontender, nondistended  EXTR: no cyanosis, no clubbing, no edema, capillary refill < 2 sec  NEURO: Awake and alert, conversant  SKIN:  warm;  non icteric  RECTAL: BRBPR, normal tone, no masses palpated     INIITIAL GI CONSULTATION  HPI:  81 year old male with PMH HTN, CAD with 4 stents (more than 5 years ago), BPH, HLD, gout, GERD, DM2 and CKD. Patient was well until this AM when he had a bloody bowel movement. Per patient, he observed blood in both the stools as well as in the toilet bowl. He proceeded to have 2 additional bloody bowel movements at home at which point he decided to pursue care in the ED. In the ED, pt. had two additional bloody bowel movements where he noticed more blood than stools. In ED, /70, HR 70s. GI consulted for BRBPR concerning for acute GI bleed.    No history of blood per rectum. Has hx of laparoscopic cholecystectomy. MRI from 2022 shows colonic diverticulosis. No weight loss, fevers, or night sweats. No family history of colon cancer. Does not regularly take NSAIDS. Last dose of aspirin/plavix yesterday. No other anticoagulation. Occasional alcohol consumption (once per month) and quit smoking >50 years ago. Last colonoscopy >5 years ago, which was normal. Pt. does not recall receiving an endoscopy. Pt. last seen in hospital in September 2022. At the time. pt. had baseline hb of 11.9 and BUN of 36. Today, labs notable for Hb 9.2, from b/l 11.9 9/2022.    Patient was seen at bedside, endorsing both fatigue and light-headedness. He has had no abdominal pain. No hematemesis, nausea, or vomiting. No diarrhea. No gross blood loss per rectum, but rectal exam significant for red blood. Patient and family advised that BP may downtrend while receiving prep for colonoscopy.     ASA/NSAIDs/anticoagulation: Aspirin/plavix     Labs/Imaging reviewed.                        9.2    4.17  )-----------( 211      ( 10 Oct 2023 11:22 )             30.0     Last Hb:Hemoglobin: 9.2 g/dL (10-10-23 @ 11:22)               139   |  107   |  36                 Ca: 10.3   BMP:   ----------------------------< 173    Mg: x     (10-10-23 @ 11:22)             4.3    |  22    | 2.48               Ph: x        LFT:     TPro: 6.3 / Alb: 3.5 / TBili: 0.4 / DBili: x / AST: 21 / ALT: 12 / AlkPhos: 57   (10-10-23 @ 11:22)    Creatinine: 2.48 mg/dL      BUN/Cr: Blood Urea Nitrogen: 36 mg/dL (10-10-23 @ 11:22)  /Creatinine: 2.48 mg/dL (10-10-23 @ 11:22)    AST/ALTAspartate Aminotransferase (AST/SGOT): 21 U/L (10-10-23 @ 11:22)  /Alanine Aminotransferase (ALT/SGPT): 12 U/L (10-10-23 @ 11:22)    ALP Alkaline Phosphatase: 57 U/L (10-10-23 @ 11:22)    T/Dbili /  INR: INR: 1.08 ratio (10-10-23 @ 11:23)      Imaging:      FamHx: ***no h/o GI malignancies known  PMH/PSH:  PAST MEDICAL & SURGICAL HISTORY:  Hypertension      Hyperlipemia      Diabetes Mellitus Type II      Renal Calculi  h/olithotripsy 2010      H/O: Rotator Cuff Tear      Pneumonia      BPH (Benign Prostatic Hypertrophy)      Hypertension      Hyperlipidemia      Diabetes  2      CAD (coronary artery disease)  stents x4( 03/2017)  last stress test 7/22  echo 3/22      Ganglion cyst of wrist, left      SHARATH (obstructive sleep apnea)  non compliant with CPAP      Chronic kidney disease (CKD)      H/O gastroesophageal reflux (GERD)      OA (osteoarthritis)      History of Arthroscopy  bilateral shoulder rotator cuff repair      Laminectomy with Spinal Fusion  cervical- 2009      lumbar lamenectomy  1998      Bilateral cataracts  sp extraction      Trigger finger of both hands  sp repair      Cervical fusion syndrome      History of lumbar laminectomy      Bilateral rotator cuff syndrome  operated both'          MEDS:  MEDICATIONS  (STANDING):    MEDICATIONS  (PRN):    Allergies    enalapril (Unknown)  seasonal allergies-outdoor (Urticaria; Rhinorrhea; Sneezing)  losartan (Other)  Avapro (Hives)    Intolerances    losartan (Other)      ROS neg except as listed above  Neg for F/C, weight loss, vision changes, SOB, PIMENTEL, CP, LE edema. GI ROS as listed in HPI      ______________________________________________________________________  PHYSICAL EXAM:  T(C): 36.4 (10-10-23 @ 11:09), Max: 36.6 (10-10-23 @ 10:26)  HR: 67 (10-10-23 @ 11:09)  BP: 166/71 (10-10-23 @ 11:09)  RR: 19 (10-10-23 @ 11:09)  SpO2: 100% (10-10-23 @ 11:09)  Wt(kg): --    GEN: NAD, normocephalic  CVS: Normal rate, HD stable  CHEST: No signs of respiratory distress, breathing comfortably, no accessory muscle usage  ABD: soft , nontender, nondistended  EXTR: no cyanosis, no clubbing, no edema, capillary refill < 2 sec  NEURO: Awake and alert, conversant  SKIN:  warm;  non icteric  RECTAL: BRBPR, normal tone, no masses palpated

## 2023-10-10 NOTE — H&P ADULT - HISTORY OF PRESENT ILLNESS
83M w/ CAD s/p FOREST x 4 (in 2016) on DAPT (ASA/Plavix), CKD3 (Baseline Cr - 2.5; 9/2022), HTN, GERD, BPH p/w BRBPR since this morning. Pt endorses 3 bloody bowel movements with abdominal discomfort in the morning. At first, pt noted blood when wiping but progessed to blood in toiled bowel. Patient with 3 additional BRBPR in the ED. Last colonoscopy 5 years and was told to repeat in 10 years (private physician). Of note, patient on DAPT for PCI w/ FOREST but last took meds last night. Last hospitalized in 9/22 for abnormal stress test and underwent LHC without need for PCI. More recently (< 2 weeks ago), patient s/p L. hip replacement. Denies fever, headache, nausea, vomiting, CP, SOB, or dysuria.     In the ED; T = 97.8F; BP = 152/73; HR = 72; O2 = 97% on RA  s/p 1U pRBC; Pantoprazole 40 IV x 1    83M w/ CAD s/p FOREST x 4 (in 2016) on DAPT (ASA/Plavix), CKD3 (Baseline Cr - 2.5; 9/2022), HTN, diabetes, GERD, gout, BPH p/w BRBPR since this morning. Pt endorses 3 bloody bowel movements in the morning that started at 9am. At first, pt noted blood when wiping but progressed to blood in toiled bowel. There have been no dark stools. Patient with 4 additional BRBPR in the ED. Last colonoscopy 5 years and was told to repeat in 10 years (private physician) as was told to have normal findings. Of note, patient on DAPT for PCI w/ FOREST but last took meds last night. Last hospitalized in 9/22 for abnormal stress test and underwent LHC without need for PCI. More recently (< 2 weeks ago), patient s/p L. hip replacement. He has never had an episode like this prior. He states his stools have also been normal consistency. Denies fever, headache, nausea, vomiting, CP, SOB, or dysuria.     In the ED, patient's vitals were significant for an elevated blood pressure. His labs were significant for anemia, an elevated BUN/Cr, He also experienced 4 episodes of hematochezia in the ED and received one unit of pRBCs. He is now admitted for further management.    83M w/ CAD s/p FOREST x 4 (in 2016) on DAPT (ASA/Plavix), CKD3 (Baseline Cr - 2.5; 9/2022), HTN, HLD, diabetes, GERD, gout, BPH p/w BRBPR since this morning. Pt endorses 3 bloody bowel movements in the morning that started at 9am. At first, pt noted blood when wiping but progressed to blood in toiled bowel. There have been no dark stools. Patient with 4 additional BRBPR in the ED. Last colonoscopy 5 years and was told to repeat in 10 years (private physician) as was told to have normal findings. Of note, patient on DAPT for PCI w/ FOREST but last took meds last night. Last hospitalized in 9/22 for abnormal stress test and underwent LHC without need for PCI. More recently (< 2 weeks ago), patient s/p L. hip replacement. He has never had an episode like this prior. He states his stools have also been normal consistency. Denies fever, headache, nausea, vomiting, CP, SOB, or dysuria.     In the ED, patient's vitals were significant for an elevated blood pressure. His labs were significant for anemia, an elevated BUN/Cr, He also experienced 4 episodes of hematochezia in the ED and received one unit of pRBCs and given one dose of protonix. He is now admitted for further management.

## 2023-10-10 NOTE — ED ADULT NURSE NOTE - NSFALLRISKFACTORS_ED_ALL_ED
Aspirin, Plavix/Coagulation: Bleeding disorder either through use of anticoagulants or underlying clinical condition(s)

## 2023-10-10 NOTE — H&P ADULT - ATTENDING COMMENTS
83 year old male with a PMHx as above and significant for CAD s/p FOREST x4 (2016) on aspirin and plavix who presents with hematochezia.     The patient was assessed with his son at bedside. He was just on the bed pan, which was quarter-way filled with shannan blood. He endorses generalized fatigue but no dizziness, chest pain or shortness of breath. He had completed his blood transfusion at the time of evaluation, but does not endorse any significant change in his symptoms. Pt states his prior colonoscopy was normal and that he never required blood transfusions in the past.    On exam, the patient is AAOx4, in no acute distress but does appear pale and fatigued. No lower extremity edema. RRR, no murmurs, clear lungs.    #shannan hematochezia  #hx of CAD s/p DAPT  #hx of CKD stage IV (creatinine at baseline)    - s/p 1 unit of pRBC  - repeat CBC now and transfuse for goal hgb > 8, or for severe symptoms  - maintain two large-bore IVs  - maintain active type and screen  - hold home DAPT  - hold home antihypertensives  - given hx of CKD stage IV, will hold off on CTA for now to avoid contrast load; however, if the patient becomes unstable overnight, obtain CTA  - plan for colonoscopy tomorrow. Bowel prep  - appreciate GI evaluation    Rest of plan as per note above. 83 year old male with a PMHx as above and significant for CAD s/p FOREST x4 (2016) on aspirin and plavix who presents with hematochezia.     The patient was assessed with his son at bedside. He was just on the bed pan, which was quarter-way filled with shannan blood. He endorses generalized fatigue but no dizziness, chest pain or shortness of breath. He had completed his blood transfusion at the time of evaluation, but does not endorse any significant change in his symptoms. Pt states his prior colonoscopy was normal and that he never required blood transfusions in the past.    On exam, the patient is AAOx4, in no acute distress but does appear pale and fatigued. No lower extremity edema. RRR, no murmurs, clear lungs.    #shannan hematochezia  #hx of CAD s/p DAPT  #hx of CKD stage IV (creatinine at baseline)    - s/p 1 unit of pRBC  - repeat CBC 6.9. Will transfuse an additional 2 units of pRBC  - monitor CBC q6hrs  - maintain two large-bore IVs  - maintain active type and screen  - hold home DAPT  - hold home antihypertensives  - given hx of CKD stage IV, will hold off on CTA for now to avoid contrast load; however, if the patient becomes unstable overnight, obtain CTA  - plan for colonoscopy tomorrow. Bowel prep  - appreciate GI evaluation    Rest of plan as per note above. 83 year old male with a PMHx as above and significant for CAD s/p FOREST x4 (2016) on aspirin and plavix who presents with hematochezia.     The patient was assessed with his son at bedside. He was just on the bed pan, which was quarter-way filled with shannan blood. He endorses generalized fatigue but no dizziness, chest pain or shortness of breath. He had completed his blood transfusion at the time of evaluation, but does not endorse any significant change in his symptoms. Pt states his prior colonoscopy was normal and that he never required blood transfusions in the past.    On exam, the patient is AAOx4, in no acute distress but does appear pale and fatigued. No lower extremity edema. RRR, no murmurs, clear lungs.    #shannan hematochezia  #hx of CAD s/p DAPT  #hx of CKD stage IV (creatinine at baseline)    - s/p 1 unit of pRBC  - repeat CBC 6.9. Will transfuse an additional 2 units of pRBC  - monitor CBC q6hrs  - maintain two large-bore IVs  - maintain active type and screen  - hold home DAPT  - hold home antihypertensives  - given hx of CKD stage IV, will hold off on CTA for now to avoid contrast load; however, if the patient becomes unstable overnight, obtain CTA  - plan for colonoscopy tomorrow. Bowel prep  - appreciate GI evaluation    Low threshold for MICU consult.    Rest of plan as per note above.

## 2023-10-10 NOTE — ED ADULT NURSE REASSESSMENT NOTE - NS ED NURSE REASSESS COMMENT FT1
Report received galdion West RN in [facility]. Pt is resting comfortably. 2nd unit of PBC to be administered. Awaiting dispo.

## 2023-10-10 NOTE — ED ADULT NURSE NOTE - NSFALLHARMRISKINTERV_ED_ALL_ED
Assistance OOB with selected safe patient handling equipment if applicable/Communicate risk of Fall with Harm to all staff, patient, and family/Monitor gait and stability/Provide patient with walking aids/Provide visual cue: red socks, yellow wristband, yellow gown, etc/Reinforce activity limits and safety measures with patient and family/Bed in lowest position, wheels locked, appropriate side rails in place/Call bell, personal items and telephone in reach/Instruct patient to call for assistance before getting out of bed/chair/stretcher/Non-slip footwear applied when patient is off stretcher/[place] to call system/Physically safe environment - no spills, clutter or unnecessary equipment/Purposeful Proactive Rounding/Room/bathroom lighting operational, light cord in reach

## 2023-10-10 NOTE — H&P ADULT - NSHPPHYSICALEXAM_GEN_ALL_CORE
LOS:     VITALS:   T(C): 36.8 (10-10-23 @ 15:00), Max: 36.8 (10-10-23 @ 15:00)  HR: 77 (10-10-23 @ 15:00) (67 - 77)  BP: 135/79 (10-10-23 @ 15:00) (112/71 - 166/71)  RR: 20 (10-10-23 @ 15:00) (18 - 27)  SpO2: 100% (10-10-23 @ 15:00) (97% - 100%)    GENERAL: NAD, lying in bed comfortably  HEAD:  Atraumatic, Normocephalic  EYES: EOMI, PERRLA, conjunctiva and sclera clear  ENT: Moist mucous membranes  NECK: Supple, No JVD  CHEST/LUNG: Clear to auscultation bilaterally; No rales, rhonchi, wheezing, or rubs. Unlabored respirations  HEART: Regular rate and rhythm; No murmurs, rubs, or gallops  ABDOMEN: BSx4; Soft, nontender, nondistended  EXTREMITIES:  2+ b/l pitting edema; 2+ Peripheral Pulses, brisk capillary refill.  NERVOUS SYSTEM:  A&Ox3, no focal deficits   SKIN: No rashes or lesions LOS:     VITALS:   T(C): 36.8 (10-10-23 @ 15:00), Max: 36.8 (10-10-23 @ 15:00)  HR: 77 (10-10-23 @ 15:00) (67 - 77)  BP: 135/79 (10-10-23 @ 15:00) (112/71 - 166/71)  RR: 20 (10-10-23 @ 15:00) (18 - 27)  SpO2: 100% (10-10-23 @ 15:00) (97% - 100%)    GENERAL: NAD, lying in bed comfortably  HEAD:  Atraumatic, Normocephalic  EYES: EOMI, PERRLA, conjunctiva and sclera clear  ENT: Moist mucous membranes  NECK: Supple, No JVD  CHEST/LUNG: Clear to auscultation bilaterally; No rales, rhonchi, wheezing, or rubs. Unlabored respirations  HEART: Regular rate and rhythm; No murmurs, rubs, or gallops  ABDOMEN: BSx4; Soft, nontender, nondistended  RECTAL: Mucoid discharge noted but no signs of acitve bleeding (chaperoned by MD Merida)  EXTREMITIES:  2+ b/l pitting edema; 2+ Peripheral Pulses, brisk capillary refill.  NERVOUS SYSTEM:  A&Ox3, no focal deficits   SKIN: No rashes or lesions

## 2023-10-10 NOTE — ED PROVIDER NOTE - DATE/TIME 1
Patient is here with grandmother , permission to evaluate and treat obtained over the phone from mom Aga .   10-Oct-2023 12:45

## 2023-10-10 NOTE — ED PROVIDER NOTE - CLINICAL SUMMARY MEDICAL DECISION MAKING FREE TEXT BOX
83M hx CAD w PCI on DAPT here with painless BRBPR beginning today. Has since had 3 episodes of blood, initially w stool and now just blood. No abd pain, No CP or SOB. Rectal exam w/o ext hemorrhoids. No stool, just dark colored blood. Suspect lower GIB, will send basic labs to determine if pt requires blood and urgent GI c/s, coags. Dispo TBA given DAPT and multiple episodes of BRPR. Currently HDS and no anemia si or sx. Baseline Hgb from 9/26 OP labs 9.6.

## 2023-10-10 NOTE — H&P ADULT - PROBLEM SELECTOR PLAN 9
Diet: CLD (NPO at midnight)  Dispo: PT eval  DVT ppx: SCDs (GI bleed) -cw trospium and finasteride (home meds) -cw finasteride (home meds) and do oxybutynin inpatient (trospium interchange)

## 2023-10-10 NOTE — CONSULT NOTE ADULT - ASSESSMENT
81 year old male with PMH HTN, CAD with 4 stents (more than 5 years ago), BPH, HLD, gout, GERD, DM2 and CKD presents with BRBPR. GI consulted for possible GI bleeding     #Anemia  #GI Bleeding  First episode of BRBPR. Last colonoscopy (>5 years ago) normal. Hemodynamically stable, but CBC significant for anemia. In context of acute bleeding per rectum and anemia, differential includes LGIB vs UGIB.    -Hb 9.2 -> down from 11.9 on last admission in 9/2022  -MRI from 2022 significant for colonic diverticulosis  -5 total episodes of bloody bowel movements this AM (3 at home, 2 in hospital)  -BRB on rectal exam    Recommendations:  -Colonoscopy in AM to evaluate for GI bleed  -CLD  -Trend Hb; transfuse if Hb < 7  -INCOMPLETE    Recommendations tentative until note signed by attending 81 year old male with PMH HTN, CAD with 4 stents (more than 5 years ago), BPH, HLD, gout, GERD, DM2 and CKD presents with BRBPR. GI consulted for possible GI bleeding     #Anemia  #GI Bleeding  First episode of BRBPR. Last colonoscopy (>5 years ago) normal. Hemodynamically stable, but CBC significant for anemia. In context of acute bleeding per rectum and anemia, differential includes LGIB vs UGIB.  -Hb 9.2 -> down from 11.9 on last admission in 9/2022; BUN 36 <- 36  -MRI from 2022 significant for colonic diverticulosis  -5 total episodes of bloody bowel movements this AM (3 at home, 2 in hospital)  -BRB on rectal exam    Recommendations:  -Colonoscopy +/- EGD in AM to evaluate for GI bleed  -CLD  -Trend Hb; transfuse if Hb < 7  -If continues to have brisk bleeding per rectum, order CTA       Recommendations tentative until note signed by attending 81 year old male with PMH HTN, CAD with 4 stents (more than 5 years ago), BPH, HLD, gout, GERD, DM2 and CKD presents with BRBPR. GI consulted for possible GI bleeding     #Anemia  #GI Bleeding  First episode of BRBPR. Last colonoscopy (>5 years ago) normal. Hemodynamically stable, but CBC significant for anemia. In context of acute bleeding per rectum and anemia, differential includes LGIB vs UGIB.  -Hb 9.2 -> down from 11.9 on last admission in 9/2022; BUN 36 <- 36  -MRI from 2022 significant for colonic diverticulosis  -5 total episodes of bloody bowel movements this AM (3 at home, 2 in hospital)  -BRB on rectal exam    Recommendations:  -Colonoscopy +/- EGD in AM to evaluate for GI bleed  -Hold plavix; ok to continue aspirin  -CLD  -Trend Hb; transfuse if Hb < 7  -If continues to have brisk bleeding per rectum, order CTA   -Supportive care per primary team      Recommendations tentative until note signed by attending 81 year old male with PMH HTN, CAD with 4 stents (more than 5 years ago), BPH, HLD, gout, GERD, DM2 and CKD presents with BRBPR. GI consulted for possible GI bleeding     #Anemia  #GI Bleeding  First episode of BRBPR. Last colonoscopy (>5 years ago) normal. Hemodynamically stable, but CBC significant for anemia. In context of acute bleeding per rectum and anemia, differential includes LGIB vs UGIB (PUD vs. AVM vs. malignancy vs. diverticular bleed).  -Hb 9.2 -> down from 11.9 on last admission in 9/2022; BUN 36 <- 36  -MRI from 2022 significant for colonic diverticulosis  -5 total episodes of bloody bowel movements this AM (3 at home, 2 in hospital)  -BRB on rectal exam    Recommendations:  -Colonoscopy +/- EGD in AM to evaluate for GI bleed  -Hold plavix; ok to continue aspirin  -CLD today  -Please start prep at 6pm. If BMs not clear by 5am, give additional 1-2L golytely  -NPO at midnight  -Please order 3am labs including CBC, CMP, coags  -Trend Hb; transfuse if Hb < 7  -If continues to have brisk bleeding per rectum, order CTA   -Supportive care per primary team      Recommendations tentative until note signed by attending

## 2023-10-10 NOTE — H&P ADULT - NSHPLABSRESULTS_GEN_ALL_CORE
Complete Blood Count + Automated Diff (10.10.23 @ 11:22)   WBC Count: 4.17 K/uL  RBC Count: 2.92 M/uL  Hemoglobin: 9.2 g/dL  Hematocrit: 30.0 %  Mean Cell Volume: 102.7 fl  Mean Cell Hemoglobin: 31.5 pg  Mean Cell Hemoglobin Conc: 30.7 gm/dL  Red Cell Distrib Width: 15.5 %  Platelet Count - Automated: 211 K/uL  Auto Neutrophil #: 2.64 K/uL  Auto Lymphocyte #: 0.69 K/uL  Auto Monocyte #: 0.50 K/uL  Auto Eosinophil #: 0.30 K/uL  Auto Basophil #: 0.02 K/uL  Auto Neutrophil %: 63.3    Comprehensive Metabolic Panel (10.10.23 @ 11:22)   Sodium: 139 mmol/L  Potassium: 4.3 mmol/L  Chloride: 107 mmol/L  Carbon Dioxide: 22 mmol/L  Anion Gap: 10 mmol/L  Blood Urea Nitrogen: 36 mg/dL  Creatinine: 2.48 mg/dL  Glucose: 173 mg/dL  Calcium: 10.3 mg/dL  Protein Total: 6.3 g/dL  Albumin: 3.5 g/dL  Bilirubin Total: 0.4 mg/dL  Alkaline Phosphatase: 57 U/L  Aspartate Aminotransferase (AST/SGOT): 21 U/L  Alanine Aminotransferase (ALT/SGPT): 12 U/L  eGFR: 25    Prothrombin Time and INR, Plasma (10.10.23 @ 11:23)   Prothrombin Time, Plasma: 11.3 sec  INR: 1.08    Activated Partial Thromboplastin Time (10.10.23 @ 11:23)   Activated Partial Thromboplastin Time: 35.3 Complete Blood Count + Automated Diff (10.10.23 @ 11:22)   WBC Count: 4.17 K/uL  RBC Count: 2.92 M/uL  Hemoglobin: 9.2 g/dL  Hematocrit: 30.0 %  Mean Cell Volume: 102.7 fl  Mean Cell Hemoglobin: 31.5 pg  Mean Cell Hemoglobin Conc: 30.7 gm/dL  Red Cell Distrib Width: 15.5 %  Platelet Count - Automated: 211 K/uL  Auto Neutrophil #: 2.64 K/uL  Auto Lymphocyte #: 0.69 K/uL  Auto Monocyte #: 0.50 K/uL  Auto Eosinophil #: 0.30 K/uL  Auto Basophil #: 0.02 K/uL  Auto Neutrophil %: 63.3    Comprehensive Metabolic Panel (10.10.23 @ 11:22)   Sodium: 139 mmol/L  Potassium: 4.3 mmol/L  Chloride: 107 mmol/L  Carbon Dioxide: 22 mmol/L  Anion Gap: 10 mmol/L  Blood Urea Nitrogen: 36 mg/dL  Creatinine: 2.48 mg/dL  Glucose: 173 mg/dL  Calcium: 10.3 mg/dL  Protein Total: 6.3 g/dL  Albumin: 3.5 g/dL  Bilirubin Total: 0.4 mg/dL  Alkaline Phosphatase: 57 U/L  Aspartate Aminotransferase (AST/SGOT): 21 U/L  Alanine Aminotransferase (ALT/SGPT): 12 U/L  eGFR: 25    Prothrombin Time and INR, Plasma (10.10.23 @ 11:23)   Prothrombin Time, Plasma: 11.3 sec  INR: 1.08    Activated Partial Thromboplastin Time (10.10.23 @ 11:23)   Activated Partial Thromboplastin Time: 35.3      EKG: NSR with nospecific T wave abnormality; QTc 439

## 2023-10-10 NOTE — ED ADULT NURSE REASSESSMENT NOTE - NS ED NURSE REASSESS COMMENT FT1
Patient son @ bedside took patient to bathroom with wheelchair. He reports the patient is now at his 5th BM with bright red bleeding today. MD Georges and RN @ bedside to assess patient.

## 2023-10-10 NOTE — H&P ADULT - NSHPSOCIALHISTORY_GEN_ALL_CORE
Lives with wife in [place]. 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 Denies smoking, drinking or drug use. COVID vaccinated x 5. No recent travel or sick contacts. Ambulates with cane and independent. Lives with wife in [place]. 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 Denies smoking, drinking or drug use. COVID vaccinated x 5. No recent travel or sick contacts. Ambulates with cane and independent.

## 2023-10-10 NOTE — ED ADULT NURSE REASSESSMENT NOTE - NS ED NURSE REASSESS COMMENT FT1
Report received from Renea JORDAN in [facility]. Pt observed sitting up in stretcher conversing with RN without difficulty. Breathing spontaneous and unlabored. Pt updated on plan of care awaiting bed assignment, RTM med. Pt assisted with bed pan, had episode of bright red bowel movement. Pt denies abdominal pain, dizziness or shortness of breath.  No acute distress noted. Call bell within reach.

## 2023-10-10 NOTE — H&P ADULT - NSHPREVIEWOFSYSTEMS_GEN_ALL_CORE
REVIEW OF SYSTEMS:    CONSTITUTIONAL:  No weakness, fevers or chills  EYES/ENT:  No visual changes;  No vertigo or throat pain   NECK:  No pain or stiffness  RESPIRATORY:  No cough, wheezing, hemoptysis; No shortness of breath  CARDIOVASCULAR:  No chest pain or palpitations  GASTROINTESTINAL:  + Hematochezia; No abdominal or epigastric pain. No nausea, vomiting, or hematemesis  MUSCULOSKELETAL:  FROM all extremities, normal strength, No calf tenderness  NEUROLOGICAL:  No numbness or weakness  SKIN:  No itching, rashes

## 2023-10-10 NOTE — CONSULT NOTE ADULT - ATTENDING COMMENTS
Suspected lower gi bleeding  Prep for colonoscopy tomorrow  IF patient becomes unstable overnight, rec CT angio for possible embolization

## 2023-10-10 NOTE — CONSULT NOTE ADULT - SUBJECTIVE AND OBJECTIVE BOX
Chief Complaint:  Patient is a 83y old  Male who presents with a chief complaint of     HPI:    Allergies:  losartan (Other)  enalapril (Unknown)  seasonal allergies-outdoor (Urticaria; Rhinorrhea; Sneezing)  losartan (Other)  Avapro (Hives)      Home Medications:    Hospital Medications:      PMHX/PSHX:  Hypertension    Hyperlipemia    Diabetes Mellitus Type II    Renal Calculi    History of Arthroscopy    H/O: Rotator Cuff Tear    Pneumonia    BPH (Benign Prostatic Hypertrophy)    Hypertension    Hyperlipidemia    Diabetes    CAD (coronary artery disease)    Ganglion cyst of wrist, left    SHARATH (obstructive sleep apnea)    Chronic kidney disease (CKD)    H/O gastroesophageal reflux (GERD)    OA (osteoarthritis)    History of Arthroscopy    Laminectomy with Spinal Fusion    lumbar lamenectomy    Bilateral cataracts    Trigger finger of both hands    Cervical fusion syndrome    History of lumbar laminectomy    Bilateral rotator cuff syndrome        Family history:  Family history of cerebrovascular accident (Mother)    Family history of cerebrovascular accident (CVA)        Denies family history of colon cancer/polyps, stomach cancer/polyps, pancreatic cancer/masses, liver cancer/disease, ovarian cancer and endometrial cancer.    Social History:     Tob: Denies  EtOH: As above  Illicit Drugs: Denies    ROS:   General:  No wt loss, fevers, chills, night sweats, fatigue  Eyes:  Good vision, no reported pain  ENT:  No sore throat, pain, runny nose, dysphagia  CV:  No pain, palpitations, hypo/hypertension  Pulm:  No dyspnea, cough, tachypnea, wheezing  GI:  As per HPI  :  No pain, bleeding, incontinence, nocturia  Muscle:  No pain, weakness  Neuro:  No weakness, tingling, memory problems  Psych:  No fatigue, insomnia, mood problems, depression  Endocrine:  No polyuria, polydipsia, cold/heat intolerance  Heme:  No petechiae, ecchymosis, easy bruisability  Skin:  No rash, tattoos, scars, edema    PHYSICAL EXAM:   GENERAL:  No acute distress  HEENT:  Normocephalic/atraumatic, no scleral icterus  CHEST:  No accessory muscle use  HEART:  Regular rate and rhythm  ABDOMEN:  Soft, non-tender, non-distended, normoactive bowel sounds,  no masses, no hepato-splenomegaly, no signs of chronic liver disease  EXTREMITIES: No cyanosis, clubbing, or edema  SKIN:  No rash  NEURO:  Alert and oriented x 3, no asterixis    Vital Signs:  Vital Signs Last 24 Hrs  T(C): 36.4 (10 Oct 2023 11:09), Max: 36.6 (10 Oct 2023 10:26)  T(F): 97.5 (10 Oct 2023 11:09), Max: 97.8 (10 Oct 2023 10:26)  HR: 67 (10 Oct 2023 11:09) (67 - 72)  BP: 166/71 (10 Oct 2023 11:09) (152/73 - 166/71)  BP(mean): 99 (10 Oct 2023 11:09) (99 - 99)  RR: 19 (10 Oct 2023 11:09) (18 - 19)  SpO2: 100% (10 Oct 2023 11:09) (97% - 100%)    Parameters below as of 10 Oct 2023 11:09  Patient On (Oxygen Delivery Method): room air      Daily Height in cm: 172.72 (10 Oct 2023 10:26)    Daily     LABS:                        9.2    4.17  )-----------( 211      ( 10 Oct 2023 11:22 )             30.0     Mean Cell Volume: 102.7 fl (10-10-23 @ 11:22)    10-10    139  |  107  |  36<H>  ----------------------------<  173<H>  4.3   |  22  |  2.48<H>    Ca    10.3      10 Oct 2023 11:22    TPro  6.3  /  Alb  3.5  /  TBili  0.4  /  DBili  x   /  AST  21  /  ALT  12  /  AlkPhos  57  10-10    LIVER FUNCTIONS - ( 10 Oct 2023 11:22 )  Alb: 3.5 g/dL / Pro: 6.3 g/dL / ALK PHOS: 57 U/L / ALT: 12 U/L / AST: 21 U/L / GGT: x           PT/INR - ( 10 Oct 2023 11:23 )   PT: 11.3 sec;   INR: 1.08 ratio         PTT - ( 10 Oct 2023 11:23 )  PTT:35.3 sec  Urinalysis Basic - ( 10 Oct 2023 11:22 )    Color: x / Appearance: x / SG: x / pH: x  Gluc: 173 mg/dL / Ketone: x  / Bili: x / Urobili: x   Blood: x / Protein: x / Nitrite: x   Leuk Esterase: x / RBC: x / WBC x   Sq Epi: x / Non Sq Epi: x / Bacteria: x                              9.2    4.17  )-----------( 211      ( 10 Oct 2023 11:22 )             30.0       Imaging:

## 2023-10-10 NOTE — H&P ADULT - PROBLEM SELECTOR PLAN 3
- hx of HTN  - home regimen: - hx of HTN  - home regimen: amlodipine 10, coreg 25 BID, hydralazine 100, ranolazine 500 BID  - will hold antihypertensives in setting of low BP on admission

## 2023-10-11 ENCOUNTER — TRANSCRIPTION ENCOUNTER (OUTPATIENT)
Age: 83
End: 2023-10-11

## 2023-10-11 LAB
A1C WITH ESTIMATED AVERAGE GLUCOSE RESULT: 6 % — HIGH (ref 4–5.6)
ALBUMIN SERPL ELPH-MCNC: 3.1 G/DL — LOW (ref 3.3–5)
ALP SERPL-CCNC: 46 U/L — SIGNIFICANT CHANGE UP (ref 40–120)
ALT FLD-CCNC: 14 U/L — SIGNIFICANT CHANGE UP (ref 10–45)
ANION GAP SERPL CALC-SCNC: 13 MMOL/L — SIGNIFICANT CHANGE UP (ref 5–17)
APTT BLD: 32.6 SEC — SIGNIFICANT CHANGE UP (ref 24.5–35.6)
AST SERPL-CCNC: 23 U/L — SIGNIFICANT CHANGE UP (ref 10–40)
BILIRUB SERPL-MCNC: 0.4 MG/DL — SIGNIFICANT CHANGE UP (ref 0.2–1.2)
BLD GP AB SCN SERPL QL: NEGATIVE — SIGNIFICANT CHANGE UP
BUN SERPL-MCNC: 40 MG/DL — HIGH (ref 7–23)
CALCIUM SERPL-MCNC: 9.8 MG/DL — SIGNIFICANT CHANGE UP (ref 8.4–10.5)
CHLORIDE SERPL-SCNC: 112 MMOL/L — HIGH (ref 96–108)
CO2 SERPL-SCNC: 18 MMOL/L — LOW (ref 22–31)
CREAT SERPL-MCNC: 2.73 MG/DL — HIGH (ref 0.5–1.3)
EGFR: 23 ML/MIN/1.73M2 — LOW
ESTIMATED AVERAGE GLUCOSE: 126 MG/DL — HIGH (ref 68–114)
GLUCOSE BLDC GLUCOMTR-MCNC: 125 MG/DL — HIGH (ref 70–99)
GLUCOSE BLDC GLUCOMTR-MCNC: 131 MG/DL — HIGH (ref 70–99)
GLUCOSE BLDC GLUCOMTR-MCNC: 147 MG/DL — HIGH (ref 70–99)
GLUCOSE BLDC GLUCOMTR-MCNC: 165 MG/DL — HIGH (ref 70–99)
GLUCOSE BLDC GLUCOMTR-MCNC: 239 MG/DL — HIGH (ref 70–99)
GLUCOSE SERPL-MCNC: 142 MG/DL — HIGH (ref 70–99)
HCT VFR BLD CALC: 25 % — LOW (ref 39–50)
HGB BLD-MCNC: 8.4 G/DL — LOW (ref 13–17)
INR BLD: 1.37 RATIO — HIGH (ref 0.85–1.18)
MAGNESIUM SERPL-MCNC: 2 MG/DL — SIGNIFICANT CHANGE UP (ref 1.6–2.6)
MCHC RBC-ENTMCNC: 30.8 PG — SIGNIFICANT CHANGE UP (ref 27–34)
MCHC RBC-ENTMCNC: 33.6 GM/DL — SIGNIFICANT CHANGE UP (ref 32–36)
MCV RBC AUTO: 91.6 FL — SIGNIFICANT CHANGE UP (ref 80–100)
NRBC # BLD: 0 /100 WBCS — SIGNIFICANT CHANGE UP (ref 0–0)
PHOSPHATE SERPL-MCNC: 3 MG/DL — SIGNIFICANT CHANGE UP (ref 2.5–4.5)
PLATELET # BLD AUTO: 135 K/UL — LOW (ref 150–400)
POTASSIUM SERPL-MCNC: 4.2 MMOL/L — SIGNIFICANT CHANGE UP (ref 3.5–5.3)
POTASSIUM SERPL-SCNC: 4.2 MMOL/L — SIGNIFICANT CHANGE UP (ref 3.5–5.3)
PROT SERPL-MCNC: 5 G/DL — LOW (ref 6–8.3)
PROTHROM AB SERPL-ACNC: 14.3 SEC — HIGH (ref 9.5–13)
RBC # BLD: 2.73 M/UL — LOW (ref 4.2–5.8)
RBC # FLD: 18.5 % — HIGH (ref 10.3–14.5)
RH IG SCN BLD-IMP: POSITIVE — SIGNIFICANT CHANGE UP
SODIUM SERPL-SCNC: 143 MMOL/L — SIGNIFICANT CHANGE UP (ref 135–145)
WBC # BLD: 4.35 K/UL — SIGNIFICANT CHANGE UP (ref 3.8–10.5)
WBC # FLD AUTO: 4.35 K/UL — SIGNIFICANT CHANGE UP (ref 3.8–10.5)

## 2023-10-11 PROCEDURE — 45378 DIAGNOSTIC COLONOSCOPY: CPT | Mod: GC

## 2023-10-11 PROCEDURE — 99233 SBSQ HOSP IP/OBS HIGH 50: CPT | Mod: GC

## 2023-10-11 DEVICE — NET RETRV ROT ROTH 2.5MMX230CM: Type: IMPLANTABLE DEVICE | Status: FUNCTIONAL

## 2023-10-11 RX ORDER — ASPIRIN/CALCIUM CARB/MAGNESIUM 324 MG
81 TABLET ORAL DAILY
Refills: 0 | Status: DISCONTINUED | OUTPATIENT
Start: 2023-10-12 | End: 2023-10-12

## 2023-10-11 RX ORDER — AMLODIPINE BESYLATE 2.5 MG/1
10 TABLET ORAL AT BEDTIME
Refills: 0 | Status: DISCONTINUED | OUTPATIENT
Start: 2023-10-11 | End: 2023-10-12

## 2023-10-11 RX ORDER — CARVEDILOL PHOSPHATE 80 MG/1
25 CAPSULE, EXTENDED RELEASE ORAL EVERY 12 HOURS
Refills: 0 | Status: DISCONTINUED | OUTPATIENT
Start: 2023-10-11 | End: 2023-10-12

## 2023-10-11 RX ADMIN — PANTOPRAZOLE SODIUM 40 MILLIGRAM(S): 20 TABLET, DELAYED RELEASE ORAL at 17:13

## 2023-10-11 RX ADMIN — Medication 5 MILLIGRAM(S): at 05:53

## 2023-10-11 RX ADMIN — ATORVASTATIN CALCIUM 40 MILLIGRAM(S): 80 TABLET, FILM COATED ORAL at 22:03

## 2023-10-11 RX ADMIN — Medication 100 MILLIGRAM(S): at 12:50

## 2023-10-11 RX ADMIN — Medication 2: at 16:51

## 2023-10-11 RX ADMIN — RANOLAZINE 500 MILLIGRAM(S): 500 TABLET, FILM COATED, EXTENDED RELEASE ORAL at 17:12

## 2023-10-11 RX ADMIN — PANTOPRAZOLE SODIUM 40 MILLIGRAM(S): 20 TABLET, DELAYED RELEASE ORAL at 05:53

## 2023-10-11 RX ADMIN — AMLODIPINE BESYLATE 10 MILLIGRAM(S): 2.5 TABLET ORAL at 22:03

## 2023-10-11 RX ADMIN — FINASTERIDE 5 MILLIGRAM(S): 5 TABLET, FILM COATED ORAL at 12:50

## 2023-10-11 RX ADMIN — RANOLAZINE 500 MILLIGRAM(S): 500 TABLET, FILM COATED, EXTENDED RELEASE ORAL at 05:52

## 2023-10-11 RX ADMIN — Medication 48 MILLIGRAM(S): at 12:50

## 2023-10-11 RX ADMIN — SODIUM CHLORIDE 75 MILLILITER(S): 9 INJECTION, SOLUTION INTRAVENOUS at 03:25

## 2023-10-11 RX ADMIN — Medication 5 MILLIGRAM(S): at 17:13

## 2023-10-11 RX ADMIN — CARVEDILOL PHOSPHATE 25 MILLIGRAM(S): 80 CAPSULE, EXTENDED RELEASE ORAL at 17:18

## 2023-10-11 NOTE — DISCHARGE NOTE PROVIDER - NSDCFUSCHEDAPPT_GEN_ALL_CORE_FT
Fro[facility]  CARDIOLOGY [address]  Scheduled Appointment: 10/13/2023

## 2023-10-11 NOTE — DISCHARGE NOTE PROVIDER - NSDCFUADDAPPT_GEN_ALL_CORE_FT
[facility]  [phone]    [facility]   [phone]

## 2023-10-11 NOTE — PRE PROCEDURE NOTE - PRE PROCEDURE EVALUATION
Attending Physician:    Dr Valdez                         Procedure: colonoscopy    Indication for Procedure: GIB   ________________________________________________________  PAST MEDICAL & SURGICAL HISTORY:  Hypertension      Hyperlipemia      Diabetes Mellitus Type II      Renal Calculi  h/olithotripsy 2010      H/O: Rotator Cuff Tear      Pneumonia      BPH (Benign Prostatic Hypertrophy)      Hypertension      Hyperlipidemia      Diabetes  2      CAD (coronary artery disease)  stents x4( 03/2017)  last stress test 7/22  echo 3/22      Ganglion cyst of wrist, left      SHARATH (obstructive sleep apnea)  non compliant with CPAP      Chronic kidney disease (CKD)      H/O gastroesophageal reflux (GERD)      OA (osteoarthritis)      History of Arthroscopy  bilateral shoulder rotator cuff repair      Laminectomy with Spinal Fusion  cervical- 2009      lumbar lamenectomy  1998      Bilateral cataracts  sp extraction      Trigger finger of both hands  sp repair      Cervical fusion syndrome      History of lumbar laminectomy      Bilateral rotator cuff syndrome  operated both'        ALLERGIES:  losartan (Other)  enalapril (Unknown)  seasonal allergies-outdoor (Urticaria; Rhinorrhea; Sneezing)  losartan (Other)  Avapro (Hives)    HOME MEDICATIONS:  Actos 30 mg oral tablet: 1 tab(s) orally once a day  allopurinol 100 mg oral tablet: 1 tab(s) orally once a day  aspirin 81 mg oral delayed release tablet: 1 tab(s) orally every other day  atorvastatin 40 mg oral tablet: 1 tab(s) orally once a day  carvedilol 25 mg oral tablet: 1 tab(s) orally 2 times a day  clopidogrel 75 mg oral tablet: 1 tab(s) orally once a day  ezetimibe 10 mg oral tablet: 1 tab(s) orally once a day  fenofibrate 48 mg oral tablet: 1 tab(s) orally once a day  glipiZIDE 10 mg oral tablet: 1 tab(s) orally 2 times a day  hydrALAZINE 100 mg oral tablet: 1 tab(s) orally 3 times a day  Jardiance 10 mg oral tablet: 1 tab(s) orally once a day (in the morning)  Norvasc 10 mg oral tablet: 1 tab(s) orally once a day at 6pm  omega-3 polyunsaturated fatty acids 1200 mg oral capsule: 1 cap(s) orally once a day  Propecia 1 mg oral tablet: 1 tab(s) orally once a day  ranolazine 500 mg oral tablet, extended release: 1 tab(s) orally 2 times a day  repaglinide 1 mg oral tablet: 1 tab(s) orally once a day  trospium 20 mg oral tablet: 1 tab(s) orally 2 times a day    AICD/PPM: [ ] yes   [x ] no    PERTINENT LAB DATA:                        8.4    4.35  )-----------( 135      ( 11 Oct 2023 04:41 )             25.0     10-11    143  |  112<H>  |  40<H>  ----------------------------<  142<H>  4.2   |  18<L>  |  2.73<H>    Ca    9.8      11 Oct 2023 04:41  Phos  3.0     10-11  Mg     2.0     10-11    TPro  5.0<L>  /  Alb  3.1<L>  /  TBili  0.4  /  DBili  x   /  AST  23  /  ALT  14  /  AlkPhos  46  10-11    PT/INR - ( 11 Oct 2023 04:41 )   PT: 14.3 sec;   INR: 1.37 ratio         PTT - ( 11 Oct 2023 04:41 )  PTT:32.6 sec            PHYSICAL EXAMINATION:    Height (cm): 172.7  Weight (kg): 83.9  BMI (kg/m2): 28.1  BSA (m2): 1.98T(C): 36.1  HR: 91  BP: 178/73  RR: 13  SpO2: 100%    Constitutional: NAD    Neck:  No JVD  Respiratory: no resp distress   Cardiovascular: rrr  Extremities: No peripheral edema  Neurological: A/O x 3, no focal deficits        COMMENTS:    The patient is a suitable candidate for the planned procedure unless box checked [ ]  No, explain:

## 2023-10-11 NOTE — DISCHARGE NOTE PROVIDER - NSDCMRMEDTOKEN_GEN_ALL_CORE_FT
Actos 30 mg oral tablet: 1 tab(s) orally once a day  allopurinol 100 mg oral tablet: 1 tab(s) orally once a day  aspirin 81 mg oral delayed release tablet: 1 tab(s) orally every other day  atorvastatin 40 mg oral tablet: 1 tab(s) orally once a day  carvedilol 25 mg oral tablet: 1 tab(s) orally 2 times a day  clopidogrel 75 mg oral tablet: 1 tab(s) orally once a day  ezetimibe 10 mg oral tablet: 1 tab(s) orally once a day  fenofibrate 48 mg oral tablet: 1 tab(s) orally once a day  glipiZIDE 10 mg oral tablet: 1 tab(s) orally 2 times a day  hydrALAZINE 100 mg oral tablet: 1 tab(s) orally 3 times a day  Jardiance 10 mg oral tablet: 1 tab(s) orally once a day (in the morning)  Norvasc 10 mg oral tablet: 1 tab(s) orally once a day at 6pm  omega-3 polyunsaturated fatty acids 1200 mg oral capsule: 1 cap(s) orally once a day  Propecia 1 mg oral tablet: 1 tab(s) orally once a day  ranolazine 500 mg oral tablet, extended release: 1 tab(s) orally 2 times a day  repaglinide 1 mg oral tablet: 1 tab(s) orally once a day  trospium 20 mg oral tablet: 1 tab(s) orally 2 times a day   Actos 30 mg oral tablet: 1 tab(s) orally once a day  allopurinol 100 mg oral tablet: 1 tab(s) orally once a day  aspirin 81 mg oral delayed release tablet: 1 tab(s) orally every other day  atorvastatin 40 mg oral tablet: 1 tab(s) orally once a day  carvedilol 25 mg oral tablet: 1 tab(s) orally 2 times a day  ezetimibe 10 mg oral tablet: 1 tab(s) orally once a day  fenofibrate 48 mg oral tablet: 1 tab(s) orally once a day  glipiZIDE 10 mg oral tablet: 1 tab(s) orally 2 times a day  hydrALAZINE 100 mg oral tablet: 1 tab(s) orally 3 times a day  Jardiance 10 mg oral tablet: 1 tab(s) orally once a day (in the morning)  Norvasc 10 mg oral tablet: 1 tab(s) orally once a day at 6pm  omega-3 polyunsaturated fatty acids 1200 mg oral capsule: 1 cap(s) orally once a day  Propecia 1 mg oral tablet: 1 tab(s) orally once a day  ranolazine 500 mg oral tablet, extended release: 1 tab(s) orally 2 times a day  repaglinide 1 mg oral tablet: 1 tab(s) orally once a day  trospium 20 mg oral tablet: 1 tab(s) orally 2 times a day   Actos 30 mg oral tablet: 1 tab(s) orally once a day  allopurinol 100 mg oral tablet: 1 tab(s) orally once a day  aspirin 81 mg oral delayed release tablet: 1 tab(s) orally every other day  atorvastatin 40 mg oral tablet: 1 tab(s) orally once a day  carvedilol 25 mg oral tablet: 1 tab(s) orally 2 times a day  ezetimibe 10 mg oral tablet: 1 tab(s) orally once a day  fenofibrate 48 mg oral tablet: 1 tab(s) orally once a day  glipiZIDE 10 mg oral tablet: 1 tab(s) orally 2 times a day  hydrALAZINE 100 mg oral tablet: 1 tab(s) orally 3 times a day  Jardiance 10 mg oral tablet: 1 tab(s) orally once a day (in the morning)  Norvasc 10 mg oral tablet: 1 tab(s) orally once a day at 6pm  omega-3 polyunsaturated fatty acids 1200 mg oral capsule: 1 cap(s) orally once a day  Physical Therapy for ICD Code R53.1: for weakness: Perform physical therapy 3 times per week to improve weakness MDD: 1  Propecia 1 mg oral tablet: 1 tab(s) orally once a day  ranolazine 500 mg oral tablet, extended release: 1 tab(s) orally 2 times a day  repaglinide 1 mg oral tablet: 1 tab(s) orally once a day  trospium 20 mg oral tablet: 1 tab(s) orally 2 times a day

## 2023-10-11 NOTE — PHYSICAL THERAPY INITIAL EVALUATION ADULT - PERTINENT HX OF CURRENT PROBLEM, REHAB EVAL
83-yo pmhx cad w/ 4 stents on asa/plavix, ckd3, htn, gerd, bph, who presents with rectal bleeding x3 since AM. Reports BRBPR on soft stools x3 which warranted presentation to ED. Denies diarrhea, vomiting, abdominal pain. Last took asa/plavix last night. No prior episodes of BRBPR. Last colonoscopy 5 years and was told to repeat in 10 years (private physician) as was told to have normal findings. Of note, patient on DAPT for PCI w/ FOREST but last took meds last night. Last hospitalized in 9/22 for abnormal stress test and underwent LHC without need for PCI. In the ED, patient's vitals were significant for an elevated blood pressure. His labs were significant for anemia, an elevated BUN/Cr, He also experienced 4 episodes of hematochezia in the ED and received one unit of pRBCs and given one dose of protonix. He is now admitted for further management. 83-yo pmhx cad w/ 4 stents on asa/plavix, ckd3, htn, gerd, bph, who presents with rectal bleeding x3 since AM. Reports BRBPR on soft stools x3 which warranted presentation to ED. Denies diarrhea, vomiting, abdominal pain. Last took asa/plavix last night. No prior episodes of BRBPR. Last colonoscopy 5 years and was told to repeat in 10 years (private physician) as was told to have normal findings. Of note, patient on DAPT for PCI w/ FOREST but last took meds last night. Last hospitalized in 9/22 for abnormal stress test and underwent LHC without need for PCI. In the ED, patient's vitals were significant for an elevated blood pressure. His labs were significant for anemia, an elevated BUN/Cr, He also experienced 4 episodes of hematochezia in the ED and received one unit of pRBCs and given one dose of protonix. He is now admitted for further management. s/p endoscopy 10/11.

## 2023-10-11 NOTE — PRE-ANESTHESIA EVALUATION ADULT - HEIGHT IN INCHES
8 overnight events noted.  denies SOB.  [facility]- no S3/JVD but dull bases bilat R>L.    wound dressed.    imp- cellulitis->sepsis; ATN->JANY; PAD; likely has HFpEF and is fluid overloaded from his ATN.   will check echo, hold off lasix unless his 02 sats drop.

## 2023-10-11 NOTE — DISCHARGE NOTE PROVIDER - HOSPITAL COURSE
83M w/ CAD s/p FOREST x 4 (in 2016) on DAPT (ASA/Plavix), CKD3 (Baseline Cr - 2.5; 9/2022), HTN, HLD, diabetes, GERD, gout, BPH p/w BRBPR since this morning. Pt endorses 3 bloody bowel movements in the morning that started at 9am. At first, pt noted blood when wiping but progressed to blood in toiled bowel. There have been no dark stools. Patient with 4 additional BRBPR in the ED. Last colonoscopy 5 years and was told to repeat in 10 years (private physician) as was told to have normal findings. Of note, patient on DAPT for PCI w/ FOREST but last took meds last night. Last hospitalized in 9/22 for abnormal stress test and underwent LHC without need for PCI. More recently (< 2 weeks ago), patient s/p L. hip replacement. He has never had an episode like this prior. He states his stools have also been normal consistency. Denies fever, headache, nausea, vomiting, CP, SOB, or dysuria.     In the hospital, the patient experienced an additional 4-5 episodes of bloody stools and received a total of three units of packed red blood cells. He responded appropriately to these transfusions. He was then started on IV protonix for his GI bleed. He then underwent a colonoscopy by the GI team. His colonoscopy showed mild diverticulosis, which was likely the etiology of his bleed as there were no signs of active bleeding.     At the time of discharge, the patient was stable.     MAJOR MEDICAL CHANGES  - Please follow up with your PCP:  *** in 1-2 weeks for management of your general health  - Please follow up with your Gastroenterologist for further care of your diverticulosis  - You should attempt to consume a high fiber diet for better management of your diverticulosis.  - Please stop taking your clopidogrel (plavix) as it likely contributed to your GI bleed.   - Please continue to take your other medications for your blood pressure, diabetes, cholesterol, and heart disease, including your aspirin   83M w/ CAD s/p FOREST x 4 (in 2016) on DAPT (ASA/Plavix), CKD3 (Baseline Cr - 2.5; 9/2022), HTN, HLD, diabetes, GERD, gout, BPH p/w BRBPR since this morning. Pt endorses 3 bloody bowel movements in the morning that started at 9am. At first, pt noted blood when wiping but progressed to blood in toiled bowel. There have been no dark stools. Patient with 4 additional BRBPR in the ED. Last colonoscopy 5 years and was told to repeat in 10 years (private physician) as was told to have normal findings. Of note, patient on DAPT for PCI w/ FOREST but last took meds last night. Last hospitalized in 9/22 for abnormal stress test and underwent LHC without need for PCI. More recently (< 2 weeks ago), patient s/p L. hip replacement. He has never had an episode like this prior. He states his stools have also been normal consistency. Denies fever, headache, nausea, vomiting, CP, SOB, or dysuria.     In the hospital, the patient experienced an additional 4-5 episodes of bloody stools and received a total of three units of packed red blood cells. He responded appropriately to these transfusions. He was then started on IV protonix for his GI bleed. He then underwent a colonoscopy by the GI team. His colonoscopy showed mild diverticulosis, which was likely the etiology of his bleed as there were no signs of active bleeding.     At the time of discharge, the patient was stable.     MAJOR MEDICAL CHANGES  - Please follow up with your PCP: Dr. Claudio Meeks in 1-2 weeks for management of your general health  - Please follow up with your Gastroenterologist for further care of your diverticulosis  - You should attempt to consume a high fiber diet for better management of your diverticulosis.  - Please stop taking your clopidogrel (plavix) as it likely contributed to your GI bleed. Also follow up with your cardiologist Dr. Abraham for further management.   - Please continue to take your other medications for your blood pressure, diabetes, cholesterol, and heart disease, including your aspirin   83M w/ CAD s/p FOREST x 4 (in 2016) on DAPT (ASA/Plavix), CKD3 (Baseline Cr - 2.5; 9/2022), HTN, HLD, diabetes, GERD, gout, BPH p/w BRBPR since this morning. Pt endorses 3 bloody bowel movements in the morning that started at 9am. At first, pt noted blood when wiping but progressed to blood in toiled bowel. There have been no dark stools. Patient with 4 additional BRBPR in the ED. Last colonoscopy 5 years and was told to repeat in 10 years (private physician) as was told to have normal findings. Of note, patient on DAPT for PCI w/ FOREST but last took meds last night. Last hospitalized in 9/22 for abnormal stress test and underwent LHC without need for PCI. More recently (< 2 weeks ago), patient s/p L. hip replacement. He has never had an episode like this prior. He states his stools have also been normal consistency. Denies fever, headache, nausea, vomiting, CP, SOB, or dysuria.     In the hospital, the patient experienced an additional 4-5 episodes of bloody stools and received a total of three units of packed red blood cells. He responded appropriately to these transfusions. He was then started on IV protonix for his GI bleed. He then underwent a colonoscopy by the GI team. His colonoscopy showed mild diverticulosis, which was likely the etiology of his bleed as there were no signs of active bleeding. After procedure, pt tolerated a regular diet and doing well.     At the time of discharge, the patient was stable and ready for discharge w/ outpatient PCP, GI, and cardiology follow up.     MAJOR MEDICAL CHANGES  - Please follow up with your PCP: Dr. Claudio Meeks in 1-2 weeks for management of your general health  - Please follow up with your Gastroenterologist for further care of your diverticulosis  - You should attempt to consume a high fiber diet for better management of your diverticulosis.  - Please stop taking your clopidogrel (plavix) as it likely contributed to your GI bleed. Also follow up with your cardiologist Dr. Abraham for further management.   - Please continue to take your other medications for your blood pressure, diabetes, cholesterol, and heart disease, including your aspirin   83M w/ CAD s/p FOREST x 4 (in 2016) on DAPT (ASA/Plavix), CKD3 (Baseline Cr - 2.5; 9/2022), HTN, HLD, diabetes, GERD, gout, BPH p/w BRBPR since this morning. Pt endorses 3 bloody bowel movements in the morning that started at 9am. At first, pt noted blood when wiping but progressed to blood in toiled bowel. There have been no dark stools. Patient with 4 additional BRBPR in the ED. Last colonoscopy 5 years and was told to repeat in 10 years (private physician) as was told to have normal findings. Of note, patient on DAPT for PCI w/ FOREST but last took meds last night. Last hospitalized in 9/22 for abnormal stress test and underwent LHC without need for PCI. More recently (< 2 weeks ago), patient s/p L. hip replacement. He has never had an episode like this prior. He states his stools have also been normal consistency. Denies fever, headache, nausea, vomiting, CP, SOB, or dysuria.     In the hospital, the patient experienced an additional 4-5 episodes of bloody stools and received a total of three units of packed red blood cells. He responded appropriately to these transfusions. He was then started on IV protonix for his GI bleed. He then underwent a colonoscopy by the GI team. His colonoscopy showed mild diverticulosis, which was likely the etiology of his bleed as there were no signs of active bleeding. After procedure, pt tolerated a regular diet and doing well. Pt consulted and recommend outpatient PT.     At the time of discharge, the patient was stable and ready for discharge w/ outpatient PCP, GI, and cardiology follow up.     MAJOR MEDICAL CHANGES  - Please follow up with your PCP: Dr. Claudio Meeks in 1-2 weeks for management of your general health  - Please follow up with your Gastroenterologist for further care of your diverticulosis  - You should attempt to consume a high fiber diet for better management of your diverticulosis.  - Please stop taking your clopidogrel (plavix) as it likely contributed to your GI bleed. Also follow up with your cardiologist Dr. Abraham for further management.   - Please continue to take your other medications for your blood pressure, diabetes, cholesterol, and heart disease, including your aspirin

## 2023-10-11 NOTE — PRE-ANESTHESIA EVALUATION ADULT - NSANTHPMHFT_GEN_ALL_CORE
83M w/ CAD s/p FOREST x 4 (in 2016) on DAPT (ASA/Plavix), CKD3 (Baseline Cr - 2.5; 9/2022), HTN, HLD, diabetes, GERD, gout, BPH p/w BRBPROf note, patient on DAPT for PCI w/ FOREST but last took meds last night. Last hospitalized in 9/22 for abnormal stress test and underwent LHC without need for PCI. More recently (< 2 weeks ago), patient s/p L. hip replacement. Also, he has a history of a cervical fusion 20 years ago.

## 2023-10-11 NOTE — DISCHARGE NOTE PROVIDER - NSFOLLOWUPCLINICS_GEN_ALL_ED_FT
[facility]  Gastroenterology  [address] [place] [address]  Phone: [phone]  Fax:   Follow Up Time: 2 weeks

## 2023-10-11 NOTE — DISCHARGE NOTE PROVIDER - CARE PROVIDER_API CALL
Claudio Meeks.  Internal Medicine  [address] [place] [address]  Phone: [phone]  Fax: [phone]  Follow Up Time:

## 2023-10-11 NOTE — DISCHARGE NOTE PROVIDER - NSDCCPCAREPLAN_GEN_ALL_CORE_FT
PRINCIPAL DISCHARGE DIAGNOSIS  Diagnosis: BRBPR (bright red blood per rectum)  Assessment and Plan of Treatment: You came into the hospital after experiencing multiple episodes of bloody stools. Although your blood pressure, your blood number (hemoglobin) was low, and for this you received three units of red blood cells. Your hemoglobin improved after this. You then underwent a colonoscopy which showed no active bleeding and diverticulosis. The reason behind your GI bleed was likely due to your GI bleed. Going forward, be sure to follow up with your GI doctor for further care. You should also consume a high fiber diet to help with your diverticulosis. Come back to the hopsital if your symptoms worsen.      SECONDARY DISCHARGE DIAGNOSES  Diagnosis: CAD (coronary artery disease)  Assessment and Plan of Treatment: You have a history of heart disease for which you have had 4 stents placed. Moving forward, continue  to take your mediations for your cholesterol and your aspirin. However, you should stop taking your plavix (clopidogrel) as this could have contributed to your GI bleed. Be sure to follow up with your Cardiologst for further managment of your heart disease and the plavix.

## 2023-10-11 NOTE — DISCHARGE NOTE PROVIDER - NSDCCPTREATMENT_GEN_ALL_CORE_FT
PRINCIPAL PROCEDURE  Procedure: Colonoscopy  Findings and Treatment: The perianal and digital rectal examinations were normal. Pertinent negatives include normal sphincter tone.  Multiple scattered medium-mouthed diverticula were found throughout the colon, specifically in the descending colon, transverse colon and ascending colon. There was no evidence of diverticular bleeding. Non-bleeding external hemorrhoids were found during retroflexion. The hemorrhoids were small.                                                                                                        Impression:      Moderate diverticulosis in the descending colon, in the transverse colon and in the ascending colon, likely soure of patient's prior bleeding. Non-bleeding external hemorrhoids. No specimens collected.       Quality 110: Preventive Care And Screening: Influenza Immunization: Influenza Immunization Administered during Influenza season Detail Level: Zone Quality 226: Preventive Care And Screening: Tobacco Use: Screening And Cessation Intervention: Patient screened for tobacco and never smoked Quality 431: Preventive Care And Screening: Unhealthy Alcohol Use - Screening: Patient screened for unhealthy alcohol use using a single question and scores less than 2 times per year

## 2023-10-11 NOTE — PHYSICAL THERAPY INITIAL EVALUATION ADULT - ADDITIONAL COMMENTS
Ramandeep Poole [address], [place], [place]  [address]  INFORMED CONSENT FOR TRANSFUSION OF BLOOD OR BLOOD PRODUCTS  My physician has informed me of the nature, purpose, benefits and risks of transfusion for blood and blood components that he/she may deem necessary during my treatment or hospitalization. He/she has also discussed alternatives to receiving blood from the voluntary blood supply with me, such as self-donation (autologous) and directed donation (blood donated by family or friends to be used specifically for me). I further understand that while the [address] [place] [address] will attempt to supply any autologous or directed donor blood prior to transfusion of blood from the routine blood supply, medical circumstances may require that other or additional blood components may be required for my care. In giving consent, I acknowledge that my physician has also informed me that despite careful screening and testing in accordance with national and regional regulations, there is still a small risk of transmission of infectious agents including hepatitis, HIV-1/2, cytomegalovirus and other viruses or diseases as yet unknown for which licensed definitive screening tests do not currently exist. Additionally, my physician has informed me of the potential for transfusion reactions not related to an infectious agent. [  ]  Check here for Recurring Outpatient Transfusion Therapy (valid for 1 year) In addition to the above, my physician has informed me that I shall receive numerous transfusions over a period of time and that these can lead to other increased risks. I hereby authorize the transfusion of blood and/or blood products to me as deemed necessary and ordered by physicians participating in my care.  My physician has given me the opportunity to ask questions and any questions asked have been answered to my satisfaction  __________________________________________ ______________________________________________  (Signature of Patient)                                                            (Responsible party in case of Minor,                                                                                                 Incompetent, or unconscious Patient)  ___________________________________________       ________ ______________________________________  (Relationship to Patient)                                                       (Signature of Witness)  ______________________________________________________________________________________________   (Date)                                                                           (Time)  REFUSAL OF CONSENT FOR BLOOD TRANSFUSIONS   Sign only if Refusing   [  ] I understand refusal of blood or blood products as deemed necessary by my physician may have serious consequences to my condition to include possible death.  I hereby assume responsibility for my refusal and release the hospital, its personnel, and my physicians from any responsibility for the consequences of my refusal.    ________________________________________________________________________________  (Signature of Patient)                                                         (Responsible Party/Relationship to Patient)    ________________________________________________________________________________  (Signature of Witness)                                                       (Date/Time)     Patient Name: Dinesh Naranjo     : 3/8/1938                 Printed: 2022      Medical Record #: [id number]                                 Page 1 of 1 Patient lives in pvt house with spouse, 4  steps to enter. Per pt. spouse available to assist at all times.  Patient ambulates indoor with straight cane independent. Community amb using rw. pt owns rw, standard cane at home. Pt s/p R hip replacement Sept . Currently receiving OPT.

## 2023-10-12 ENCOUNTER — TRANSCRIPTION ENCOUNTER (OUTPATIENT)
Age: 83
End: 2023-10-12

## 2023-10-12 VITALS
OXYGEN SATURATION: 98 % | HEART RATE: 69 BPM | DIASTOLIC BLOOD PRESSURE: 73 MMHG | TEMPERATURE: 99 F | SYSTOLIC BLOOD PRESSURE: 135 MMHG | RESPIRATION RATE: 18 BRPM

## 2023-10-12 LAB
ALBUMIN SERPL ELPH-MCNC: 3.5 G/DL — SIGNIFICANT CHANGE UP (ref 3.3–5)
ALP SERPL-CCNC: 49 U/L — SIGNIFICANT CHANGE UP (ref 40–120)
ALT FLD-CCNC: 27 U/L — SIGNIFICANT CHANGE UP (ref 10–45)
ANION GAP SERPL CALC-SCNC: 13 MMOL/L — SIGNIFICANT CHANGE UP (ref 5–17)
AST SERPL-CCNC: 41 U/L — HIGH (ref 10–40)
BILIRUB SERPL-MCNC: 0.3 MG/DL — SIGNIFICANT CHANGE UP (ref 0.2–1.2)
BUN SERPL-MCNC: 32 MG/DL — HIGH (ref 7–23)
CALCIUM SERPL-MCNC: 9.7 MG/DL — SIGNIFICANT CHANGE UP (ref 8.4–10.5)
CHLORIDE SERPL-SCNC: 108 MMOL/L — SIGNIFICANT CHANGE UP (ref 96–108)
CO2 SERPL-SCNC: 20 MMOL/L — LOW (ref 22–31)
CREAT SERPL-MCNC: 2.74 MG/DL — HIGH (ref 0.5–1.3)
EGFR: 23 ML/MIN/1.73M2 — LOW
GLUCOSE BLDC GLUCOMTR-MCNC: 154 MG/DL — HIGH (ref 70–99)
GLUCOSE SERPL-MCNC: 159 MG/DL — HIGH (ref 70–99)
HCT VFR BLD CALC: 23.2 % — LOW (ref 39–50)
HGB BLD-MCNC: 7.8 G/DL — LOW (ref 13–17)
MAGNESIUM SERPL-MCNC: 2 MG/DL — SIGNIFICANT CHANGE UP (ref 1.6–2.6)
MCHC RBC-ENTMCNC: 31.1 PG — SIGNIFICANT CHANGE UP (ref 27–34)
MCHC RBC-ENTMCNC: 33.6 GM/DL — SIGNIFICANT CHANGE UP (ref 32–36)
MCV RBC AUTO: 92.4 FL — SIGNIFICANT CHANGE UP (ref 80–100)
NRBC # BLD: 0 /100 WBCS — SIGNIFICANT CHANGE UP (ref 0–0)
PHOSPHATE SERPL-MCNC: 2.1 MG/DL — LOW (ref 2.5–4.5)
PLATELET # BLD AUTO: 129 K/UL — LOW (ref 150–400)
POTASSIUM SERPL-MCNC: 3.9 MMOL/L — SIGNIFICANT CHANGE UP (ref 3.5–5.3)
POTASSIUM SERPL-SCNC: 3.9 MMOL/L — SIGNIFICANT CHANGE UP (ref 3.5–5.3)
PROT SERPL-MCNC: 5.3 G/DL — LOW (ref 6–8.3)
RBC # BLD: 2.51 M/UL — LOW (ref 4.2–5.8)
RBC # FLD: 19.3 % — HIGH (ref 10.3–14.5)
SODIUM SERPL-SCNC: 141 MMOL/L — SIGNIFICANT CHANGE UP (ref 135–145)
WBC # BLD: 4.05 K/UL — SIGNIFICANT CHANGE UP (ref 3.8–10.5)
WBC # FLD AUTO: 4.05 K/UL — SIGNIFICANT CHANGE UP (ref 3.8–10.5)

## 2023-10-12 PROCEDURE — 85730 THROMBOPLASTIN TIME PARTIAL: CPT

## 2023-10-12 PROCEDURE — 36430 TRANSFUSION BLD/BLD COMPNT: CPT

## 2023-10-12 PROCEDURE — 36415 COLL VENOUS BLD VENIPUNCTURE: CPT

## 2023-10-12 PROCEDURE — 85027 COMPLETE CBC AUTOMATED: CPT

## 2023-10-12 PROCEDURE — P9016: CPT

## 2023-10-12 PROCEDURE — 86901 BLOOD TYPING SEROLOGIC RH(D): CPT

## 2023-10-12 PROCEDURE — 84100 ASSAY OF PHOSPHORUS: CPT

## 2023-10-12 PROCEDURE — 86923 COMPATIBILITY TEST ELECTRIC: CPT

## 2023-10-12 PROCEDURE — 86900 BLOOD TYPING SEROLOGIC ABO: CPT

## 2023-10-12 PROCEDURE — 85025 COMPLETE CBC W/AUTO DIFF WBC: CPT

## 2023-10-12 PROCEDURE — 80053 COMPREHEN METABOLIC PANEL: CPT

## 2023-10-12 PROCEDURE — 97161 PT EVAL LOW COMPLEX 20 MIN: CPT

## 2023-10-12 PROCEDURE — 85610 PROTHROMBIN TIME: CPT

## 2023-10-12 PROCEDURE — 86850 RBC ANTIBODY SCREEN: CPT

## 2023-10-12 PROCEDURE — 99291 CRITICAL CARE FIRST HOUR: CPT

## 2023-10-12 PROCEDURE — 82962 GLUCOSE BLOOD TEST: CPT

## 2023-10-12 PROCEDURE — 96374 THER/PROPH/DIAG INJ IV PUSH: CPT

## 2023-10-12 PROCEDURE — 99239 HOSP IP/OBS DSCHRG MGMT >30: CPT | Mod: GC

## 2023-10-12 PROCEDURE — 83735 ASSAY OF MAGNESIUM: CPT

## 2023-10-12 PROCEDURE — 83036 HEMOGLOBIN GLYCOSYLATED A1C: CPT

## 2023-10-12 RX ORDER — PANTOPRAZOLE SODIUM 20 MG/1
40 TABLET, DELAYED RELEASE ORAL DAILY
Refills: 0 | Status: DISCONTINUED | OUTPATIENT
Start: 2023-10-12 | End: 2023-10-12

## 2023-10-12 RX ORDER — SODIUM,POTASSIUM PHOSPHATES 278-250MG
1 POWDER IN PACKET (EA) ORAL ONCE
Refills: 0 | Status: COMPLETED | OUTPATIENT
Start: 2023-10-12 | End: 2023-10-12

## 2023-10-12 RX ORDER — CLOPIDOGREL BISULFATE 75 MG/1
1 TABLET, FILM COATED ORAL
Qty: 0 | Refills: 0 | DISCHARGE

## 2023-10-12 RX ADMIN — Medication 1: at 08:28

## 2023-10-12 RX ADMIN — CARVEDILOL PHOSPHATE 25 MILLIGRAM(S): 80 CAPSULE, EXTENDED RELEASE ORAL at 05:18

## 2023-10-12 RX ADMIN — FINASTERIDE 5 MILLIGRAM(S): 5 TABLET, FILM COATED ORAL at 11:55

## 2023-10-12 RX ADMIN — Medication 81 MILLIGRAM(S): at 11:56

## 2023-10-12 RX ADMIN — Medication 5 MILLIGRAM(S): at 05:18

## 2023-10-12 RX ADMIN — Medication 1 PACKET(S): at 11:56

## 2023-10-12 RX ADMIN — RANOLAZINE 500 MILLIGRAM(S): 500 TABLET, FILM COATED, EXTENDED RELEASE ORAL at 05:18

## 2023-10-12 RX ADMIN — PANTOPRAZOLE SODIUM 40 MILLIGRAM(S): 20 TABLET, DELAYED RELEASE ORAL at 05:18

## 2023-10-12 RX ADMIN — Medication 100 MILLIGRAM(S): at 11:55

## 2023-10-12 RX ADMIN — PANTOPRAZOLE SODIUM 40 MILLIGRAM(S): 20 TABLET, DELAYED RELEASE ORAL at 11:56

## 2023-10-12 RX ADMIN — Medication 48 MILLIGRAM(S): at 11:56

## 2023-10-12 NOTE — PROGRESS NOTE ADULT - ATTENDING COMMENTS
83 year old male with a PMHx as above who presents with hematochezia. He required a total of 3 units of pRBC during hospitalization. Colonoscopy showed moderate diverticulosis (likely source of bleed) in the descending, transverse, and ascending colon as well as nonbleeding external hemorrhoids.    Pt was examined after his colonoscopy. He has no further bleeding. Discussed results of colonoscopy with the patient. Pt also c/o ingrown toenail on the left 4th toe.     #acute lower GI bleed from divertulcosis  #CAD on DAPT at home  - agree with restarting amlodipine and carvedilol. BP now trending up  - continue to monitor for additional bleeds overnight  - maintain active type and screen  - transfuse for goal hgb >8  - restart aspirin tomorrow. Hold plavix  - Pt states he has an appointment with his cardiologist, Dr. Abraham this Friday  - appreciate GI evaluation  - pending PT final recommendations    Rest of plan as above.
83 year old male with a PMHx as above who presents with hematochezia. He required a total of 3 units of pRBC during hospitalization. Colonoscopy showed moderate diverticulosis (likely source of bleed) in the descending, transverse, and ascending colon as well as nonbleeding external hemorrhoids.    Pt remains stable overnight. He had no further episodes of hematochezia. States that his last two bowel movements have been normal and nonbloody.     #acute lower GI bleed from divertulcosis  #CAD on DAPT at home  - agree with restart home antihypertensives  - restart aspirin tomorrow. Hold plavix  - Pt states he has an appointment with his cardiologist, Dr. bAraham this Friday  - appreciate GI evaluation    Pt is stable for discharge home with outpatient PT.    Rest of plan as above.

## 2023-10-12 NOTE — PROGRESS NOTE ADULT - PROBLEM SELECTOR PLAN 1
Hb 9.2 on admission 2/2 multiple episodes w/ BRBPR  Differential: Diverticular bleed vs Internal Hemorrhoids vs Ischemic colitis (less likely)   - diverticulosis noted on MRI 7/22  On admission: Hgb - 9.2 (Baseline Hgb - 11.9; 09/2022) s/p 1U pRBC   + JADE   - can also be due to CKD    Plan:   - Transfuse Hgb < 7; Maintain active T&S;   - s/p Colonoscopy (10/11) = diverticulosis w/o over bleeding   - Resume diet; will hold plavix on dc
Hb 9.2 on admission 2/2 multiple episodes w/ BRBPR  Differential: Diverticular bleed vs Internal Hemorrhoids vs Ischemic colitis (less likely)   - diverticulosis noted on MRI 7/22  On admission: Hgb - 9.2 (Baseline Hgb - 11.9; 09/2022) s/p 1U pRBC   + JADE     Plan:   - Transfuse Hgb < 8; Maintain active T&S;   - s/p Colonoscopy (10/11) = diverticulosis w/o over bleeding   - Resume diet; will hold plavix on dc

## 2023-10-12 NOTE — PROGRESS NOTE ADULT - PROBLEM SELECTOR PLAN 10
Diet: Advance as tolerated   Dispo: PT eval  DVT ppx: SCDs (GI bleed)
Diet: Advance as tolerated   Dispo: PT eval  DVT ppx: SCDs (GI bleed)

## 2023-10-12 NOTE — PROGRESS NOTE ADULT - PROBLEM SELECTOR PLAN 9
-cw finasteride (home meds) and do oxybutynin inpatient (trospium interchange)
-cw finasteride (home meds) and do oxybutynin inpatient (trospium interchange)

## 2023-10-12 NOTE — PROGRESS NOTE ADULT - PROBLEM SELECTOR PLAN 6
- hx of CKD3  - Cr 2.48 on admission (appears near baseline)  - monitor creatinine   - avoid nephrotoxic agents   - renally dose meds
- hx of CKD3  - Cr 2.48 on admission (appears near baseline)  - monitor creatinine   - avoid nephrotoxic agents   - renally dose meds

## 2023-10-12 NOTE — PROGRESS NOTE ADULT - PROBLEM SELECTOR PLAN 2
Hx of PCI (4 stents last one in 2016)  - hold plavix; resume asa tmrw
Hx of PCI (4 stents last one in 2016)  - hold plavix; resume asa tmrw

## 2023-10-12 NOTE — PROGRESS NOTE ADULT - SUBJECTIVE AND OBJECTIVE BOX
Patient is a 83y old  Male who presents with a chief complaint of Hematochezia  (11 Oct 2023 16:38)      SUBJECTIVE / OVERNIGHT EVENTS: No acute events overnight. Pt seen and examined at bedside this morning. Pt s/p colonoscopy and doing well. No acute distress noted. Denies fever, headache, nausea, vomiting, CP, SOB, or dysuria.     Brief Daily Plan   - S/p Colonoscopy w/ noted diverticulosis  - PT eval; likely d/c in AM if CBC stable  - C/w asa; d/c plavix on d/c; f/u with outpt cardiology   - restarted home amlodipine 10; coreg 25 bid     MEDICATIONS  (STANDING):  allopurinol 100 milliGRAM(s) Oral daily  amLODIPine   Tablet 10 milliGRAM(s) Oral at bedtime  atorvastatin 40 milliGRAM(s) Oral at bedtime  carvedilol 25 milliGRAM(s) Oral every 12 hours  dextrose 5%. 1000 milliLiter(s) (50 mL/Hr) IV Continuous <Continuous>  dextrose 5%. 1000 milliLiter(s) (100 mL/Hr) IV Continuous <Continuous>  dextrose 50% Injectable 25 Gram(s) IV Push once  dextrose 50% Injectable 25 Gram(s) IV Push once  dextrose 50% Injectable 12.5 Gram(s) IV Push once  fenofibrate Tablet 48 milliGRAM(s) Oral daily  finasteride 5 milliGRAM(s) Oral daily  glucagon  Injectable 1 milliGRAM(s) IntraMuscular once  insulin lispro (ADMELOG) corrective regimen sliding scale   SubCutaneous three times a day before meals  insulin lispro (ADMELOG) corrective regimen sliding scale   SubCutaneous at bedtime  oxybutynin 5 milliGRAM(s) Oral two times a day  pantoprazole  Injectable 40 milliGRAM(s) IV Push every 12 hours  ranolazine 500 milliGRAM(s) Oral two times a day    MEDICATIONS  (PRN):  dextrose Oral Gel 15 Gram(s) Oral once PRN Blood Glucose LESS THAN 70 milliGRAM(s)/deciliter      Vital Signs Last 24 Hrs  T(C): 36.9 (11 Oct 2023 13:12), Max: 36.9 (11 Oct 2023 06:39)  T(F): 98.5 (11 Oct 2023 13:12), Max: 98.5 (11 Oct 2023 13:12)  HR: 77 (11 Oct 2023 16:53) (75 - 101)  BP: 171/75 (11 Oct 2023 16:53) (114/74 - 178/73)  BP(mean): 81 (11 Oct 2023 10:35) (81 - 83)  RR: 18 (11 Oct 2023 13:12) (13 - 20)  SpO2: 99% (11 Oct 2023 13:12) (97% - 100%)    Parameters below as of 11 Oct 2023 13:12  Patient On (Oxygen Delivery Method): room air      CAPILLARY BLOOD GLUCOSE      POCT Blood Glucose.: 239 mg/dL (11 Oct 2023 16:42)  POCT Blood Glucose.: 147 mg/dL (11 Oct 2023 13:10)  POCT Blood Glucose.: 125 mg/dL (11 Oct 2023 12:49)  POCT Blood Glucose.: 131 mg/dL (11 Oct 2023 08:19)  POCT Blood Glucose.: 138 mg/dL (10 Oct 2023 22:27)  POCT Blood Glucose.: 181 mg/dL (10 Oct 2023 19:27)  POCT Blood Glucose.: 199 mg/dL (10 Oct 2023 18:03)    I&O's Summary      PHYSICAL EXAM:  GENERAL: NAD, well-developed  HEAD:  Atraumatic, Normocephalic  EYES: EOMI, PERRLA, conjunctiva and sclera clear  NECK: Supple, No JVD  CHEST/LUNG: Clear to auscultation bilaterally; No wheeze  HEART: Regular rate and rhythm; No murmurs, rubs, or gallops  ABDOMEN: Soft, Nontender, Nondistended; Bowel sounds present  EXTREMITIES:  2+ Peripheral Pulses, No clubbing, cyanosis, or edema  PSYCH: AAOx3  NEUROLOGY: non-focal  SKIN: No rashes or lesions    LABS:                        8.4    4.35  )-----------( 135      ( 11 Oct 2023 04:41 )             25.0     10-11    143  |  112<H>  |  40<H>  ----------------------------<  142<H>  4.2   |  18<L>  |  2.73<H>    Ca    9.8      11 Oct 2023 04:41  Phos  3.0     10-11  Mg     2.0     10-11    TPro  5.0<L>  /  Alb  3.1<L>  /  TBili  0.4  /  DBili  x   /  AST  23  /  ALT  14  /  AlkPhos  46  10-11    PT/INR - ( 11 Oct 2023 04:41 )   PT: 14.3 sec;   INR: 1.37 ratio         PTT - ( 11 Oct 2023 04:41 )  PTT:32.6 sec      Urinalysis Basic - ( 11 Oct 2023 04:41 )    Color: x / Appearance: x / SG: x / pH: x  Gluc: 142 mg/dL / Ketone: x  / Bili: x / Urobili: x   Blood: x / Protein: x / Nitrite: x   Leuk Esterase: x / RBC: x / WBC x   Sq Epi: x / Non Sq Epi: x / Bacteria: x        RADIOLOGY & ADDITIONAL TESTS:    Imaging Personally Reviewed:    Consultant(s) Notes Reviewed:      Care Discussed with Consultants/Other Providers:  
PROGRESS NOTE:   Authored by Dr. Selwyn Rodriguez MD (PGY-1).     Patient is a 83y old  Male who presents with a chief complaint of Hematochezia x 1 day (11 Oct 2023 17:10)      SUBJECTIVE / OVERNIGHT EVENTS:  No acute events overnight. He has no acute complaints this morning and no episodes of hematochezia.     MEDICATIONS  (STANDING):  allopurinol 100 milliGRAM(s) Oral daily  amLODIPine   Tablet 10 milliGRAM(s) Oral at bedtime  aspirin  chewable 81 milliGRAM(s) Oral daily  atorvastatin 40 milliGRAM(s) Oral at bedtime  carvedilol 25 milliGRAM(s) Oral every 12 hours  dextrose 5%. 1000 milliLiter(s) (50 mL/Hr) IV Continuous <Continuous>  dextrose 5%. 1000 milliLiter(s) (100 mL/Hr) IV Continuous <Continuous>  dextrose 50% Injectable 25 Gram(s) IV Push once  dextrose 50% Injectable 25 Gram(s) IV Push once  dextrose 50% Injectable 12.5 Gram(s) IV Push once  fenofibrate Tablet 48 milliGRAM(s) Oral daily  finasteride 5 milliGRAM(s) Oral daily  glucagon  Injectable 1 milliGRAM(s) IntraMuscular once  insulin lispro (ADMELOG) corrective regimen sliding scale   SubCutaneous three times a day before meals  insulin lispro (ADMELOG) corrective regimen sliding scale   SubCutaneous at bedtime  oxybutynin 5 milliGRAM(s) Oral two times a day  pantoprazole  Injectable 40 milliGRAM(s) IV Push daily  potassium phosphate / sodium phosphate Powder (PHOS-NaK) 1 Packet(s) Oral once  ranolazine 500 milliGRAM(s) Oral two times a day    MEDICATIONS  (PRN):  dextrose Oral Gel 15 Gram(s) Oral once PRN Blood Glucose LESS THAN 70 milliGRAM(s)/deciliter      CAPILLARY BLOOD GLUCOSE      POCT Blood Glucose.: 154 mg/dL (12 Oct 2023 08:10)  POCT Blood Glucose.: 165 mg/dL (11 Oct 2023 21:43)  POCT Blood Glucose.: 239 mg/dL (11 Oct 2023 16:42)  POCT Blood Glucose.: 147 mg/dL (11 Oct 2023 13:10)  POCT Blood Glucose.: 125 mg/dL (11 Oct 2023 12:49)    I&O's Summary    11 Oct 2023 07:01  -  12 Oct 2023 07:00  --------------------------------------------------------  IN: 0 mL / OUT: 1100 mL / NET: -1100 mL        PHYSICAL EXAM:  Vital Signs Last 24 Hrs  T(C): 37 (12 Oct 2023 04:37), Max: 37 (12 Oct 2023 04:37)  T(F): 98.6 (12 Oct 2023 04:37), Max: 98.6 (12 Oct 2023 04:37)  HR: 84 (12 Oct 2023 04:37) (75 - 101)  BP: 175/72 (12 Oct 2023 04:37) (121/64 - 178/73)  BP(mean): 81 (11 Oct 2023 10:35) (81 - 81)  RR: 16 (12 Oct 2023 04:37) (13 - 18)  SpO2: 98% (12 Oct 2023 04:37) (97% - 100%)    Parameters below as of 12 Oct 2023 04:37  Patient On (Oxygen Delivery Method): room air        CONSTITUTIONAL: NAD, well-developed  HEET: MMM, EOMI, PERRLA  NECK: supple  RESPIRATORY: Normal respiratory effort; lungs are clear to auscultation bilaterally  CARDIOVASCULAR: Regular rate and rhythm, normal S1 and S2, no murmur/rub/gallop; No lower extremity edema; Peripheral pulses are 2+ bilaterally  ABDOMEN: Nontender to palpation, normoactive bowel sounds, no rebound/guarding; No hepatosplenomegaly  MUSCULOSKELETAL: no clubbing or cyanosis of digits; no joint swelling or tenderness to palpation  NEURO: Moving all four extremities, sensation grossly intact  PSYCH: A+O to person, place, and time; affect appropriate  SKIN: No rash    LABS:                        7.8    4.05  )-----------( 129      ( 12 Oct 2023 07:13 )             23.2     10-12    141  |  108  |  32<H>  ----------------------------<  159<H>  3.9   |  20<L>  |  2.74<H>    Ca    9.7      12 Oct 2023 07:13  Phos  2.1     10-12  Mg     2.0     10-12    TPro  5.3<L>  /  Alb  3.5  /  TBili  0.3  /  DBili  x   /  AST  41<H>  /  ALT  27  /  AlkPhos  49  10-12    PT/INR - ( 11 Oct 2023 04:41 )   PT: 14.3 sec;   INR: 1.37 ratio         PTT - ( 11 Oct 2023 04:41 )  PTT:32.6 sec      Urinalysis Basic - ( 12 Oct 2023 07:13 )    Color: x / Appearance: x / SG: x / pH: x  Gluc: 159 mg/dL / Ketone: x  / Bili: x / Urobili: x   Blood: x / Protein: x / Nitrite: x   Leuk Esterase: x / RBC: x / WBC x   Sq Epi: x / Non Sq Epi: x / Bacteria: x          Tele Reviewed:    RADIOLOGY & ADDITIONAL TESTS:  Results Reviewed:   Imaging Personally Reviewed:  Electrocardiogram Personally Reviewed:

## 2023-10-12 NOTE — PROGRESS NOTE ADULT - PROBLEM SELECTOR PLAN 3
- hx of HTN  - home regimen: amlodipine 10, coreg 25 BID, hydralazine 100, ranolazine 500 BID  - restarted amlodipine 10; coreg 25 BID on 10/11  - will restart hydral 100 on d/c
- hx of HTN  - home regimen: amlodipine 10, coreg 25 BID, hydralazine 100, ranolazine 500 BID  - restarted amlodipine 10; coreg 25 BID on 10/11  - will restart hydral 100 on d/c

## 2023-10-12 NOTE — PROGRESS NOTE ADULT - ASSESSMENT
83M w/ CAD s/p FOREST x 4 (in 2016) on DAPT (ASA/Plavix), CKD3 (Baseline Cr - 2.5; 9/2022), HTN, HLD, GERD, gout, diabetes, BPH p/w BRBPR since this morning. Patient s/p 1U pRBC and admitted to medicine for further management. In the medicine unit, s/p colonoscopy with diverticulosis and no signs of bleeding. 
83M w/ CAD s/p FOREST x 4 (in 2016) on DAPT (ASA/Plavix), CKD3 (Baseline Cr - 2.5; 9/2022), HTN, HLD, GERD, gout, diabetes, BPH p/w BRBPR since this morning. Patient s/p 1U pRBC and admitted to medicine for further management. In the medicine unit, s/p colonoscopy with diverticulosis and no signs of bleeding.

## 2023-10-12 NOTE — DISCHARGE NOTE NURSING/CASE MANAGEMENT/SOCIAL WORK - PATIENT PORTAL LINK FT
You can access the FollowMyHealth Patient Portal offered by [facility] by registering at the following website: [email or web address]. By joining [employer]’s FollowMyHealth portal, you will also be able to view your health information using other applications (apps) compatible with our system.

## 2023-10-12 NOTE — DISCHARGE NOTE NURSING/CASE MANAGEMENT/SOCIAL WORK - NSDCFUADDAPPT_GEN_ALL_CORE_FT
[facility]  [phone]    [facility]   [phone]

## 2023-10-13 ENCOUNTER — INPATIENT (INPATIENT)
Facility: HOSPITAL | Age: 83
LOS: 3 days | Discharge: HOME | DRG: 377 | End: 2023-10-17
Attending: STUDENT IN AN ORGANIZED HEALTH CARE EDUCATION/TRAINING PROGRAM | Admitting: HOSPITALIST
Payer: MEDICARE

## 2023-10-13 ENCOUNTER — NON-APPOINTMENT (OUTPATIENT)
Age: 83
End: 2023-10-13

## 2023-10-13 ENCOUNTER — APPOINTMENT (OUTPATIENT)
Dept: CARDIOLOGY | Facility: CLINIC | Age: 83
End: 2023-10-13
Payer: MEDICARE

## 2023-10-13 VITALS
WEIGHT: 185 LBS | HEART RATE: 78 BPM | SYSTOLIC BLOOD PRESSURE: 168 MMHG | OXYGEN SATURATION: 97 % | BODY MASS INDEX: 27.4 KG/M2 | HEIGHT: 69 IN | DIASTOLIC BLOOD PRESSURE: 66 MMHG

## 2023-10-13 VITALS
HEIGHT: 68 IN | TEMPERATURE: 99 F | WEIGHT: 184.97 LBS | DIASTOLIC BLOOD PRESSURE: 65 MMHG | OXYGEN SATURATION: 100 % | RESPIRATION RATE: 19 BRPM | HEART RATE: 83 BPM | SYSTOLIC BLOOD PRESSURE: 127 MMHG

## 2023-10-13 DIAGNOSIS — Z87.891 PERSONAL HISTORY OF NICOTINE DEPENDENCE: ICD-10-CM

## 2023-10-13 DIAGNOSIS — Z98.89 OTHER SPECIFIED POSTPROCEDURAL STATES: Chronic | ICD-10-CM

## 2023-10-13 DIAGNOSIS — H26.9 UNSPECIFIED CATARACT: Chronic | ICD-10-CM

## 2023-10-13 DIAGNOSIS — Q76.1 KLIPPEL-FEIL SYNDROME: Chronic | ICD-10-CM

## 2023-10-13 DIAGNOSIS — M65.30 TRIGGER FINGER, UNSPECIFIED FINGER: Chronic | ICD-10-CM

## 2023-10-13 DIAGNOSIS — Z86.39 PERSONAL HISTORY OF OTHER ENDOCRINE, NUTRITIONAL AND METABOLIC DISEASE: ICD-10-CM

## 2023-10-13 DIAGNOSIS — M75.101 UNSPECIFIED ROTATOR CUFF TEAR OR RUPTURE OF RIGHT SHOULDER, NOT SPECIFIED AS TRAUMATIC: Chronic | ICD-10-CM

## 2023-10-13 DIAGNOSIS — Z83.3 FAMILY HISTORY OF DIABETES MELLITUS: ICD-10-CM

## 2023-10-13 PROCEDURE — 99291 CRITICAL CARE FIRST HOUR: CPT

## 2023-10-13 PROCEDURE — 99214 OFFICE O/P EST MOD 30 MIN: CPT

## 2023-10-13 RX ORDER — EZETIMIBE 10 MG/1
10 TABLET ORAL
Qty: 90 | Refills: 3 | Status: ACTIVE | COMMUNITY
Start: 2022-02-09 | End: 1900-01-01

## 2023-10-13 NOTE — ED ADULT TRIAGE NOTE - CHIEF COMPLAINT QUOTE
C/o BRB in stool since today. On Plavix for stent. Denies abdominal pain, SOB, HA   Recently seen for same complaint and had to be transfused Tuesday

## 2023-10-14 DIAGNOSIS — I25.10 ATHEROSCLEROTIC HEART DISEASE OF NATIVE CORONARY ARTERY WITHOUT ANGINA PECTORIS: ICD-10-CM

## 2023-10-14 DIAGNOSIS — I10 ESSENTIAL (PRIMARY) HYPERTENSION: ICD-10-CM

## 2023-10-14 DIAGNOSIS — K92.2 GASTROINTESTINAL HEMORRHAGE, UNSPECIFIED: ICD-10-CM

## 2023-10-14 DIAGNOSIS — N18.4 CHRONIC KIDNEY DISEASE, STAGE 4 (SEVERE): ICD-10-CM

## 2023-10-14 DIAGNOSIS — E11.9 TYPE 2 DIABETES MELLITUS WITHOUT COMPLICATIONS: ICD-10-CM

## 2023-10-14 LAB
ALBUMIN SERPL ELPH-MCNC: 3.5 G/DL — SIGNIFICANT CHANGE UP (ref 3.3–5)
ALP SERPL-CCNC: 51 U/L — SIGNIFICANT CHANGE UP (ref 40–120)
ALT FLD-CCNC: 33 U/L — SIGNIFICANT CHANGE UP (ref 10–45)
ANION GAP SERPL CALC-SCNC: 11 MMOL/L — SIGNIFICANT CHANGE UP (ref 5–17)
APTT BLD: 28.7 SEC — SIGNIFICANT CHANGE UP (ref 24.5–35.6)
AST SERPL-CCNC: 34 U/L — SIGNIFICANT CHANGE UP (ref 10–40)
BILIRUB SERPL-MCNC: 0.3 MG/DL — SIGNIFICANT CHANGE UP (ref 0.2–1.2)
BLD GP AB SCN SERPL QL: NEGATIVE — SIGNIFICANT CHANGE UP
BUN SERPL-MCNC: 39 MG/DL — HIGH (ref 7–23)
CALCIUM SERPL-MCNC: 9.7 MG/DL — SIGNIFICANT CHANGE UP (ref 8.4–10.5)
CHLORIDE SERPL-SCNC: 105 MMOL/L — SIGNIFICANT CHANGE UP (ref 96–108)
CO2 SERPL-SCNC: 21 MMOL/L — LOW (ref 22–31)
CREAT SERPL-MCNC: 3 MG/DL — HIGH (ref 0.5–1.3)
EGFR: 20 ML/MIN/1.73M2 — LOW
GLUCOSE BLDC GLUCOMTR-MCNC: 265 MG/DL — HIGH (ref 70–99)
GLUCOSE BLDC GLUCOMTR-MCNC: 272 MG/DL — HIGH (ref 70–99)
GLUCOSE BLDC GLUCOMTR-MCNC: 288 MG/DL — HIGH (ref 70–99)
GLUCOSE BLDC GLUCOMTR-MCNC: 290 MG/DL — HIGH (ref 70–99)
GLUCOSE SERPL-MCNC: 269 MG/DL — HIGH (ref 70–99)
HCT VFR BLD CALC: 19.3 % — CRITICAL LOW (ref 39–50)
HCT VFR BLD CALC: 20.4 % — CRITICAL LOW (ref 39–50)
HCT VFR BLD CALC: 20.6 % — CRITICAL LOW (ref 39–50)
HCT VFR BLD CALC: 20.7 % — CRITICAL LOW (ref 39–50)
HGB BLD-MCNC: 6.5 G/DL — CRITICAL LOW (ref 13–17)
HGB BLD-MCNC: 6.6 G/DL — CRITICAL LOW (ref 13–17)
HGB BLD-MCNC: 6.8 G/DL — CRITICAL LOW (ref 13–17)
HGB BLD-MCNC: 6.8 G/DL — CRITICAL LOW (ref 13–17)
INR BLD: 1.02 RATIO — SIGNIFICANT CHANGE UP (ref 0.85–1.18)
MCHC RBC-ENTMCNC: 30.1 PG — SIGNIFICANT CHANGE UP (ref 27–34)
MCHC RBC-ENTMCNC: 30.2 PG — SIGNIFICANT CHANGE UP (ref 27–34)
MCHC RBC-ENTMCNC: 31.2 PG — SIGNIFICANT CHANGE UP (ref 27–34)
MCHC RBC-ENTMCNC: 31.3 PG — SIGNIFICANT CHANGE UP (ref 27–34)
MCHC RBC-ENTMCNC: 32.4 GM/DL — SIGNIFICANT CHANGE UP (ref 32–36)
MCHC RBC-ENTMCNC: 32.9 GM/DL — SIGNIFICANT CHANGE UP (ref 32–36)
MCHC RBC-ENTMCNC: 33 GM/DL — SIGNIFICANT CHANGE UP (ref 32–36)
MCHC RBC-ENTMCNC: 33.7 GM/DL — SIGNIFICANT CHANGE UP (ref 32–36)
MCV RBC AUTO: 89.8 FL — SIGNIFICANT CHANGE UP (ref 80–100)
MCV RBC AUTO: 91.2 FL — SIGNIFICANT CHANGE UP (ref 80–100)
MCV RBC AUTO: 95 FL — SIGNIFICANT CHANGE UP (ref 80–100)
MCV RBC AUTO: 96.7 FL — SIGNIFICANT CHANGE UP (ref 80–100)
NRBC # BLD: 0 /100 WBCS — SIGNIFICANT CHANGE UP (ref 0–0)
NRBC # BLD: 2 /100 WBCS — HIGH (ref 0–0)
PLATELET # BLD AUTO: 103 K/UL — LOW (ref 150–400)
PLATELET # BLD AUTO: 124 K/UL — LOW (ref 150–400)
PLATELET # BLD AUTO: 129 K/UL — LOW (ref 150–400)
PLATELET # BLD AUTO: 157 K/UL — SIGNIFICANT CHANGE UP (ref 150–400)
POTASSIUM SERPL-MCNC: 4.4 MMOL/L — SIGNIFICANT CHANGE UP (ref 3.5–5.3)
POTASSIUM SERPL-SCNC: 4.4 MMOL/L — SIGNIFICANT CHANGE UP (ref 3.5–5.3)
PROT SERPL-MCNC: 5.4 G/DL — LOW (ref 6–8.3)
PROTHROM AB SERPL-ACNC: 11.2 SEC — SIGNIFICANT CHANGE UP (ref 9.5–13)
RBC # BLD: 2.11 M/UL — LOW (ref 4.2–5.8)
RBC # BLD: 2.15 M/UL — LOW (ref 4.2–5.8)
RBC # BLD: 2.18 M/UL — LOW (ref 4.2–5.8)
RBC # BLD: 2.26 M/UL — LOW (ref 4.2–5.8)
RBC # FLD: 17.6 % — HIGH (ref 10.3–14.5)
RBC # FLD: 17.8 % — HIGH (ref 10.3–14.5)
RBC # FLD: 18.6 % — HIGH (ref 10.3–14.5)
RBC # FLD: 18.8 % — HIGH (ref 10.3–14.5)
RH IG SCN BLD-IMP: POSITIVE — SIGNIFICANT CHANGE UP
SARS-COV-2 RNA SPEC QL NAA+PROBE: SIGNIFICANT CHANGE UP
SODIUM SERPL-SCNC: 137 MMOL/L — SIGNIFICANT CHANGE UP (ref 135–145)
WBC # BLD: 3.58 K/UL — LOW (ref 3.8–10.5)
WBC # BLD: 4.72 K/UL — SIGNIFICANT CHANGE UP (ref 3.8–10.5)
WBC # BLD: 5.76 K/UL — SIGNIFICANT CHANGE UP (ref 3.8–10.5)
WBC # BLD: 6.11 K/UL — SIGNIFICANT CHANGE UP (ref 3.8–10.5)
WBC # FLD AUTO: 3.58 K/UL — LOW (ref 3.8–10.5)
WBC # FLD AUTO: 4.72 K/UL — SIGNIFICANT CHANGE UP (ref 3.8–10.5)
WBC # FLD AUTO: 5.76 K/UL — SIGNIFICANT CHANGE UP (ref 3.8–10.5)
WBC # FLD AUTO: 6.11 K/UL — SIGNIFICANT CHANGE UP (ref 3.8–10.5)

## 2023-10-14 PROCEDURE — 99223 1ST HOSP IP/OBS HIGH 75: CPT

## 2023-10-14 PROCEDURE — 99232 SBSQ HOSP IP/OBS MODERATE 35: CPT | Mod: GC

## 2023-10-14 RX ORDER — PANTOPRAZOLE SODIUM 20 MG/1
40 TABLET, DELAYED RELEASE ORAL ONCE
Refills: 0 | Status: COMPLETED | OUTPATIENT
Start: 2023-10-14 | End: 2023-10-14

## 2023-10-14 RX ORDER — PANTOPRAZOLE SODIUM 20 MG/1
40 TABLET, DELAYED RELEASE ORAL
Refills: 0 | Status: DISCONTINUED | OUTPATIENT
Start: 2023-10-14 | End: 2023-10-17

## 2023-10-14 RX ORDER — ALLOPURINOL 300 MG
100 TABLET ORAL DAILY
Refills: 0 | Status: DISCONTINUED | OUTPATIENT
Start: 2023-10-14 | End: 2023-10-17

## 2023-10-14 RX ORDER — INSULIN LISPRO 100/ML
VIAL (ML) SUBCUTANEOUS AT BEDTIME
Refills: 0 | Status: DISCONTINUED | OUTPATIENT
Start: 2023-10-14 | End: 2023-10-17

## 2023-10-14 RX ORDER — DEXTROSE 50 % IN WATER 50 %
12.5 SYRINGE (ML) INTRAVENOUS ONCE
Refills: 0 | Status: DISCONTINUED | OUTPATIENT
Start: 2023-10-14 | End: 2023-10-17

## 2023-10-14 RX ORDER — SODIUM CHLORIDE 9 MG/ML
1000 INJECTION, SOLUTION INTRAVENOUS
Refills: 0 | Status: DISCONTINUED | OUTPATIENT
Start: 2023-10-14 | End: 2023-10-17

## 2023-10-14 RX ORDER — ACETAMINOPHEN 500 MG
650 TABLET ORAL EVERY 6 HOURS
Refills: 0 | Status: DISCONTINUED | OUTPATIENT
Start: 2023-10-14 | End: 2023-10-17

## 2023-10-14 RX ORDER — DEXTROSE 50 % IN WATER 50 %
15 SYRINGE (ML) INTRAVENOUS ONCE
Refills: 0 | Status: DISCONTINUED | OUTPATIENT
Start: 2023-10-14 | End: 2023-10-17

## 2023-10-14 RX ORDER — INSULIN LISPRO 100/ML
VIAL (ML) SUBCUTANEOUS
Refills: 0 | Status: DISCONTINUED | OUTPATIENT
Start: 2023-10-14 | End: 2023-10-17

## 2023-10-14 RX ORDER — RANOLAZINE 500 MG/1
500 TABLET, FILM COATED, EXTENDED RELEASE ORAL
Refills: 0 | Status: DISCONTINUED | OUTPATIENT
Start: 2023-10-14 | End: 2023-10-17

## 2023-10-14 RX ORDER — ATORVASTATIN CALCIUM 80 MG/1
40 TABLET, FILM COATED ORAL AT BEDTIME
Refills: 0 | Status: DISCONTINUED | OUTPATIENT
Start: 2023-10-14 | End: 2023-10-17

## 2023-10-14 RX ORDER — CARVEDILOL PHOSPHATE 80 MG/1
25 CAPSULE, EXTENDED RELEASE ORAL EVERY 12 HOURS
Refills: 0 | Status: DISCONTINUED | OUTPATIENT
Start: 2023-10-14 | End: 2023-10-17

## 2023-10-14 RX ORDER — AMLODIPINE BESYLATE 2.5 MG/1
10 TABLET ORAL DAILY
Refills: 0 | Status: DISCONTINUED | OUTPATIENT
Start: 2023-10-14 | End: 2023-10-17

## 2023-10-14 RX ORDER — DEXTROSE 50 % IN WATER 50 %
25 SYRINGE (ML) INTRAVENOUS ONCE
Refills: 0 | Status: DISCONTINUED | OUTPATIENT
Start: 2023-10-14 | End: 2023-10-17

## 2023-10-14 RX ORDER — HYDRALAZINE HCL 50 MG
25 TABLET ORAL THREE TIMES A DAY
Refills: 0 | Status: DISCONTINUED | OUTPATIENT
Start: 2023-10-14 | End: 2023-10-15

## 2023-10-14 RX ORDER — GLUCAGON INJECTION, SOLUTION 0.5 MG/.1ML
1 INJECTION, SOLUTION SUBCUTANEOUS ONCE
Refills: 0 | Status: DISCONTINUED | OUTPATIENT
Start: 2023-10-14 | End: 2023-10-17

## 2023-10-14 RX ORDER — INFLUENZA VIRUS VACCINE 15; 15; 15; 15 UG/.5ML; UG/.5ML; UG/.5ML; UG/.5ML
0.7 SUSPENSION INTRAMUSCULAR ONCE
Refills: 0 | Status: DISCONTINUED | OUTPATIENT
Start: 2023-10-14 | End: 2023-10-17

## 2023-10-14 RX ADMIN — PANTOPRAZOLE SODIUM 40 MILLIGRAM(S): 20 TABLET, DELAYED RELEASE ORAL at 21:38

## 2023-10-14 RX ADMIN — Medication 100 MILLIGRAM(S): at 13:41

## 2023-10-14 RX ADMIN — CARVEDILOL PHOSPHATE 25 MILLIGRAM(S): 80 CAPSULE, EXTENDED RELEASE ORAL at 17:25

## 2023-10-14 RX ADMIN — Medication 25 MILLIGRAM(S): at 13:41

## 2023-10-14 RX ADMIN — Medication 3: at 13:41

## 2023-10-14 RX ADMIN — RANOLAZINE 500 MILLIGRAM(S): 500 TABLET, FILM COATED, EXTENDED RELEASE ORAL at 17:25

## 2023-10-14 RX ADMIN — Medication 3: at 17:19

## 2023-10-14 RX ADMIN — PANTOPRAZOLE SODIUM 40 MILLIGRAM(S): 20 TABLET, DELAYED RELEASE ORAL at 00:25

## 2023-10-14 NOTE — H&P ADULT - NSHPPHYSICALEXAM_GEN_ALL_CORE
T(C): 36.4 (10-14-23 @ 12:21), Max: 37.3 (10-13-23 @ 21:28)  HR: 86 (10-14-23 @ 12:21) (76 - 86)  BP: 144/70 (10-14-23 @ 12:21) (127/65 - 160/68)  RR: 18 (10-14-23 @ 12:21) (17 - 19)  SpO2: 100% (10-14-23 @ 12:21) (98% - 100%)    GEN: male in NAD, appears comfortable, no diaphoresis  EYES: No scleral injection, PERRL, EOMI  ENTM: neck supple & symmetric without tracheal deviation, moist membranes, no gross hearing impairment, thyroid gland not enlarged  CV: +S1/S2, no m/r/g, no abdominal bruit, no LE edema  RESP: breathing comfortably, no respiratory accessory muscle use, CTAB, no w/r/r  GI: normoactive BS, soft, NTND, no rebounding/guarding, no palpable masses  LYMPHATICS: no LAD or tenderness to palpation  NEURO: AOx3, no focal deficits, CNII-XII grossly intact  PSYCH: No SI/HI/AVH, appropriate affect, appropriate insight/judgment   SKIN: no petechiae, ecchymosis or maculopapular rash noted

## 2023-10-14 NOTE — CHART NOTE - NSCHARTNOTEFT_GEN_A_CORE
Late entry    Notified by RN at 4pm of RRT called for pt, as he was noted ot have presyncopal episode while having bm on commode. RRT was noted at bedside, pt back to baseline once he was in bed. Repeat hgb noted to be 6.8 after 2 units of PRBC, another unit of PRBC ordered, Dr. Hernandez made aware of above and agrees with the plan. [place]  [id number]

## 2023-10-14 NOTE — ED PROVIDER NOTE - OBJECTIVE STATEMENT
83M w/ CAD s/p FOREST x 4 (in 2016) on DAPT (ASA/Plavix), CKD3 (Baseline Cr - 2.5; 9/2022), HTN, HLD, diabetes, GERD, gout, BPH p/w BRBPR for one day. stopped plavix Monday, and was dc from the hospital yesterday for workup for GI bleed. found on colonoscopy to be secondary to diverticulosis. during his stay received 3u prbc total. today noticed significant amount of bright red blood associated w LLQ abdominal pain three times. endorses feeling light headed. denies f/c/cp. on arrival bp 1449/61 HR 82.

## 2023-10-14 NOTE — CONSULT NOTE ADULT - ASSESSMENT
83 year old male with PMH HTN, CAD with 4 stents (more than 5 years ago), BPH, HLD, gout, GERD, DM2 and CKD with recent admission on 10/10 for hematochezia with colonoscopy performed on 10/11 noted to have diverticulosis in Rt/t side of colon presents again for recurrence of hematochezia associated with LLQ abd cramps. GI called for further evaluation. \    #Presumed diverticular bleeding  -HD stable, Hb on admission 6.6 down from 7.8 on recent admission. Only on Asprin, no longer of Plavix (held on last admission). Suspect recurrent diverticular bleeding which can be challenging to locate and usually stop.     Recommendations:  -clear liquid diet    -Okay to continue Aspirin from a GI perspective  -Trend Hb; transfuse if Hb < 7  -If continues to have brisk bleeding per rectum, order RBC nuclear scan (unable to do CTA given CKD)  -Supportive care per primary team    Recommendations preliminary until signed by attending.     Da Sandy MD  Gastroenterology/Hepatology Fellow  1st option: [phone] (text or call), ONLY available from 7:00 am to 5:00 pm.   **Contact on-call GI fellow via answering service ([phone]) from 5pm-7am AND on weekends/holidays**  2nd option: Available via [employer] Teams  3rd option: Pager: [phone]         83 year old male with PMH HTN, CAD with 4 stents (more than 5 years ago), BPH, HLD, gout, GERD, DM2 and CKD with recent admission on 10/10 for hematochezia with colonoscopy performed on 10/11 noted to have diverticulosis in Rt/t side of colon presents again for recurrence of hematochezia associated with LLQ abd cramps. GI called for further evaluation. \    #Presumed diverticular bleeding  -HD stable, Hb on admission 6.6 down from 7.8 on recent admission. Only on Asprin, no longer of Plavix (held on last admission). Suspect recurrent diverticular bleeding which can be challenging to locate and usually stop.     Recommendations:  -clear liquid diet    -Okay to continue Aspirin from a GI perspective  -Trend Hb; transfuse if Hb < 7  -Please order 2 units of blood  -If continues to have brisk bleeding per rectum, order RBC nuclear scan (unable to do CTA given CKD)  -Supportive care per primary team    Recommendations preliminary until signed by attending.     Da Sandy MD  Gastroenterology/Hepatology Fellow  1st option: [phone] (text or call), ONLY available from 7:00 am to 5:00 pm.   **Contact on-call GI fellow via answering service ([phone]) from 5pm-7am AND on weekends/holidays**  2nd option: Available via [employer] Teams  3rd option: Pager: [phone]

## 2023-10-14 NOTE — RAPID RESPONSE TEAM SUMMARY - NSSITUATIONBACKGROUNDRRT_GEN_ALL_CORE
83M with PMHx of CAD (FOREST x4 in 2016), CKD4, HTN, and T2DM presenting for one day history of hematochezia. Patient recently admitted for hematochezia and had a colonoscopy on 10/11 which showed diverticulosis; thus, he was likely having a diverticular bleed. Patient being admitted for likely recurrent LGIB which is likely diverticular in etiology.      RRT called for lethargy while having hematochezia on commode. Patient was reported to have been passed out, with some twitching after large bloody BM (seen at bedside). When I arrived, patient AOx4, following commands, no focal deficits.      systolic, afebrile 97.7, HR 88.  Patient in no acute distress. Pale appearing.  S/p 2U pRBC just finished per team, now due for CBC.    Patient with likely LGIB, being following by GI  Hemodynamically stable.    Recommendations:  - given large BM, can give another 1U pRBC prior to CBC resulting   - CBC now, f/u to determine if appropriate response  - if no appropriate increase in Hb, consider reconsulting GI and transfusing more aggressively  - fall precautions, ambulate with assistance at all times.   - discussed w primary team at bedside.

## 2023-10-14 NOTE — H&P ADULT - PROBLEM SELECTOR PLAN 1
- GI consult appreciated  - Serial CBC with goal Hg of 7 (no active cardiac issue necessitating a goal of 8)  - CLD  - Holding home aspirin  - If continues to bleed would perform a tagged RBC scan

## 2023-10-14 NOTE — ED ADULT NURSE NOTE - NSFALLRISKINTERV_ED_ALL_ED

## 2023-10-14 NOTE — H&P ADULT - ASSESSMENT
83M with PMHx of CAD (FOREST x4 in 2016), CKD4, HTN, and T2DM presenting for one day history of hematochezia. Patient recently admitted for hematochezia and had a colonoscopy on 10/11 which showed diverticulosis; thus, he was likely having a diverticular bleed. Patient being admitted for likely recurrent LGIB which is likely diverticular in etiology.

## 2023-10-14 NOTE — ED PROVIDER NOTE - CLINICAL SUMMARY MEDICAL DECISION MAKING FREE TEXT BOX
82 yo m here for BRPBR w abd pain x1 day s/p recent evaluation w colonoscopy and endoscopy showing bleeding secondary to diverticulosis. given recent imaging and workup, will check for anemia and transfuse as needed. do not think this pt needs further imaging at this time, given no infectious sx and patient well appearing 84 yo m here for BRPBR w abd pain x1 day s/p recent evaluation w colonoscopy and endoscopy showing bleeding secondary to diverticulosis. given recent imaging and workup, will check for anemia and transfuse as needed. do not think this pt needs further imaging at this time, given no infectious sx and patient well appearing    pettet attending- see attending attestation for my mdm

## 2023-10-14 NOTE — ED PROVIDER NOTE - ATTENDING CONTRIBUTION TO CARE
I, Harsha Corral, performed a history and physical exam of the patient and discussed their management with the resident and/or advanced care provider. I reviewed the resident and/or advanced care provider's note and agree with the documented findings and plan of care. I was present and available for all procedures.     83M w/ CAD s/p FOREST x 4 (in 2016) on DAPT (ASA/Plavix), CKD3 (Baseline Cr - 2.5; 9/2022), HTN, HLD, diabetes, GERD, gout, BPH p/w BRBPR for one day. stopped plavix Monday, and was dc from the hospital yesterday for workup for GI bleed. found on colonoscopy to be secondary to diverticulosis. during his stay received 3u prbc total. today noticed significant amount of bright red blood associated w LLQ abdominal pain three times. endorses feeling light headed. denies f/c/cp. on arrival bp 1449/61 HR 82.    Well appearing and in NAD, head normal appearing atraumatic, trachea midline, no respiratory distress, lungs cta bilaterally, rrr no murmurs, soft NT ND abdomen, no visible extremity deformities, Alert and oriented, non focal neuro exam, skin warm and dry, normal affect and mood, no leg swelling, calf ttp or jvd    Patient presenting with recurrent GI bleed had colonoscopy showing diverticulosis but otherwise no aggressive measures at that time no biopsies otherwise presenting today with likely anemia symptomatic in nature will evaluate with repeat blood work transfuse as necessary otherwise admit for further GI management discussed with patient agreeable plan unlikely ACS PE pneumothorax dissection AAA pneumonia

## 2023-10-14 NOTE — CONSULT NOTE ADULT - ATTENDING COMMENTS
GI consulted for hematochezia and anemia.   Recent admission earlier this month for same.   Colonoscopy 10/11/23 -- moderate diverticulosis, nonbleeding hemorrhoids     agree w prbc transfusion   please obtain tagged rbc scan (unable to perform CTA due to CKD)  monitor bowel movements  consider repeat cscope pending review of imaging   GI to follow, please call with questions

## 2023-10-14 NOTE — ED ADULT NURSE NOTE - OBJECTIVE STATEMENT
Pt is Pt is a 84 y/o male with PMH multiple cardiac stents on plavix, DM, HTN presenting to the ED c/o bloody stools. Pt reports he was admitted to [facility] 5 days ago for rectal bleeding with findings of diverticulitis and discharged home 1 day ago, pt states he began having multiple bright red bloody BM's since being home with generalized weakness and lower abdominal pain. Pt states last dose of plavix 5 days ago. Pt also states needing 3 blood transfusions while in hospital. Pt denies associated chest pain, SOB, dizziness, syncope, n/v.

## 2023-10-14 NOTE — ED PROVIDER NOTE - PHYSICAL EXAMINATION
GENERAL: well appearing in no acute distress, non-toxic appearing  HEENT: pale conjunctiva, oral mucosa moist,   CARDIAC: regular rate and rhythm, normal S1S2, no appreciable murmurs, 2+ pulses in UE/LE b/l  PULM: normal breath sounds, clear to ascultation bilaterally, no rales, rhonchi, wheezing  GI: abdomen nondistended, soft, tender LLQ with no guarding, rebound tenderness  MSK: no peripheral edema, no calf tenderness b/l

## 2023-10-14 NOTE — PATIENT PROFILE ADULT - DEAF OR HARD OF HEARING?
PAST SURGICAL HISTORY:  Admission for tubal ligation     Encounter for screening colonoscopy     History of esophagogastroduodenoscopy (EGD)      no

## 2023-10-14 NOTE — PATIENT PROFILE ADULT - FALL HARM RISK - HARM RISK INTERVENTIONS

## 2023-10-14 NOTE — ED PROVIDER NOTE - PROGRESS NOTE DETAILS
OUTSIDE FILMS REQUEST - INFORMATION FORM    Patient Name:  Jett Llanes    MRN:  [id number]    Patient YOB: 1952        [place]/Other Name:         The films are being requested for an imaging appointment at [facility] on 2-3-22    Name of Facility Exam Requested From: [facility]    Address:     City/State/Zip:      Phone #:       Fax #: [phone]    Date and Type of films/images to be released: MAMMO/US BREAST        Include Reports:  Yes      Please send  ( X ) CD - Only digital images performed at this site     (  ) Films - Only films (Analog) performed at this site     (  )  VPN    Send images to:    [address]: Imaging Department     [address] [place] [address] [address]     (T) [phone]     (O) [phone] sg emailed GI regarding recurrent bleed, likely will need rpt colonscopy.

## 2023-10-14 NOTE — H&P ADULT - NSHPREVIEWOFSYSTEMS_GEN_ALL_CORE
GEN: no night sweats or change in appetite  EYES: no changes in vision or diplopia   ENT: no epistaxis, sinus pain, gingival bleeding, odynophagia or dysphagia  CV: no CP, PND or palpitations  RESP: no cough, wheezing, or hemoptysis  GI: no hematemesis or melena; +hematochezia  : no dysuria, polyuria, or hematuria  MSK: no arthralgias or joint swelling   NEURO: no gross sensory changes, numbness, focal deficits  PSYCH: no depression or changes in concentration  HEME/ONC: no purpura, petechiae or night sweats  SKIN: no pruritus, hair loss or skin lesions  ALL: no photosensitivity, no complaints of anaphylaxis (SOB, throat swelling)

## 2023-10-14 NOTE — PATIENT PROFILE ADULT - NSPROHMDIABETMGMTSTRAT_GEN_A_NUR
Precious Jennings     1941       Ofelia Kapoor MD, [facility] [place]  Date of Visit-1/15/2020   PCP is Collins Landis MD   [facility]  [facility]  HPI:  Precious Jennings is a 70 y.o. female   1 year f/u of CAD and HTN. Notes chronic dizziness heart palpitations  And shortness of breath  Has mild chest tightness rarely  From a heart standpoint, pt is doing well. Pt notes that she has been feeling some SOB, but she has been recently diagnosed with bronchitis. Pt states that her SOB has been getting better. She had a chest x-ray in September that showed a nodule on her lung. Pt notes that she does not drink as much water as she should. Denies edema, syncope, has no tachycardia    She was at [facility] ER with a skin tear and was seen at [facility] for bronchitis and URI  EKG: SR WNL normal axis and intervals     Assessment/Plan:    Patient Instructions   Please stop your Plavix and continue your Asprin. We will see you back in one year. Refill simvastatin and metoprolol  1. Coronary artery disease involving native coronary artery of native heart without angina pectoris  Discussed Plavix. We will stop. It has been pretty far out from her diagnosis with disease and she has been doing well with her statin. Hx of prior cath in 2006, LAD stenosis 2007 with proximal GORDON. Stress echo normal January 2019.  - AMB POC EKG ROUTINE W/ 12 LEADS, INTER & REP     Exercise stress echo is normal. January 15, 2019 walked 5 minutes, EF 6065% normal study  2. Mixed hyperlipidemia  Reviewed recent numbers. Excellent result.    Lab Results   Component Value Date/Time    Cholesterol, total 115 01/10/2020 09:10 AM    HDL Cholesterol 33 (L) 01/10/2020 09:10 AM    LDL, calculated 57 01/10/2020 09:10 AM    VLDL, calculated 25 01/10/2020 09:10 AM    Triglyceride 125 01/10/2020 09:10 AM    CHOL/HDL Ratio 3.8 01/09/2014 04:20 AM        Key Antihyperlipidemia Meds simvastatin (ZOCOR) 40 mg tablet (Taking) TAKE 1 TABLET EVERY NIGHT         3. Hypertension, essential  Well controlled. BP Readings from Last 6 Encounters:   01/15/20 112/60   07/22/19 106/44   12/26/18 112/56   12/17/18 129/47   06/06/18 124/66   03/05/18 149/61       4. Chronic obstructive pulmonary disease, unspecified COPD type ([facility]  Seems to have a chronic bronchitis. She says this acute episode is getting better. She is a bit short of breath but lung exam is normal.     Future Appointments   Date Time Provider Calista Reesi   1/19/2021 11:00 AM Alem Medeiros MD [facility] SCHED      Stop Plavix, f/u in 1 year     Impression:   1. Coronary artery disease involving native coronary artery of native heart without angina pectoris    2. Mixed hyperlipidemia    3. Hypertension, essential    4. Chronic obstructive pulmonary disease, unspecified COPD type ([facility]       Cardiac History:   NUKE 3-1-13= walked 5 minutes, STT 3 mm horizontal, normal perfusion, ef 70%  ECHO 3-1-13= 55-60%, mild MR,AR,TR, Aortic sclerosis without stenosis  Cardiac Cath 1-8-14-Successful GORDON pLAD (2.75x18 Xience). RFA mynx, RFV manual.     ROS-except as noted above. . A complete cardiac and respiratory are reviewed and negative except as above ; Resp-denies wheezing  or productive cough,. Const- No unusual weight loss or fever; Neuro-no recent seizure or CVA ; GI- No BRBPR, abdom pain, bloating ; - no  hematuria   see supplement sheet, initialed and to be scanned by staff  Past Medical History:   Diagnosis Date    Anxiety     Arthritis     CAD (coronary artery disease)     COPD (chronic obstructive pulmonary disease) ([facility] 1/7/2016    Coronary atherosclerosis of native coronary artery 2006    MI/Left ventricular wall motion is segmentally abnormal with mild hypokinesis of the anterior wall. Ejection Fraction is estimated at 50%. LAD >90% stenosis. LCX 50% stenosis.      Depression     Elevated BP 2/1/2017    Family history of suicide     father    Fatigue     GERD (gastroesophageal reflux disease)     Glaucoma     Headache 1988    migraines    Hypertension, essential 11/1/2017    Incontinence in female     Lung nodule     Menopause     Mixed hyperlipidemia     Osteopenia 2015    DEXA 2010, 2015    S/P angioplasty with stent     stent to LAD. 3X8mm Cypher. Social Hx= reports that she has never smoked. She has never used smokeless tobacco. She reports that she does not drink alcohol or use drugs. Exam and Labs:  /60 (BP 1 Location: Left arm, BP Patient Position: Sitting)   Pulse 68   Resp 18   Ht 5' 5\" (1.651 m)   Wt 131 lb (59.4 kg)   SpO2 97%   BMI 21.80 kg/m² Constitutional:  NAD, comfortable  Head: NC,AT. Eyes: No scleral icterus. Neck:  Neck supple. No JVD present. Throat: moist mucous membranes. Chest: Effort normal & normal respiratory excursion . Neurological: alert, conversant and oriented . Skin: Skin is not cold. No obvious systemic rash noted. Not diaphoretic. No erythema. Psychiatric:  Grossly normal mood and affect. Behavior appears normal. Extremities:  no clubbing or cyanosis. Abdomen: non distended    Lungs:breath sounds normal. No stridor. distress, wheezes or  Rales. Heart: normal rate, regular rhythm, normal S1, S2, no murmurs, rubs, clicks or gallops , PMI non displaced. Edema: Edema is none.   Lab Results   Component Value Date/Time    Cholesterol, total 115 01/10/2020 09:10 AM    HDL Cholesterol 33 (L) 01/10/2020 09:10 AM    LDL, calculated 57 01/10/2020 09:10 AM    Triglyceride 125 01/10/2020 09:10 AM    CHOL/HDL Ratio 3.8 01/09/2014 04:20 AM     Lab Results   Component Value Date/Time    Sodium 145 (H) 01/10/2020 09:10 AM    Potassium 4.5 01/10/2020 09:10 AM    Chloride 104 01/10/2020 09:10 AM    CO2 24 01/10/2020 09:10 AM    Anion gap 7 03/09/2017 12:13 AM    Glucose 104 (H) 01/10/2020 09:10 AM    BUN 16 01/10/2020 09:10 AM    Creatinine 0.79 01/10/2020 09:10 AM BUN/Creatinine ratio 20 01/10/2020 09:10 AM    GFR est AA 84 01/10/2020 09:10 AM    GFR est non-AA 73 01/10/2020 09:10 AM    Calcium 9.3 01/10/2020 09:10 AM      Wt Readings from Last 3 Encounters:   01/15/20 131 lb (59.4 kg)   07/22/19 133 lb (60.3 kg)   12/26/18 124 lb 3.2 oz (56.3 kg)      BP Readings from Last 3 Encounters:   01/15/20 112/60   07/22/19 106/44   12/26/18 112/56      Current Outpatient Medications   Medication Sig    pantoprazole (PROTONIX) 40 mg tablet     metoprolol tartrate (LOPRESSOR) 25 mg tablet TAKE 1 TABLET TWICE DAILY    simvastatin (ZOCOR) 40 mg tablet TAKE 1 TABLET EVERY NIGHT    amLODIPine (NORVASC) 5 mg tablet Take 1 Tab by mouth daily.  aspirin delayed-release 81 mg tablet Take  by mouth daily.  citalopram (CELEXA) 20 mg tablet Take  by mouth daily.  calcium-cholecalciferol, d3, (CALCIUM 600 + D) 600-125 mg-unit tab Take 1 Tab by mouth two (2) times a day.  latanoprost (XALATAN) 0.005 % ophthalmic solution Administer 1 Drop to both eyes nightly. No current facility-administered medications for this visit. Impression see above.       Written by Marit Curling, as dictated by Corinne Dumas MD. blood glucose testing

## 2023-10-14 NOTE — H&P ADULT - HISTORY OF PRESENT ILLNESS
83M w/ CAD s/p FOREST x 4 (in 2016) on DAPT (ASA/Plavix), CKD3 (Baseline Cr - 2.5; 9/2022), HTN, HLD, diabetes, GERD, gout, BPH p/w BRBPR for one day. stopped plavix Monday, and was dc from the hospital yesterday for workup for GI bleed. found on colonoscopy to be secondary to diverticulosis. during his stay received 3u prbc total. today noticed significant amount of bright red blood associated w LLQ abdominal pain three times. endorses feeling light headed. denies f/c/cp. on arrival bp 1449/61 HR 82. 83M with PMHx of CAD (FOREST x4 in 2016), CKD4, HTN, and T2DM presenting for one day history of hematochezia. Patient recently admitted for hematochezia and had a colonoscopy on 10/11 which showed diverticulosis; thus, he was likely having a diverticular bleed. His Plavix was stopped (no strict indication given stent many years prior) and was cleared to resume aspirin, though it didn't seem like her did. On the day of admission noted bright red blood in the toilet bowl with loose stools. While in the hospital he had 6-7 bowel movements of blood. Endorses mild lightheadedness and some suprapubic discomfort. Denies CP/SOB. Patient seen by GI.

## 2023-10-14 NOTE — H&P ADULT - PROBLEM SELECTOR PLAN 3
- Holding baby aspirin due to LGIB  - Continue with beta blocker and ranexa  - Follows with Jarad BELL

## 2023-10-15 LAB
ANION GAP SERPL CALC-SCNC: 12 MMOL/L — SIGNIFICANT CHANGE UP (ref 5–17)
BUN SERPL-MCNC: 37 MG/DL — HIGH (ref 7–23)
CALCIUM SERPL-MCNC: 9.2 MG/DL — SIGNIFICANT CHANGE UP (ref 8.4–10.5)
CHLORIDE SERPL-SCNC: 109 MMOL/L — HIGH (ref 96–108)
CO2 SERPL-SCNC: 19 MMOL/L — LOW (ref 22–31)
CREAT SERPL-MCNC: 2.99 MG/DL — HIGH (ref 0.5–1.3)
EGFR: 20 ML/MIN/1.73M2 — LOW
GLUCOSE BLDC GLUCOMTR-MCNC: 195 MG/DL — HIGH (ref 70–99)
GLUCOSE BLDC GLUCOMTR-MCNC: 203 MG/DL — HIGH (ref 70–99)
GLUCOSE BLDC GLUCOMTR-MCNC: 220 MG/DL — HIGH (ref 70–99)
GLUCOSE BLDC GLUCOMTR-MCNC: 318 MG/DL — HIGH (ref 70–99)
GLUCOSE SERPL-MCNC: 203 MG/DL — HIGH (ref 70–99)
HCT VFR BLD CALC: 20.1 % — CRITICAL LOW (ref 39–50)
HCT VFR BLD CALC: 21.1 % — LOW (ref 39–50)
HCT VFR BLD CALC: 22.9 % — LOW (ref 39–50)
HCT VFR BLD CALC: 24.4 % — LOW (ref 39–50)
HGB BLD-MCNC: 6.7 G/DL — CRITICAL LOW (ref 13–17)
HGB BLD-MCNC: 7.1 G/DL — LOW (ref 13–17)
HGB BLD-MCNC: 7.7 G/DL — LOW (ref 13–17)
HGB BLD-MCNC: 8.4 G/DL — LOW (ref 13–17)
MCHC RBC-ENTMCNC: 30.3 PG — SIGNIFICANT CHANGE UP (ref 27–34)
MCHC RBC-ENTMCNC: 30.3 PG — SIGNIFICANT CHANGE UP (ref 27–34)
MCHC RBC-ENTMCNC: 30.4 PG — SIGNIFICANT CHANGE UP (ref 27–34)
MCHC RBC-ENTMCNC: 30.5 PG — SIGNIFICANT CHANGE UP (ref 27–34)
MCHC RBC-ENTMCNC: 33.3 GM/DL — SIGNIFICANT CHANGE UP (ref 32–36)
MCHC RBC-ENTMCNC: 33.6 GM/DL — SIGNIFICANT CHANGE UP (ref 32–36)
MCHC RBC-ENTMCNC: 33.6 GM/DL — SIGNIFICANT CHANGE UP (ref 32–36)
MCHC RBC-ENTMCNC: 34.4 GM/DL — SIGNIFICANT CHANGE UP (ref 32–36)
MCV RBC AUTO: 88.7 FL — SIGNIFICANT CHANGE UP (ref 80–100)
MCV RBC AUTO: 90.2 FL — SIGNIFICANT CHANGE UP (ref 80–100)
MCV RBC AUTO: 90.5 FL — SIGNIFICANT CHANGE UP (ref 80–100)
MCV RBC AUTO: 91 FL — SIGNIFICANT CHANGE UP (ref 80–100)
NRBC # BLD: 1 /100 WBCS — HIGH (ref 0–0)
NRBC # BLD: 2 /100 WBCS — HIGH (ref 0–0)
PLATELET # BLD AUTO: 104 K/UL — LOW (ref 150–400)
PLATELET # BLD AUTO: 104 K/UL — LOW (ref 150–400)
PLATELET # BLD AUTO: 105 K/UL — LOW (ref 150–400)
PLATELET # BLD AUTO: 108 K/UL — LOW (ref 150–400)
POTASSIUM SERPL-MCNC: 4.2 MMOL/L — SIGNIFICANT CHANGE UP (ref 3.5–5.3)
POTASSIUM SERPL-SCNC: 4.2 MMOL/L — SIGNIFICANT CHANGE UP (ref 3.5–5.3)
RBC # BLD: 2.21 M/UL — LOW (ref 4.2–5.8)
RBC # BLD: 2.34 M/UL — LOW (ref 4.2–5.8)
RBC # BLD: 2.53 M/UL — LOW (ref 4.2–5.8)
RBC # BLD: 2.75 M/UL — LOW (ref 4.2–5.8)
RBC # FLD: 17.5 % — HIGH (ref 10.3–14.5)
RBC # FLD: 17.8 % — HIGH (ref 10.3–14.5)
RBC # FLD: 18.5 % — HIGH (ref 10.3–14.5)
RBC # FLD: 18.7 % — HIGH (ref 10.3–14.5)
SODIUM SERPL-SCNC: 140 MMOL/L — SIGNIFICANT CHANGE UP (ref 135–145)
WBC # BLD: 6.3 K/UL — SIGNIFICANT CHANGE UP (ref 3.8–10.5)
WBC # BLD: 6.54 K/UL — SIGNIFICANT CHANGE UP (ref 3.8–10.5)
WBC # BLD: 7.07 K/UL — SIGNIFICANT CHANGE UP (ref 3.8–10.5)
WBC # BLD: 8.1 K/UL — SIGNIFICANT CHANGE UP (ref 3.8–10.5)
WBC # FLD AUTO: 6.3 K/UL — SIGNIFICANT CHANGE UP (ref 3.8–10.5)
WBC # FLD AUTO: 6.54 K/UL — SIGNIFICANT CHANGE UP (ref 3.8–10.5)
WBC # FLD AUTO: 7.07 K/UL — SIGNIFICANT CHANGE UP (ref 3.8–10.5)
WBC # FLD AUTO: 8.1 K/UL — SIGNIFICANT CHANGE UP (ref 3.8–10.5)

## 2023-10-15 PROCEDURE — 78278 ACUTE GI BLOOD LOSS IMAGING: CPT | Mod: 26

## 2023-10-15 PROCEDURE — 99233 SBSQ HOSP IP/OBS HIGH 50: CPT

## 2023-10-15 RX ADMIN — PANTOPRAZOLE SODIUM 40 MILLIGRAM(S): 20 TABLET, DELAYED RELEASE ORAL at 05:47

## 2023-10-15 RX ADMIN — CARVEDILOL PHOSPHATE 25 MILLIGRAM(S): 80 CAPSULE, EXTENDED RELEASE ORAL at 17:14

## 2023-10-15 RX ADMIN — RANOLAZINE 500 MILLIGRAM(S): 500 TABLET, FILM COATED, EXTENDED RELEASE ORAL at 05:47

## 2023-10-15 RX ADMIN — AMLODIPINE BESYLATE 10 MILLIGRAM(S): 2.5 TABLET ORAL at 05:47

## 2023-10-15 RX ADMIN — RANOLAZINE 500 MILLIGRAM(S): 500 TABLET, FILM COATED, EXTENDED RELEASE ORAL at 17:11

## 2023-10-15 RX ADMIN — Medication 100 MILLIGRAM(S): at 12:10

## 2023-10-15 RX ADMIN — CARVEDILOL PHOSPHATE 25 MILLIGRAM(S): 80 CAPSULE, EXTENDED RELEASE ORAL at 05:47

## 2023-10-15 RX ADMIN — ATORVASTATIN CALCIUM 40 MILLIGRAM(S): 80 TABLET, FILM COATED ORAL at 21:29

## 2023-10-15 RX ADMIN — Medication 4: at 12:10

## 2023-10-15 RX ADMIN — PANTOPRAZOLE SODIUM 40 MILLIGRAM(S): 20 TABLET, DELAYED RELEASE ORAL at 17:11

## 2023-10-15 RX ADMIN — Medication 2: at 17:10

## 2023-10-15 RX ADMIN — Medication 2: at 08:01

## 2023-10-15 NOTE — PROVIDER CONTACT NOTE (CRITICAL VALUE NOTIFICATION) - SITUATION
Hgb 6.7, Hct 20.1
Seen at cardiology clinic    Heriberto Anderson MD  Infectious Disease Fellow, PGY-5  Pager: [phone] or ext: [phone]  [facility]    
critical value of hgb 6.8, hct 20.8
Critical value of hgb 6.8, hct 20.7
H+H 6.5 19.3%

## 2023-10-15 NOTE — PROVIDER CONTACT NOTE (CRITICAL VALUE NOTIFICATION) - NS PROVIDER READ BACK TO LAB
This LSW met with patient at bedside this am. Patient will be transferred to [facility] upon discharge. [place] require [id number]. Patient will be admitted under his Medicare benefit. ELISSAW / TYSON to follow.   Electronically signed by ALEXIA Rai, BRADLEY on 1/16/23 at 10:28 AM EST
yes

## 2023-10-15 NOTE — PROVIDER CONTACT NOTE (CRITICAL VALUE NOTIFICATION) - BACKGROUND
GI hemorrhage
Pt admitted for GI hemorrhage. Pt has received 4units PRBC so far, monitoring H&H. Hgb was 7.1 in the afternoon.

## 2023-10-15 NOTE — PROVIDER CONTACT NOTE (CRITICAL VALUE NOTIFICATION) - ASSESSMENT
pt asymptomatic, no s.s of distress, safety maintained
Pt is A+Ox4, VSS, denies dizziness. States that he feels fine. Color is normal not pale.
Pt A&O 4, denies dizziness. Pt asymptomatic.
Pt A&O 4, had a vasal vagal event. Lethargic.

## 2023-10-16 DIAGNOSIS — Z29.9 ENCOUNTER FOR PROPHYLACTIC MEASURES, UNSPECIFIED: ICD-10-CM

## 2023-10-16 LAB
ANION GAP SERPL CALC-SCNC: 9 MMOL/L — SIGNIFICANT CHANGE UP (ref 5–17)
APPEARANCE UR: CLEAR — SIGNIFICANT CHANGE UP
BACTERIA # UR AUTO: NEGATIVE — SIGNIFICANT CHANGE UP
BILIRUB UR-MCNC: NEGATIVE — SIGNIFICANT CHANGE UP
BUN SERPL-MCNC: 33 MG/DL — HIGH (ref 7–23)
CALCIUM SERPL-MCNC: 9.4 MG/DL — SIGNIFICANT CHANGE UP (ref 8.4–10.5)
CHLORIDE SERPL-SCNC: 109 MMOL/L — HIGH (ref 96–108)
CO2 SERPL-SCNC: 21 MMOL/L — LOW (ref 22–31)
COLOR SPEC: YELLOW — SIGNIFICANT CHANGE UP
CREAT ?TM UR-MCNC: 90 MG/DL — SIGNIFICANT CHANGE UP
CREAT SERPL-MCNC: 3.1 MG/DL — HIGH (ref 0.5–1.3)
DIFF PNL FLD: NEGATIVE — SIGNIFICANT CHANGE UP
EGFR: 19 ML/MIN/1.73M2 — LOW
EPI CELLS # UR: 0 /HPF — SIGNIFICANT CHANGE UP
GLUCOSE BLDC GLUCOMTR-MCNC: 175 MG/DL — HIGH (ref 70–99)
GLUCOSE BLDC GLUCOMTR-MCNC: 204 MG/DL — HIGH (ref 70–99)
GLUCOSE BLDC GLUCOMTR-MCNC: 204 MG/DL — HIGH (ref 70–99)
GLUCOSE BLDC GLUCOMTR-MCNC: 269 MG/DL — HIGH (ref 70–99)
GLUCOSE BLDC GLUCOMTR-MCNC: 269 MG/DL — HIGH (ref 70–99)
GLUCOSE BLDC GLUCOMTR-MCNC: 292 MG/DL — HIGH (ref 70–99)
GLUCOSE BLDC GLUCOMTR-MCNC: 342 MG/DL — HIGH (ref 70–99)
GLUCOSE SERPL-MCNC: 142 MG/DL — HIGH (ref 70–99)
GLUCOSE UR QL: ABNORMAL
HCT VFR BLD CALC: 23 % — LOW (ref 39–50)
HCT VFR BLD CALC: 24.1 % — LOW (ref 39–50)
HCT VFR BLD CALC: 24.5 % — LOW (ref 39–50)
HGB BLD-MCNC: 7.8 G/DL — LOW (ref 13–17)
HGB BLD-MCNC: 8 G/DL — LOW (ref 13–17)
HGB BLD-MCNC: 8.3 G/DL — LOW (ref 13–17)
HYALINE CASTS # UR AUTO: 0 /LPF — SIGNIFICANT CHANGE UP (ref 0–2)
KETONES UR-MCNC: NEGATIVE — SIGNIFICANT CHANGE UP
LEUKOCYTE ESTERASE UR-ACNC: NEGATIVE — SIGNIFICANT CHANGE UP
MCHC RBC-ENTMCNC: 30.3 PG — SIGNIFICANT CHANGE UP (ref 27–34)
MCHC RBC-ENTMCNC: 30.4 PG — SIGNIFICANT CHANGE UP (ref 27–34)
MCHC RBC-ENTMCNC: 30.5 PG — SIGNIFICANT CHANGE UP (ref 27–34)
MCHC RBC-ENTMCNC: 33.2 GM/DL — SIGNIFICANT CHANGE UP (ref 32–36)
MCHC RBC-ENTMCNC: 33.9 GM/DL — SIGNIFICANT CHANGE UP (ref 32–36)
MCHC RBC-ENTMCNC: 33.9 GM/DL — SIGNIFICANT CHANGE UP (ref 32–36)
MCV RBC AUTO: 89.5 FL — SIGNIFICANT CHANGE UP (ref 80–100)
MCV RBC AUTO: 90.1 FL — SIGNIFICANT CHANGE UP (ref 80–100)
MCV RBC AUTO: 91.3 FL — SIGNIFICANT CHANGE UP (ref 80–100)
NITRITE UR-MCNC: NEGATIVE — SIGNIFICANT CHANGE UP
NRBC # BLD: 1 /100 WBCS — HIGH (ref 0–0)
NRBC # BLD: 2 /100 WBCS — HIGH (ref 0–0)
NRBC # BLD: 2 /100 WBCS — HIGH (ref 0–0)
PH UR: 5.5 — SIGNIFICANT CHANGE UP (ref 5–8)
PLATELET # BLD AUTO: 106 K/UL — LOW (ref 150–400)
PLATELET # BLD AUTO: 116 K/UL — LOW (ref 150–400)
PLATELET # BLD AUTO: 119 K/UL — LOW (ref 150–400)
POTASSIUM SERPL-MCNC: 4.2 MMOL/L — SIGNIFICANT CHANGE UP (ref 3.5–5.3)
POTASSIUM SERPL-SCNC: 4.2 MMOL/L — SIGNIFICANT CHANGE UP (ref 3.5–5.3)
PROT ?TM UR-MCNC: 28 MG/DL — HIGH (ref 0–12)
PROT UR-MCNC: ABNORMAL
PROT/CREAT UR-RTO: 0.3 RATIO — HIGH (ref 0–0.2)
RBC # BLD: 2.57 M/UL — LOW (ref 4.2–5.8)
RBC # BLD: 2.64 M/UL — LOW (ref 4.2–5.8)
RBC # BLD: 2.72 M/UL — LOW (ref 4.2–5.8)
RBC # FLD: 17.3 % — HIGH (ref 10.3–14.5)
RBC # FLD: 18.2 % — HIGH (ref 10.3–14.5)
RBC # FLD: 18.3 % — HIGH (ref 10.3–14.5)
RBC CASTS # UR COMP ASSIST: 1 /HPF — SIGNIFICANT CHANGE UP (ref 0–4)
SODIUM SERPL-SCNC: 139 MMOL/L — SIGNIFICANT CHANGE UP (ref 135–145)
SP GR SPEC: 1.02 — SIGNIFICANT CHANGE UP (ref 1.01–1.02)
UROBILINOGEN FLD QL: NEGATIVE — SIGNIFICANT CHANGE UP
WBC # BLD: 5.16 K/UL — SIGNIFICANT CHANGE UP (ref 3.8–10.5)
WBC # BLD: 5.69 K/UL — SIGNIFICANT CHANGE UP (ref 3.8–10.5)
WBC # BLD: 5.91 K/UL — SIGNIFICANT CHANGE UP (ref 3.8–10.5)
WBC # FLD AUTO: 5.16 K/UL — SIGNIFICANT CHANGE UP (ref 3.8–10.5)
WBC # FLD AUTO: 5.69 K/UL — SIGNIFICANT CHANGE UP (ref 3.8–10.5)
WBC # FLD AUTO: 5.91 K/UL — SIGNIFICANT CHANGE UP (ref 3.8–10.5)
WBC UR QL: 1 /HPF — SIGNIFICANT CHANGE UP (ref 0–5)

## 2023-10-16 PROCEDURE — 99232 SBSQ HOSP IP/OBS MODERATE 35: CPT

## 2023-10-16 PROCEDURE — 99232 SBSQ HOSP IP/OBS MODERATE 35: CPT | Mod: GC

## 2023-10-16 PROCEDURE — 93010 ELECTROCARDIOGRAM REPORT: CPT

## 2023-10-16 RX ORDER — DOCUSATE SODIUM 100 MG
0 CAPSULE ORAL
Refills: 0 | DISCHARGE

## 2023-10-16 RX ORDER — OMEGA-3 ACID ETHYL ESTERS 1 G
1 CAPSULE ORAL
Qty: 0 | Refills: 0 | DISCHARGE

## 2023-10-16 RX ORDER — ASPIRIN/CALCIUM CARB/MAGNESIUM 324 MG
1 TABLET ORAL
Qty: 0 | Refills: 0 | DISCHARGE

## 2023-10-16 RX ORDER — RANOLAZINE 500 MG/1
1 TABLET, FILM COATED, EXTENDED RELEASE ORAL
Refills: 0 | DISCHARGE

## 2023-10-16 RX ORDER — REPAGLINIDE 1 MG/1
1 TABLET ORAL
Qty: 0 | Refills: 0 | DISCHARGE

## 2023-10-16 RX ADMIN — Medication 4: at 12:05

## 2023-10-16 RX ADMIN — PANTOPRAZOLE SODIUM 40 MILLIGRAM(S): 20 TABLET, DELAYED RELEASE ORAL at 17:23

## 2023-10-16 RX ADMIN — ATORVASTATIN CALCIUM 40 MILLIGRAM(S): 80 TABLET, FILM COATED ORAL at 21:22

## 2023-10-16 RX ADMIN — Medication 3: at 17:23

## 2023-10-16 RX ADMIN — Medication 1: at 09:06

## 2023-10-16 RX ADMIN — RANOLAZINE 500 MILLIGRAM(S): 500 TABLET, FILM COATED, EXTENDED RELEASE ORAL at 17:23

## 2023-10-16 RX ADMIN — CARVEDILOL PHOSPHATE 25 MILLIGRAM(S): 80 CAPSULE, EXTENDED RELEASE ORAL at 17:24

## 2023-10-16 RX ADMIN — CARVEDILOL PHOSPHATE 25 MILLIGRAM(S): 80 CAPSULE, EXTENDED RELEASE ORAL at 05:42

## 2023-10-16 RX ADMIN — RANOLAZINE 500 MILLIGRAM(S): 500 TABLET, FILM COATED, EXTENDED RELEASE ORAL at 05:42

## 2023-10-16 RX ADMIN — Medication 100 MILLIGRAM(S): at 12:05

## 2023-10-16 RX ADMIN — PANTOPRAZOLE SODIUM 40 MILLIGRAM(S): 20 TABLET, DELAYED RELEASE ORAL at 05:43

## 2023-10-16 RX ADMIN — AMLODIPINE BESYLATE 10 MILLIGRAM(S): 2.5 TABLET ORAL at 05:42

## 2023-10-16 NOTE — CONSULT NOTE ADULT - ASSESSMENT
83 year old Male with PMHx of CAD (FOREST x4 in 2016), CKD4, HTN, and T2DM presenting for one day history of hematochezia. Patient recently admitted for hematochezia and had a colonoscopy on 10/11 which showed moderate diverticulosis and non-bleeding hemorrhoids; thus, he was likely having a diverticular bleed. His Plavix was stopped (no strict indication given stent many years prior) and was cleared to resume aspirin. On the day of admission noted bright red blood in the toilet bowl with loose stools. While in the hospital he had 6-7 bowel movements of blood. Endorses mild lightheadedness and some suprapubic discomfort. On 10/14 had an episode of syncope while using commode, which prompted a rapid response, his hgb revealed <7, and thus received prbc transfusion.       1- SAPNA on CKD4  2- anemia, GIB  3- HTN  4- CAD      SAPNA in setting of ischemic ATN secondary to GIB/Anemia  received prbc transfusion  maintain hgb >7  check retic count, and iron studies  trend hgb  continue amlodipine 10 mg daily  coreg 25 mg bid  check U/A, and protein/creatine ratio  strict I/O  trend creatinine and electrolytes daily

## 2023-10-16 NOTE — PROGRESS NOTE ADULT - PROBLEM SELECTOR PLAN 6
- no AC in setting of hematochezia    DISPO: possible DC home tomorrow in 1-2 days pending stable Hgb and resolved hematochezia

## 2023-10-16 NOTE — PHYSICAL THERAPY INITIAL EVALUATION ADULT - IMPAIRMENTS CONTRIBUTING TO GAIT DEVIATIONS, PT EVAL
impaired balance/decreased strength
Presented from assistant living for hypoxia, sats to 86-88% on RA, in the ED sats 86-91% on RA, patient with hx of COPD, not requiring home O2 or CPAP at night, patient is not in COPD ex, has no whizzing on exam and breads comfortably. Found to have RUL cavitary lesson and RL effusion on CT chest, no PE. Last admission found to have HFpEF, proBNP 555, patient mildly w/ fluid overload. Now on 2L NC  - wean off O2 as tolerate  - c/w dairensis as below  - IV Lasix 40mg once

## 2023-10-16 NOTE — PHARMACOTHERAPY INTERVENTION NOTE - COMMENTS
Confirmed home medications with patient and [facility] pharmacy, updated in Outpatient Medication Review.    Home Medications:  Actos 30 mg oral tablet: 1 tab(s) orally once a day   allopurinol 100 mg oral tablet: 1 tab(s) orally once a day   amLODIPine 10 mg oral tablet: 1 tab(s) orally once a day  atorvastatin 40 mg oral tablet: 1 tab(s) orally once a day   carvedilol 25 mg oral tablet: 1 tab(s) orally 2 times a day  docusate sodium 100 mg oral capsule: 1 cap(s) orally 3 times a day  ezetimibe 10 mg oral tablet: 1 tab(s) orally once a day  fenofibrate 48 mg oral tablet: 1 tab(s) orally once a day   glipiZIDE 10 mg oral tablet: 1 tab(s) orally once a day  hydrALAZINE 100 mg oral tablet: 1 tab(s) orally 3 times a day  Jardiance 10 mg oral tablet: 1 tab(s) orally once a day (in the morning)  Propecia 1 mg oral tablet: 1 tab(s) orally once a day  ranolazine 500 mg oral tablet, extended release: 1 tab(s) orally once a day  repaglinide 1 mg oral tablet: 1 tab(s) orally 2 times a day before meals  trospium 20 mg oral tablet: 1 tab(s) orally 2 times a day    Jesika HudsonD  PGY-1 Pharmacy Resident  Spectra [phone] or Teams     Confirmed home medications with patient and [employer]cripts, updated in Outpatient Medication Review.    Home Medications:  Actos 30 mg oral tablet: 1 tab(s) orally once a day   allopurinol 100 mg oral tablet: 1 tab(s) orally once a day   amLODIPine 10 mg oral tablet: 1 tab(s) orally once a day  atorvastatin 40 mg oral tablet: 1 tab(s) orally once a day   carvedilol 25 mg oral tablet: 1 tab(s) orally 2 times a day  docusate sodium 100 mg oral capsule: 1 cap(s) orally 3 times a day  ezetimibe 10 mg oral tablet: 1 tab(s) orally once a day  fenofibrate 48 mg oral tablet: 1 tab(s) orally once a day   glipiZIDE 10 mg oral tablet: 1 tab(s) orally once a day  hydrALAZINE 100 mg oral tablet: 1 tab(s) orally 3 times a day  Jardiance 10 mg oral tablet: 1 tab(s) orally once a day (in the morning)  Propecia 1 mg oral tablet: 1 tab(s) orally once a day  ranolazine 500 mg oral tablet, extended release: 1 tab(s) orally once a day  repaglinide 1 mg oral tablet: 1 tab(s) orally 2 times a day before meals  trospium 20 mg oral tablet: 1 tab(s) orally 2 times a day    Jesika HudsonD  PGY-1 Pharmacy Resident  Spectra [phone] or Teams

## 2023-10-16 NOTE — PHYSICAL THERAPY INITIAL EVALUATION ADULT - PERTINENT HX OF CURRENT PROBLEM, REHAB EVAL
84yo M with PMHx of CAD (FOREST x4 in 2016), CKD4, HTN, and T2DM presenting for one day history of hematochezia. Patient recently admitted for hematochezia and had a colonoscopy on 10/11 which showed diverticulosis; thus, he was likely having a diverticular bleed. His Plavix was stopped (no strict indication given stent many years prior) and was cleared to resume aspirin. On the day of admission noted bright red blood in the toilet bowl with loose stools. While in the hospital he had 6-7 bowel movements of blood. Endorses mild lightheadedness and some suprapubic discomfort. Denies CP/SOB. Patient seen by GI. Patient being admitted for likely recurrent GIB.

## 2023-10-16 NOTE — PHYSICAL THERAPY INITIAL EVALUATION ADULT - LIVES WITH, PROFILE
Pt resides in private home with 4 steps to enter with handrails and first floor setup. Pt functionally independent in all aspects of mobility and self care with straight cane for household ambulation and RW for community ambulation./spouse

## 2023-10-16 NOTE — PHYSICAL THERAPY INITIAL EVALUATION ADULT - GENERAL OBSERVATIONS, REHAB EVAL
Pt received semi-supine in bed in NAD, +IVL, +tele monitoring, +spouse bedside, VSS, agreeable to participate in therapy at this time

## 2023-10-17 ENCOUNTER — TRANSCRIPTION ENCOUNTER (OUTPATIENT)
Age: 83
End: 2023-10-17

## 2023-10-17 ENCOUNTER — NON-APPOINTMENT (OUTPATIENT)
Age: 83
End: 2023-10-17

## 2023-10-17 VITALS
DIASTOLIC BLOOD PRESSURE: 66 MMHG | HEART RATE: 65 BPM | OXYGEN SATURATION: 98 % | RESPIRATION RATE: 18 BRPM | TEMPERATURE: 98 F | SYSTOLIC BLOOD PRESSURE: 149 MMHG

## 2023-10-17 LAB
ANION GAP SERPL CALC-SCNC: 8 MMOL/L — SIGNIFICANT CHANGE UP (ref 5–17)
ANION GAP SERPL CALC-SCNC: 8 MMOL/L — SIGNIFICANT CHANGE UP (ref 5–17)
BUN SERPL-MCNC: 37 MG/DL — HIGH (ref 7–23)
BUN SERPL-MCNC: 37 MG/DL — HIGH (ref 7–23)
CALCIUM SERPL-MCNC: 9.5 MG/DL — SIGNIFICANT CHANGE UP (ref 8.4–10.5)
CALCIUM SERPL-MCNC: 9.5 MG/DL — SIGNIFICANT CHANGE UP (ref 8.4–10.5)
CHLORIDE SERPL-SCNC: 107 MMOL/L — SIGNIFICANT CHANGE UP (ref 96–108)
CHLORIDE SERPL-SCNC: 107 MMOL/L — SIGNIFICANT CHANGE UP (ref 96–108)
CO2 SERPL-SCNC: 23 MMOL/L — SIGNIFICANT CHANGE UP (ref 22–31)
CO2 SERPL-SCNC: 23 MMOL/L — SIGNIFICANT CHANGE UP (ref 22–31)
CREAT SERPL-MCNC: 3.15 MG/DL — HIGH (ref 0.5–1.3)
CREAT SERPL-MCNC: 3.15 MG/DL — HIGH (ref 0.5–1.3)
EGFR: 19 ML/MIN/1.73M2 — LOW
EGFR: 19 ML/MIN/1.73M2 — LOW
FERRITIN SERPL-MCNC: 167 NG/ML — SIGNIFICANT CHANGE UP (ref 30–400)
FERRITIN SERPL-MCNC: 167 NG/ML — SIGNIFICANT CHANGE UP (ref 30–400)
GLUCOSE BLDC GLUCOMTR-MCNC: 231 MG/DL — HIGH (ref 70–99)
GLUCOSE BLDC GLUCOMTR-MCNC: 231 MG/DL — HIGH (ref 70–99)
GLUCOSE BLDC GLUCOMTR-MCNC: 282 MG/DL — HIGH (ref 70–99)
GLUCOSE BLDC GLUCOMTR-MCNC: 282 MG/DL — HIGH (ref 70–99)
GLUCOSE SERPL-MCNC: 243 MG/DL — HIGH (ref 70–99)
GLUCOSE SERPL-MCNC: 243 MG/DL — HIGH (ref 70–99)
HCT VFR BLD CALC: 24.4 % — LOW (ref 39–50)
HCT VFR BLD CALC: 24.4 % — LOW (ref 39–50)
HGB BLD-MCNC: 8.1 G/DL — LOW (ref 13–17)
HGB BLD-MCNC: 8.1 G/DL — LOW (ref 13–17)
IRON SATN MFR SERPL: 20 % — SIGNIFICANT CHANGE UP (ref 16–55)
IRON SATN MFR SERPL: 20 % — SIGNIFICANT CHANGE UP (ref 16–55)
IRON SATN MFR SERPL: 54 UG/DL — SIGNIFICANT CHANGE UP (ref 45–165)
IRON SATN MFR SERPL: 54 UG/DL — SIGNIFICANT CHANGE UP (ref 45–165)
MAGNESIUM SERPL-MCNC: 2 MG/DL — SIGNIFICANT CHANGE UP (ref 1.6–2.6)
MAGNESIUM SERPL-MCNC: 2 MG/DL — SIGNIFICANT CHANGE UP (ref 1.6–2.6)
MCHC RBC-ENTMCNC: 30.5 PG — SIGNIFICANT CHANGE UP (ref 27–34)
MCHC RBC-ENTMCNC: 30.5 PG — SIGNIFICANT CHANGE UP (ref 27–34)
MCHC RBC-ENTMCNC: 33.2 GM/DL — SIGNIFICANT CHANGE UP (ref 32–36)
MCHC RBC-ENTMCNC: 33.2 GM/DL — SIGNIFICANT CHANGE UP (ref 32–36)
MCV RBC AUTO: 91.7 FL — SIGNIFICANT CHANGE UP (ref 80–100)
MCV RBC AUTO: 91.7 FL — SIGNIFICANT CHANGE UP (ref 80–100)
NRBC # BLD: 2 /100 WBCS — HIGH (ref 0–0)
NRBC # BLD: 2 /100 WBCS — HIGH (ref 0–0)
PHOSPHATE SERPL-MCNC: 2.7 MG/DL — SIGNIFICANT CHANGE UP (ref 2.5–4.5)
PHOSPHATE SERPL-MCNC: 2.7 MG/DL — SIGNIFICANT CHANGE UP (ref 2.5–4.5)
PLATELET # BLD AUTO: 121 K/UL — LOW (ref 150–400)
PLATELET # BLD AUTO: 121 K/UL — LOW (ref 150–400)
POTASSIUM SERPL-MCNC: 4.3 MMOL/L — SIGNIFICANT CHANGE UP (ref 3.5–5.3)
POTASSIUM SERPL-MCNC: 4.3 MMOL/L — SIGNIFICANT CHANGE UP (ref 3.5–5.3)
POTASSIUM SERPL-SCNC: 4.3 MMOL/L — SIGNIFICANT CHANGE UP (ref 3.5–5.3)
POTASSIUM SERPL-SCNC: 4.3 MMOL/L — SIGNIFICANT CHANGE UP (ref 3.5–5.3)
RBC # BLD: 2.66 M/UL — LOW (ref 4.2–5.8)
RBC # FLD: 18.6 % — HIGH (ref 10.3–14.5)
RBC # FLD: 18.6 % — HIGH (ref 10.3–14.5)
RETICS #: 191.3 K/UL — HIGH (ref 25–125)
RETICS #: 191.3 K/UL — HIGH (ref 25–125)
RETICS/RBC NFR: 7.2 % — HIGH (ref 0.5–2.5)
RETICS/RBC NFR: 7.2 % — HIGH (ref 0.5–2.5)
SODIUM SERPL-SCNC: 138 MMOL/L — SIGNIFICANT CHANGE UP (ref 135–145)
SODIUM SERPL-SCNC: 138 MMOL/L — SIGNIFICANT CHANGE UP (ref 135–145)
TIBC SERPL-MCNC: 269 UG/DL — SIGNIFICANT CHANGE UP (ref 220–430)
TIBC SERPL-MCNC: 269 UG/DL — SIGNIFICANT CHANGE UP (ref 220–430)
UIBC SERPL-MCNC: 216 UG/DL — SIGNIFICANT CHANGE UP (ref 110–370)
UIBC SERPL-MCNC: 216 UG/DL — SIGNIFICANT CHANGE UP (ref 110–370)
WBC # BLD: 5.21 K/UL — SIGNIFICANT CHANGE UP (ref 3.8–10.5)
WBC # BLD: 5.21 K/UL — SIGNIFICANT CHANGE UP (ref 3.8–10.5)
WBC # FLD AUTO: 5.21 K/UL — SIGNIFICANT CHANGE UP (ref 3.8–10.5)
WBC # FLD AUTO: 5.21 K/UL — SIGNIFICANT CHANGE UP (ref 3.8–10.5)

## 2023-10-17 PROCEDURE — 99222 1ST HOSP IP/OBS MODERATE 55: CPT

## 2023-10-17 PROCEDURE — 99239 HOSP IP/OBS DSCHRG MGMT >30: CPT

## 2023-10-17 RX ORDER — HYDRALAZINE HCL 50 MG
1 TABLET ORAL
Qty: 0 | Refills: 0 | DISCHARGE

## 2023-10-17 RX ADMIN — PANTOPRAZOLE SODIUM 40 MILLIGRAM(S): 20 TABLET, DELAYED RELEASE ORAL at 06:05

## 2023-10-17 RX ADMIN — Medication 100 MILLIGRAM(S): at 11:20

## 2023-10-17 RX ADMIN — Medication 2: at 08:20

## 2023-10-17 RX ADMIN — RANOLAZINE 500 MILLIGRAM(S): 500 TABLET, FILM COATED, EXTENDED RELEASE ORAL at 06:05

## 2023-10-17 RX ADMIN — CARVEDILOL PHOSPHATE 25 MILLIGRAM(S): 80 CAPSULE, EXTENDED RELEASE ORAL at 06:04

## 2023-10-17 RX ADMIN — Medication 3: at 11:44

## 2023-10-17 RX ADMIN — AMLODIPINE BESYLATE 10 MILLIGRAM(S): 2.5 TABLET ORAL at 06:04

## 2023-10-17 NOTE — PROGRESS NOTE ADULT - PROBLEM SELECTOR PLAN 2
- Continue with home coreg and norvasc  - Hold home Hydralazine  - BP currently stable; continue to monitor
- Continue with home coreg and norvasc  - Hold home Hydralazine
- Continue with home coreg and norvasc  - Hold home Hydralazine  - BP currently stable; continue to monitor

## 2023-10-17 NOTE — DISCHARGE NOTE PROVIDER - NSDCCPCAREPLAN_GEN_ALL_CORE_FT
PRINCIPAL DISCHARGE DIAGNOSIS  Diagnosis: GI bleed  Assessment and Plan of Treatment: GI bleed secondary to diverticulosis found on previous admission. Diverticular bleed resolved. received several units of blood transfusion. hemoglobin stable for a few days.      SECONDARY DISCHARGE DIAGNOSES  Diagnosis: CAD (coronary artery disease)  Assessment and Plan of Treatment: Can continue your aspirin, hold plavix in setting of GI bleed until you follow up wiht cardiologist.    Diagnosis: Stage 4 chronic kidney disease  Assessment and Plan of Treatment: Your renal function is slightly elevated from your baseline likely in setting of GI bleed. Please make sure to follow up with your nephrologist in 1-2 weeks    Diagnosis: Diabetes mellitus  Assessment and Plan of Treatment: continue your usual home medications and follow up with your doctor routinely    Diagnosis: Hypertension  Assessment and Plan of Treatment: continue all blood pressure emdication except hydralazine. Hydralazine on hold because your blood pressure is well controlled off of it.

## 2023-10-17 NOTE — DISCHARGE NOTE PROVIDER - CARE PROVIDER_API CALL
Claudio Meeks  Internal Medicine  [address] [place] [address]  Phone: [phone]  Fax: [phone]  Follow Up Time: 2 weeks    Froylan Abraham  Vascular Medicine  [address] [place] [address], [address] [place] [id number]  Phone: [phone]  Fax: [phone]  Follow Up Time: 1 week    Ata Oliveira  Nephrology  [address] [place] [address] [address]  [place] [address]  Phone: [phone]  Fax: [phone]  Follow Up Time: 1-3 days

## 2023-10-17 NOTE — DISCHARGE NOTE PROVIDER - CARE PROVIDERS DIRECT ADDRESSES
,DirectAddress_Unknown,[email or web address],DirectAddress_Unknown

## 2023-10-17 NOTE — PROGRESS NOTE ADULT - ATTENDING COMMENTS
Following for lower GI bleed likely 2/2 diverticulosis.   Hgb stable at 8.1   Defer repeat cscope   Outpatient f/u on d/c   GI to sign off, please call w questions
GI following for hematochezia and anemia.   Recent admission earlier this month for same.   Colonoscopy 10/11/23 -- moderate diverticulosis, nonbleeding hemorrhoids   RBC scan negative     No bowel movements so far today, continue to monitor  consider repeat cscope pending clinical course   GI to follow, please call with questions .
GI following for hematochezia and anemia.   Recent admission earlier this month for same.   Colonoscopy 10/11/23 -- moderate diverticulosis, nonbleeding hemorrhoids   RBC scan negative     Hematochezia appears to have stopped   monitor bowel movements  consider repeat cscope pending clinical course   GI to follow, please call with questions .

## 2023-10-17 NOTE — PROGRESS NOTE ADULT - PROBLEM SELECTOR PLAN 4
- Hold home oral agents  - FS TID AC & insulin sliding scale

## 2023-10-17 NOTE — DISCHARGE NOTE PROVIDER - PROVIDER TOKENS
PROVIDER:[TOKEN:[[id number]],FOLLOWUP:[2 weeks]],PROVIDER:[TOKEN:[[id number]],FOLLOWUP:[1 week]],PROVIDER:[TOKEN:[[id number]],FOLLOWUP:[1-3 days]]

## 2023-10-17 NOTE — CONSULT NOTE ADULT - NS ATTEND AMEND GEN_ALL_CORE FT
Seen, examined with, formulated plan with and  agree with above as scribed by NP Chaka [Skylar]     pt with hx ckd IV recent hip surgery   now admitted with GI bleed as well as syncope     lungs no rales no wheezing   heart RRR  ext no edema      CKD IV with SAPNA   GI bleed   syncope   HTN     PRBC to keep hb > 7   cont norvasc 10 mg daily however if bp lower or cont to bleed hold it temporarily   cr is higher in setting of ischemic ATN likely   trend cr and strict I/O  d/w pt wife at bedside
Hold aspirin and plavix for now  Pneumatic compression for DVT ppx  GI followup    Jarad

## 2023-10-17 NOTE — DISCHARGE NOTE PROVIDER - NSDCMRMEDTOKEN_GEN_ALL_CORE_FT
Actos 30 mg oral tablet: 1 tab(s) orally once a day  allopurinol 100 mg oral tablet: 1 tab(s) orally once a day  amLODIPine 10 mg oral tablet: 1 tab(s) orally once a day  atorvastatin 40 mg oral tablet: 1 tab(s) orally once a day  carvedilol 25 mg oral tablet: 1 tab(s) orally 2 times a day  docusate sodium 100 mg oral capsule: 1 cap(s) orally 3 times a day  ezetimibe 10 mg oral tablet: 1 tab(s) orally once a day  fenofibrate 48 mg oral tablet: 1 tab(s) orally once a day  glipiZIDE 10 mg oral tablet: 1 tab(s) orally once a day  Jardiance 10 mg oral tablet: 1 tab(s) orally once a day (in the morning)  Propecia 1 mg oral tablet: 1 tab(s) orally once a day  ranolazine 500 mg oral tablet, extended release: 1 tab(s) orally once a day  repaglinide 1 mg oral tablet: 1 tab(s) orally 2 times a day before meals  trospium 20 mg oral tablet: 1 tab(s) orally 2 times a day   Actos 30 mg oral tablet: 1 tab(s) orally once a day  allopurinol 100 mg oral tablet: 1 tab(s) orally once a day  amLODIPine 10 mg oral tablet: 1 tab(s) orally once a day  atorvastatin 40 mg oral tablet: 1 tab(s) orally once a day  carvedilol 25 mg oral tablet: 1 tab(s) orally 2 times a day  docusate sodium 100 mg oral capsule: 1 cap(s) orally 3 times a day  ezetimibe 10 mg oral tablet: 1 tab(s) orally once a day  fenofibrate 48 mg oral tablet: 1 tab(s) orally once a day  glipiZIDE 10 mg oral tablet: 1 tab(s) orally once a day  Jardiance 10 mg oral tablet: 1 tab(s) orally once a day (in the morning)  outpatient physical therapy: 1  Propecia 1 mg oral tablet: 1 tab(s) orally once a day  ranolazine 500 mg oral tablet, extended release: 1 tab(s) orally once a day  repaglinide 1 mg oral tablet: 1 tab(s) orally 2 times a day before meals  trospium 20 mg oral tablet: 1 tab(s) orally 2 times a day

## 2023-10-17 NOTE — PROGRESS NOTE ADULT - ASSESSMENT
83 year old Male with PMHx of CAD (FOREST x4 in 2016), CKD4, HTN, and T2DM presenting for one day history of hematochezia. Patient recently admitted for hematochezia and had a colonoscopy on 10/11 which showed moderate diverticulosis and non-bleeding hemorrhoids; thus, he was likely having a diverticular bleed. His Plavix was stopped (no strict indication given stent many years prior) and was cleared to resume aspirin. On the day of admission noted bright red blood in the toilet bowl with loose stools. While in the hospital he had 6-7 bowel movements of blood. Endorses mild lightheadedness and some suprapubic discomfort. On 10/14 had an episode of syncope while using commode, which prompted a rapid response, his hgb revealed <7, and thus received prbc transfusion.       1- SAPNA on CKD4  2- anemia, GIB  3- HTN  4- CAD      SAPNA in setting of ischemic ATN secondary to GIB/Anemia  received prbc transfusion  maintain hgb >7  retic ct not low   trend hgb  continue amlodipine 10 mg daily  coreg 25 mg bidstrict I/O  trend creatinine and electrolytes daily
83 year old male with PMH HTN, CAD with 4 stents (more than 5 years ago), BPH, HLD, gout, GERD, DM2 and CKD with recent admission on 10/10 for hematochezia with colonoscopy performed on 10/11 noted to have diverticulosis in Rt/t side of colon presents again for recurrence of hematochezia associated with LLQ abd cramps. GI called for further evaluation. \    #Presumed diverticular bleeding  -HD stable, Hb on admission 6.6 down from 7.8 on recent admission. Only on Asprin, no longer of Plavix (held on last admission). Suspect recurrent diverticular bleeding which can be challenging to locate and usually stop. RBC scan negative. Patient no longer having hematochezia, suspect diverticular bleeding stopped.     Recommendations:  -Hb stable, no overt GIB. Delfino BM this am   -Okay to continue Aspirin from a GI perspective  -Trend Hb; transfuse if Hb < 7  -Supportive care per primary team    Okay to go home from a GI perspective. Will sign off     Recommendations preliminary until signed by attending.     Da Sandy MD  Gastroenterology/Hepatology Fellow  1st option: [phone] (text or call), ONLY available from 7:00 am to 5:00 pm.   **Contact on-call GI fellow via answering service ([phone]) from 5pm-7am AND on weekends/holidays**  2nd option: Available via [employer] Teams  3rd option: Pager: [phone]        
83 year old male with PMH HTN, CAD with 4 stents (more than 5 years ago), BPH, HLD, gout, GERD, DM2 and CKD with recent admission on 10/10 for hematochezia with colonoscopy performed on 10/11 noted to have diverticulosis in Rt/t side of colon presents again for recurrence of hematochezia associated with LLQ abd cramps. GI called for further evaluation. \    #Presumed diverticular bleeding  -HD stable, Hb on admission 6.6 down from 7.8 on recent admission. Only on Asprin, no longer of Plavix (held on last admission). Suspect recurrent diverticular bleeding which can be challenging to locate and usually stop. RBC scan negative. Patient no longer having hematochezia, suspect diverticular bleeding stopped.     Recommendations:  -given no further bleeding, okay to advance diet    -Okay to continue Aspirin from a GI perspective  -Trend Hb; transfuse if Hb < 7  -Would watch for 24 more hr and if hb stable with no further overt GIB, can DC home.   -Supportive care per primary team    Recommendations preliminary until signed by attending.     Da Sandy MD  Gastroenterology/Hepatology Fellow  1st option: [phone] (text or call), ONLY available from 7:00 am to 5:00 pm.   **Contact on-call GI fellow via answering service ([phone]) from 5pm-7am AND on weekends/holidays**  2nd option: Available via [employer] Teams  3rd option: Pager: [phone]        
83 year old male with PMH HTN, CAD with 4 stents (more than 5 years ago), BPH, HLD, gout, GERD, DM2 and CKD with recent admission on 10/10 for hematochezia with colonoscopy performed on 10/11 noted to have diverticulosis in Rt/t side of colon presents again for recurrence of hematochezia associated with LLQ abd cramps. GI called for further evaluation. \    #Presumed diverticular bleeding  -HD stable, Hb on admission 6.6 down from 7.8 on recent admission. Only on Asprin, no longer of Plavix (held on last admission). Suspect recurrent diverticular bleeding which can be challenging to locate and usually stop. RBC scan negative. Patient no longer having hematochezia, suspect diverticular bleeding stopped.     Recommendations:  -given no overt bleeding, okay to advance diet    -Okay to continue Aspirin from a GI perspective  -Trend Hb; transfuse if Hb < 7  -Supportive care per primary team    Recommendations preliminary until signed by attending.     Da Sandy MD  Gastroenterology/Hepatology Fellow  1st option: [phone] (text or call), ONLY available from 7:00 am to 5:00 pm.   **Contact on-call GI fellow via answering service ([phone]) from 5pm-7am AND on weekends/holidays**  2nd option: Available via [employer] Teams  3rd option: Pager: [phone]        
83M with PMHx of CAD (FOREST x4 in 2016), CKD4, HTN, and T2DM presenting for one day history of hematochezia. Patient recently admitted for hematochezia and had a colonoscopy on 10/11 which showed diverticulosis; thus, he was likely having a diverticular bleed. Patient being admitted for likely recurrent LGIB which is likely diverticular in etiology.

## 2023-10-17 NOTE — DISCHARGE NOTE PROVIDER - HOSPITAL COURSE
83M with PMHx of CAD (FOREST x4 in 2016), CKD4, HTN, and T2DM presenting for one day history of hematochezia. Patient recently admitted for hematochezia and had a colonoscopy on 10/11 which showed diverticulosis; thus, he was likely having a diverticular bleed. Patient being admitted for likely recurrent LGIB which is likely diverticular in etiology.         Problem/Plan - 1:  ·  Problem: Lower GI bleed.   ·  Plan: - GI consult appreciated  - Serial CBC with goal Hg of 7 (no active cardiac issue necessitating a goal of 8)  - tolerating regular diet  - REstart aspirin, hold plavix  - Tagged RBC scan did not localize a bleed  - cbc stable     Problem/Plan - 2:  ·  Problem: Hypertension.   ·  Plan: - Continue with home coreg and norvasc  - Hold home Hydralazine  - BP currently stable; continue to monitor.     Problem/Plan - 3:  ·  Problem: CAD (coronary artery disease).   ·  Plan: - Holding baby aspirin due to LGIB  - Continue with beta blocker and Ranexa  - Follows with Dr. Abraham OP.     Problem/Plan - 4:  ·  Problem: Diabetes mellitus.   ·  Plan: - Hold home oral agents  - FS TID AC & insulin sliding scale.     Problem/Plan - 5:  ·  Problem: Stage 4 chronic kidney disease.   ·  Plan: - Baseline Cr ~2.5  - cr elevated, clear for discharge to follow up with nephrologist outpatient       pt clear for discharge

## 2023-10-17 NOTE — PROGRESS NOTE ADULT - PROVIDER SPECIALTY LIST ADULT
Hospitalist
Internal Medicine
Hospitalist
Gastroenterology
Nephrology
Gastroenterology
Gastroenterology

## 2023-10-17 NOTE — PROGRESS NOTE ADULT - PROBLEM SELECTOR PLAN 6
- no AC in setting of hematochezia  - PT: outpatient PT     DISPO: DC planning to home today  40 mins spent on DC planning

## 2023-10-17 NOTE — PROGRESS NOTE ADULT - SUBJECTIVE AND OBJECTIVE BOX
Interval Events:   Patient denies any abdominal pain, nausea /vomiting, diarrhea or melena / bloody bm.     Hospital Medications:  acetaminophen     Tablet .. 650 milliGRAM(s) Oral every 6 hours PRN  allopurinol 100 milliGRAM(s) Oral daily  amLODIPine   Tablet 10 milliGRAM(s) Oral daily  atorvastatin 40 milliGRAM(s) Oral at bedtime  carvedilol 25 milliGRAM(s) Oral every 12 hours  dextrose 5%. 1000 milliLiter(s) IV Continuous <Continuous>  dextrose 5%. 1000 milliLiter(s) IV Continuous <Continuous>  dextrose 50% Injectable 12.5 Gram(s) IV Push once  dextrose 50% Injectable 25 Gram(s) IV Push once  dextrose 50% Injectable 25 Gram(s) IV Push once  dextrose Oral Gel 15 Gram(s) Oral once PRN  glucagon  Injectable 1 milliGRAM(s) IntraMuscular once  influenza  Vaccine (HIGH DOSE) 0.7 milliLiter(s) IntraMuscular once  insulin lispro (ADMELOG) corrective regimen sliding scale   SubCutaneous at bedtime  insulin lispro (ADMELOG) corrective regimen sliding scale   SubCutaneous three times a day before meals  pantoprazole  Injectable 40 milliGRAM(s) IV Push two times a day  ranolazine 500 milliGRAM(s) Oral two times a day      ROS: All system reviewed and negative except as mentioned above.    PHYSICAL EXAM:   Vital Signs:  Vital Signs Last 24 Hrs  T(C): 37.2 (15 Oct 2023 04:34), Max: 37.2 (14 Oct 2023 22:54)  T(F): 98.9 (15 Oct 2023 04:34), Max: 98.9 (14 Oct 2023 22:54)  HR: 88 (15 Oct 2023 04:34) (82 - 88)  BP: 145/64 (15 Oct 2023 04:34) (112/62 - 162/87)  BP(mean): 112 (15 Oct 2023 03:15) (112 - 112)  RR: 18 (15 Oct 2023 04:34) (18 - 19)  SpO2: 99% (15 Oct 2023 04:34) (99% - 100%)    Parameters below as of 15 Oct 2023 04:34  Patient On (Oxygen Delivery Method): room air      Daily     Daily     GENERAL:  NAD, Appears stated age  HEENT:  NC/AT,  conjunctivae clear and pink, sclera -anicteric  CHEST:  Normal Effort, Breath sounds clear  HEART:  RRR, S1 + S2, no murmurs  ABDOMEN:  Soft, non-tender, non-distended, normoactive bowel sounds,  no masses  EXTREMITIES:  no cyanosis or edema  SKIN:  Warm & Dry. No rash or erythema  NEURO:  Alert, oriented, no focal deficit    LABS:                        7.7    7.07  )-----------( 104      ( 15 Oct 2023 06:33 )             22.9     Mean Cell Volume: 90.5 fl (10-15-23 @ 06:33)    10-15    140  |  109<H>  |  37<H>  ----------------------------<  203<H>  4.2   |  19<L>  |  2.99<H>    Ca    9.2      15 Oct 2023 06:33    TPro  5.4<L>  /  Alb  3.5  /  TBili  0.3  /  DBili  x   /  AST  34  /  ALT  33  /  AlkPhos  51  10-14    LIVER FUNCTIONS - ( 14 Oct 2023 00:32 )  Alb: 3.5 g/dL / Pro: 5.4 g/dL / ALK PHOS: 51 U/L / ALT: 33 U/L / AST: 34 U/L / GGT: x           PT/INR - ( 14 Oct 2023 00:32 )   PT: 11.2 sec;   INR: 1.02 ratio         PTT - ( 14 Oct 2023 00:32 )  PTT:28.7 sec  Urinalysis Basic - ( 15 Oct 2023 06:33 )    Color: x / Appearance: x / SG: x / pH: x  Gluc: 203 mg/dL / Ketone: x  / Bili: x / Urobili: x   Blood: x / Protein: x / Nitrite: x   Leuk Esterase: x / RBC: x / WBC x   Sq Epi: x / Non Sq Epi: x / Bacteria: x                              7.7    7.07  )-----------( 104      ( 15 Oct 2023 06:33 )             22.9                         8.4    8.10  )-----------( 108      ( 15 Oct 2023 03:44 )             24.4                         6.5    4.72  )-----------( 103      ( 14 Oct 2023 21:18 )             19.3                         6.8    6.11  )-----------( 124      ( 14 Oct 2023 16:20 )             20.6                         6.8    3.58  )-----------( 129      ( 14 Oct 2023 07:08 )             20.7       Imaging: Images reviewed.    
    Interval Events:   Hb downtrended but no overt GIB    Hospital Medications:  acetaminophen     Tablet .. 650 milliGRAM(s) Oral every 6 hours PRN  allopurinol 100 milliGRAM(s) Oral daily  amLODIPine   Tablet 10 milliGRAM(s) Oral daily  atorvastatin 40 milliGRAM(s) Oral at bedtime  carvedilol 25 milliGRAM(s) Oral every 12 hours  dextrose 5%. 1000 milliLiter(s) IV Continuous <Continuous>  dextrose 5%. 1000 milliLiter(s) IV Continuous <Continuous>  dextrose 50% Injectable 12.5 Gram(s) IV Push once  dextrose 50% Injectable 25 Gram(s) IV Push once  dextrose 50% Injectable 25 Gram(s) IV Push once  dextrose Oral Gel 15 Gram(s) Oral once PRN  glucagon  Injectable 1 milliGRAM(s) IntraMuscular once  influenza  Vaccine (HIGH DOSE) 0.7 milliLiter(s) IntraMuscular once  insulin lispro (ADMELOG) corrective regimen sliding scale   SubCutaneous three times a day before meals  insulin lispro (ADMELOG) corrective regimen sliding scale   SubCutaneous at bedtime  pantoprazole  Injectable 40 milliGRAM(s) IV Push two times a day  ranolazine 500 milliGRAM(s) Oral two times a day      ROS: All system reviewed and negative except as mentioned above.    PHYSICAL EXAM:   Vital Signs:  Vital Signs Last 24 Hrs  T(C): 36.6 (16 Oct 2023 04:10), Max: 37 (15 Oct 2023 20:29)  T(F): 97.9 (16 Oct 2023 04:10), Max: 98.6 (15 Oct 2023 20:29)  HR: 79 (16 Oct 2023 04:10) (70 - 81)  BP: 165/76 (16 Oct 2023 04:10) (106/62 - 165/76)  BP(mean): --  RR: 18 (16 Oct 2023 04:10) (16 - 18)  SpO2: 100% (16 Oct 2023 04:10) (96% - 100%)    Parameters below as of 16 Oct 2023 04:10  Patient On (Oxygen Delivery Method): room air      Daily     Daily     GENERAL:  NAD, Appears stated age  HEENT:  NC/AT,  conjunctivae clear and pink, sclera -anicteric  CHEST:  Normal Effort, Breath sounds clear  HEART:  RRR, S1 + S2, no murmurs  ABDOMEN:  Soft, non-tender, non-distended, normoactive bowel sounds,  no masses  EXTREMITIES:  no cyanosis or edema  SKIN:  Warm & Dry. No rash or erythema  NEURO:  Alert, oriented, no focal deficit    LABS:                        8.3    5.91  )-----------( 119      ( 16 Oct 2023 05:03 )             24.5     Mean Cell Volume: 90.1 fl (10-16-23 @ 05:03)    10-16    139  |  109<H>  |  33<H>  ----------------------------<  142<H>  4.2   |  21<L>  |  3.10<H>    Ca    9.4      16 Oct 2023 05:03          Urinalysis Basic - ( 16 Oct 2023 05:03 )    Color: x / Appearance: x / SG: x / pH: x  Gluc: 142 mg/dL / Ketone: x  / Bili: x / Urobili: x   Blood: x / Protein: x / Nitrite: x   Leuk Esterase: x / RBC: x / WBC x   Sq Epi: x / Non Sq Epi: x / Bacteria: x                              8.3    5.91  )-----------( 119      ( 16 Oct 2023 05:03 )             24.5                         7.8    5.69  )-----------( 106      ( 16 Oct 2023 00:33 )             23.0                         6.7    6.30  )-----------( 105      ( 15 Oct 2023 18:39 )             20.1                         7.1    6.54  )-----------( 104      ( 15 Oct 2023 13:25 )             21.1                         7.7    7.07  )-----------( 104      ( 15 Oct 2023 06:33 )             22.9       Imaging: Images reviewed.    
[facility] Division of Hospital Medicine  Kristan Joseph MD  Available via MS Teams    SUBJECTIVE / OVERNIGHT EVENTS: No acute events overnight. Patient with normal colored BM. No further episodes of bleeding. Hgb stable. Denies any chest pain or SOB. No other concerns or complaints at this time.      Review of Systems:   CONSTITUTIONAL: No fever   EYES: No eye pain, visual disturbances, or discharge  ENMT: No difficulty hearing   RESPIRATORY: No SOB. No cough   CARDIOVASCULAR: No chest pain   GASTROINTESTINAL: No abdominal or epigastric pain. No nausea, vomiting, or hematemesis; No diarrhea   GENITOURINARY: No dysuria   NEUROLOGICAL: No headaches   SKIN: No itching     MUSCULOSKELETAL: No joint pain or swelling; No muscle or back pain  PSYCHIATRIC: No depression or anxiety   HEME/LYMPH: No easy bruising or bleeding gums    MEDICATIONS  (STANDING):  allopurinol 100 milliGRAM(s) Oral daily  amLODIPine   Tablet 10 milliGRAM(s) Oral daily  atorvastatin 40 milliGRAM(s) Oral at bedtime  carvedilol 25 milliGRAM(s) Oral every 12 hours  dextrose 5%. 1000 milliLiter(s) (50 mL/Hr) IV Continuous <Continuous>  dextrose 5%. 1000 milliLiter(s) (100 mL/Hr) IV Continuous <Continuous>  dextrose 50% Injectable 25 Gram(s) IV Push once  dextrose 50% Injectable 12.5 Gram(s) IV Push once  dextrose 50% Injectable 25 Gram(s) IV Push once  glucagon  Injectable 1 milliGRAM(s) IntraMuscular once  influenza  Vaccine (HIGH DOSE) 0.7 milliLiter(s) IntraMuscular once  insulin lispro (ADMELOG) corrective regimen sliding scale   SubCutaneous at bedtime  insulin lispro (ADMELOG) corrective regimen sliding scale   SubCutaneous three times a day before meals  pantoprazole  Injectable 40 milliGRAM(s) IV Push two times a day  ranolazine 500 milliGRAM(s) Oral two times a day    MEDICATIONS  (PRN):  acetaminophen     Tablet .. 650 milliGRAM(s) Oral every 6 hours PRN Temp greater or equal to 38C (100.4F), Mild Pain (1 - 3), Moderate Pain (4 - 6), Severe Pain (7 - 10)  dextrose Oral Gel 15 Gram(s) Oral once PRN Blood Glucose LESS THAN 70 milliGRAM(s)/deciliter      I&O's Summary    16 Oct 2023 07:01  -  17 Oct 2023 07:00  --------------------------------------------------------  IN: 240 mL / OUT: 1900 mL / NET: -1660 mL    17 Oct 2023 07:01  -  17 Oct 2023 13:31  --------------------------------------------------------  IN: 240 mL / OUT: 500 mL / NET: -260 mL      PHYSICAL EXAM:  Vital Signs Last 24 Hrs  T(C): 36.4 (17 Oct 2023 11:32), Max: 36.9 (16 Oct 2023 21:33)  T(F): 97.5 (17 Oct 2023 11:32), Max: 98.4 (16 Oct 2023 21:33)  HR: 65 (17 Oct 2023 11:32) (65 - 84)  BP: 149/66 (17 Oct 2023 11:32) (112/78 - 149/66)  RR: 18 (17 Oct 2023 11:32) (18 - 19)  SpO2: 98% (17 Oct 2023 11:32) (98% - 99%)    Parameters below as of 17 Oct 2023 11:32  Patient On (Oxygen Delivery Method): room air      CONSTITUTIONAL: NAD, well-developed   EYES: PERRLA; conjunctiva and sclera clear  ENMT: Moist oral mucosa, no pharyngeal injection or exudates   NECK: Supple   RESPIRATORY: Normal respiratory effort; lungs are clear to auscultation bilaterally  CARDIOVASCULAR: Regular rate and rhythm, normal S1 and S2, no murmur   ABDOMEN: Nontender to palpation, normoactive bowel sounds   MUSCULOSKELETAL: no clubbing or cyanosis of digits; no joint swelling or tenderness to palpation  PSYCH: A+O to person, place, and time; affect appropriate  NEUROLOGY: no gross sensory deficits   SKIN: No rashes     LABS:                        8.1    5.21  )-----------( 121      ( 17 Oct 2023 06:39 )             24.4     10-17    138  |  107  |  37<H>  ----------------------------<  243<H>  4.3   |  23  |  3.15<H>    Ca    9.5      17 Oct 2023 06:37  Phos  2.7     10-17  Mg     2.0     10-17      Urinalysis Basic - ( 17 Oct 2023 06:37 )    Color: x / Appearance: x / SG: x / pH: x  Gluc: 243 mg/dL / Ketone: x  / Bili: x / Urobili: x   Blood: x / Protein: x / Nitrite: x   Leuk Esterase: x / RBC: x / WBC x   Sq Epi: x / Non Sq Epi: x / Bacteria: x        COVID-19 PCR: NotDetec (14 Oct 2023 11:06)      RADIOLOGY & ADDITIONAL TESTS:  New Imaging Personally Reviewed Today:  New Electrocardiogram Personally Reviewed Today:  Other Results Reviewed Today:   Prior or Outpatient Records Reviewed Today with Summary:    COORDINATION OF CARE:  Consultant Communication and Details of Discussion (where applicable):    
    Interval Events:   Patient denies any abdominal pain, nausea /vomiting, diarrhea or melena / bloody bm.    Hospital Medications:  acetaminophen     Tablet .. 650 milliGRAM(s) Oral every 6 hours PRN  allopurinol 100 milliGRAM(s) Oral daily  amLODIPine   Tablet 10 milliGRAM(s) Oral daily  atorvastatin 40 milliGRAM(s) Oral at bedtime  carvedilol 25 milliGRAM(s) Oral every 12 hours  dextrose 5%. 1000 milliLiter(s) IV Continuous <Continuous>  dextrose 5%. 1000 milliLiter(s) IV Continuous <Continuous>  dextrose 50% Injectable 25 Gram(s) IV Push once  dextrose 50% Injectable 12.5 Gram(s) IV Push once  dextrose 50% Injectable 25 Gram(s) IV Push once  dextrose Oral Gel 15 Gram(s) Oral once PRN  glucagon  Injectable 1 milliGRAM(s) IntraMuscular once  influenza  Vaccine (HIGH DOSE) 0.7 milliLiter(s) IntraMuscular once  insulin lispro (ADMELOG) corrective regimen sliding scale   SubCutaneous three times a day before meals  insulin lispro (ADMELOG) corrective regimen sliding scale   SubCutaneous at bedtime  pantoprazole  Injectable 40 milliGRAM(s) IV Push two times a day  ranolazine 500 milliGRAM(s) Oral two times a day      ROS: All system reviewed and negative except as mentioned above.    PHYSICAL EXAM:   Vital Signs:  Vital Signs Last 24 Hrs  T(C): 36.7 (17 Oct 2023 04:33), Max: 37.1 (16 Oct 2023 11:48)  T(F): 98.1 (17 Oct 2023 04:33), Max: 98.8 (16 Oct 2023 11:48)  HR: 84 (17 Oct 2023 04:33) (69 - 84)  BP: 112/78 (17 Oct 2023 04:33) (112/78 - 135/58)  BP(mean): --  RR: 18 (17 Oct 2023 04:33) (18 - 19)  SpO2: 98% (17 Oct 2023 04:33) (98% - 99%)    Parameters below as of 17 Oct 2023 04:33  Patient On (Oxygen Delivery Method): room air      Daily     Daily     GENERAL:  NAD, Appears stated age  HEENT:  NC/AT,  conjunctivae clear and pink, sclera -anicteric  CHEST:  Normal Effort, Breath sounds clear  HEART:  RRR, S1 + S2, no murmurs  ABDOMEN:  Soft, non-tender, non-distended, normoactive bowel sounds,  no masses  EXTREMITIES:  no cyanosis or edema  SKIN:  Warm & Dry. No rash or erythema  NEURO:  Alert, oriented, no focal deficit    LABS:                        8.1    5.21  )-----------( 121      ( 17 Oct 2023 06:39 )             24.4     Mean Cell Volume: 91.7 fl (10-17-23 @ 06:39)    10-17    138  |  107  |  37<H>  ----------------------------<  243<H>  4.3   |  23  |  3.15<H>    Ca    9.5      17 Oct 2023 06:37  Phos  2.7     10-17  Mg     2.0     10-17          Urinalysis Basic - ( 17 Oct 2023 06:37 )    Color: x / Appearance: x / SG: x / pH: x  Gluc: 243 mg/dL / Ketone: x  / Bili: x / Urobili: x   Blood: x / Protein: x / Nitrite: x   Leuk Esterase: x / RBC: x / WBC x   Sq Epi: x / Non Sq Epi: x / Bacteria: x                              8.1    5.21  )-----------( 121      ( 17 Oct 2023 06:39 )             24.4                         8.0    5.16  )-----------( 116      ( 16 Oct 2023 14:55 )             24.1                         8.3    5.91  )-----------( 119      ( 16 Oct 2023 05:03 )             24.5                         7.8    5.69  )-----------( 106      ( 16 Oct 2023 00:33 )             23.0                         6.7    6.30  )-----------( 105      ( 15 Oct 2023 18:39 )             20.1       Imaging: Images reviewed.    
[facility]   [phone]  RENAL FOLLOW UP NOTE  --------------------------------------------------------------------------------  Chief Complaint:    24 hour events/subjective:    seen earlier   denies any rectal bleeding     PAST HISTORY  --------------------------------------------------------------------------------  No significant changes to PMH, PSH, FHx, SHx, unless otherwise noted    ALLERGIES & MEDICATIONS  --------------------------------------------------------------------------------  Allergies    enalapril (Unknown)  Avapro (Hives)  losartan (Other)  seasonal allergies-outdoor (Urticaria; Rhinorrhea; Sneezing)    Intolerances    losartan (Other)    Standing Inpatient Medications  allopurinol 100 milliGRAM(s) Oral daily  amLODIPine   Tablet 10 milliGRAM(s) Oral daily  atorvastatin 40 milliGRAM(s) Oral at bedtime  carvedilol 25 milliGRAM(s) Oral every 12 hours  dextrose 5%. 1000 milliLiter(s) IV Continuous <Continuous>  dextrose 5%. 1000 milliLiter(s) IV Continuous <Continuous>  dextrose 50% Injectable 25 Gram(s) IV Push once  dextrose 50% Injectable 12.5 Gram(s) IV Push once  dextrose 50% Injectable 25 Gram(s) IV Push once  glucagon  Injectable 1 milliGRAM(s) IntraMuscular once  influenza  Vaccine (HIGH DOSE) 0.7 milliLiter(s) IntraMuscular once  insulin lispro (ADMELOG) corrective regimen sliding scale   SubCutaneous at bedtime  insulin lispro (ADMELOG) corrective regimen sliding scale   SubCutaneous three times a day before meals  pantoprazole  Injectable 40 milliGRAM(s) IV Push two times a day  ranolazine 500 milliGRAM(s) Oral two times a day    PRN Inpatient Medications  acetaminophen     Tablet .. 650 milliGRAM(s) Oral every 6 hours PRN  dextrose Oral Gel 15 Gram(s) Oral once PRN      REVIEW OF SYSTEMS  --------------------------------------------------------------------------------    Gen: denies  fevers/chills,  CVS: denies chest pain/palpitations  Resp: denies SOB/Cough  GI: Denies N/V/Abd pain  : Denies dysuria    VITALS/PHYSICAL EXAM  --------------------------------------------------------------------------------  T(C): 36.4 (10-17-23 @ 11:32), Max: 36.9 (10-16-23 @ 21:33)  HR: 65 (10-17-23 @ 11:32) (65 - 84)  BP: 149/66 (10-17-23 @ 11:32) (112/78 - 149/66)  RR: 18 (10-17-23 @ 11:32) (18 - 19)  SpO2: 98% (10-17-23 @ 11:32) (98% - 99%)  Wt(kg): --        10-16-23 @ 07:01  -  10-17-23 @ 07:00  --------------------------------------------------------  IN: 240 mL / OUT: 1900 mL / NET: -1660 mL    10-17-23 @ 07:01  -  10-17-23 @ 19:34  --------------------------------------------------------  IN: 480 mL / OUT: 500 mL / NET: -20 mL      Physical Exam:  	    Gen: Non toxic comfortable appearing    	Pulm: decrease bs  no rales or ronchi or wheezing  	CV: No JVD. RRR, S1S2; no rub  	Abd: +BS, soft, nontender/nondistended  	: No suprapubic tenderness  	UE: Warm, no cyanosis  no clubbing,  no edema;  	LE: Warm, no cyanosis  no clubbing, no edema  	Neuro: alert and oriented. speech coherent     LABS/STUDIES  --------------------------------------------------------------------------------              8.1    5.21  >-----------<  121      [10-17-23 @ 06:39]              24.4     138  |  107  |  37  ----------------------------<  243      [10-17-23 @ 06:37]  4.3   |  23  |  3.15        Ca     9.5     [10-17-23 @ 06:37]      Mg     2.0     [10-17-23 @ 06:37]      Phos  2.7     [10-17-23 @ 06:37]            Creatinine Trend:  SCr 3.15 [10-17 @ 06:37]  SCr 3.10 [10-16 @ 05:03]  SCr 2.99 [10-15 @ 06:33]  SCr 3.00 [10-14 @ 00:32]  SCr 2.74 [10-12 @ 07:13]        Urine Creatinine 90      [10-16-23 @ 12:11]  Urine Protein 28      [10-16-23 @ 12:11]    Iron 54, TIBC 269, %sat 20      [10-17-23 @ 06:37]  Ferritin 167      [10-17-23 @ 06:37]        
Patient is a 83y old  Male who presents with a chief complaint of Hematochezia (15 Oct 2023 12:01)      SUBJECTIVE / OVERNIGHT EVENTS: Patient seen and examined at bedside. Hematochezia resolved, but periodically feeling lightheaded. No abdominal pain.    MEDICATIONS  (STANDING):  allopurinol 100 milliGRAM(s) Oral daily  amLODIPine   Tablet 10 milliGRAM(s) Oral daily  atorvastatin 40 milliGRAM(s) Oral at bedtime  carvedilol 25 milliGRAM(s) Oral every 12 hours  dextrose 5%. 1000 milliLiter(s) (50 mL/Hr) IV Continuous <Continuous>  dextrose 5%. 1000 milliLiter(s) (100 mL/Hr) IV Continuous <Continuous>  dextrose 50% Injectable 25 Gram(s) IV Push once  dextrose 50% Injectable 12.5 Gram(s) IV Push once  dextrose 50% Injectable 25 Gram(s) IV Push once  glucagon  Injectable 1 milliGRAM(s) IntraMuscular once  influenza  Vaccine (HIGH DOSE) 0.7 milliLiter(s) IntraMuscular once  insulin lispro (ADMELOG) corrective regimen sliding scale   SubCutaneous three times a day before meals  insulin lispro (ADMELOG) corrective regimen sliding scale   SubCutaneous at bedtime  pantoprazole  Injectable 40 milliGRAM(s) IV Push two times a day  ranolazine 500 milliGRAM(s) Oral two times a day    MEDICATIONS  (PRN):  acetaminophen     Tablet .. 650 milliGRAM(s) Oral every 6 hours PRN Temp greater or equal to 38C (100.4F), Mild Pain (1 - 3), Moderate Pain (4 - 6), Severe Pain (7 - 10)  dextrose Oral Gel 15 Gram(s) Oral once PRN Blood Glucose LESS THAN 70 milliGRAM(s)/deciliter      CAPILLARY BLOOD GLUCOSE      POCT Blood Glucose.: 318 mg/dL (15 Oct 2023 11:52)  POCT Blood Glucose.: 203 mg/dL (15 Oct 2023 07:48)  POCT Blood Glucose.: 290 mg/dL (14 Oct 2023 21:58)  POCT Blood Glucose.: 265 mg/dL (14 Oct 2023 17:05)  POCT Blood Glucose.: 272 mg/dL (14 Oct 2023 15:55)    I&O's Summary    14 Oct 2023 07:01  -  15 Oct 2023 07:00  --------------------------------------------------------  IN: 660 mL / OUT: 400 mL / NET: 260 mL        PHYSICAL EXAM:  Vital Signs Last 24 Hrs  T(C): 36.9 (15 Oct 2023 12:08), Max: 37.2 (14 Oct 2023 22:54)  T(F): 98.5 (15 Oct 2023 12:08), Max: 98.9 (14 Oct 2023 22:54)  HR: 81 (15 Oct 2023 12:08) (81 - 88)  BP: 106/62 (15 Oct 2023 12:08) (106/62 - 162/87)  BP(mean): 112 (15 Oct 2023 03:15) (112 - 112)  RR: 18 (15 Oct 2023 12:08) (18 - 19)  SpO2: 99% (15 Oct 2023 12:08) (99% - 100%)    Parameters below as of 15 Oct 2023 12:08  Patient On (Oxygen Delivery Method): room air        GEN: male in NAD, appears comfortable, no diaphoresis  EYES: No scleral injection, EOMI  ENTM: neck supple & symmetric without tracheal deviation, moist membranes, no gross hearing impairment, thyroid gland not enlarged  CV: +S1/S2, no m/r/g, no abdominal bruit, no LE edema  RESP: breathing comfortably, no respiratory accessory muscle use, CTAB, no w/r/r  GI: normoactive BS, soft, NTND, no rebounding/guarding, no palpable masses    LABS:                        7.1    6.54  )-----------( 104      ( 15 Oct 2023 13:25 )             21.1     10-15    140  |  109<H>  |  37<H>  ----------------------------<  203<H>  4.2   |  19<L>  |  2.99<H>    Ca    9.2      15 Oct 2023 06:33    TPro  5.4<L>  /  Alb  3.5  /  TBili  0.3  /  DBili  x   /  AST  34  /  ALT  33  /  AlkPhos  51  10-14    PT/INR - ( 14 Oct 2023 00:32 )   PT: 11.2 sec;   INR: 1.02 ratio         PTT - ( 14 Oct 2023 00:32 )  PTT:28.7 sec      Urinalysis Basic - ( 15 Oct 2023 06:33 )    Color: x / Appearance: x / SG: x / pH: x  Gluc: 203 mg/dL / Ketone: x  / Bili: x / Urobili: x   Blood: x / Protein: x / Nitrite: x   Leuk Esterase: x / RBC: x / WBC x   Sq Epi: x / Non Sq Epi: x / Bacteria: x          RADIOLOGY & ADDITIONAL TESTS:  Results Reviewed:   Imaging Personally Reviewed:  Electrocardiogram Personally Reviewed:    COORDINATION OF CARE:  Care Discussed with Consultants/Other Providers [Y/N]:  Prior or Outpatient Records Reviewed [Y/N]:  
[facility] Division of Hospital Medicine  Kristan Joseph MD  Available via MS Teams    SUBJECTIVE / OVERNIGHT EVENTS: No acute events overnight. Patient feeling well overall this morning. Denies any episodes of bleeding but has not had a BM yet. Denies any chest pain or SOB. Denies any other concerns or complaints at this time.     Review of Systems:   CONSTITUTIONAL: No fever   EYES: No eye pain, visual disturbances, or discharge  ENMT: No difficulty hearing   RESPIRATORY: No SOB. No cough   CARDIOVASCULAR: No chest pain   GASTROINTESTINAL: No abdominal or epigastric pain. No nausea, vomiting, or hematemesis; No diarrhea    GENITOURINARY: No dysuria   NEUROLOGICAL: No headache   SKIN: No itching    MUSCULOSKELETAL: No joint pain or swelling; No muscle or back pain  PSYCHIATRIC: No depression or anxiety   HEME/LYMPH: No easy bruising or bleeding gums    MEDICATIONS  (STANDING):  allopurinol 100 milliGRAM(s) Oral daily  amLODIPine   Tablet 10 milliGRAM(s) Oral daily  atorvastatin 40 milliGRAM(s) Oral at bedtime  carvedilol 25 milliGRAM(s) Oral every 12 hours  dextrose 5%. 1000 milliLiter(s) (50 mL/Hr) IV Continuous <Continuous>  dextrose 5%. 1000 milliLiter(s) (100 mL/Hr) IV Continuous <Continuous>  dextrose 50% Injectable 25 Gram(s) IV Push once  dextrose 50% Injectable 12.5 Gram(s) IV Push once  dextrose 50% Injectable 25 Gram(s) IV Push once  glucagon  Injectable 1 milliGRAM(s) IntraMuscular once  influenza  Vaccine (HIGH DOSE) 0.7 milliLiter(s) IntraMuscular once  insulin lispro (ADMELOG) corrective regimen sliding scale   SubCutaneous three times a day before meals  insulin lispro (ADMELOG) corrective regimen sliding scale   SubCutaneous at bedtime  pantoprazole  Injectable 40 milliGRAM(s) IV Push two times a day  ranolazine 500 milliGRAM(s) Oral two times a day    MEDICATIONS  (PRN):  acetaminophen     Tablet .. 650 milliGRAM(s) Oral every 6 hours PRN Temp greater or equal to 38C (100.4F), Mild Pain (1 - 3), Moderate Pain (4 - 6), Severe Pain (7 - 10)  dextrose Oral Gel 15 Gram(s) Oral once PRN Blood Glucose LESS THAN 70 milliGRAM(s)/deciliter      I&O's Summary    15 Oct 2023 07:01  -  16 Oct 2023 07:00  --------------------------------------------------------  IN: 700 mL / OUT: 1500 mL / NET: -800 mL      PHYSICAL EXAM:  Vital Signs Last 24 Hrs  T(C): 37.1 (16 Oct 2023 11:48), Max: 37.1 (16 Oct 2023 11:48)  T(F): 98.8 (16 Oct 2023 11:48), Max: 98.8 (16 Oct 2023 11:48)  HR: 72 (16 Oct 2023 11:48) (70 - 79)  BP: 128/64 (16 Oct 2023 11:48) (111/57 - 165/76)  RR: 18 (16 Oct 2023 11:48) (16 - 18)  SpO2: 99% (16 Oct 2023 11:48) (96% - 100%)    Parameters below as of 16 Oct 2023 11:48  Patient On (Oxygen Delivery Method): room air      CONSTITUTIONAL: NAD, well-developed   EYES: PERRLA; conjunctiva and sclera clear  ENMT: Moist oral mucosa, no pharyngeal injection or exudates   NECK: Supple   RESPIRATORY: Normal respiratory effort; lungs are clear to auscultation bilaterally  CARDIOVASCULAR: Regular rate and rhythm, normal S1 and S2, no murmur   ABDOMEN: Nontender to palpation, normoactive bowel sounds   MUSCULOSKELETAL: no clubbing or cyanosis of digits; no joint swelling or tenderness to palpation  PSYCH: A+O to person, place, and time; affect appropriate  NEUROLOGY: no gross sensory deficits   SKIN: No rashes     LABS:                        8.3    5.91  )-----------( 119      ( 16 Oct 2023 05:03 )             24.5     10-16    139  |  109<H>  |  33<H>  ----------------------------<  142<H>  4.2   |  21<L>  |  3.10<H>    Ca    9.4      16 Oct 2023 05:03      Urinalysis Basic - ( 16 Oct 2023 12:11 )    Color: Yellow / Appearance: Clear / S.018 / pH: x  Gluc: x / Ketone: Negative  / Bili: Negative / Urobili: Negative   Blood: x / Protein: Trace / Nitrite: Negative   Leuk Esterase: Negative / RBC: 1 /hpf / WBC 1 /HPF   Sq Epi: x / Non Sq Epi: x / Bacteria: Negative        COVID-19 PCR: NotDetec (14 Oct 2023 11:06)      RADIOLOGY & ADDITIONAL TESTS:  New Imaging Personally Reviewed Today:  New Electrocardiogram Personally Reviewed Today:  Other Results Reviewed Today:   Prior or Outpatient Records Reviewed Today with Summary:    COORDINATION OF CARE:  Consultant Communication and Details of Discussion (where applicable):

## 2023-10-17 NOTE — PROGRESS NOTE ADULT - PROBLEM SELECTOR PLAN 1
- GI consult appreciated  - Serial CBC with goal Hg of 7 (no active cardiac issue necessitating a goal of 8)  - CLD  - Holding home aspirin  - Tagged RBC scan did not localize a bleed
- GI consult appreciated  - Serial CBC with goal Hg of 7 (no active cardiac issue necessitating a goal of 8)  - tolerating regular diet   - GI recs noted; resume home aspirin  - Tagged RBC scan did not localize a bleed  - Hgb stable; DC planning to home
- GI consult appreciated  - Serial CBC with goal Hg of 7 (no active cardiac issue necessitating a goal of 8)  - tolerating liquid diet; will advance to regular diet today and monitor   - Holding home aspirin  - Tagged RBC scan did not localize a bleed  - plan to repeat CBC again today at 2pm and continue to further trend based on Hgb levels

## 2023-10-17 NOTE — DISCHARGE NOTE NURSING/CASE MANAGEMENT/SOCIAL WORK - PATIENT PORTAL LINK FT
You can access the FollowMyHealth Patient Portal offered by [facility] by registering at the following website: [email or web address]. By joining [facility]’s FollowMyHealth portal, you will also be able to view your health information using other applications (apps) compatible with our system.

## 2023-10-17 NOTE — DISCHARGE NOTE PROVIDER - NSDCFUADDAPPT_GEN_ALL_CORE_FT
APPTS ARE READY TO BE MADE: [x ] YES    Best Family or Patient Contact (if needed):    Additional Information about above appointments (if needed):    1:   2:   3:     Other comments or requests:    APPTS ARE READY TO BE MADE: [x ] YES    Best Family or Patient Contact (if needed):    Additional Information about above appointments (if needed):    1:   2:   3:     Other comments or requests:   Patient/Caregiver was provided with follow up request details and prefers to call the providers office on their own to schedule.   Patient was previously scheduled for an appointment on 11/10 8:45a at [address] with Dr. Abraham.

## 2023-10-17 NOTE — PROGRESS NOTE ADULT - PROBLEM SELECTOR PLAN 3
- Holding baby aspirin due to LGIB  - Continue with beta blocker and Ranexa  - Follows with Jarad BELL
- Holding baby aspirin due to LGIB  - Continue with beta blocker and Ranexa  - Follows with Dr. Abraham OP
- resume ASA on DC since GIB resolved   - Continue with beta blocker and Ranexa  - Follows with Dr. Abraham OP

## 2023-10-17 NOTE — CONSULT NOTE ADULT - SUBJECTIVE AND OBJECTIVE BOX
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 Patient readmitted on 10/14 for blood in stool. Hgb 6.6 on admission, post transfusion improved to 8.1. SAPNA noted. Patient denies C/P or SOB. No LE pain or edema. Patient denies current hematochezia or melena today. Vascular Cardiology consulted.    Allergies  enalapril (Unknown)  Avapro (Hives)  losartan (Other)  seasonal allergies-outdoor (Urticaria; Rhinorrhea; Sneezing)    Intolerances  losartan (Other)  	  MEDICATIONS:  amLODIPine   Tablet 10 milliGRAM(s) Oral daily  carvedilol 25 milliGRAM(s) Oral every 12 hours  ranolazine 500 milliGRAM(s) Oral two times a day  acetaminophen     Tablet .. 650 milliGRAM(s) Oral every 6 hours PRN  pantoprazole  Injectable 40 milliGRAM(s) IV Push two times a day  allopurinol 100 milliGRAM(s) Oral daily  atorvastatin 40 milliGRAM(s) Oral at bedtime  glucagon  Injectable 1 milliGRAM(s) IntraMuscular once  insulin lispro (ADMELOG) corrective regimen sliding scale   SubCutaneous at bedtime  insulin lispro (ADMELOG) corrective regimen sliding scale   SubCutaneous three times a day before meals  influenza  Vaccine (HIGH DOSE) 0.7 milliLiter(s) IntraMuscular once    PAST MEDICAL & SURGICAL HISTORY:  Hypertension  Hyperlipemia  Diabetes Mellitus Type II  Renal Calculi  H/O: Rotator Cuff Tear  Pneumonia  BPH (Benign Prostatic Hypertrophy)  Hypertension  Hyperlipidemia  Diabetes  CAD (coronary artery disease)  Ganglion cyst of wrist, left  SHARATH (obstructive sleep apnea)  non compliant with CPAP  Chronic kidney disease (CKD)  H/O gastroesophageal reflux (GERD)  OA (osteoarthritis)  History of Arthroscopy  Laminectomy with Spinal Fusion  lumbar lamenectomy  Bilateral cataracts  Trigger finger of both hands  Cervical fusion syndrome  History of lumbar laminectomy  Bilateral rotator cuff syndrome  operated both'    FAMILY HISTORY:  Family history of cerebrovascular accident (Mother)  mother    SOCIAL HISTORY:  unchanged    REVIEW OF SYSTEMS:  CONSTITUTIONAL: No fever  EYES: No eye pain  ENT:  No throat pain  NECK: No pain   RESPIRATORY:    CARDIOVASCULAR:    GASTROINTESTINAL: No abdominal pain  GENITOURINARY: No hematuria  NEUROLOGICAL: No memory loss  SKIN: No LE wounds  LYMPH Nodes: No enlarged glands noted  ENDOCRINE: No heat or cold intolerance noted  MUSCULOSKELETAL:   PSYCHIATRIC: No depression, anxiety  HEME/LYMPH: No  bleeding gums  ALLERGY AND IMMUNOLOGIC: No hives    [ x] All others negative	  PHYSICAL EXAM:  T(C): 36.4 (10-17-23 @ 11:32), Max: 36.9 (10-16-23 @ 21:33)  HR: 65 (10-17-23 @ 11:32) (65 - 84)  BP: 149/66 (10-17-23 @ 11:32) (112/78 - 149/66)  RR: 18 (10-17-23 @ 11:32) (18 - 19)  SpO2: 98% (10-17-23 @ 11:32) (98% - 99%)  Wt(kg): --  I&O's Summary    16 Oct 2023 07:01  -  17 Oct 2023 07:00  --------------------------------------------------------  IN: 240 mL / OUT: 1900 mL / NET: -1660 mL    17 Oct 2023 07:01  -  17 Oct 2023 12:00  --------------------------------------------------------  IN: 240 mL / OUT: 500 mL / NET: -260 mL    Appearance: NAD 	  HEENT:  NC/AT  Cardiovascular: RRR, S1 and S2   Respiratory: CTA B/L  Psychiatry:  AAO x 3  Gastrointestinal:  Soft, Non-tender, + BS	  Skin: No rashes, No ecchymoses, No cyanosis	  Neurologic: No focal deficits  Extremities: No LE edema, bilateral calves soft  Foot Exam: No LE wounds    LABS:	 	    CBC Full  -  ( 17 Oct 2023 06:39 )  WBC Count : 5.21 K/uL  Hemoglobin : 8.1 g/dL  Hematocrit : 24.4 %  Platelet Count - Automated : 121 K/uL  Mean Cell Volume : 91.7 fl  Mean Cell Hemoglobin : 30.5 pg  Mean Cell Hemoglobin Concentration : 33.2 gm/dL  Auto Neutrophil # : x  Auto Lymphocyte # : x  Auto Monocyte # : x  Auto Eosinophil # : x  Auto Basophil # : x  Auto Neutrophil % : x  Auto Lymphocyte % : x  Auto Monocyte % : x  Auto Eosinophil % : x  Auto Basophil % : x    10-17    138  |  107  |  37<H>  ----------------------------<  243<H>  4.3   |  23  |  3.15<H>  10-16    139  |  109<H>  |  33<H>  ----------------------------<  142<H>  4.2   |  21<L>  |  3.10<H>    Ca    9.5      17 Oct 2023 06:37  Ca    9.4      16 Oct 2023 05:03  Phos  2.7     10-17  Mg     2.0     10-17      Assessment:  1. Recent admission for GI bleeding and acute anemia      Re-admitted for blood in stool and anemia   2. CAD s/p PCI RCA in 2016, un-revascularized 70% mLAD on cardiac cath in 2022  3. HTN / HLD / DM  4. Dx Stage I Gall Bladder Ca s/p lap nahid 6/18/20  5. Recent admission for acute anemia and GI bleeding  6. SAPNA on  CKD stage 3-4     Plan:  1. Continue to hold antiplatelet therapy at this time.  2. Appreciate GI assistance.      Further evaluation of anemia as per GI and primary team.  3. Hydration.  4. Continue BB/CCB/Statin/Ranexa.  5. Monitor CBC. Monitor for any signs and symptoms of bleeding.       Thank you  ESEQUIEL Burns, MS, [facility]  Vascular Cardiology Service    Please call with any questions:   DIRECT SERVICE NUMBER: [phone] / Available on TEAMS
HPI:  83 year old male with PMH HTN, CAD with 4 stents (more than 5 years ago), BPH, HLD, gout, GERD, DM2 and CKD with recent admission on 10/10 for hematochezia with colonoscopy performed on 10/11 noted to have diverticulosis in Rt/t side of colon presents again for recurrence of hematochezia associated with LLQ abd cramps. GI called for further evaluation. \        Allergies:  enalapril (Unknown)  losartan (Other)  Avapro (Hives)  losartan (Other)  seasonal allergies-outdoor (Urticaria; Rhinorrhea; Sneezing)        Hospital Medications:  influenza  Vaccine (HIGH DOSE) 0.7 milliLiter(s) IntraMuscular once      PMHX/PSHX:  Hypertension    Hyperlipemia    Diabetes Mellitus Type II    Renal Calculi    History of Arthroscopy    H/O: Rotator Cuff Tear    Pneumonia    BPH (Benign Prostatic Hypertrophy)    Hypertension    Hyperlipidemia    Diabetes    CAD (coronary artery disease)    Ganglion cyst of wrist, left    SHARATH (obstructive sleep apnea)    Chronic kidney disease (CKD)    H/O gastroesophageal reflux (GERD)    OA (osteoarthritis)    History of Arthroscopy    Laminectomy with Spinal Fusion    lumbar lamenectomy    Bilateral cataracts    Trigger finger of both hands    Cervical fusion syndrome    History of lumbar laminectomy    Bilateral rotator cuff syndrome        Family history:  Family history of cerebrovascular accident (Mother)    Family history of cerebrovascular accident (CVA)        Social History: no smoking    ROS:   General:  No fevers, chills or night sweats  ENT:  No sore throat or dysphagia  CV:  No pain or palpitations  Resp:  No dyspnea, cough or  wheezing  GI:  as above  Skin:  No rash or edema  Neuro: no weakness   Hematologic: no bleeding  Musculoskeletal: no muscle pain or join pain  Psych: no agitation     : no dysuria      PHYSICAL EXAM:   GENERAL:  NAD, Appears stated age  HEENT:  NC/AT,  conjunctivae clear and pink, sclera -anicteric  CHEST:  CTA B/L, Normal effort  HEART:  RRR S1/S2,  ABDOMEN:  Soft, non-tender, non-distended,  no masses   EXTREMITIES:  No cyanosis or Edema  SKIN:  Warm & Dry. No rash or erythema  NEURO:  Alert, oriented, no focal deficit    Vital Signs:  Vital Signs Last 24 Hrs  T(C): 36.3 (14 Oct 2023 05:40), Max: 37.3 (13 Oct 2023 21:28)  T(F): 97.3 (14 Oct 2023 05:40), Max: 99.1 (13 Oct 2023 21:28)  HR: 76 (14 Oct 2023 05:40) (76 - 83)  BP: 160/68 (14 Oct 2023 05:40) (127/65 - 160/68)  BP(mean): --  RR: 17 (14 Oct 2023 05:40) (17 - 19)  SpO2: 98% (14 Oct 2023 05:40) (98% - 100%)    Parameters below as of 14 Oct 2023 05:40  Patient On (Oxygen Delivery Method): room air      Daily Height in cm: 172.72 (13 Oct 2023 21:28)    Daily     LABS:                        6.6    5.76  )-----------( 157      ( 14 Oct 2023 00:32 )             20.4     Mean Cell Volume: 96.7 fl (10-14-23 @ 00:32)    10-14    137  |  105  |  39<H>  ----------------------------<  269<H>  4.4   |  21<L>  |  3.00<H>    Ca    9.7      14 Oct 2023 00:32  Phos  2.1     10-12  Mg     2.0     10-12    TPro  5.4<L>  /  Alb  3.5  /  TBili  0.3  /  DBili  x   /  AST  34  /  ALT  33  /  AlkPhos  51  10-14    LIVER FUNCTIONS - ( 14 Oct 2023 00:32 )  Alb: 3.5 g/dL / Pro: 5.4 g/dL / ALK PHOS: 51 U/L / ALT: 33 U/L / AST: 34 U/L / GGT: x           PT/INR - ( 14 Oct 2023 00:32 )   PT: 11.2 sec;   INR: 1.02 ratio         PTT - ( 14 Oct 2023 00:32 )  PTT:28.7 sec  Urinalysis Basic - ( 14 Oct 2023 00:32 )    Color: x / Appearance: x / SG: x / pH: x  Gluc: 269 mg/dL / Ketone: x  / Bili: x / Urobili: x   Blood: x / Protein: x / Nitrite: x   Leuk Esterase: x / RBC: x / WBC x   Sq Epi: x / Non Sq Epi: x / Bacteria: x                              6.6    5.76  )-----------( 157      ( 14 Oct 2023 00:32 )             20.4                         7.8    4.05  )-----------( 129      ( 12 Oct 2023 07:13 )             23.2     Imaging:  
[facility]  [phone]  NEPHROLOGY      INITIAL CONSULT NOTE  --------------------------------------------------------------------------------  HPI:    83 year old Male with PMHx of CAD (FOREST x4 in 2016), CKD4, HTN, and T2DM presenting for one day history of hematochezia. Patient recently admitted for hematochezia and had a colonoscopy on 10/11 which showed moderate diverticulosis and non-bleeding hemorrhoids; thus, he was likely having a diverticular bleed. His Plavix was stopped (no strict indication given stent many years prior) and was cleared to resume aspirin. On the day of admission noted bright red blood in the toilet bowl with loose stools. While in the hospital he had 6-7 bowel movements of blood. Endorses mild lightheadedness and some suprapubic discomfort. On 10/14 had an episode of syncope while using commode, which prompted a rapid response, his hgb revealed <7, and thus received prbc transfusion. Noticed with worsening creatinine.     PAST HISTORY  --------------------------------------------------------------------------------  PAST MEDICAL & SURGICAL HISTORY:  Hypertension      Hyperlipemia      Diabetes Mellitus Type II      Renal Calculi  h/olithotripsy 2010      H/O: Rotator Cuff Tear      Pneumonia      BPH (Benign Prostatic Hypertrophy)      Hypertension      Hyperlipidemia      Diabetes  2      CAD (coronary artery disease)  stents x4( 03/2017)  last stress test 7/22  echo 3/22      Ganglion cyst of wrist, left      SHARATH (obstructive sleep apnea)  non compliant with CPAP      Chronic kidney disease (CKD)      H/O gastroesophageal reflux (GERD)      OA (osteoarthritis)      History of Arthroscopy  bilateral shoulder rotator cuff repair      Laminectomy with Spinal Fusion  cervical- 2009      lumbar lamenectomy  1998      Bilateral cataracts  sp extraction      Trigger finger of both hands  sp repair      Cervical fusion syndrome      History of lumbar laminectomy      Bilateral rotator cuff syndrome  operated both'        FAMILY HISTORY:  Family history of cerebrovascular accident (Mother)  mother      PAST SOCIAL HISTORY:  denies tobacco use    ALLERGIES & MEDICATIONS  --------------------------------------------------------------------------------  Allergies    enalapril (Unknown)  Avapro (Hives)  losartan (Other)  seasonal allergies-outdoor (Urticaria; Rhinorrhea; Sneezing)    Intolerances    losartan (Other)    Standing Inpatient Medications  allopurinol 100 milliGRAM(s) Oral daily  amLODIPine   Tablet 10 milliGRAM(s) Oral daily  atorvastatin 40 milliGRAM(s) Oral at bedtime  carvedilol 25 milliGRAM(s) Oral every 12 hours  dextrose 5%. 1000 milliLiter(s) IV Continuous <Continuous>  dextrose 5%. 1000 milliLiter(s) IV Continuous <Continuous>  dextrose 50% Injectable 12.5 Gram(s) IV Push once  dextrose 50% Injectable 25 Gram(s) IV Push once  dextrose 50% Injectable 25 Gram(s) IV Push once  glucagon  Injectable 1 milliGRAM(s) IntraMuscular once  influenza  Vaccine (HIGH DOSE) 0.7 milliLiter(s) IntraMuscular once  insulin lispro (ADMELOG) corrective regimen sliding scale   SubCutaneous three times a day before meals  insulin lispro (ADMELOG) corrective regimen sliding scale   SubCutaneous at bedtime  pantoprazole  Injectable 40 milliGRAM(s) IV Push two times a day  ranolazine 500 milliGRAM(s) Oral two times a day    PRN Inpatient Medications  acetaminophen     Tablet .. 650 milliGRAM(s) Oral every 6 hours PRN  dextrose Oral Gel 15 Gram(s) Oral once PRN      REVIEW OF SYSTEMS  --------------------------------------------------------------------------------  Gen: No fevers/chills   Head/Eyes/Ears/Mouth: No headache; Normal hearing;    Respiratory: No dyspnea, cough, wheezing,   CV: No chest pain, orthopnea  GI: denies BM since 10/14. No abdominal pain, diarrhea, nausea, vomiting, +recent hematochezia  : No dysuria, decrease urination   MSK: No joint pain/swelling; no back pain  Neuro: No dizziness/lightheadedness, weakness,  also with no edema     VITALS/PHYSICAL EXAM  --------------------------------------------------------------------------------  T(C): 36.6 (10-16-23 @ 04:10), Max: 37 (10-15-23 @ 20:29)  HR: 79 (10-16-23 @ 04:10) (70 - 81)  BP: 165/76 (10-16-23 @ 04:10) (106/62 - 165/76)  RR: 18 (10-16-23 @ 04:10) (16 - 18)  SpO2: 100% (10-16-23 @ 04:10) (96% - 100%)  Wt(kg): --        10-15-23 @ 07:01  -  10-16-23 @ 07:00  --------------------------------------------------------  IN: 700 mL / OUT: 1500 mL / NET: -800 mL      Physical Exam:  	Gen: Non toxic comfortable appearing    	Pulm: decrease bs  no rales or ronchi or wheezing  	CV: No JVD. RRR, S1S2; no rub  	Abd: +BS, soft, nontender/nondistended  	: No suprapubic tenderness  	UE: Warm, no cyanosis  no clubbing,  no edema;  	LE: Warm, no cyanosis  no clubbing, no edema  	Neuro: alert and oriented. speech coherent   	Skin: Warm, no decrease skin turgor     LABS/STUDIES  --------------------------------------------------------------------------------              8.3    5.91  >-----------<  119      [10-16-23 @ 05:03]              24.5     139  |  109  |  33  ----------------------------<  142      [10-16-23 @ 05:03]  4.2   |  21  |  3.10        Ca     9.4     [10-16-23 @ 05:03]            Creatinine Trend:  SCr 3.10 [10-16 @ 05:03]  SCr 2.99 [10-15 @ 06:33]  SCr 3.00 [10-14 @ 00:32]  SCr 2.74 [10-12 @ 07:13]  SCr 2.73 [10-11 @ 04:41]        < from: Colonoscopy (10.11.23 @ 10:35) >  [facility]  ____________________________________________________________________________________________________  Patient Name: Willie Schmitz                       MRN: [id number]  Account Number: [id number]                     YOB: 1940  Room: Endoscopy Room 5                           Gender: Male  Attending MD: Selvin Valdez MD                Procedure Date No Time: 10/11/2023  ____________________________________________________________________________________________________     Procedure:           Colonoscopy  Indications:         Hematochezia  Providers:           Selvin Valdez MD  Medicines:           Monitored Anesthesia Care  Complications:       No immediate complications.  ____________________________________________________________________________________________________  Procedure:           Pre-Anesthesia Assessment:                       - Prior to the procedure, a History and Physical was performed, and patient       medications, allergies and sensitivities were reviewed. The patient's                        tolerance of previous anesthesia was reviewed.                       - The risks and benefits of the procedure and the sedation options and risks                  were discussed with the patient. All questions were answered and informed                        consent was obtained.                       After I obtained informed consent, the scope was passed under direct vision.        Throughout the procedure, the patient's blood pressure, pulse, and oxygen                        saturations were monitored continuously. The Colonoscope was introduced                        through the anus and advanced to the terminal ileum. The patient tolerated                        the procedure well. The quality of the bowel preparation was excellent. The                        terminal ileum, the ileocecal valve, the appendiceal orifice and the rectum                        were photographed.                                                                                                        Findings:       The perianal and digital rectal examinations were normal. Pertinent negatives include normal        sphincter tone.  Multiple scattered medium-mouthed diverticula were found throughout the colon, specifically        in the descending colon, transverse colon and ascending colon. There was no evidence of        diverticular bleeding.       Non-bleeding external hemorrhoids were found during retroflexion. The hemorrhoids were small.                                                                                                        Impression:          - Moderate diverticulosis in the descending colon, in the transverse colon                        and in the ascending colon, likely soure of patient's prior bleeding                       - Non-bleeding external hemorrhoids.                       - No specimens collected.  Recommendation:      - Return patient to hospital [facility] for ongoing care.                       - Advance diet as tolerated                       - Would transition to monotherapy for history of cardiac stents if possible.                        Defer to primary team and cardiology                                                                                                   Attending Participation:       I was present and participated during the entire procedure, including non-key portions.                                                                        __________________  Selvin Valdez MD  10/11/2023 12:46:22 PM  Number of Addenda: 0    Note Initiated On: 10/11/2023 10:35 AM    < from: NM GI Bleed Localization (NM GI Bleed Localization .) (10.15.23 @ 00:00) >  ACC: [id number] EXAM:  NM GI BLEEDING IMG   ORDERED BY:  YOSI BRADSHAW     PROCEDURE DATE:  10/15/2023          INTERPRETATION:  CLINICAL INFORMATION: 83 year-old male with bright red   blood per rectum, referred for localization of bleeding site.    RADIOPHARMACEUTICAL:  20.1 mCi Tc-99m labeled autologous red blood cells,   I.V.    TECHNIQUE: Dynamic imaging of the abdomen and pelvis was performed for 2   hours following administration of radiolabeled autologous red blood   cells. Static imagesof the abdomen and pelvis in the anterior and   lateral views were obtained immediately thereafter. An anterior view of   the head/neck was obtained for  purposes.    COMPARISON: None    OTHER STUDIES USED FOR CORRELATION: None    FINDINGS: There is physiologic distribution of radiolabeled red cells in   the abdomen and pelvis. There is no abnormal focus of increased   radiopharmaceutical activity to suggest the presence of an active   bleeding site.      IMPRESSION: No scan evidence of active bleeding site.    --- End of Report ---      < end of copied text >  < end of copied text >

## 2023-10-17 NOTE — DISCHARGE NOTE PROVIDER - NSDCFUSCHEDAPPT_GEN_ALL_CORE_FT
Froylan Abraham Physician [facility]  CARDIOLOGY [address]  Scheduled Appointment: 11/10/2023

## 2023-10-17 NOTE — PROGRESS NOTE ADULT - PROBLEM SELECTOR PLAN 5
- Baseline Cr ~2.5  - Monitor Cr and renally dose medications  - nephro following; outpatient follow up to repeat BMP
- Baseline Cr ~2.5  - Monitor Cr and renally dose medications
- Baseline Cr ~2.5  - Monitor Cr and renally dose medications  - nephro following

## 2023-10-30 ENCOUNTER — LABORATORY RESULT (OUTPATIENT)
Age: 83
End: 2023-10-30

## 2023-11-01 LAB
ALBUMIN SERPL ELPH-MCNC: 3.7 G/DL
ALP BLD-CCNC: 62 U/L
ALT SERPL-CCNC: 13 U/L
ANION GAP SERPL CALC-SCNC: 10 MMOL/L
AST SERPL-CCNC: 25 U/L
BASOPHILS # BLD AUTO: 0.03 K/UL
BASOPHILS NFR BLD AUTO: 0.7 %
BILIRUB SERPL-MCNC: 0.3 MG/DL
BUN SERPL-MCNC: 23 MG/DL
CALCIUM SERPL-MCNC: 10.2 MG/DL
CHLORIDE SERPL-SCNC: 106 MMOL/L
CO2 SERPL-SCNC: 22 MMOL/L
CREAT SERPL-MCNC: 2.63 MG/DL
EGFR: 24 ML/MIN/1.73M2
EOSINOPHIL # BLD AUTO: 0.27 K/UL
EOSINOPHIL NFR BLD AUTO: 6.2 %
GLUCOSE SERPL-MCNC: 102 MG/DL
HCT VFR BLD CALC: 31.3 %
HGB BLD-MCNC: 9.4 G/DL
IMM GRANULOCYTES NFR BLD AUTO: 0.7 %
LYMPHOCYTES # BLD AUTO: 0.72 K/UL
LYMPHOCYTES NFR BLD AUTO: 16.6 %
MAN DIFF?: NORMAL
MCHC RBC-ENTMCNC: 30 GM/DL
MCHC RBC-ENTMCNC: 30.5 PG
MCV RBC AUTO: 101.6 FL
MONOCYTES # BLD AUTO: 0.43 K/UL
MONOCYTES NFR BLD AUTO: 9.9 %
NEUTROPHILS # BLD AUTO: 2.86 K/UL
NEUTROPHILS NFR BLD AUTO: 65.9 %
PLATELET # BLD AUTO: 242 K/UL
POTASSIUM SERPL-SCNC: 4.4 MMOL/L
PROT SERPL-MCNC: 5.7 G/DL
RBC # BLD: 3.08 M/UL
RBC # FLD: 20.9 %
SODIUM SERPL-SCNC: 138 MMOL/L
WBC # FLD AUTO: 4.34 K/UL

## 2023-11-10 ENCOUNTER — NON-APPOINTMENT (OUTPATIENT)
Age: 83
End: 2023-11-10

## 2023-11-10 ENCOUNTER — APPOINTMENT (OUTPATIENT)
Dept: CARDIOLOGY | Facility: CLINIC | Age: 83
End: 2023-11-10
Payer: MEDICARE

## 2023-11-10 VITALS
HEART RATE: 76 BPM | DIASTOLIC BLOOD PRESSURE: 60 MMHG | OXYGEN SATURATION: 99 % | WEIGHT: 185 LBS | BODY MASS INDEX: 27.4 KG/M2 | HEIGHT: 69 IN | SYSTOLIC BLOOD PRESSURE: 143 MMHG

## 2023-11-10 PROCEDURE — 99214 OFFICE O/P EST MOD 30 MIN: CPT

## 2023-11-13 LAB
HCT VFR BLD CALC: 30 %
HGB BLD-MCNC: 9.4 G/DL
MCHC RBC-ENTMCNC: 31.3 GM/DL
MCHC RBC-ENTMCNC: 31.5 PG
MCV RBC AUTO: 100.7 FL
PLATELET # BLD AUTO: 200 K/UL
RBC # BLD: 2.98 M/UL
RBC # FLD: 18.5 %
WBC # FLD AUTO: 4.53 K/UL

## 2023-11-13 PROCEDURE — 96375 TX/PRO/DX INJ NEW DRUG ADDON: CPT

## 2023-11-13 PROCEDURE — 86900 BLOOD TYPING SEROLOGIC ABO: CPT

## 2023-11-13 PROCEDURE — 82962 GLUCOSE BLOOD TEST: CPT

## 2023-11-13 PROCEDURE — 86901 BLOOD TYPING SEROLOGIC RH(D): CPT

## 2023-11-13 PROCEDURE — 96374 THER/PROPH/DIAG INJ IV PUSH: CPT

## 2023-11-13 PROCEDURE — 85610 PROTHROMBIN TIME: CPT

## 2023-11-13 PROCEDURE — 82728 ASSAY OF FERRITIN: CPT

## 2023-11-13 PROCEDURE — 81001 URINALYSIS AUTO W/SCOPE: CPT

## 2023-11-13 PROCEDURE — 83550 IRON BINDING TEST: CPT

## 2023-11-13 PROCEDURE — 78278 ACUTE GI BLOOD LOSS IMAGING: CPT

## 2023-11-13 PROCEDURE — P9016: CPT

## 2023-11-13 PROCEDURE — 93005 ELECTROCARDIOGRAM TRACING: CPT

## 2023-11-13 PROCEDURE — 83735 ASSAY OF MAGNESIUM: CPT

## 2023-11-13 PROCEDURE — 86850 RBC ANTIBODY SCREEN: CPT

## 2023-11-13 PROCEDURE — 82570 ASSAY OF URINE CREATININE: CPT

## 2023-11-13 PROCEDURE — P9040: CPT

## 2023-11-13 PROCEDURE — 80048 BASIC METABOLIC PNL TOTAL CA: CPT

## 2023-11-13 PROCEDURE — 36430 TRANSFUSION BLD/BLD COMPNT: CPT

## 2023-11-13 PROCEDURE — A9560: CPT

## 2023-11-13 PROCEDURE — 85045 AUTOMATED RETICULOCYTE COUNT: CPT

## 2023-11-13 PROCEDURE — 97161 PT EVAL LOW COMPLEX 20 MIN: CPT

## 2023-11-13 PROCEDURE — 80053 COMPREHEN METABOLIC PANEL: CPT

## 2023-11-13 PROCEDURE — 85730 THROMBOPLASTIN TIME PARTIAL: CPT

## 2023-11-13 PROCEDURE — 99285 EMERGENCY DEPT VISIT HI MDM: CPT | Mod: 25

## 2023-11-13 PROCEDURE — 36415 COLL VENOUS BLD VENIPUNCTURE: CPT

## 2023-11-13 PROCEDURE — 84100 ASSAY OF PHOSPHORUS: CPT

## 2023-11-13 PROCEDURE — 83540 ASSAY OF IRON: CPT

## 2023-11-13 PROCEDURE — 84156 ASSAY OF PROTEIN URINE: CPT

## 2023-11-13 PROCEDURE — 96376 TX/PRO/DX INJ SAME DRUG ADON: CPT

## 2023-11-13 PROCEDURE — 85027 COMPLETE CBC AUTOMATED: CPT

## 2023-11-13 PROCEDURE — 87635 SARS-COV-2 COVID-19 AMP PRB: CPT

## 2023-11-13 PROCEDURE — 86923 COMPATIBILITY TEST ELECTRIC: CPT

## 2023-12-04 NOTE — DISCHARGE NOTE PROVIDER - ATTENDING DISCHARGE PHYSICAL EXAMINATION:
Patient seen and examined. Feeling well overall. No new complaints or concerns.   Vital Signs Last 24 Hrs  T(C): 36.4 (17 Oct 2023 11:32), Max: 36.9 (16 Oct 2023 21:33)  T(F): 97.5 (17 Oct 2023 11:32), Max: 98.4 (16 Oct 2023 21:33)  HR: 65 (17 Oct 2023 11:32) (65 - 84)  BP: 149/66 (17 Oct 2023 11:32) (112/78 - 149/66)  RR: 18 (17 Oct 2023 11:32) (18 - 19)  SpO2: 98% (17 Oct 2023 11:32) (98% - 99%)    Parameters below as of 17 Oct 2023 11:32  Patient On (Oxygen Delivery Method): room air    CONSTITUTIONAL: NAD, well-developed   EYES: PERRLA; conjunctiva and sclera clear  ENMT: Moist oral mucosa, no pharyngeal injection or exudates   NECK: Supple   RESPIRATORY: Normal respiratory effort; lungs are clear to auscultation bilaterally  CARDIOVASCULAR: Regular rate and rhythm, normal S1 and S2, no murmur   ABDOMEN: Nontender to palpation, normoactive bowel sounds   MUSCULOSKELETAL: no clubbing or cyanosis of digits; no joint swelling or tenderness to palpation  PSYCH: A+O to person, place, and time; affect appropriate  NEUROLOGY: no gross sensory deficits   SKIN: No rashes  Tyler Edmonds  [facility]  CARDIOLOGY [address] [facility]  Scheduled Appointment: 12/11/2023     Tyler Edmonds  [facility]  CARDIOLOGY [address] [facility]  Scheduled Appointment: 12/11/2023     Tyler Edmonds  [facility]  [facility]OLOGY [address] [facility]  Scheduled Appointment: 12/11/2023    [facility]  [address]  Scheduled Appointment: 12/18/2023     Tyler Edmonds  [facility]  [facility] [address] [facility]  Scheduled Appointment: 12/11/2023    [facility]  [address]  Scheduled Appointment: 12/18/2023

## 2023-12-05 ENCOUNTER — RX RENEWAL (OUTPATIENT)
Age: 83
End: 2023-12-05

## 2024-01-02 RX ORDER — RANOLAZINE 500 MG/1
500 TABLET, EXTENDED RELEASE ORAL
Qty: 180 | Refills: 3 | Status: ACTIVE | COMMUNITY
Start: 2022-10-04 | End: 1900-01-01

## 2024-01-17 RX ORDER — HYDRALAZINE HYDROCHLORIDE 100 MG/1
100 TABLET ORAL
Qty: 270 | Refills: 3 | Status: ACTIVE | COMMUNITY
Start: 2018-09-10 | End: 1900-01-01

## 2024-01-17 NOTE — ED ADULT NURSE NOTE - NS ED PATIENT SAFETY CONCERN
Problem: Pain - Adult  Goal: Verbalizes/displays adequate comfort level or baseline comfort level  Outcome: Progressing     Problem: Discharge Planning  Goal: Discharge to home or other facility with appropriate resources  Outcome: Progressing     Problem: Chronic Conditions and Co-morbidities  Goal: Patient's chronic conditions and co-morbidity symptoms are monitored and maintained or improved  Outcome: Progressing     Problem: Skin  Goal: Decreased wound size/increased tissue granulation at next dressing change  Outcome: Progressing  Goal: Participates in plan/prevention/treatment measures  Outcome: Progressing  Goal: Prevent/manage excess moisture  Outcome: Progressing  Goal: Prevent/minimize sheer/friction injuries  Outcome: Progressing  Goal: Promote/optimize nutrition  Outcome: Progressing  Goal: Promote skin healing  Outcome: Progressing     Problem: Fall/Injury  Goal: Verbalize understanding of personal risk factors for fall in the hospital  Outcome: Progressing  Goal: Verbalize understanding of risk factor reduction measures to prevent injury from fall in the home  Outcome: Progressing  Goal: Use assistive devices by end of the shift  Outcome: Progressing  Goal: Pace activities to prevent fatigue by end of the shift  Outcome: Progressing     Problem: Diabetes  Goal: Achieve decreasing blood glucose levels by end of shift  Outcome: Progressing  Goal: Increase stability of blood glucose readings by end of shift  Outcome: Progressing  Goal: Decrease in ketones present in urine by end of shift  Outcome: Progressing  Goal: Maintain electrolyte levels within acceptable range throughout shift  Outcome: Progressing  Goal: Maintain glucose levels >70mg/dl to <250mg/dl throughout shift  Outcome: Progressing  Goal: No changes in neurological exam by end of shift  Outcome: Progressing  Goal: Learn about and adhere to nutrition recommendations by end of shift  Outcome: Progressing  Goal: Vital signs within normal  range for age by end of shift  Outcome: Progressing  Goal: Increase self care and/or family involovement by end of shift  Outcome: Progressing  Goal: Receive DSME education by end of shift  Outcome: Progressing     Problem: Nutrition  Goal: Less than 5 days NPO/clear liquids  Outcome: Progressing  Goal: Oral intake greater 75%  Outcome: Progressing  Goal: Consume prescribed supplement  Outcome: Progressing  Goal: Adequate PO fluid intake  Outcome: Progressing  Goal: Nutrition support goals are met within 48 hrs  Outcome: Progressing  Goal: Nutrition support is meeting 75% of nutrient needs  Outcome: Progressing  Goal: BG  mg/dL  Outcome: Progressing  Goal: Lab values WNL  Outcome: Progressing  Goal: Electrolytes WNL  Outcome: Progressing  Goal: Promote healing  Outcome: Progressing  Goal: Maintain stable weight  Outcome: Progressing  Goal: Reduce weight from edema/fluid  Outcome: Progressing  Goal: Gradual weight gain  Outcome: Progressing  Goal: Improve ostomy output  Outcome: Progressing   The patient's goals for the shift include defer    The clinical goals for the shift include Patient will be HD stable this shift and maintain vial signs WNL.Patient is stable post IR Lumbar Puncture.   No

## 2024-03-01 ENCOUNTER — NON-APPOINTMENT (OUTPATIENT)
Age: 84
End: 2024-03-01

## 2024-03-01 ENCOUNTER — APPOINTMENT (OUTPATIENT)
Dept: CARDIOLOGY | Facility: CLINIC | Age: 84
End: 2024-03-01
Payer: MEDICARE

## 2024-03-01 VITALS
OXYGEN SATURATION: 97 % | DIASTOLIC BLOOD PRESSURE: 68 MMHG | HEART RATE: 71 BPM | SYSTOLIC BLOOD PRESSURE: 182 MMHG | HEIGHT: 69 IN

## 2024-03-01 DIAGNOSIS — I25.10 ATHEROSCLEROTIC HEART DISEASE OF NATIVE CORONARY ARTERY W/OUT ANGINA PECTORIS: ICD-10-CM

## 2024-03-01 PROCEDURE — G2211 COMPLEX E/M VISIT ADD ON: CPT

## 2024-03-01 PROCEDURE — 99214 OFFICE O/P EST MOD 30 MIN: CPT

## 2024-03-01 RX ORDER — SPIRONOLACTONE 25 MG/1
25 TABLET ORAL
Qty: 30 | Refills: 0 | Status: ACTIVE | COMMUNITY
Start: 2024-03-01 | End: 1900-01-01

## 2024-03-01 NOTE — HISTORY OF PRESENT ILLNESS
[FreeTextEntry1] : 3/1/2024  Doing ok  No CP or SOB He resumed plavix he is also on aspirin  no changes to his meds He is active. Walking ok  Hip feels fine, no pains No blood in the stool Stopped taking iron - consitpation  BP at home is 145-150 systolic   11/10/2023  Admitted Oct 14 - Discuarhged Oct 17th  started aspirin  Oct 18th No blood in the stool  No chest pain  Breathing is ok  his BP at home is 145/70 .  Sometimes 150, sometimes 135 eating and drinking ok  Seeing GI in the office.   Average is 140    Discharge Medications Actos 30 mg oral tablet: 1 tab(s) orally once a day allopurinol 100 mg oral tablet: 1 tab(s) orally once a day amLODIPine 10 mg oral tablet: 1 tab(s) orally once a day atorvastatin 40 mg oral tablet: 1 tab(s) orally once a day carvedilol 25 mg oral tablet: 1 tab(s) orally 2 times a day docusate sodium 100 mg oral capsule: 1 cap(s) orally 3 times a day ezetimibe 10 mg oral tablet: 1 tab(s) orally once a day fenofibrate 48 mg oral tablet: 1 tab(s) orally once a day glipiZIDE 10 mg oral tablet: 1 tab(s) orally once a day Jardiance 10 mg oral tablet: 1 tab(s) orally once a day (in the morning) Propecia 1 mg oral tablet: 1 tab(s) orally once a day ranolazine 500 mg oral tablet, extended release: 1 tab(s) orally once a day repaglinide 1 mg oral tablet: 1 tab(s) orally 2 times a day before meals Aspirin 81mg daily   10/13/2023  Admitted 10/10-10/12 due to bright red blood in stool suspected to have diverticular bleed.  Colonoscopy: Impression:    - Moderate diverticulosis in the descending colon, in the transverse colon                       and in the ascending colon, likely soure of patient's prior bleeding                      - Non-bleeding external hemorrhoids.                      - No specimens collected. Recommendation:      - Return patient to hospital [facility]ott for ongoing care.                      - Advance diet as tolerated                      - Would transition to monotherapy for history of cardiac stents if possible.                       Defer to primary team and cardiology  No further blood in stool today.  Patient was transfused 1 unit for PRBC, Hgb 6.9. Last Hgb 7.8. Cr. was elevated to 2.7  Denies C/P or SOB. No LE pain or edema.   ASA and Plavix was held during admission.   He also had a hip replacement was in September.   Medications:  Actos  allopurinol  aspirin 81 mg oral delayed release tablet: 1 tab(s) orally every other day (has not resumed yet) atorvastatin 40 mg oral tablet: 1 tab(s) orally once a day carvedilol 25 mg oral tablet: 1 tab(s) orally 2 times a day ezetimibe 10 mg oral tablet: 1 tab(s) orally once a day fenofibrate 48 mg oral tablet: 1 tab(s) orally once a day glipiZIDE 1 hydrALAZINE 100 mg oral tablet: 1 tab(s) orally 3 times a day Jardiance  Norvasc 10 mg oral tablet: 1 tab(s) orally once a day at 6pm omega-3 polyunsaturated fatty acids 1200 mg oral capsule: 1 cap(s) orally once a day Propecia  ranolazine 500 mg oral tablet, extended release: 1 tab(s) orally 2 times a day repaglinide  trospium  7/14/2023 He is planning on having a R hip replacement August 10th [facility] Dr. Alexi Venegas Fax:  [phone] no cp or angina  Meds  Ranexa 500mg BID Jardiance ASA 81 every other day  Actos Glipizide Repaglinide Amlodipine 10  Plavix 75 Atorava 40 Coreg 25mg BID  Hydral 100mg TID flomax 0.4 finasteride Zetia Fenofibrate Allopurinol  1/13/2023 Was seen by urologist 2nd opinion - Tom Bearden -no Urolift needed NO CP or SOB wants to travel to [facility] in a few months.     Ranexa Jardiance ASA 81 every other day  Actos Glipizide Repaglinide Amlodipine 10  Plavix 75 Atorava 40 Coreg 25mg BID  Hydral 100mg TID  Lasix 20mg  flomax 0.4 finasteride Zetia Fenofibrate Allopurinol   10/2022 Doing ok Has plans for urolift, but not yet scheduled     Meds:     Cardiac Hx: 3/16 Cath - RCA PCI with FOREST x 3. LAD 70% --but nuc stress without anterior ischemia.  5/16 NST - Basal inf. / lateral ischemia 5/16 Echo - Normal LV, RV, EF  6/16 Renal Duplex - No GATITO 3/2022- Echo- normal LV and RV function, nl EF 8/2022- C  LAD- There is a 70 % stenosis.   Cx- Angiography shows moderate atherosclerosis.   RCA- is a 40 % stenosis.    Allergies: ACE/ARB allergy noted

## 2024-03-01 NOTE — ASSESSMENT
[FreeTextEntry1] : Assessment: 1. CAD s/p RCA FOREST x 3 and unrevascularized 70% mLAD (2016 NST without anterior ischemia). C 9/2022 with mLAD 70%, un-revascularized 2. HTN / HLD / DM 3. 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 Recent admission for acute anemia and GI bleeding 5. CKD stage 3-4  Plan:    1.  He is on aspirin and plavix, he can stop one of those agents, he will stop aspirin  2   Watch for GI bleeding 3  Montior BP at home- his home Blood pressures are 140/75 4.  Continue amloidpine, ranolazine, coreg, atorvastatin , hyrdralazine 5.  RTC in 3 months.  6. He had labwork yesterday with his PCP  7.  Diet and exercise 8.  Eliminate salt from the diet.  9.  To see Dr. Escamilla next month, check renal function and see if he would benefit from a diuretic

## 2024-03-01 NOTE — PHYSICAL EXAM
[General Appearance - Well Developed] : well developed [Heart Rate And Rhythm] : heart rate and rhythm were normal [Heart Sounds] : normal S1 and S2 [Abdomen Soft] : soft [Bowel Sounds] : normal bowel sounds [Abdomen Tenderness] : non-tender [Abnormal Walk] : normal gait [Nail Clubbing] : no clubbing of the fingernails [Cyanosis, Localized] : no localized cyanosis [] : no rash [Skin Color & Pigmentation] : normal skin color and pigmentation [Skin Lesions] : no skin lesions [No Venous Stasis] : no venous stasis [No Skin Ulcers] : no skin ulcer [No Xanthoma] : no  xanthoma was observed [Oriented To Time, Place, And Person] : oriented to person, place, and time [Affect] : the affect was normal [Mood] : the mood was normal [No Anxiety] : not feeling anxious [FreeTextEntry1] : mild LE edema, bilaterally

## 2024-03-07 DIAGNOSIS — Z00.00 ENCOUNTER FOR GENERAL ADULT MEDICAL EXAMINATION W/OUT ABNORMAL FINDINGS: ICD-10-CM

## 2024-03-07 LAB
ALBUMIN SERPL ELPH-MCNC: 4.1 G/DL
ALP BLD-CCNC: 56 U/L
ALT SERPL-CCNC: 17 U/L
ANION GAP SERPL CALC-SCNC: 12 MMOL/L
AST SERPL-CCNC: 25 U/L
BILIRUB SERPL-MCNC: 0.4 MG/DL
BUN SERPL-MCNC: 31 MG/DL
CALCIUM SERPL-MCNC: 10.7 MG/DL
CHLORIDE SERPL-SCNC: 107 MMOL/L
CO2 SERPL-SCNC: 22 MMOL/L
CREAT SERPL-MCNC: 2.57 MG/DL
EGFR: 24 ML/MIN/1.73M2
GLUCOSE SERPL-MCNC: 102 MG/DL
POTASSIUM SERPL-SCNC: 4.7 MMOL/L
PROT SERPL-MCNC: 6.3 G/DL
SODIUM SERPL-SCNC: 141 MMOL/L

## 2024-03-17 ENCOUNTER — INPATIENT (INPATIENT)
Facility: HOSPITAL | Age: 84
LOS: 2 days | Discharge: HOME | DRG: 639 | End: 2024-03-20
Attending: HOSPITALIST | Admitting: HOSPITALIST
Payer: MEDICARE

## 2024-03-17 VITALS
RESPIRATION RATE: 18 BRPM | SYSTOLIC BLOOD PRESSURE: 130 MMHG | HEART RATE: 67 BPM | DIASTOLIC BLOOD PRESSURE: 65 MMHG | OXYGEN SATURATION: 100 % | TEMPERATURE: 99 F

## 2024-03-17 DIAGNOSIS — H26.9 UNSPECIFIED CATARACT: Chronic | ICD-10-CM

## 2024-03-17 DIAGNOSIS — Q76.1 KLIPPEL-FEIL SYNDROME: Chronic | ICD-10-CM

## 2024-03-17 DIAGNOSIS — Z98.89 OTHER SPECIFIED POSTPROCEDURAL STATES: Chronic | ICD-10-CM

## 2024-03-17 DIAGNOSIS — M65.30 TRIGGER FINGER, UNSPECIFIED FINGER: Chronic | ICD-10-CM

## 2024-03-17 DIAGNOSIS — M75.101 UNSPECIFIED ROTATOR CUFF TEAR OR RUPTURE OF RIGHT SHOULDER, NOT SPECIFIED AS TRAUMATIC: Chronic | ICD-10-CM

## 2024-03-17 PROCEDURE — 99285 EMERGENCY DEPT VISIT HI MDM: CPT

## 2024-03-17 PROCEDURE — 99053 MED SERV 10PM-8AM 24 HR FAC: CPT

## 2024-03-17 NOTE — ED ADULT NURSE NOTE - OBJECTIVE STATEMENT
84y male w/ pmh of DM, HTN, HLD, CKD presents with hypoglycemia. Pt BIBEMS accompanied by son. EMS states pt was AOx1 on arrival to home of pt and blood glucose was 38. EMS states IVP dextrose was administered which brought his glucose up to 238. On arrival to ED, pt is AOx4 and . Pt states he was feeling unwell last night with "flu like symptoms" endorsing fatigue. Pt states he had a piece of chocolate cake about an hour prior to this episode that brought him to the hospital. Pt states he remembers feeling sweaty prior to hypoglycemic event. Pt states he does not take insulin. Pt denies fever, chills, cough , chest pain, shortness of breath, nausea, vomiting, diarrhea, decreased PO intake, or urinary Symptoms.

## 2024-03-18 DIAGNOSIS — M10.9 GOUT, UNSPECIFIED: ICD-10-CM

## 2024-03-18 DIAGNOSIS — I25.10 ATHEROSCLEROTIC HEART DISEASE OF NATIVE CORONARY ARTERY WITHOUT ANGINA PECTORIS: ICD-10-CM

## 2024-03-18 DIAGNOSIS — I10 ESSENTIAL (PRIMARY) HYPERTENSION: ICD-10-CM

## 2024-03-18 DIAGNOSIS — E16.2 HYPOGLYCEMIA, UNSPECIFIED: ICD-10-CM

## 2024-03-18 DIAGNOSIS — E11.9 TYPE 2 DIABETES MELLITUS WITHOUT COMPLICATIONS: ICD-10-CM

## 2024-03-18 DIAGNOSIS — N18.30 CHRONIC KIDNEY DISEASE, STAGE 3 UNSPECIFIED: ICD-10-CM

## 2024-03-18 LAB
A1C WITH ESTIMATED AVERAGE GLUCOSE RESULT: 6.3 % — HIGH (ref 4–5.6)
ALBUMIN SERPL ELPH-MCNC: 3.8 G/DL — SIGNIFICANT CHANGE UP (ref 3.3–5)
ALBUMIN SERPL ELPH-MCNC: 4.1 G/DL
ALP BLD-CCNC: 53 U/L
ALP SERPL-CCNC: 45 U/L — SIGNIFICANT CHANGE UP (ref 40–120)
ALT FLD-CCNC: 17 U/L — SIGNIFICANT CHANGE UP (ref 10–45)
ALT SERPL-CCNC: 17 U/L
ANION GAP SERPL CALC-SCNC: 11 MMOL/L
ANION GAP SERPL CALC-SCNC: 11 MMOL/L — SIGNIFICANT CHANGE UP (ref 5–17)
ANION GAP SERPL CALC-SCNC: 8 MMOL/L — SIGNIFICANT CHANGE UP (ref 5–17)
ANISOCYTOSIS BLD QL: SLIGHT — SIGNIFICANT CHANGE UP
APPEARANCE UR: CLEAR — SIGNIFICANT CHANGE UP
AST SERPL-CCNC: 24 U/L
AST SERPL-CCNC: 25 U/L — SIGNIFICANT CHANGE UP (ref 10–40)
B-OH-BUTYR SERPL-SCNC: 0 MMOL/L — SIGNIFICANT CHANGE UP
BACTERIA # UR AUTO: NEGATIVE /HPF — SIGNIFICANT CHANGE UP
BASE EXCESS BLDV CALC-SCNC: -2.5 MMOL/L — LOW (ref -2–3)
BASOPHILS # BLD AUTO: 0.07 K/UL — SIGNIFICANT CHANGE UP (ref 0–0.2)
BASOPHILS NFR BLD AUTO: 0.9 % — SIGNIFICANT CHANGE UP (ref 0–2)
BILIRUB SERPL-MCNC: 0.4 MG/DL
BILIRUB SERPL-MCNC: 0.5 MG/DL — SIGNIFICANT CHANGE UP (ref 0.2–1.2)
BILIRUB UR-MCNC: NEGATIVE — SIGNIFICANT CHANGE UP
BUN SERPL-MCNC: 38 MG/DL
BUN SERPL-MCNC: 43 MG/DL — HIGH (ref 7–23)
BUN SERPL-MCNC: 47 MG/DL — HIGH (ref 7–23)
CA-I SERPL-SCNC: 1.5 MMOL/L — HIGH (ref 1.15–1.33)
CALCIUM SERPL-MCNC: 10.5 MG/DL
CALCIUM SERPL-MCNC: 10.5 MG/DL — SIGNIFICANT CHANGE UP (ref 8.4–10.5)
CALCIUM SERPL-MCNC: 10.7 MG/DL — HIGH (ref 8.4–10.5)
CAST: 4 /LPF — SIGNIFICANT CHANGE UP (ref 0–4)
CHLORIDE BLDV-SCNC: 104 MMOL/L — SIGNIFICANT CHANGE UP (ref 96–108)
CHLORIDE SERPL-SCNC: 104 MMOL/L — SIGNIFICANT CHANGE UP (ref 96–108)
CHLORIDE SERPL-SCNC: 105 MMOL/L
CHLORIDE SERPL-SCNC: 106 MMOL/L — SIGNIFICANT CHANGE UP (ref 96–108)
CO2 BLDV-SCNC: 26 MMOL/L — SIGNIFICANT CHANGE UP (ref 22–26)
CO2 SERPL-SCNC: 22 MMOL/L
CO2 SERPL-SCNC: 22 MMOL/L — SIGNIFICANT CHANGE UP (ref 22–31)
CO2 SERPL-SCNC: 23 MMOL/L — SIGNIFICANT CHANGE UP (ref 22–31)
COLOR SPEC: YELLOW — SIGNIFICANT CHANGE UP
CREAT SERPL-MCNC: 2.64 MG/DL
CREAT SERPL-MCNC: 2.85 MG/DL — HIGH (ref 0.5–1.3)
CREAT SERPL-MCNC: 2.89 MG/DL — HIGH (ref 0.5–1.3)
CULTURE RESULTS: SIGNIFICANT CHANGE UP
DIFF PNL FLD: NEGATIVE — SIGNIFICANT CHANGE UP
EGFR: 21 ML/MIN/1.73M2 — LOW
EGFR: 21 ML/MIN/1.73M2 — LOW
EGFR: 23 ML/MIN/1.73M2
EOSINOPHIL # BLD AUTO: 0 K/UL — SIGNIFICANT CHANGE UP (ref 0–0.5)
EOSINOPHIL NFR BLD AUTO: 0 % — SIGNIFICANT CHANGE UP (ref 0–6)
ESTIMATED AVERAGE GLUCOSE: 134 MG/DL — HIGH (ref 68–114)
FLUAV AG NPH QL: SIGNIFICANT CHANGE UP
FLUBV AG NPH QL: SIGNIFICANT CHANGE UP
GAS PNL BLDV: 135 MMOL/L — LOW (ref 136–145)
GAS PNL BLDV: SIGNIFICANT CHANGE UP
GAS PNL BLDV: SIGNIFICANT CHANGE UP
GIANT PLATELETS BLD QL SMEAR: PRESENT — SIGNIFICANT CHANGE UP
GLUCOSE BLDC GLUCOMTR-MCNC: 107 MG/DL — HIGH (ref 70–99)
GLUCOSE BLDC GLUCOMTR-MCNC: 140 MG/DL — HIGH (ref 70–99)
GLUCOSE BLDC GLUCOMTR-MCNC: 142 MG/DL — HIGH (ref 70–99)
GLUCOSE BLDC GLUCOMTR-MCNC: 56 MG/DL — LOW (ref 70–99)
GLUCOSE BLDC GLUCOMTR-MCNC: 62 MG/DL — LOW (ref 70–99)
GLUCOSE BLDC GLUCOMTR-MCNC: 78 MG/DL — SIGNIFICANT CHANGE UP (ref 70–99)
GLUCOSE BLDC GLUCOMTR-MCNC: 88 MG/DL — SIGNIFICANT CHANGE UP (ref 70–99)
GLUCOSE BLDC GLUCOMTR-MCNC: 89 MG/DL — SIGNIFICANT CHANGE UP (ref 70–99)
GLUCOSE BLDC GLUCOMTR-MCNC: 93 MG/DL — SIGNIFICANT CHANGE UP (ref 70–99)
GLUCOSE BLDC GLUCOMTR-MCNC: 95 MG/DL — SIGNIFICANT CHANGE UP (ref 70–99)
GLUCOSE BLDV-MCNC: 122 MG/DL — HIGH (ref 70–99)
GLUCOSE SERPL-MCNC: 120 MG/DL — HIGH (ref 70–99)
GLUCOSE SERPL-MCNC: 75 MG/DL
GLUCOSE SERPL-MCNC: 91 MG/DL — SIGNIFICANT CHANGE UP (ref 70–99)
GLUCOSE UR QL: >=1000 MG/DL
HCO3 BLDV-SCNC: 24 MMOL/L — SIGNIFICANT CHANGE UP (ref 22–29)
HCT VFR BLD CALC: 32.7 % — LOW (ref 39–50)
HCT VFR BLDA CALC: 35 % — LOW (ref 39–51)
HGB BLD CALC-MCNC: 11.6 G/DL — LOW (ref 12.6–17.4)
HGB BLD-MCNC: 10.4 G/DL — LOW (ref 13–17)
KETONES UR-MCNC: NEGATIVE MG/DL — SIGNIFICANT CHANGE UP
LACTATE BLDV-MCNC: 1.8 MMOL/L — SIGNIFICANT CHANGE UP (ref 0.5–2)
LEUKOCYTE ESTERASE UR-ACNC: NEGATIVE — SIGNIFICANT CHANGE UP
LYMPHOCYTES # BLD AUTO: 0.72 K/UL — LOW (ref 1–3.3)
LYMPHOCYTES # BLD AUTO: 9.6 % — LOW (ref 13–44)
MAGNESIUM SERPL-MCNC: 2.2 MG/DL — SIGNIFICANT CHANGE UP (ref 1.6–2.6)
MANUAL SMEAR VERIFICATION: SIGNIFICANT CHANGE UP
MCHC RBC-ENTMCNC: 31.2 PG — SIGNIFICANT CHANGE UP (ref 27–34)
MCHC RBC-ENTMCNC: 31.8 GM/DL — LOW (ref 32–36)
MCV RBC AUTO: 98.2 FL — SIGNIFICANT CHANGE UP (ref 80–100)
MONOCYTES # BLD AUTO: 0.2 K/UL — SIGNIFICANT CHANGE UP (ref 0–0.9)
MONOCYTES NFR BLD AUTO: 2.6 % — SIGNIFICANT CHANGE UP (ref 2–14)
NEUTROPHILS # BLD AUTO: 6.56 K/UL — SIGNIFICANT CHANGE UP (ref 1.8–7.4)
NEUTROPHILS NFR BLD AUTO: 81.7 % — HIGH (ref 43–77)
NEUTS BAND # BLD: 5.2 % — SIGNIFICANT CHANGE UP (ref 0–8)
NITRITE UR-MCNC: NEGATIVE — SIGNIFICANT CHANGE UP
NT-PROBNP SERPL-SCNC: 4539 PG/ML — HIGH (ref 0–300)
PCO2 BLDV: 51 MMHG — SIGNIFICANT CHANGE UP (ref 42–55)
PH BLDV: 7.29 — LOW (ref 7.32–7.43)
PH UR: 5 — SIGNIFICANT CHANGE UP (ref 5–8)
PHOSPHATE SERPL-MCNC: 2.4 MG/DL — LOW (ref 2.5–4.5)
PLAT MORPH BLD: NORMAL — SIGNIFICANT CHANGE UP
PLATELET # BLD AUTO: 148 K/UL — LOW (ref 150–400)
PLATELET COUNT - ESTIMATE: ABNORMAL
PO2 BLDV: 28 MMHG — SIGNIFICANT CHANGE UP (ref 25–45)
POTASSIUM BLDV-SCNC: 4.1 MMOL/L — SIGNIFICANT CHANGE UP (ref 3.5–5.1)
POTASSIUM SERPL-MCNC: 3.9 MMOL/L — SIGNIFICANT CHANGE UP (ref 3.5–5.3)
POTASSIUM SERPL-MCNC: 4.2 MMOL/L — SIGNIFICANT CHANGE UP (ref 3.5–5.3)
POTASSIUM SERPL-SCNC: 3.9 MMOL/L — SIGNIFICANT CHANGE UP (ref 3.5–5.3)
POTASSIUM SERPL-SCNC: 4.2 MMOL/L — SIGNIFICANT CHANGE UP (ref 3.5–5.3)
POTASSIUM SERPL-SCNC: 4.5 MMOL/L
PROT SERPL-MCNC: 6.3 G/DL
PROT SERPL-MCNC: 6.9 G/DL — SIGNIFICANT CHANGE UP (ref 6–8.3)
PROT UR-MCNC: 100 MG/DL
RBC # BLD: 3.33 M/UL — LOW (ref 4.2–5.8)
RBC # FLD: 15.6 % — HIGH (ref 10.3–14.5)
RBC BLD AUTO: SIGNIFICANT CHANGE UP
RBC CASTS # UR COMP ASSIST: 2 /HPF — SIGNIFICANT CHANGE UP (ref 0–4)
RSV RNA NPH QL NAA+NON-PROBE: SIGNIFICANT CHANGE UP
SAO2 % BLDV: 39.1 % — LOW (ref 67–88)
SARS-COV-2 RNA SPEC QL NAA+PROBE: SIGNIFICANT CHANGE UP
SODIUM SERPL-SCNC: 137 MMOL/L — SIGNIFICANT CHANGE UP (ref 135–145)
SODIUM SERPL-SCNC: 137 MMOL/L — SIGNIFICANT CHANGE UP (ref 135–145)
SODIUM SERPL-SCNC: 139 MMOL/L
SP GR SPEC: 1.02 — SIGNIFICANT CHANGE UP (ref 1–1.03)
SPECIMEN SOURCE: SIGNIFICANT CHANGE UP
SQUAMOUS # UR AUTO: 0 /HPF — SIGNIFICANT CHANGE UP (ref 0–5)
TROPONIN T, HIGH SENSITIVITY RESULT: 55 NG/L — HIGH (ref 0–51)
TROPONIN T, HIGH SENSITIVITY RESULT: 57 NG/L — HIGH (ref 0–51)
TROPONIN T, HIGH SENSITIVITY RESULT: 62 NG/L — HIGH (ref 0–51)
UROBILINOGEN FLD QL: 0.2 MG/DL — SIGNIFICANT CHANGE UP (ref 0.2–1)
WBC # BLD: 7.55 K/UL — SIGNIFICANT CHANGE UP (ref 3.8–10.5)
WBC # FLD AUTO: 7.55 K/UL — SIGNIFICANT CHANGE UP (ref 3.8–10.5)
WBC UR QL: 0 /HPF — SIGNIFICANT CHANGE UP (ref 0–5)

## 2024-03-18 PROCEDURE — 99222 1ST HOSP IP/OBS MODERATE 55: CPT

## 2024-03-18 PROCEDURE — 99497 ADVNCD CARE PLAN 30 MIN: CPT | Mod: 25

## 2024-03-18 PROCEDURE — 71045 X-RAY EXAM CHEST 1 VIEW: CPT | Mod: 26

## 2024-03-18 RX ORDER — ALLOPURINOL 300 MG
100 TABLET ORAL DAILY
Refills: 0 | Status: DISCONTINUED | OUTPATIENT
Start: 2024-03-18 | End: 2024-03-20

## 2024-03-18 RX ORDER — RANOLAZINE 500 MG/1
1 TABLET, FILM COATED, EXTENDED RELEASE ORAL
Refills: 0 | DISCHARGE

## 2024-03-18 RX ORDER — DEXTROSE 50 % IN WATER 50 %
15 SYRINGE (ML) INTRAVENOUS ONCE
Refills: 0 | Status: DISCONTINUED | OUTPATIENT
Start: 2024-03-18 | End: 2024-03-20

## 2024-03-18 RX ORDER — CLOPIDOGREL BISULFATE 75 MG/1
75 TABLET, FILM COATED ORAL DAILY
Refills: 0 | Status: DISCONTINUED | OUTPATIENT
Start: 2024-03-18 | End: 2024-03-20

## 2024-03-18 RX ORDER — LANOLIN ALCOHOL/MO/W.PET/CERES
3 CREAM (GRAM) TOPICAL AT BEDTIME
Refills: 0 | Status: DISCONTINUED | OUTPATIENT
Start: 2024-03-18 | End: 2024-03-20

## 2024-03-18 RX ORDER — DEXTROSE 50 % IN WATER 50 %
12.5 SYRINGE (ML) INTRAVENOUS ONCE
Refills: 0 | Status: DISCONTINUED | OUTPATIENT
Start: 2024-03-18 | End: 2024-03-20

## 2024-03-18 RX ORDER — RANOLAZINE 500 MG/1
500 TABLET, FILM COATED, EXTENDED RELEASE ORAL
Refills: 0 | Status: DISCONTINUED | OUTPATIENT
Start: 2024-03-18 | End: 2024-03-20

## 2024-03-18 RX ORDER — DEXTROSE 50 % IN WATER 50 %
25 SYRINGE (ML) INTRAVENOUS ONCE
Refills: 0 | Status: DISCONTINUED | OUTPATIENT
Start: 2024-03-18 | End: 2024-03-20

## 2024-03-18 RX ORDER — HYDRALAZINE HCL 50 MG
100 TABLET ORAL EVERY 8 HOURS
Refills: 0 | Status: DISCONTINUED | OUTPATIENT
Start: 2024-03-18 | End: 2024-03-20

## 2024-03-18 RX ORDER — ACETAMINOPHEN 500 MG
650 TABLET ORAL EVERY 6 HOURS
Refills: 0 | Status: DISCONTINUED | OUTPATIENT
Start: 2024-03-18 | End: 2024-03-20

## 2024-03-18 RX ORDER — EMPAGLIFLOZIN 10 MG/1
1 TABLET, FILM COATED ORAL
Qty: 0 | Refills: 0 | DISCHARGE

## 2024-03-18 RX ORDER — INSULIN LISPRO 100/ML
VIAL (ML) SUBCUTANEOUS
Refills: 0 | Status: DISCONTINUED | OUTPATIENT
Start: 2024-03-18 | End: 2024-03-18

## 2024-03-18 RX ORDER — SODIUM CHLORIDE 9 MG/ML
1000 INJECTION, SOLUTION INTRAVENOUS
Refills: 0 | Status: DISCONTINUED | OUTPATIENT
Start: 2024-03-18 | End: 2024-03-20

## 2024-03-18 RX ORDER — EZETIMIBE 10 MG/1
1 TABLET ORAL
Qty: 0 | Refills: 0 | DISCHARGE

## 2024-03-18 RX ORDER — AMLODIPINE BESYLATE 2.5 MG/1
10 TABLET ORAL DAILY
Refills: 0 | Status: DISCONTINUED | OUTPATIENT
Start: 2024-03-18 | End: 2024-03-20

## 2024-03-18 RX ORDER — DEXTROSE 50 % IN WATER 50 %
12.5 SYRINGE (ML) INTRAVENOUS ONCE
Refills: 0 | Status: COMPLETED | OUTPATIENT
Start: 2024-03-18 | End: 2024-03-18

## 2024-03-18 RX ORDER — FINASTERIDE 5 MG/1
1 TABLET, FILM COATED ORAL
Qty: 0 | Refills: 0 | DISCHARGE

## 2024-03-18 RX ORDER — HYDRALAZINE HCL 50 MG
1 TABLET ORAL
Refills: 0 | DISCHARGE

## 2024-03-18 RX ORDER — PIOGLITAZONE HYDROCHLORIDE 15 MG/1
1 TABLET ORAL
Qty: 0 | Refills: 0 | DISCHARGE

## 2024-03-18 RX ORDER — INSULIN LISPRO 100/ML
VIAL (ML) SUBCUTANEOUS
Refills: 0 | Status: DISCONTINUED | OUTPATIENT
Start: 2024-03-18 | End: 2024-03-20

## 2024-03-18 RX ORDER — HEPARIN SODIUM 5000 [USP'U]/ML
5000 INJECTION INTRAVENOUS; SUBCUTANEOUS EVERY 8 HOURS
Refills: 0 | Status: DISCONTINUED | OUTPATIENT
Start: 2024-03-18 | End: 2024-03-20

## 2024-03-18 RX ORDER — CARVEDILOL PHOSPHATE 80 MG/1
1 CAPSULE, EXTENDED RELEASE ORAL
Qty: 0 | Refills: 0 | DISCHARGE

## 2024-03-18 RX ORDER — DOCUSATE SODIUM 100 MG
1 CAPSULE ORAL
Refills: 0 | DISCHARGE

## 2024-03-18 RX ORDER — FENOFIBRATE,MICRONIZED 130 MG
1 CAPSULE ORAL
Qty: 0 | Refills: 0 | DISCHARGE

## 2024-03-18 RX ORDER — FENOFIBRATE,MICRONIZED 130 MG
48 CAPSULE ORAL DAILY
Refills: 0 | Status: DISCONTINUED | OUTPATIENT
Start: 2024-03-18 | End: 2024-03-20

## 2024-03-18 RX ORDER — REPAGLINIDE 1 MG/1
1 TABLET ORAL
Refills: 0 | DISCHARGE

## 2024-03-18 RX ORDER — POLYETHYLENE GLYCOL 3350 17 G/17G
17 POWDER, FOR SOLUTION ORAL DAILY
Refills: 0 | Status: DISCONTINUED | OUTPATIENT
Start: 2024-03-18 | End: 2024-03-20

## 2024-03-18 RX ORDER — ONDANSETRON 8 MG/1
4 TABLET, FILM COATED ORAL EVERY 8 HOURS
Refills: 0 | Status: DISCONTINUED | OUTPATIENT
Start: 2024-03-18 | End: 2024-03-20

## 2024-03-18 RX ORDER — ATORVASTATIN CALCIUM 80 MG/1
1 TABLET, FILM COATED ORAL
Qty: 0 | Refills: 0 | DISCHARGE

## 2024-03-18 RX ORDER — SENNA PLUS 8.6 MG/1
2 TABLET ORAL AT BEDTIME
Refills: 0 | Status: DISCONTINUED | OUTPATIENT
Start: 2024-03-18 | End: 2024-03-20

## 2024-03-18 RX ORDER — INSULIN LISPRO 100/ML
VIAL (ML) SUBCUTANEOUS AT BEDTIME
Refills: 0 | Status: DISCONTINUED | OUTPATIENT
Start: 2024-03-18 | End: 2024-03-20

## 2024-03-18 RX ORDER — CLOPIDOGREL BISULFATE 75 MG/1
1 TABLET, FILM COATED ORAL
Refills: 0 | DISCHARGE

## 2024-03-18 RX ORDER — SPIRONOLACTONE 25 MG/1
25 TABLET, FILM COATED ORAL DAILY
Refills: 0 | Status: DISCONTINUED | OUTPATIENT
Start: 2024-03-18 | End: 2024-03-19

## 2024-03-18 RX ORDER — TROSPIUM CHLORIDE 20 MG/1
1 TABLET, FILM COATED ORAL
Refills: 0 | DISCHARGE

## 2024-03-18 RX ORDER — AMLODIPINE BESYLATE 2.5 MG/1
1 TABLET ORAL
Refills: 0 | DISCHARGE

## 2024-03-18 RX ORDER — GLUCAGON INJECTION, SOLUTION 0.5 MG/.1ML
1 INJECTION, SOLUTION SUBCUTANEOUS ONCE
Refills: 0 | Status: DISCONTINUED | OUTPATIENT
Start: 2024-03-18 | End: 2024-03-20

## 2024-03-18 RX ORDER — CARVEDILOL PHOSPHATE 80 MG/1
25 CAPSULE, EXTENDED RELEASE ORAL EVERY 12 HOURS
Refills: 0 | Status: DISCONTINUED | OUTPATIENT
Start: 2024-03-18 | End: 2024-03-20

## 2024-03-18 RX ORDER — ATORVASTATIN CALCIUM 80 MG/1
40 TABLET, FILM COATED ORAL AT BEDTIME
Refills: 0 | Status: DISCONTINUED | OUTPATIENT
Start: 2024-03-18 | End: 2024-03-20

## 2024-03-18 RX ADMIN — HEPARIN SODIUM 5000 UNIT(S): 5000 INJECTION INTRAVENOUS; SUBCUTANEOUS at 15:53

## 2024-03-18 RX ADMIN — CARVEDILOL PHOSPHATE 25 MILLIGRAM(S): 80 CAPSULE, EXTENDED RELEASE ORAL at 18:02

## 2024-03-18 RX ADMIN — CLOPIDOGREL BISULFATE 75 MILLIGRAM(S): 75 TABLET, FILM COATED ORAL at 13:06

## 2024-03-18 RX ADMIN — SENNA PLUS 2 TABLET(S): 8.6 TABLET ORAL at 23:40

## 2024-03-18 RX ADMIN — Medication 100 MILLIGRAM(S): at 13:06

## 2024-03-18 RX ADMIN — ATORVASTATIN CALCIUM 40 MILLIGRAM(S): 80 TABLET, FILM COATED ORAL at 23:39

## 2024-03-18 RX ADMIN — Medication 12.5 GRAM(S): at 18:03

## 2024-03-18 RX ADMIN — Medication 650 MILLIGRAM(S): at 13:06

## 2024-03-18 RX ADMIN — Medication 100 MILLIGRAM(S): at 23:40

## 2024-03-18 RX ADMIN — Medication 48 MILLIGRAM(S): at 14:50

## 2024-03-18 RX ADMIN — Medication 650 MILLIGRAM(S): at 13:17

## 2024-03-18 RX ADMIN — AMLODIPINE BESYLATE 10 MILLIGRAM(S): 2.5 TABLET ORAL at 18:03

## 2024-03-18 RX ADMIN — HEPARIN SODIUM 5000 UNIT(S): 5000 INJECTION INTRAVENOUS; SUBCUTANEOUS at 23:40

## 2024-03-18 RX ADMIN — RANOLAZINE 500 MILLIGRAM(S): 500 TABLET, FILM COATED, EXTENDED RELEASE ORAL at 18:05

## 2024-03-18 RX ADMIN — Medication 100 MILLIGRAM(S): at 13:07

## 2024-03-18 NOTE — H&P ADULT - PROBLEM SELECTOR PLAN 5
Baseline Cr likely 3. Proteinuria on UA on admission. Unclear why not on ACE/ARB, may have had hyperkalemia as an outpatient.  - Avoid nephrotoxins, renally dose as appropriate   - No indications for HD at this time

## 2024-03-18 NOTE — ED ADULT NURSE REASSESSMENT NOTE - NS ED NURSE REASSESS COMMENT FT1
At 7am pt awake Son at bedside Responds approp to verbal and tactile stimuli Resp even and nonlab Admitted. FS done.

## 2024-03-18 NOTE — ED PROVIDER NOTE - PHYSICAL EXAMINATION
GENERAL: Awake, alert, appears fatigued but nontoxic   HEAD: NC/AT, neck supple, moist mucous membranes  EYES: PERRL, EOM grossly intact, sclera anicteric  LUNGS: normal WOB on RA, CTAB, no wheezes or crackles   CARDIAC: RRR, no m/r/g  ABDOMEN: Soft, non tender, non distended, no rebound, no guarding  BACK: No midline spinal tenderness, no CVA tenderness  EXT: No edema, no calf tenderness, no deformities.  NEURO: A&Ox3. Moving all extremities.  SKIN: Warm and dry. No rash.  PSYCH: Normal affect.

## 2024-03-18 NOTE — H&P ADULT - CONVERSATION DETAILS
Assigned HCP, son, Shiva Schmitz ([phone]), who works as an RN   Patient stated wish for FULL CODE

## 2024-03-18 NOTE — ED PROVIDER NOTE - ATTENDING CONTRIBUTION TO CARE
I, Devan Jefferson MD, Emergency Medicine Attending Physician, personally saw and examined the patient and I personally made/approve the management plan and take responsibility for the patient management.    MDM: 84-year-old male with history of CAD s/p FOREST x 4 in 2016, CKD 4, hypertension, diabetes, GI bleed, who was brought in by EMS for AMS with hypoglycemia.  Patient felt unwell yesterday which self resolved, but then around 5 PM today felt generalized weakness, diaphoretic, confused, blurry vision, found to have fingerstick of 33 by EMS who gave him 1 amp of D50, improved's fingerstick to 220s, and significant improved patient's symptoms.  Patient denies any N/V/D/C or decreased p.o. intake.  He denies any medication changes or taking extra doses of his medications.    ROS: denies trauma, fever, chills, chest pain, shortness of breath, abdominal pain, nausea, vomiting, headache, numbness, focal weakness, slurred speech, current visual changes    On examination, patient with stable vitals, NAD. Cardiac examination RRR, lungs CTAB with no W/W/R, abdomen soft and nontender, nondistended, no peritoneal signs.  There is no calf tenderness or leg swelling. Negative Lynda's sign.  NEURO: AAOx3, Cranial nerves III-XII intact. Strength intact with 5/5 strength in all 4 extremities. Sensation intact to light touch in all 4 extremities. No pronator drift. Normal speech and gait.    Concern for hypoglycemia with multiple antidiabetic medications including long-acting medications with risk of hypoglycemia.  Will screen for secondary causes of hypoglycemia including infection and ischemia.  Will monitor patient's fingerstick closely to see if patient requires dextrose drip.  Will obtain labs to evaluate for hematologic disorder, metabolic derangements, hepatic and renal function, and screen for infection.  Will keep patient on cardiac monitor, obtain EKG and troponin to evaluate for possible cardiac abnormality including ACS and arrhythmia.  Will obtain chest x-ray to evaluate for acute cardiopulmonary pathology.    My independent interpretation of the EKG shows:  Normal sinus rhythm with rate of 67 bpm, no T wave inversions, no ST elevations or depressions.  Similar to prior on 10/16/2023.    Labs reviewed, patient with no leukocytosis, hemoglobin up trended from prior.  Elevated creatinine of 2.89, but improved from prior.  Troponin elevated to 62, patient has an elevated troponin level. However, they do not have any active chest pain. The EKG does not show any new changes from prior. Will trend troponin and keep patient on cardiac monitor.  Repeat troponin 57.  Fingersticks remained stable, patient able to tolerate p.o. so did not require dextrose infusion.    The patient will need to be admitted to the hospital for continued evaluation and management.  Discussed with the accepting physician regarding the initial presentation, diagnostic studies, treatments given in the ED, and current plan of care.  I, Dr. Devan Jefferson, spoke directly to the admitting attending physician, Dr. Jim Dooley, who accepted the patient to their service.  The patient was accepted by and endorsed to the medicine team.

## 2024-03-18 NOTE — CONSULT NOTE ADULT - ATTENDING COMMENTS
Agree with Dr. Perez's assessment and plan as outlined above. Reviewed all pertinent labs, and imaging studies. Modifications made as indicated above. 84 M with history of HTN, T2D, CAD, CKD stage 3 here for hypoglycemia. A1C 6.3. Home DM meds: glipizide 10 mg daily, jardiacnce 10 mg daily, actos 30 mg daily, prandin 1 mg before lunch. Patient presented with hypoglycemia reported in the 30s. Glucose too tightly controlled in this 84 year old patient, thus resulting in hypoglycemia. [facility] inpatient continue with LDSS. Hypoglycemia caused by sulfonylurea can last for a few days. For discharge, will need to STOP glipizide, actos and prandin and can continue with jardiance 10 mg daily for CKD. Rest of the plan as above.

## 2024-03-18 NOTE — CONSULT NOTE ADULT - SUBJECTIVE AND OBJECTIVE BOX
NOTE INCOMPLETE/ IN PROGRESS  *Please wait for attending attestation for official recommendations.     HPI:  84M PMH CKD3, HTN, CAD, SHARATH, BPH, DM2, presents with hypoglycemia. Patient reports that he was in his normal state of health and developed possible URI symptoms last Wednesday, which subsequently resolved and he returned to a normal state of health. On day prior to admission, patient reports he again felt unwell. Patient states he felt sweaty, his arms were heavy, no vomit, though he felt nauseous.  Patient states he felt he was going to pass out, and called his wife, who called his son, and EMS was subsequently called. Upon arrival, patients blood glucose was found to be in the 30s. for which he received an amp of d50 with resolution. Subsequently brought to the hospital for further evaluation and treatment.   Patient states he does not have an endocrinologist, and has medications prescribed by his PCP. Reports he is not on injection medications. Has not had recent diarrhea, vomiting or other clear cause of dehydration.   At time of exam, denies chest pain or palpitations. Denies shortness of breath or cough. ROS otherwise noncontributory. VSS on admission. Labs notable for elevated Cr, mild elevation in troponin. Patient is now for admission to medicine for further evaluation and treatment.  (18 Mar 2024 10:45)      Consulted for: Hypoglycemia      PAST MEDICAL & SURGICAL HISTORY:  Hypertension  Hyperlipemia  Diabetes Mellitus Type II  Renal Calculi  h/olithotripsy 2010  H/O: Rotator Cuff Tear  Pneumonia  BPH (Benign Prostatic Hypertrophy)  Hypertension  Hyperlipidemia  Diabetes 2  CAD (coronary artery disease)  stents x4( 03/2017)  last stress test 7/22  echo 3/22  Ganglion cyst of wrist, left  SHARATH (obstructive sleep apnea)  non compliant with CPAP  Chronic kidney disease (CKD)  H/O gastroesophageal reflux (GERD)  OA (osteoarthritis)  History of Arthroscopy  bilateral shoulder rotator cuff repair  Laminectomy with Spinal Fusion  cervical- 2009  lumbar lamenectomy  1998  Bilateral cataracts  sp extraction  Trigger finger of both hands  sp repair  Cervical fusion syndrome  History of lumbar laminectomy  Bilateral rotator cuff syndrome  operated both'    FAMILY HISTORY:  Family history of cerebrovascular accident (Mother)  mother    Social History:    Outpatient Medications:    MEDICATIONS  (STANDING):  allopurinol 100 milliGRAM(s) Oral daily  amLODIPine   Tablet 10 milliGRAM(s) Oral daily  atorvastatin 40 milliGRAM(s) Oral at bedtime  carvedilol 25 milliGRAM(s) Oral every 12 hours  clopidogrel Tablet 75 milliGRAM(s) Oral daily  dextrose 5%. 1000 milliLiter(s) (100 mL/Hr) IV Continuous <Continuous>  dextrose 5%. 1000 milliLiter(s) (50 mL/Hr) IV Continuous <Continuous>  dextrose 50% Injectable 12.5 Gram(s) IV Push once  dextrose 50% Injectable 25 Gram(s) IV Push once  dextrose 50% Injectable 25 Gram(s) IV Push once  fenofibrate Tablet 48 milliGRAM(s) Oral daily  glucagon  Injectable 1 milliGRAM(s) IntraMuscular once  heparin   Injectable 5000 Unit(s) SubCutaneous every 8 hours  hydrALAZINE 100 milliGRAM(s) Oral every 8 hours  insulin lispro (ADMELOG) corrective regimen sliding scale   SubCutaneous three times a day before meals  ranolazine 500 milliGRAM(s) Oral two times a day  spironolactone 25 milliGRAM(s) Oral daily    MEDICATIONS  (PRN):  acetaminophen     Tablet .. 650 milliGRAM(s) Oral every 6 hours PRN Temp greater or equal to 38C (100.4F), Mild Pain (1 - 3)  aluminum hydroxide/magnesium hydroxide/simethicone Suspension 30 milliLiter(s) Oral every 4 hours PRN Dyspepsia  dextrose Oral Gel 15 Gram(s) Oral once PRN Blood Glucose LESS THAN 70 milliGRAM(s)/deciliter  melatonin 3 milliGRAM(s) Oral at bedtime PRN Insomnia  ondansetron Injectable 4 milliGRAM(s) IV Push every 8 hours PRN Nausea and/or Vomiting      Allergies  Avapro (Hives)  enalapril (Unknown)  seasonal allergies-outdoor (Urticaria; Rhinorrhea; Sneezing)  losartan (Other)    Intolerances  losartan (Other)    Review of Systems:  Constitutional: No fever  Eyes: No blurry vision  Neuro: No tremors  HEENT: No pain  Cardiovascular: No chest pain, palpitations  Respiratory: No SOB, no cough  GI: No nausea, vomiting, abdominal pain  : No dysuria  Skin: no rash  Psych: no depression  Endocrine: no polyuria, polydipsia  Hem/lymph: no swelling  Osteoporosis: no fractures    ALL OTHER SYSTEMS REVIEWED AND NEGATIVE    UNABLE TO OBTAIN    PHYSICAL EXAM:  VITALS: T(C): 36.9 (03-18-24 @ 08:06)  T(F): 98.5 (03-18-24 @ 08:06), Max: 98.7 (03-17-24 @ 23:46)  HR: 76 (03-18-24 @ 08:06) (63 - 84)  BP: 136/56 (03-18-24 @ 08:06) (121/61 - 141/55)  RR:  (16 - 20)  SpO2:  (99% - 100%)  Wt(kg): --  GENERAL: NAD, well-groomed, well-developed  EYES: No proptosis, no lid lag, anicteric  HEENT:  Atraumatic, Normocephalic, moist mucous membranes  THYROID: Normal size, no palpable nodules  RESPIRATORY: Clear to auscultation bilaterally; No rales, rhonchi, wheezing  CARDIOVASCULAR: Regular rate and rhythm; No murmurs; no peripheral edema  GI: Soft, nontender, non distended, normal bowel sounds  SKIN: Dry, intact, No rashes or lesions  MUSCULOSKELETAL: Full range of motion, normal strength  NEURO: sensation intact, extraocular movements intact, no tremor  PSYCH: Alert and oriented x 3, normal affect, normal mood  CUSHING'S SIGNS: no striae      CAPILLARY BLOOD GLUCOSE  95 (18 Mar 2024 07:45)  POCT Blood Glucose.: 89 mg/dL (18 Mar 2024 11:09)  POCT Blood Glucose.: 78 mg/dL (18 Mar 2024 10:24)  POCT Blood Glucose.: 88 mg/dL (18 Mar 2024 08:06)  POCT Blood Glucose.: 95 mg/dL (18 Mar 2024 07:45)  POCT Blood Glucose.: 107 mg/dL (18 Mar 2024 05:56)  POCT Blood Glucose.: 112 mg/dL (18 Mar 2024 04:46)  POCT Blood Glucose.: 127 mg/dL (18 Mar 2024 03:44)  POCT Blood Glucose.: 88 mg/dL (18 Mar 2024 02:24)  POCT Blood Glucose.: 116 mg/dL (18 Mar 2024 01:27)  POCT Blood Glucose.: 139 mg/dL (18 Mar 2024 00:33)  POCT Blood Glucose.: 138 mg/dL (17 Mar 2024 23:33)                            10.4   7.55  )-----------( 148      ( 18 Mar 2024 00:35 )             32.7       03-18    137  |  106  |  43<H>  ----------------------------<  91  3.9   |  23  |  2.85<H>    eGFR: 21<L>    Ca    10.5      03-18  Mg     2.2     03-18  Phos  2.4     03-18    TPro  6.9  /  Alb  3.8  /  TBili  0.5  /  DBili  x   /  AST  25  /  ALT  17  /  AlkPhos  45  03-18      Thyroid Function Tests:      A1C with Estimated Average Glucose Result: 6.3 % (03-18-24 @ 00:35)  A1C with Estimated Average Glucose Result: 6.0 % (10-11-23 @ 04:41)      Radiology:     FINDINGS:  Heart/Vascular: Heart size cannot be accurately assessed on this   projection  Pulmonary: Right lower lobe patchy opacity. No pleural effusion or   pneumothorax..  Bones: No acute bony abnormalities.  Lines and tubes: None.    IMPRESSION:  Right lower lobe patchy opacity, likely atelectasis or pneumonia.    --- End of Report ---             NOTE INCOMPLETE/ IN PROGRESS  *Please wait for attending attestation for official recommendations.     HPI:  84M PMH CKD3, HTN, CAD, SHARATH, BPH, DM2, presents with hypoglycemia. Patient reports that he was in his normal state of health and developed possible URI symptoms last Wednesday, which subsequently resolved and he returned to a normal state of health. On day prior to admission, patient reports he again felt unwell. Patient states he felt sweaty, his arms were heavy, no vomit, though he felt nauseous.  Patient states he felt he was going to pass out, and called his wife, who called his son, and EMS was subsequently called. Upon arrival, patients blood glucose was found to be in the 30s. for which he received an amp of d50 with resolution. Subsequently brought to the hospital for further evaluation and treatment.   Patient states he does not have an endocrinologist, and has medications prescribed by his PCP. Reports he is not on injection medications. Has not had recent diarrhea, vomiting or other clear cause of dehydration.   At time of exam, denies chest pain or palpitations. Denies shortness of breath or cough. ROS otherwise noncontributory. VSS on admission. Labs notable for elevated Cr, mild elevation in troponin. Patient is now for admission to medicine for further evaluation and treatment.     Consulted for: Hypoglycemia    Upon evaluation patient was resting in bed awoke to verbal stimuli accompanied by wife. Per Patient and wife, he has been feeling under the weather for the last few days but did not get tested. Had flu like symptoms, no one else is sick at home. Noted to have decreased PO intake but still taking medications as prescribed. This am he felt lightheaded and dizzy, described his vision as darkening/blackening. Wife notes she was with him and he was conscious the entire time but not really interactive. No head strike. She also confirmed that he has not been eating much over the last 24hrs.     Diabetes History:  Diagnosed in the [facility] when he was 30 yrs old. Unsure if there was any additional testing done but was always told that it was Type 2.  His medications have changed over the years but he has not had any changes in the last few years. Notes he was insulin daily injections for a few years but stopped 5 years ago.   Does not have an endocrinologist, PCP manages all meds.  Sees ophthalmology and podiatry 1-2 times per year for routine screening. Last visits within last 3 months. Never had any vision issues, denies ulcers/wounds requiring debridement/amputations.  Checks daily finger stick in AM upon awaking. Generally in the 70-80s in the am, occasionally in 90-100s if has dessert at night.  Never been hospitalized for hypo or hyper glycemia.      PAST MEDICAL & SURGICAL HISTORY:  Hypertension  Hyperlipemia  Diabetes Mellitus Type II  Renal Calculi  h/olithotripsy 2010  H/O: Rotator Cuff Tear  Pneumonia  BPH (Benign Prostatic Hypertrophy)  Hypertension  Hyperlipidemia  Diabetes 2  CAD (coronary artery disease)  stents x4( 03/2017)  last stress test 7/22  echo 3/22  Ganglion cyst of wrist, left  SHARATH (obstructive sleep apnea)  non compliant with CPAP  Chronic kidney disease (CKD)  H/O gastroesophageal reflux (GERD)  OA (osteoarthritis)  History of Arthroscopy  bilateral shoulder rotator cuff repair  Laminectomy with Spinal Fusion  cervical- 2009  lumbar lamenectomy  1998  Bilateral cataracts  sp extraction  Trigger finger of both hands  sp repair  Cervical fusion syndrome  History of lumbar laminectomy  Bilateral rotator cuff syndrome  operated both'    FAMILY HISTORY:  Family history of cerebrovascular accident (Mother)  mother    Social History:    Outpatient Medications:    MEDICATIONS  (STANDING):  allopurinol 100 milliGRAM(s) Oral daily  amLODIPine   Tablet 10 milliGRAM(s) Oral daily  atorvastatin 40 milliGRAM(s) Oral at bedtime  carvedilol 25 milliGRAM(s) Oral every 12 hours  clopidogrel Tablet 75 milliGRAM(s) Oral daily  dextrose 5%. 1000 milliLiter(s) (100 mL/Hr) IV Continuous <Continuous>  dextrose 5%. 1000 milliLiter(s) (50 mL/Hr) IV Continuous <Continuous>  dextrose 50% Injectable 12.5 Gram(s) IV Push once  dextrose 50% Injectable 25 Gram(s) IV Push once  dextrose 50% Injectable 25 Gram(s) IV Push once  fenofibrate Tablet 48 milliGRAM(s) Oral daily  glucagon  Injectable 1 milliGRAM(s) IntraMuscular once  heparin   Injectable 5000 Unit(s) SubCutaneous every 8 hours  hydrALAZINE 100 milliGRAM(s) Oral every 8 hours  insulin lispro (ADMELOG) corrective regimen sliding scale   SubCutaneous three times a day before meals  ranolazine 500 milliGRAM(s) Oral two times a day  spironolactone 25 milliGRAM(s) Oral daily    MEDICATIONS  (PRN):  acetaminophen     Tablet .. 650 milliGRAM(s) Oral every 6 hours PRN Temp greater or equal to 38C (100.4F), Mild Pain (1 - 3)  aluminum hydroxide/magnesium hydroxide/simethicone Suspension 30 milliLiter(s) Oral every 4 hours PRN Dyspepsia  dextrose Oral Gel 15 Gram(s) Oral once PRN Blood Glucose LESS THAN 70 milliGRAM(s)/deciliter  melatonin 3 milliGRAM(s) Oral at bedtime PRN Insomnia  ondansetron Injectable 4 milliGRAM(s) IV Push every 8 hours PRN Nausea and/or Vomiting      Allergies  Avapro (Hives)  enalapril (Unknown)  seasonal allergies-outdoor (Urticaria; Rhinorrhea; Sneezing)  losartan (Other)    Intolerances  losartan (Other)    Review of Systems:  Constitutional: No fever  Eyes: No blurry vision  Neuro: No tremors  HEENT: + Headache  Cardiovascular: No chest pain, palpitations  Respiratory: No SOB, no cough  GI: No nausea, vomiting, abdominal pain  : No dysuria  Skin: no rash  Psych: no depression  Endocrine: no polyuria, polydipsia  Hem/lymph: no swelling  Osteoporosis: no fractures    ALL OTHER SYSTEMS REVIEWED AND NEGATIVE    UNABLE TO OBTAIN    PHYSICAL EXAM:  VITALS: T(C): 36.9 (03-18-24 @ 08:06)  T(F): 98.5 (03-18-24 @ 08:06), Max: 98.7 (03-17-24 @ 23:46)  HR: 76 (03-18-24 @ 08:06) (63 - 84)  BP: 136/56 (03-18-24 @ 08:06) (121/61 - 141/55)  RR:  (16 - 20)  SpO2:  (99% - 100%)  Wt(kg): --  GENERAL: NAD, well-groomed, well-developed  EYES: No proptosis, no lid lag, anicteric  HEENT:  Atraumatic, Normocephalic, moist mucous membranes  THYROID: Normal size, no palpable nodules  RESPIRATORY: Clear to auscultation bilaterally; No rales, rhonchi, wheezing  CARDIOVASCULAR: Regular rate and rhythm; No murmurs; no peripheral edema  GI: Soft, nontender, non distended, normal bowel sounds  SKIN: Dry, intact, No rashes or lesions  MUSCULOSKELETAL: Full range of motion, normal strength  PSYCH: Alert and oriented x 3, normal affect, normal mood        CAPILLARY BLOOD GLUCOSE  95 (18 Mar 2024 07:45)  POCT Blood Glucose.: 89 mg/dL (18 Mar 2024 11:09)  POCT Blood Glucose.: 78 mg/dL (18 Mar 2024 10:24)  POCT Blood Glucose.: 88 mg/dL (18 Mar 2024 08:06)  POCT Blood Glucose.: 95 mg/dL (18 Mar 2024 07:45)  POCT Blood Glucose.: 107 mg/dL (18 Mar 2024 05:56)  POCT Blood Glucose.: 112 mg/dL (18 Mar 2024 04:46)  POCT Blood Glucose.: 127 mg/dL (18 Mar 2024 03:44)  POCT Blood Glucose.: 88 mg/dL (18 Mar 2024 02:24)  POCT Blood Glucose.: 116 mg/dL (18 Mar 2024 01:27)  POCT Blood Glucose.: 139 mg/dL (18 Mar 2024 00:33)  POCT Blood Glucose.: 138 mg/dL (17 Mar 2024 23:33)                            10.4   7.55  )-----------( 148      ( 18 Mar 2024 00:35 )             32.7       03-18    137  |  106  |  43<H>  ----------------------------<  91  3.9   |  23  |  2.85<H>    eGFR: 21<L>    Ca    10.5      03-18  Mg     2.2     03-18  Phos  2.4     03-18    TPro  6.9  /  Alb  3.8  /  TBili  0.5  /  DBili  x   /  AST  25  /  ALT  17  /  AlkPhos  45  03-18      Thyroid Function Tests:      A1C with Estimated Average Glucose Result: 6.3 % (03-18-24 @ 00:35)  A1C with Estimated Average Glucose Result: 6.0 % (10-11-23 @ 04:41)      Radiology:     FINDINGS:  Heart/Vascular: Heart size cannot be accurately assessed on this   projection  Pulmonary: Right lower lobe patchy opacity. No pleural effusion or   pneumothorax..  Bones: No acute bony abnormalities.  Lines and tubes: None.    IMPRESSION:  Right lower lobe patchy opacity, likely atelectasis or pneumonia.    --- End of Report ---             NOTE INCOMPLETE/ IN PROGRESS  *Please wait for attending attestation for official recommendations.     HPI:  84M PMH CKD3, HTN, CAD, SHARATH, BPH, DM2, presents with hypoglycemia. Patient reports that he was in his normal state of health and developed possible URI symptoms last Wednesday, which subsequently resolved and he returned to a normal state of health. On day prior to admission, patient reports he again felt unwell. Patient states he felt sweaty, his arms were heavy, no vomit, though he felt nauseous.  Patient states he felt he was going to pass out, and called his wife, who called his son, and EMS was subsequently called. Upon arrival, patients blood glucose was found to be in the 30s. for which he received an amp of d50 with resolution. Subsequently brought to the hospital for further evaluation and treatment.   Patient states he does not have an endocrinologist, and has medications prescribed by his PCP. Reports he is not on injection medications. Has not had recent diarrhea, vomiting or other clear cause of dehydration.   At time of exam, denies chest pain or palpitations. Denies shortness of breath or cough. ROS otherwise noncontributory. VSS on admission. Labs notable for elevated Cr, mild elevation in troponin. Patient is now for admission to medicine for further evaluation and treatment.     Consulted for: Hypoglycemia    Upon evaluation patient was resting in bed awoke to verbal stimuli accompanied by wife. Per Patient and wife, he has been feeling under the weather for the last few days but did not get tested. Had flu like symptoms, no one else is sick at home. Noted to have decreased PO intake but still taking medications as prescribed. This am he felt lightheaded and dizzy, described his vision as darkening/blackening. Wife notes she was with him and he was conscious the entire time but not really interactive. No head strike. She also confirmed that he has not been eating much over the last 24hrs.     Diabetes History:  Diagnosed in the [facility] when he was 30 yrs old. Unsure if there was any additional testing done but was always told that it was Type 2.  His medications have changed over the years but he has not had any changes in the last few years. Notes he was insulin daily injections for a few years but stopped 5 years ago.   Does not have an endocrinologist, PCP manages all meds.  Sees ophthalmology and podiatry 1-2 times per year for routine screening. Last visits within last 3 months. Never had any vision issues, denies ulcers/wounds requiring debridement/amputations.  Checks daily finger stick in AM upon awaking. Generally in the 70-80s in the am, occasionally in 90-100s if has at night.  Never been hospitalized for hypo or hyper glycemia.      PAST MEDICAL & SURGICAL HISTORY:  Hypertension  Hyperlipemia  Diabetes Mellitus Type II  Renal Calculi  h/olithotripsy 2010  H/O: Rotator Cuff Tear  Pneumonia  BPH (Benign Prostatic Hypertrophy)  Hypertension  Hyperlipidemia  Diabetes 2  CAD (coronary artery disease)  stents x4( 03/2017)  last stress test 7/22  echo 3/22  Ganglion cyst of wrist, left  SHARATH (obstructive sleep apnea)  non compliant with CPAP  Chronic kidney disease (CKD)  H/O gastroesophageal reflux (GERD)  OA (osteoarthritis)  History of Arthroscopy  bilateral shoulder rotator cuff repair  Laminectomy with Spinal Fusion  cervical- 2009  lumbar lamenectomy  1998  Bilateral cataracts  sp extraction  Trigger finger of both hands  sp repair  Cervical fusion syndrome  History of lumbar laminectomy  Bilateral rotator cuff syndrome  operated both'    FAMILY HISTORY:  Family history of cerebrovascular accident (Mother)  mother    Social History:    Outpatient Medications:    MEDICATIONS  (STANDING):  allopurinol 100 milliGRAM(s) Oral daily  amLODIPine   Tablet 10 milliGRAM(s) Oral daily  atorvastatin 40 milliGRAM(s) Oral at bedtime  carvedilol 25 milliGRAM(s) Oral every 12 hours  clopidogrel Tablet 75 milliGRAM(s) Oral daily  dextrose 5%. 1000 milliLiter(s) (100 mL/Hr) IV Continuous <Continuous>  dextrose 5%. 1000 milliLiter(s) (50 mL/Hr) IV Continuous <Continuous>  dextrose 50% Injectable 12.5 Gram(s) IV Push once  dextrose 50% Injectable 25 Gram(s) IV Push once  dextrose 50% Injectable 25 Gram(s) IV Push once  fenofibrate Tablet 48 milliGRAM(s) Oral daily  glucagon  Injectable 1 milliGRAM(s) IntraMuscular once  heparin   Injectable 5000 Unit(s) SubCutaneous every 8 hours  hydrALAZINE 100 milliGRAM(s) Oral every 8 hours  insulin lispro (ADMELOG) corrective regimen sliding scale   SubCutaneous three times a day before meals  ranolazine 500 milliGRAM(s) Oral two times a day  spironolactone 25 milliGRAM(s) Oral daily    MEDICATIONS  (PRN):  acetaminophen     Tablet .. 650 milliGRAM(s) Oral every 6 hours PRN Temp greater or equal to 38C (100.4F), Mild Pain (1 - 3)  aluminum hydroxide/magnesium hydroxide/simethicone Suspension 30 milliLiter(s) Oral every 4 hours PRN Dyspepsia  dextrose Oral Gel 15 Gram(s) Oral once PRN Blood Glucose LESS THAN 70 milliGRAM(s)/deciliter  melatonin 3 milliGRAM(s) Oral at bedtime PRN Insomnia  ondansetron Injectable 4 milliGRAM(s) IV Push every 8 hours PRN Nausea and/or Vomiting      Allergies  Avapro (Hives)  enalapril (Unknown)  seasonal allergies-outdoor (Urticaria; Rhinorrhea; Sneezing)  losartan (Other)    Intolerances  losartan (Other)    Review of Systems:  Constitutional: No fever  Eyes: No blurry vision  Neuro: No tremors  HEENT: + Headache  Cardiovascular: No chest pain, palpitations  Respiratory: No SOB, no cough  GI: No nausea, vomiting, abdominal pain  : No dysuria  Skin: no rash  Psych: no depression  Endocrine: no polyuria, polydipsia  Hem/lymph: no swelling  Osteoporosis: no fractures    ALL OTHER SYSTEMS REVIEWED AND NEGATIVE    UNABLE TO OBTAIN    PHYSICAL EXAM:  VITALS: T(C): 36.9 (03-18-24 @ 08:06)  T(F): 98.5 (03-18-24 @ 08:06), Max: 98.7 (03-17-24 @ 23:46)  HR: 76 (03-18-24 @ 08:06) (63 - 84)  BP: 136/56 (03-18-24 @ 08:06) (121/61 - 141/55)  RR:  (16 - 20)  SpO2:  (99% - 100%)  Wt(kg): --  GENERAL: NAD, well-groomed, well-developed  EYES: No proptosis, no lid lag, anicteric  HEENT:  Atraumatic, Normocephalic, moist mucous membranes  THYROID: Normal size, no palpable nodules  RESPIRATORY: Clear to auscultation bilaterally; No rales, rhonchi, wheezing  CARDIOVASCULAR: Regular rate and rhythm; No murmurs; no peripheral edema  GI: Soft, nontender, non distended, normal bowel sounds  SKIN: Dry, intact, No rashes or lesions  MUSCULOSKELETAL: Full range of motion, normal strength  PSYCH: Alert and oriented x 3, normal affect, normal mood        CAPILLARY BLOOD GLUCOSE  95 (18 Mar 2024 07:45)  POCT Blood Glucose.: 89 mg/dL (18 Mar 2024 11:09)  POCT Blood Glucose.: 78 mg/dL (18 Mar 2024 10:24)  POCT Blood Glucose.: 88 mg/dL (18 Mar 2024 08:06)  POCT Blood Glucose.: 95 mg/dL (18 Mar 2024 07:45)  POCT Blood Glucose.: 107 mg/dL (18 Mar 2024 05:56)  POCT Blood Glucose.: 112 mg/dL (18 Mar 2024 04:46)  POCT Blood Glucose.: 127 mg/dL (18 Mar 2024 03:44)  POCT Blood Glucose.: 88 mg/dL (18 Mar 2024 02:24)  POCT Blood Glucose.: 116 mg/dL (18 Mar 2024 01:27)  POCT Blood Glucose.: 139 mg/dL (18 Mar 2024 00:33)  POCT Blood Glucose.: 138 mg/dL (17 Mar 2024 23:33)                            10.4   7.55  )-----------( 148      ( 18 Mar 2024 00:35 )             32.7       03-18    137  |  106  |  43<H>  ----------------------------<  91  3.9   |  23  |  2.85<H>    eGFR: 21<L>    Ca    10.5      03-18  Mg     2.2     03-18  Phos  2.4     03-18    TPro  6.9  /  Alb  3.8  /  TBili  0.5  /  DBili  x   /  AST  25  /  ALT  17  /  AlkPhos  45  03-18      Thyroid Function Tests:      A1C with Estimated Average Glucose Result: 6.3 % (03-18-24 @ 00:35)  A1C with Estimated Average Glucose Result: 6.0 % (10-11-23 @ 04:41)      Radiology:     FINDINGS:  Heart/Vascular: Heart size cannot be accurately assessed on this   projection  Pulmonary: Right lower lobe patchy opacity. No pleural effusion or   pneumothorax..  Bones: No acute bony abnormalities.  Lines and tubes: None.    IMPRESSION:  Right lower lobe patchy opacity, likely atelectasis or pneumonia.    --- End of Report ---             NOTE INCOMPLETE/ IN PROGRESS  *Please wait for attending attestation for official recommendations.     HPI:  84M PMH CKD3, HTN, CAD, SHARATH, BPH, DM2, presents with hypoglycemia. Patient reports that he was in his normal state of health and developed possible URI symptoms last Wednesday, which subsequently resolved and he returned to a normal state of health. On day prior to admission, patient reports he again felt unwell. Patient states he felt sweaty, his arms were heavy, no vomit, though he felt nauseous.  Patient states he felt he was going to pass out, and called his wife, who called his son, and EMS was subsequently called. Upon arrival, patients blood glucose was found to be in the 30s. for which he received an amp of d50 with resolution. Subsequently brought to the hospital for further evaluation and treatment.   Patient states he does not have an endocrinologist, and has medications prescribed by his PCP. Reports he is not on injection medications. Has not had recent diarrhea, vomiting or other clear cause of dehydration.   At time of exam, denies chest pain or palpitations. Denies shortness of breath or cough. ROS otherwise noncontributory. VSS on admission. Labs notable for elevated Cr, mild elevation in troponin. Patient is now for admission to medicine for further evaluation and treatment.     Consulted for: Hypoglycemia    Upon evaluation patient was resting in bed awoke to verbal stimuli accompanied by wife. Per Patient and wife, he has been feeling under the weather for the last few days but did not get tested. Had flu like symptoms, no one else is sick at home. Noted to have decreased PO intake but still taking medications as prescribed. This am he felt lightheaded and dizzy, described his vision as darkening/blackening. Wife notes she was with him and he was conscious the entire time but not really interactive. No head strike. She also confirmed that he has not been eating much over the last 24hrs.     Diabetes History:  Diagnosed in the [facility] when he was 30 yrs old. Unsure if there was any additional testing done but was always told that it was Type 2.  His medications have changed over the years but he has not had any changes in the last few years. Notes he was insulin daily injections for a few years but stopped 5 years ago.   Does not have an endocrinologist, PCP manages all meds.  Sees ophthalmology and podiatry 1-2 times per year for routine screening. Last visits within last 3 months. Never had any vision issues, denies ulcers/wounds requiring debridement/amputations.  Checks daily finger stick in AM upon awaking. Generally in the 70-80s in the am, occasionally in 90-100s if has sweets at night.  Never been hospitalized for hypo or hyper glycemia.      PAST MEDICAL & SURGICAL HISTORY:  Hypertension  Hyperlipemia  Diabetes Mellitus Type II  Renal Calculi  h/olithotripsy 2010  H/O: Rotator Cuff Tear  Pneumonia  BPH (Benign Prostatic Hypertrophy)  Hypertension  Hyperlipidemia  Diabetes 2  CAD (coronary artery disease)  stents x4( 03/2017)  last stress test 7/22  echo 3/22  Ganglion cyst of wrist, left  SHARATH (obstructive sleep apnea)  non compliant with CPAP  Chronic kidney disease (CKD)  H/O gastroesophageal reflux (GERD)  OA (osteoarthritis)  History of Arthroscopy  bilateral shoulder rotator cuff repair  Laminectomy with Spinal Fusion  cervical- 2009  lumbar lamenectomy  1998  Bilateral cataracts  sp extraction  Trigger finger of both hands  sp repair  Cervical fusion syndrome  History of lumbar laminectomy  Bilateral rotator cuff syndrome  operated both'    FAMILY HISTORY:  Family history of cerebrovascular accident (Mother)  mother    Social History:    Outpatient Medications:    MEDICATIONS  (STANDING):  allopurinol 100 milliGRAM(s) Oral daily  amLODIPine   Tablet 10 milliGRAM(s) Oral daily  atorvastatin 40 milliGRAM(s) Oral at bedtime  carvedilol 25 milliGRAM(s) Oral every 12 hours  clopidogrel Tablet 75 milliGRAM(s) Oral daily  dextrose 5%. 1000 milliLiter(s) (100 mL/Hr) IV Continuous <Continuous>  dextrose 5%. 1000 milliLiter(s) (50 mL/Hr) IV Continuous <Continuous>  dextrose 50% Injectable 12.5 Gram(s) IV Push once  dextrose 50% Injectable 25 Gram(s) IV Push once  dextrose 50% Injectable 25 Gram(s) IV Push once  fenofibrate Tablet 48 milliGRAM(s) Oral daily  glucagon  Injectable 1 milliGRAM(s) IntraMuscular once  heparin   Injectable 5000 Unit(s) SubCutaneous every 8 hours  hydrALAZINE 100 milliGRAM(s) Oral every 8 hours  insulin lispro (ADMELOG) corrective regimen sliding scale   SubCutaneous three times a day before meals  ranolazine 500 milliGRAM(s) Oral two times a day  spironolactone 25 milliGRAM(s) Oral daily    MEDICATIONS  (PRN):  acetaminophen     Tablet .. 650 milliGRAM(s) Oral every 6 hours PRN Temp greater or equal to 38C (100.4F), Mild Pain (1 - 3)  aluminum hydroxide/magnesium hydroxide/simethicone Suspension 30 milliLiter(s) Oral every 4 hours PRN Dyspepsia  dextrose Oral Gel 15 Gram(s) Oral once PRN Blood Glucose LESS THAN 70 milliGRAM(s)/deciliter  melatonin 3 milliGRAM(s) Oral at bedtime PRN Insomnia  ondansetron Injectable 4 milliGRAM(s) IV Push every 8 hours PRN Nausea and/or Vomiting      Allergies  Avapro (Hives)  enalapril (Unknown)  seasonal allergies-outdoor (Urticaria; Rhinorrhea; Sneezing)  losartan (Other)    Intolerances  losartan (Other)    Review of Systems:  Constitutional: No fever  Eyes: No blurry vision  Neuro: No tremors  HEENT: + Headache  Cardiovascular: No chest pain, palpitations  Respiratory: No SOB, no cough  GI: No nausea, vomiting, abdominal pain  : No dysuria  Skin: no rash  Psych: no depression  Endocrine: no polyuria, polydipsia  Hem/lymph: no swelling  Osteoporosis: no fractures    ALL OTHER SYSTEMS REVIEWED AND NEGATIVE    UNABLE TO OBTAIN    PHYSICAL EXAM:  VITALS: T(C): 36.9 (03-18-24 @ 08:06)  T(F): 98.5 (03-18-24 @ 08:06), Max: 98.7 (03-17-24 @ 23:46)  HR: 76 (03-18-24 @ 08:06) (63 - 84)  BP: 136/56 (03-18-24 @ 08:06) (121/61 - 141/55)  RR:  (16 - 20)  SpO2:  (99% - 100%)  Wt(kg): --  GENERAL: NAD, well-groomed, well-developed  EYES: No proptosis, no lid lag, anicteric  HEENT:  Atraumatic, Normocephalic, moist mucous membranes  THYROID: Normal size, no palpable nodules  RESPIRATORY: Clear to auscultation bilaterally; No rales, rhonchi, wheezing  CARDIOVASCULAR: Regular rate and rhythm; No murmurs; no peripheral edema  GI: Soft, nontender, non distended, normal bowel sounds  SKIN: Dry, intact, No rashes or lesions  MUSCULOSKELETAL: Full range of motion, normal strength  PSYCH: Alert and oriented x 3, normal affect, normal mood        CAPILLARY BLOOD GLUCOSE  95 (18 Mar 2024 07:45)  POCT Blood Glucose.: 89 mg/dL (18 Mar 2024 11:09)  POCT Blood Glucose.: 78 mg/dL (18 Mar 2024 10:24)  POCT Blood Glucose.: 88 mg/dL (18 Mar 2024 08:06)  POCT Blood Glucose.: 95 mg/dL (18 Mar 2024 07:45)  POCT Blood Glucose.: 107 mg/dL (18 Mar 2024 05:56)  POCT Blood Glucose.: 112 mg/dL (18 Mar 2024 04:46)  POCT Blood Glucose.: 127 mg/dL (18 Mar 2024 03:44)  POCT Blood Glucose.: 88 mg/dL (18 Mar 2024 02:24)  POCT Blood Glucose.: 116 mg/dL (18 Mar 2024 01:27)  POCT Blood Glucose.: 139 mg/dL (18 Mar 2024 00:33)  POCT Blood Glucose.: 138 mg/dL (17 Mar 2024 23:33)                            10.4   7.55  )-----------( 148      ( 18 Mar 2024 00:35 )             32.7       03-18    137  |  106  |  43<H>  ----------------------------<  91  3.9   |  23  |  2.85<H>    eGFR: 21<L>    Ca    10.5      03-18  Mg     2.2     03-18  Phos  2.4     03-18    TPro  6.9  /  Alb  3.8  /  TBili  0.5  /  DBili  x   /  AST  25  /  ALT  17  /  AlkPhos  45  03-18      Thyroid Function Tests:      A1C with Estimated Average Glucose Result: 6.3 % (03-18-24 @ 00:35)  A1C with Estimated Average Glucose Result: 6.0 % (10-11-23 @ 04:41)      Radiology:     FINDINGS:  Heart/Vascular: Heart size cannot be accurately assessed on this   projection  Pulmonary: Right lower lobe patchy opacity. No pleural effusion or   pneumothorax..  Bones: No acute bony abnormalities.  Lines and tubes: None.    IMPRESSION:  Right lower lobe patchy opacity, likely atelectasis or pneumonia.    --- End of Report ---             *Please wait for attending attestation for official recommendations.     HPI:  84M PMH CKD3, HTN, CAD, SHARATH, BPH, DM2, presents with hypoglycemia. Patient reports that he was in his normal state of health and developed possible URI symptoms last Wednesday, which subsequently resolved and he returned to a normal state of health. On day prior to admission, patient reports he again felt unwell. Patient states he felt sweaty, his arms were heavy, no vomit, though he felt nauseous.  Patient states he felt he was going to pass out, and called his wife, who called his son, and EMS was subsequently called. Upon arrival, patients blood glucose was found to be in the 30s. for which he received an amp of d50 with resolution. Subsequently brought to the hospital for further evaluation and treatment.   Patient states he does not have an endocrinologist, and has medications prescribed by his PCP. Reports he is not on injection medications. Has not had recent diarrhea, vomiting or other clear cause of dehydration.   At time of exam, denies chest pain or palpitations. Denies shortness of breath or cough. ROS otherwise noncontributory. VSS on admission. Labs notable for elevated Cr, mild elevation in troponin. Patient is now for admission to medicine for further evaluation and treatment.     Consulted for: Hypoglycemia    Upon evaluation patient was resting in bed awoke to verbal stimuli accompanied by wife. Per Patient and wife, he has been feeling under the weather for the last few days but did not get tested. Had flu like symptoms, no one else is sick at home. Noted to have decreased PO intake but still taking medications as prescribed. This am he felt lightheaded and dizzy, described his vision as darkening/blackening. Wife notes she was with him and he was conscious the entire time but not really interactive. No head strike. She also confirmed that he has not been eating much over the last 24hrs.     Diabetes History:  Diagnosed in the [facility] when he was 30 yrs old. Unsure if there was any additional testing done but was always told that it was Type 2.  His medications have changed over the years but he has not had any changes in the last few years. Notes he was insulin daily injections for a few years but stopped 5 years ago.   Does not have an endocrinologist, PCP manages all meds.  Sees ophthalmology and podiatry 1-2 times per year for routine screening. Last visits within last 3 months. Never had any vision issues, denies ulcers/wounds requiring debridement/amputations.  Checks daily finger stick in AM upon awaking. Generally in the 70-80s in the am, occasionally in 90-100s if has sweets at night.  Never been hospitalized for hypo or hyper glycemia.      PAST MEDICAL & SURGICAL HISTORY:  Hypertension  Hyperlipemia  Diabetes Mellitus Type II  Renal Calculi  h/olithotripsy 2010  H/O: Rotator Cuff Tear  Pneumonia  BPH (Benign Prostatic Hypertrophy)  Hypertension  Hyperlipidemia  Diabetes 2  CAD (coronary artery disease)  stents x4( 03/2017)  last stress test 7/22  echo 3/22  Ganglion cyst of wrist, left  SHARATH (obstructive sleep apnea)  non compliant with CPAP  Chronic kidney disease (CKD)  H/O gastroesophageal reflux (GERD)  OA (osteoarthritis)  History of Arthroscopy  bilateral shoulder rotator cuff repair  Laminectomy with Spinal Fusion  cervical- 2009  lumbar lamenectomy  1998  Bilateral cataracts  sp extraction  Trigger finger of both hands  sp repair  Cervical fusion syndrome  History of lumbar laminectomy  Bilateral rotator cuff syndrome  operated both'    FAMILY HISTORY:  Family history of cerebrovascular accident (Mother)  mother    Social History:    Outpatient Medications:    MEDICATIONS  (STANDING):  allopurinol 100 milliGRAM(s) Oral daily  amLODIPine   Tablet 10 milliGRAM(s) Oral daily  atorvastatin 40 milliGRAM(s) Oral at bedtime  carvedilol 25 milliGRAM(s) Oral every 12 hours  clopidogrel Tablet 75 milliGRAM(s) Oral daily  dextrose 5%. 1000 milliLiter(s) (100 mL/Hr) IV Continuous <Continuous>  dextrose 5%. 1000 milliLiter(s) (50 mL/Hr) IV Continuous <Continuous>  dextrose 50% Injectable 12.5 Gram(s) IV Push once  dextrose 50% Injectable 25 Gram(s) IV Push once  dextrose 50% Injectable 25 Gram(s) IV Push once  fenofibrate Tablet 48 milliGRAM(s) Oral daily  glucagon  Injectable 1 milliGRAM(s) IntraMuscular once  heparin   Injectable 5000 Unit(s) SubCutaneous every 8 hours  hydrALAZINE 100 milliGRAM(s) Oral every 8 hours  insulin lispro (ADMELOG) corrective regimen sliding scale   SubCutaneous three times a day before meals  ranolazine 500 milliGRAM(s) Oral two times a day  spironolactone 25 milliGRAM(s) Oral daily    MEDICATIONS  (PRN):  acetaminophen     Tablet .. 650 milliGRAM(s) Oral every 6 hours PRN Temp greater or equal to 38C (100.4F), Mild Pain (1 - 3)  aluminum hydroxide/magnesium hydroxide/simethicone Suspension 30 milliLiter(s) Oral every 4 hours PRN Dyspepsia  dextrose Oral Gel 15 Gram(s) Oral once PRN Blood Glucose LESS THAN 70 milliGRAM(s)/deciliter  melatonin 3 milliGRAM(s) Oral at bedtime PRN Insomnia  ondansetron Injectable 4 milliGRAM(s) IV Push every 8 hours PRN Nausea and/or Vomiting      Allergies  Avapro (Hives)  enalapril (Unknown)  seasonal allergies-outdoor (Urticaria; Rhinorrhea; Sneezing)  losartan (Other)    Intolerances  losartan (Other)    Review of Systems:  Constitutional: No fever  Eyes: No blurry vision  Neuro: No tremors  HEENT: + Headache  Cardiovascular: No chest pain, palpitations  Respiratory: No SOB, no cough  GI: No nausea, vomiting, abdominal pain  : No dysuria  Skin: no rash  Psych: no depression  Endocrine: no polyuria, polydipsia  Hem/lymph: no swelling  Osteoporosis: no fractures    ALL OTHER SYSTEMS REVIEWED AND NEGATIVE    UNABLE TO OBTAIN    PHYSICAL EXAM:  VITALS: T(C): 36.9 (03-18-24 @ 08:06)  T(F): 98.5 (03-18-24 @ 08:06), Max: 98.7 (03-17-24 @ 23:46)  HR: 76 (03-18-24 @ 08:06) (63 - 84)  BP: 136/56 (03-18-24 @ 08:06) (121/61 - 141/55)  RR:  (16 - 20)  SpO2:  (99% - 100%)  Wt(kg): --  GENERAL: NAD, well-groomed, well-developed  EYES: No proptosis, no lid lag, anicteric  HEENT:  Atraumatic, Normocephalic, moist mucous membranes  THYROID: Normal size, no palpable nodules  RESPIRATORY: Clear to auscultation bilaterally; No rales, rhonchi, wheezing  CARDIOVASCULAR: Regular rate and rhythm; No murmurs; no peripheral edema  GI: Soft, nontender, non distended, normal bowel sounds  SKIN: Dry, intact, No rashes or lesions  MUSCULOSKELETAL: Full range of motion, normal strength  PSYCH: Alert and oriented x 3, normal affect, normal mood        CAPILLARY BLOOD GLUCOSE  95 (18 Mar 2024 07:45)  POCT Blood Glucose.: 89 mg/dL (18 Mar 2024 11:09)  POCT Blood Glucose.: 78 mg/dL (18 Mar 2024 10:24)  POCT Blood Glucose.: 88 mg/dL (18 Mar 2024 08:06)  POCT Blood Glucose.: 95 mg/dL (18 Mar 2024 07:45)  POCT Blood Glucose.: 107 mg/dL (18 Mar 2024 05:56)  POCT Blood Glucose.: 112 mg/dL (18 Mar 2024 04:46)  POCT Blood Glucose.: 127 mg/dL (18 Mar 2024 03:44)  POCT Blood Glucose.: 88 mg/dL (18 Mar 2024 02:24)  POCT Blood Glucose.: 116 mg/dL (18 Mar 2024 01:27)  POCT Blood Glucose.: 139 mg/dL (18 Mar 2024 00:33)  POCT Blood Glucose.: 138 mg/dL (17 Mar 2024 23:33)                            10.4   7.55  )-----------( 148      ( 18 Mar 2024 00:35 )             32.7       03-18    137  |  106  |  43<H>  ----------------------------<  91  3.9   |  23  |  2.85<H>    eGFR: 21<L>    Ca    10.5      03-18  Mg     2.2     03-18  Phos  2.4     03-18    TPro  6.9  /  Alb  3.8  /  TBili  0.5  /  DBili  x   /  AST  25  /  ALT  17  /  AlkPhos  45  03-18      Thyroid Function Tests:      A1C with Estimated Average Glucose Result: 6.3 % (03-18-24 @ 00:35)  A1C with Estimated Average Glucose Result: 6.0 % (10-11-23 @ 04:41)      Radiology:     FINDINGS:  Heart/Vascular: Heart size cannot be accurately assessed on this   projection  Pulmonary: Right lower lobe patchy opacity. No pleural effusion or   pneumothorax..  Bones: No acute bony abnormalities.  Lines and tubes: None.    IMPRESSION:  Right lower lobe patchy opacity, likely atelectasis or pneumonia.    --- End of Report ---

## 2024-03-18 NOTE — H&P ADULT - PROBLEM SELECTOR PLAN 2
- Continue with home plavix 75 mg PO Qd  - Continue with home carvedilol 25 mg PO BID   - Continue with home atorvastatin 40 mg PO Qd Quality 111:Pneumonia Vaccination Status For Older Adults: Pneumococcal vaccine (PPSV23) administered on or after patient’s 60th birthday and before the end of the measurement period Detail Level: Detailed

## 2024-03-18 NOTE — ED ADULT TRIAGE NOTE - DIRECT TO ROOM CARE INITIATED:
17-Mar-2024 23:42
Patient requests all Lab, Cardiology, and Radiology Results on their Discharge Instructions

## 2024-03-18 NOTE — ED PROVIDER NOTE - OBJECTIVE STATEMENT
84Y M p/w hypoglycemia. Patient reportedly was feeling unwell during the day today, around 5 PM he became acutely weak, diaphoretic, and confused.  Wife was with him and called their son who called EMS. Fingerstick was 33 on EMS arrival, received 1 amp of D50 after which fingerstick improved to 220s per EMS. 84Y M PMH DM2, CKD, HTN, CAD, SHARATH, BPH p/w hypoglycemia. Patient reportedly was feeling unwell during the day today, around 5 PM he became acutely weak, diaphoretic, and confused.  Wife was with him and called their son who called EMS. Fingerstick was 33 on EMS arrival, received 1 amp of D50 after which fingerstick improved to 220s per EMS. Denies any recent fevers, nausea/vomiting, diarrhea, no recent medication changes or missed doses.  Was taking p.o. as normal today.

## 2024-03-18 NOTE — H&P ADULT - NSHPPHYSICALEXAM_GEN_ALL_CORE
.  VITAL SIGNS:  T(C): 36.9 (03-18-24 @ 08:06), Max: 37.1 (03-17-24 @ 23:46)  T(F): 98.5 (03-18-24 @ 08:06), Max: 98.7 (03-17-24 @ 23:46)  HR: 76 (03-18-24 @ 08:06) (63 - 84)  BP: 136/56 (03-18-24 @ 08:06) (121/61 - 141/55)  BP(mean): 80 (03-18-24 @ 06:15) (79 - 85)  RR: 18 (03-18-24 @ 08:06) (16 - 20)  SpO2: 99% (03-18-24 @ 08:06) (99% - 100%)  Wt(kg): --    PHYSICAL EXAM:    Constitutional: WDWN resting comfortably in bed; NAD  Head: NC/AT  Eyes: PERRL, EOMI, anicteric sclera  ENT: no nasal discharge; uvula midline, no oropharyngeal erythema or exudates; MMM  Neck: supple; no JVD or thyromegaly  Respiratory: CTA B/L; no W/R/R, no retractions  Cardiac: +S1/S2; RRR; no M/R/G; PMI non-displaced  Gastrointestinal: abdomen soft, NT/ND; no rebound or guarding; +BSx4  Back: spine midline, no bony tenderness or step-offs; no CVAT B/L  Extremities: WWP, no clubbing or cyanosis; no peripheral edema  Musculoskeletal: NROM x4; no joint swelling, tenderness or erythema  Vascular: 2+ radial, femoral, DP/PT pulses B/L  Dermatologic: skin warm, dry and intact; no rashes, wounds, or scars  Lymphatic: no submandibular or cervical LAD  Neurologic: AAOx3; CNII-XII grossly intact; no focal deficits  Psychiatric: affect and characteristics of appearance, verbalizations, behaviors are appropriate

## 2024-03-18 NOTE — ED PROVIDER NOTE - NSICDXFAMILYHX_GEN_ALL_CORE_FT
82
FAMILY HISTORY:  Mother  Still living? Unknown  Family history of cerebrovascular accident, Age at diagnosis: Age Unknown

## 2024-03-18 NOTE — H&P ADULT - HISTORY OF PRESENT ILLNESS
84M PMH CKD3, HTN, CAD, SHARATH, BPH, DM2, presents with hypoglycemia. Patient reports that he was in his normal state of health and developed possible URI symptoms last Wednesday, which subsequently resolved and he returned to a normal state of health. On day prior to admission, patient reports he again felt unwell. Patient states he felt sweaty, his arms were heavy, no vomit, though he felt nauseous.  Patient states he felt he was going to pass out, and called his wife, who called his son, and EMS was subsequently called. Upon arrival, patients blood glucose was found to be in the 30s. for which he received an amp of d50 with resolution. Subsequently brought to the hospital for further evaluation and treatment.   Patient states he does not have an endocrinologist, and has medications prescribed by his PCP. Reports he is not on injection medications. Has not had recent diarrhea, vomiting or other clear cause of dehydration.   At time of exam, denies chest pain or palpitations. Denies shortness of breath or cough. ROS otherwise noncontributory. VSS on admission. Labs notable for elevated Cr, mild elevation in troponin. Patient is now for admission to medicine for further evaluation and treatment.

## 2024-03-18 NOTE — ED PROVIDER NOTE - PROGRESS NOTE DETAILS
Jane Isaacs MD PGY-2: patient TBA for ongoing glucose monitoring. discussed plan for admission with [facility] Dr. Dooley who accepted the patient for admission.

## 2024-03-18 NOTE — H&P ADULT - PROBLEM SELECTOR PLAN 1
Patient presents to the hospital with hypoglycemia at home. No clear instigating factor, though with possible URI last Wednesday. Patient reports no previous similar events, and has been on antihyperglycemics for years, though notably is on medications which may have caused hypoglycemia (glipizide, pioglitizone in some contexts). No insulin use. Last A1c 6.8 as per patient  - Endocrine consult requested for medication management given his hypoglycemia. Patient is also interested in following with endocrinology as an outpatient.   - Hold home regimen  - Will order mISS while inpatient   - A1c with AM labs  - Continue with home atorvastatin 40 mg PO Qd

## 2024-03-18 NOTE — ED ADULT NURSE REASSESSMENT NOTE - NS ED NURSE REASSESS COMMENT FT1
FS 75 med team aware Juice given Pt awake alert and orientedx4 Spouse at bedside Will check FS [id number].

## 2024-03-18 NOTE — CONSULT NOTE ADULT - ASSESSMENT
84M PMH CKD3, HTN, CAD, SHARATH, BPH, DM2, presents with hypoglycemia.  84M PMH CKD3, HTN, CAD, SHARATH, BPH, DM2, presents with hypoglycemia likely in setting of recent decreased PO intake secondary to URI symptoms and continued medication use.    Pt has had an a1c of 6.0 in October 2023 and 6.3 March 2024 demonstrating relatively well controlled diabetes over the past 6 months.     #T2DM  #Hypoglycemia    Inpatient Medication Recommendations:   Low dose insulin sliding scale TID premeal and QHS, Finger stick premeal TID and QHS    Discharge Medications Recommendations:  Will likely stop Glipizide in setting of recent hypoglycemia event. Pt is also well controlled and will likely dc repaglinide. Will likely continue Jardiance 10mg QD and Pioglitazone 30mg QD depending on finger sticks while in the hospital.    Please have patient follow up with Endocrinology clinic upon discharge at [address] [place] [address].    #HTN  Continue blood pressure management with home medications as clinically indicated.  Home regimen includes: Hydralazine 100mg TID, amlodipine 10mg QD, Spironolactone 25mg QD  Goal systolic blood pressure while in patient <180    #HLD  Check fasting lipid panel in AM  Continue with Zetia 10mg QD, Fenofibrate 48mg QD, Atorvastatin 40mg QD, omega 3 fish oil capsule qd    Rest of care as per primary team.    Patient case pending discussion with attending.  All recommendations are preliminary until attending attestation complete.  84M PMH CKD3, HTN, CAD, SHARATH, BPH, DM2, presents with hypoglycemia likely in setting of recent decreased PO intake secondary to URI symptoms and continued medication use.    Pt has had an a1c of 6.0 in October 2023 and 6.3 March 2024 demonstrating relatively well controlled diabetes over the past 6 months.     #T2DM  #Hypoglycemia    Inpatient Medication Recommendations:   Low dose insulin sliding scale TID premeal and QHS, Finger stick premeal TID and QHS  In patient FS goal Range 100-180    Discharge Medications Recommendations:  Will stop Glipizide in setting of recent hypoglycemia event. Will stop pioglitazone 30mg QD given multitude of side effects. Will likely continue Jardiance 10mg QD and Repaglinide 1mg QD pre-lunch depending on pre-meal FS/insulin requirement while inpatient. Pioglitazone 30mg QD depending on finger sticks while in the hospital.    Please have patient follow up with the Endocrinology clinic upon discharge at [address] [place] [address], [address] [place] [address] [phone].    #HTN  Continue blood pressure management with home medications as clinically indicated.  Home regimen includes: Hydralazine 100mg TID, amlodipine 10mg QD, Spironolactone 25mg QD  Goal systolic blood pressure while in patient <180    #HLD  Check fasting lipid panel in AM  Continue with Zetia 10mg QD, Fenofibrate 48mg QD, Atorvastatin 40mg QD, omega 3 fish oil capsule qd    Rest of care as per primary team.    Patient case discussed with attending.  All recommendations are preliminary until attending attestation complete.  84M PMH CKD3, HTN, CAD, SHARATH, BPH, DM2, presents with hypoglycemia likely in setting of recent decreased PO intake secondary to URI symptoms and continued medication use.    Pt has had an a1c of 6.0 in October 2023 and 6.3 March 2024 demonstrating relatively well controlled diabetes over the past 6 months.     #T2DM  #Hypoglycemia    Inpatient Medication Recommendations:   Low dose insulin sliding scale TID premeal and QHS, Finger stick premeal TID and QHS  In patient FS goal Range 100-180  Hypoglycemia protocol while inpatient    Discharge Medications Recommendations:  Will stop Glipizide in setting of recent hypoglycemia event. Will stop pioglitazone 30mg QD given multitude of side effects. Will likely continue Jardiance 10mg QD and Repaglinide 1mg QD pre-lunch depending on pre-meal FS/insulin requirement while inpatient. Pioglitazone 30mg QD depending on finger sticks while in the hospital.    Please have patient follow up with the Endocrinology clinic upon discharge at [address] [place] [address], [address] [place] [address] [phone].    #HTN  Continue blood pressure management with home medications as clinically indicated.  Home regimen includes: Hydralazine 100mg TID, amlodipine 10mg QD, Spironolactone 25mg QD  Goal systolic blood pressure while in patient <180    #HLD  Check fasting lipid panel in AM  Continue with Zetia 10mg QD, Fenofibrate 48mg QD, Atorvastatin 40mg QD, omega 3 fish oil capsule qd    Rest of care as per primary team.    Patient case discussed with attending.  All recommendations are preliminary until attending attestation complete.

## 2024-03-19 ENCOUNTER — TRANSCRIPTION ENCOUNTER (OUTPATIENT)
Age: 84
End: 2024-03-19

## 2024-03-19 DIAGNOSIS — N18.9 CHRONIC KIDNEY DISEASE, UNSPECIFIED: ICD-10-CM

## 2024-03-19 LAB
ANION GAP SERPL CALC-SCNC: 12 MMOL/L — SIGNIFICANT CHANGE UP (ref 5–17)
BUN SERPL-MCNC: 50 MG/DL — HIGH (ref 7–23)
CALCIUM SERPL-MCNC: 10.8 MG/DL — HIGH (ref 8.4–10.5)
CHLORIDE SERPL-SCNC: 107 MMOL/L — SIGNIFICANT CHANGE UP (ref 96–108)
CHOLEST SERPL-MCNC: 135 MG/DL — SIGNIFICANT CHANGE UP
CO2 SERPL-SCNC: 20 MMOL/L — LOW (ref 22–31)
CREAT ?TM UR-MCNC: 71 MG/DL — SIGNIFICANT CHANGE UP
CREAT SERPL-MCNC: 3.14 MG/DL — HIGH (ref 0.5–1.3)
EGFR: 19 ML/MIN/1.73M2 — LOW
GLUCOSE BLDC GLUCOMTR-MCNC: 105 MG/DL — HIGH (ref 70–99)
GLUCOSE BLDC GLUCOMTR-MCNC: 190 MG/DL — HIGH (ref 70–99)
GLUCOSE BLDC GLUCOMTR-MCNC: 191 MG/DL — HIGH (ref 70–99)
GLUCOSE BLDC GLUCOMTR-MCNC: 196 MG/DL — HIGH (ref 70–99)
GLUCOSE SERPL-MCNC: 101 MG/DL — HIGH (ref 70–99)
HDLC SERPL-MCNC: 62 MG/DL — SIGNIFICANT CHANGE UP
LIPID PNL WITH DIRECT LDL SERPL: 49 MG/DL — SIGNIFICANT CHANGE UP
NON HDL CHOLESTEROL: 73 MG/DL — SIGNIFICANT CHANGE UP
POTASSIUM SERPL-MCNC: 4.2 MMOL/L — SIGNIFICANT CHANGE UP (ref 3.5–5.3)
POTASSIUM SERPL-SCNC: 4.2 MMOL/L — SIGNIFICANT CHANGE UP (ref 3.5–5.3)
PROT ?TM UR-MCNC: 46 MG/DL — HIGH (ref 0–12)
PROT/CREAT UR-RTO: 0.6 RATIO — HIGH (ref 0–0.2)
SODIUM SERPL-SCNC: 139 MMOL/L — SIGNIFICANT CHANGE UP (ref 135–145)
TRIGL SERPL-MCNC: 144 MG/DL — SIGNIFICANT CHANGE UP

## 2024-03-19 PROCEDURE — 99232 SBSQ HOSP IP/OBS MODERATE 35: CPT

## 2024-03-19 PROCEDURE — 99233 SBSQ HOSP IP/OBS HIGH 50: CPT

## 2024-03-19 PROCEDURE — 99223 1ST HOSP IP/OBS HIGH 75: CPT

## 2024-03-19 RX ORDER — REPAGLINIDE 1 MG/1
1 TABLET ORAL
Refills: 0 | DISCHARGE

## 2024-03-19 RX ORDER — PIOGLITAZONE HYDROCHLORIDE 15 MG/1
1 TABLET ORAL
Refills: 0 | DISCHARGE

## 2024-03-19 RX ORDER — SENNA PLUS 8.6 MG/1
2 TABLET ORAL
Qty: 0 | Refills: 0 | DISCHARGE

## 2024-03-19 RX ADMIN — AMLODIPINE BESYLATE 10 MILLIGRAM(S): 2.5 TABLET ORAL at 05:43

## 2024-03-19 RX ADMIN — Medication 100 MILLIGRAM(S): at 21:52

## 2024-03-19 RX ADMIN — Medication 650 MILLIGRAM(S): at 02:36

## 2024-03-19 RX ADMIN — Medication 1: at 17:16

## 2024-03-19 RX ADMIN — RANOLAZINE 500 MILLIGRAM(S): 500 TABLET, FILM COATED, EXTENDED RELEASE ORAL at 17:08

## 2024-03-19 RX ADMIN — Medication 48 MILLIGRAM(S): at 11:01

## 2024-03-19 RX ADMIN — Medication 100 MILLIGRAM(S): at 11:01

## 2024-03-19 RX ADMIN — HEPARIN SODIUM 5000 UNIT(S): 5000 INJECTION INTRAVENOUS; SUBCUTANEOUS at 21:52

## 2024-03-19 RX ADMIN — POLYETHYLENE GLYCOL 3350 17 GRAM(S): 17 POWDER, FOR SOLUTION ORAL at 11:00

## 2024-03-19 RX ADMIN — CARVEDILOL PHOSPHATE 25 MILLIGRAM(S): 80 CAPSULE, EXTENDED RELEASE ORAL at 05:43

## 2024-03-19 RX ADMIN — Medication 650 MILLIGRAM(S): at 18:21

## 2024-03-19 RX ADMIN — ATORVASTATIN CALCIUM 40 MILLIGRAM(S): 80 TABLET, FILM COATED ORAL at 21:52

## 2024-03-19 RX ADMIN — SPIRONOLACTONE 25 MILLIGRAM(S): 25 TABLET, FILM COATED ORAL at 11:00

## 2024-03-19 RX ADMIN — CARVEDILOL PHOSPHATE 25 MILLIGRAM(S): 80 CAPSULE, EXTENDED RELEASE ORAL at 17:08

## 2024-03-19 RX ADMIN — RANOLAZINE 500 MILLIGRAM(S): 500 TABLET, FILM COATED, EXTENDED RELEASE ORAL at 05:43

## 2024-03-19 RX ADMIN — HEPARIN SODIUM 5000 UNIT(S): 5000 INJECTION INTRAVENOUS; SUBCUTANEOUS at 14:22

## 2024-03-19 RX ADMIN — CLOPIDOGREL BISULFATE 75 MILLIGRAM(S): 75 TABLET, FILM COATED ORAL at 11:01

## 2024-03-19 RX ADMIN — Medication 100 MILLIGRAM(S): at 14:22

## 2024-03-19 RX ADMIN — Medication 1: at 11:08

## 2024-03-19 RX ADMIN — HEPARIN SODIUM 5000 UNIT(S): 5000 INJECTION INTRAVENOUS; SUBCUTANEOUS at 05:43

## 2024-03-19 RX ADMIN — SENNA PLUS 2 TABLET(S): 8.6 TABLET ORAL at 21:52

## 2024-03-19 NOTE — PROGRESS NOTE ADULT - SUBJECTIVE AND OBJECTIVE BOX
[facility] Division of Hospital Medicine  Surinder Amaro MD  Available via MS Teams    SUBJECTIVE / OVERNIGHT EVENTS:    ADDITIONAL REVIEW OF SYSTEMS:    MEDICATIONS  (STANDING):  allopurinol 100 milliGRAM(s) Oral daily  amLODIPine   Tablet 10 milliGRAM(s) Oral daily  atorvastatin 40 milliGRAM(s) Oral at bedtime  carvedilol 25 milliGRAM(s) Oral every 12 hours  clopidogrel Tablet 75 milliGRAM(s) Oral daily  dextrose 5%. 1000 milliLiter(s) (50 mL/Hr) IV Continuous <Continuous>  dextrose 5%. 1000 milliLiter(s) (100 mL/Hr) IV Continuous <Continuous>  dextrose 50% Injectable 25 Gram(s) IV Push once  dextrose 50% Injectable 12.5 Gram(s) IV Push once  dextrose 50% Injectable 25 Gram(s) IV Push once  fenofibrate Tablet 48 milliGRAM(s) Oral daily  glucagon  Injectable 1 milliGRAM(s) IntraMuscular once  heparin   Injectable 5000 Unit(s) SubCutaneous every 8 hours  hydrALAZINE 100 milliGRAM(s) Oral every 8 hours  insulin lispro (ADMELOG) corrective regimen sliding scale   SubCutaneous three times a day before meals  insulin lispro (ADMELOG) corrective regimen sliding scale   SubCutaneous at bedtime  polyethylene glycol 3350 17 Gram(s) Oral daily  ranolazine 500 milliGRAM(s) Oral two times a day  senna 2 Tablet(s) Oral at bedtime    MEDICATIONS  (PRN):  acetaminophen     Tablet .. 650 milliGRAM(s) Oral every 6 hours PRN Temp greater or equal to 38C (100.4F), Mild Pain (1 - 3)  aluminum hydroxide/magnesium hydroxide/simethicone Suspension 30 milliLiter(s) Oral every 4 hours PRN Dyspepsia  dextrose Oral Gel 15 Gram(s) Oral once PRN Blood Glucose LESS THAN 70 milliGRAM(s)/deciliter  melatonin 3 milliGRAM(s) Oral at bedtime PRN Insomnia  ondansetron Injectable 4 milliGRAM(s) IV Push every 8 hours PRN Nausea and/or Vomiting      I&O's Summary    19 Mar 2024 07:01  -  19 Mar 2024 15:47  --------------------------------------------------------  IN: 250 mL / OUT: 300 mL / NET: -50 mL        PHYSICAL EXAM:  Vital Signs Last 24 Hrs  T(C): 37.1 (19 Mar 2024 15:36), Max: 37.2 (18 Mar 2024 16:35)  T(F): 98.8 (19 Mar 2024 15:36), Max: 99 (18 Mar 2024 16:35)  HR: 85 (19 Mar 2024 15:36) (77 - 85)  BP: 151/62 (19 Mar 2024 15:36) (129/55 - 151/62)  BP(mean): --  RR: 18 (19 Mar 2024 15:36) (17 - 18)  SpO2: 96% (19 Mar 2024 15:36) (94% - 98%)    Parameters below as of 19 Mar 2024 15:36  Patient On (Oxygen Delivery Method): room air    GENERAL: NAD, well appearing  HEAD: NCAT  CHEST/LUNG: Clear to auscultation bilaterally; No wheeze  HEART: Regular rate and rhythm; No murmurs, rubs, or gallops  ABDOMEN: Soft, Nontender, Nondistended; Bowel sounds present  EXTREMITIES:  2+ Peripheral Pulses, No clubbing, cyanosis, or edema  PSYCH: AAOx3, appropriate affect  NEUROLOGY: non-focal, arellano  SKIN: No rashes or lesions    LABS:                        10.4   7.55  )-----------( 148      ( 18 Mar 2024 00:35 )             32.7     03-19    139  |  107  |  50<H>  ----------------------------<  101<H>  4.2   |  20<L>  |  3.14<H>    Ca    10.8<H>      19 Mar 2024 06:54  Phos  2.4     03-18  Mg     2.2     03-18    TPro  6.9  /  Alb  3.8  /  TBili  0.5  /  DBili  x   /  AST  25  /  ALT  17  /  AlkPhos  45  03-18          Urinalysis Basic - ( 19 Mar 2024 06:54 )    Color: x / Appearance: x / SG: x / pH: x  Gluc: 101 mg/dL / Ketone: x  / Bili: x / Urobili: x   Blood: x / Protein: x / Nitrite: x   Leuk Esterase: x / RBC: x / WBC x   Sq Epi: x / Non Sq Epi: x / Bacteria: x        Culture - Urine (collected 18 Mar 2024 01:12)  Source: Clean Catch Clean Catch (Midstream)  Final Report (18 Mar 2024 23:45):    <10,000 CFU/mL Normal Urogenital Maty      COVID-19 PCR: NotDetec (14 Oct 2023 11:06)            RADIOLOGY & ADDITIONAL TESTS:  New Results Reviewed Today:   New Imaging Personally Reviewed Today:  New Electrocardiogram Personally Reviewed Today:  Prior or Outpatient Records Reviewed Today:    COMMUNICATION:  Care Discussed with Consultants/Other Providers and Details of Discussion: Discussed with ACP  Discussions with Patient/Family:  PCP Communication: [facility] Division of Hospital Medicine  Surinder Amaro MD  Available via MS Teams    SUBJECTIVE / OVERNIGHT EVENTS: No acute events overnight. Pt seen and examined at bedside. Denies any chest pain, sob, hypoglycemia.     ADDITIONAL REVIEW OF SYSTEMS:    MEDICATIONS  (STANDING):  allopurinol 100 milliGRAM(s) Oral daily  amLODIPine   Tablet 10 milliGRAM(s) Oral daily  atorvastatin 40 milliGRAM(s) Oral at bedtime  carvedilol 25 milliGRAM(s) Oral every 12 hours  clopidogrel Tablet 75 milliGRAM(s) Oral daily  dextrose 5%. 1000 milliLiter(s) (50 mL/Hr) IV Continuous <Continuous>  dextrose 5%. 1000 milliLiter(s) (100 mL/Hr) IV Continuous <Continuous>  dextrose 50% Injectable 25 Gram(s) IV Push once  dextrose 50% Injectable 12.5 Gram(s) IV Push once  dextrose 50% Injectable 25 Gram(s) IV Push once  fenofibrate Tablet 48 milliGRAM(s) Oral daily  glucagon  Injectable 1 milliGRAM(s) IntraMuscular once  heparin   Injectable 5000 Unit(s) SubCutaneous every 8 hours  hydrALAZINE 100 milliGRAM(s) Oral every 8 hours  insulin lispro (ADMELOG) corrective regimen sliding scale   SubCutaneous three times a day before meals  insulin lispro (ADMELOG) corrective regimen sliding scale   SubCutaneous at bedtime  polyethylene glycol 3350 17 Gram(s) Oral daily  ranolazine 500 milliGRAM(s) Oral two times a day  senna 2 Tablet(s) Oral at bedtime    MEDICATIONS  (PRN):  acetaminophen     Tablet .. 650 milliGRAM(s) Oral every 6 hours PRN Temp greater or equal to 38C (100.4F), Mild Pain (1 - 3)  aluminum hydroxide/magnesium hydroxide/simethicone Suspension 30 milliLiter(s) Oral every 4 hours PRN Dyspepsia  dextrose Oral Gel 15 Gram(s) Oral once PRN Blood Glucose LESS THAN 70 milliGRAM(s)/deciliter  melatonin 3 milliGRAM(s) Oral at bedtime PRN Insomnia  ondansetron Injectable 4 milliGRAM(s) IV Push every 8 hours PRN Nausea and/or Vomiting      I&O's Summary    19 Mar 2024 07:01  -  19 Mar 2024 15:47  --------------------------------------------------------  IN: 250 mL / OUT: 300 mL / NET: -50 mL        PHYSICAL EXAM:  Vital Signs Last 24 Hrs  T(C): 37.1 (19 Mar 2024 15:36), Max: 37.2 (18 Mar 2024 16:35)  T(F): 98.8 (19 Mar 2024 15:36), Max: 99 (18 Mar 2024 16:35)  HR: 85 (19 Mar 2024 15:36) (77 - 85)  BP: 151/62 (19 Mar 2024 15:36) (129/55 - 151/62)  BP(mean): --  RR: 18 (19 Mar 2024 15:36) (17 - 18)  SpO2: 96% (19 Mar 2024 15:36) (94% - 98%)    Parameters below as of 19 Mar 2024 15:36  Patient On (Oxygen Delivery Method): room air    GENERAL: NAD, well appearing  HEAD: NCAT  CHEST/LUNG: Clear to auscultation bilaterally; No wheeze  HEART: Regular rate and rhythm; No murmurs, rubs, or gallops  ABDOMEN: Soft, Nontender, Nondistended; Bowel sounds present  EXTREMITIES:  2+ Peripheral Pulses, No clubbing, cyanosis, or edema  PSYCH: AAOx3, appropriate affect  NEUROLOGY: non-focal, arellano  SKIN: No rashes or lesions    LABS:                        10.4   7.55  )-----------( 148      ( 18 Mar 2024 00:35 )             32.7     03-19    139  |  107  |  50<H>  ----------------------------<  101<H>  4.2   |  20<L>  |  3.14<H>    Ca    10.8<H>      19 Mar 2024 06:54  Phos  2.4     03-18  Mg     2.2     03-18    TPro  6.9  /  Alb  3.8  /  TBili  0.5  /  DBili  x   /  AST  25  /  ALT  17  /  AlkPhos  45  03-18          Urinalysis Basic - ( 19 Mar 2024 06:54 )    Color: x / Appearance: x / SG: x / pH: x  Gluc: 101 mg/dL / Ketone: x  / Bili: x / Urobili: x   Blood: x / Protein: x / Nitrite: x   Leuk Esterase: x / RBC: x / WBC x   Sq Epi: x / Non Sq Epi: x / Bacteria: x        Culture - Urine (collected 18 Mar 2024 01:12)  Source: Clean Catch Clean Catch (Midstream)  Final Report (18 Mar 2024 23:45):    <10,000 CFU/mL Normal Urogenital Maty      COVID-19 PCR: NotDetec (14 Oct 2023 11:06)            RADIOLOGY & ADDITIONAL TESTS:  New Results Reviewed Today:   New Imaging Personally Reviewed Today:  New Electrocardiogram Personally Reviewed Today:  Prior or Outpatient Records Reviewed Today:    COMMUNICATION:  Care Discussed with Consultants/Other Providers and Details of Discussion: Discussed with ACP  Discussions with Patient/Family:  PCP Communication:

## 2024-03-19 NOTE — DISCHARGE NOTE PROVIDER - CARE PROVIDER_API CALL
Gail Quiros  Internal Medicine  [address] [facility], [facility] [address]  [place] [address]  Phone: [phone]  Fax: [phone]  Follow Up Time: 1 week   Gail Quiros  Internal Medicine  [address] [facility], [facility] [address]  [place] [address]  Phone: [phone]  Fax: [phone]  Follow Up Time: 1 week    Ata Oliveira  Nephrology  [address] [facility], [facility] [address]  [place] [address]  Phone: [phone]  Fax: [phone]  Established Patient  Follow Up Time: 1 week

## 2024-03-19 NOTE — PROGRESS NOTE ADULT - PROBLEM SELECTOR PLAN 2
Patient presents to the hospital with hypoglycemia at home. No clear instigating factor, though with possible URI last Wednesday. Patient reports no previous similar events, and has been on antihyperglycemics for years, though notably is on medications which may have caused hypoglycemia (glipizide, pioglitizone in some contexts). No insulin use. Last A1c 6.8 as per patient  - Endocrine consult requested for medication management given his hypoglycemia. Patient is also interested in following with endocrinology as an outpatient.   - stop glipizide and pioglitazone on discharge  - cont farxiga for renal protection and DM  - cont prandin only if predinner FS > 200  - Continue with home atorvastatin 40 mg PO Qd

## 2024-03-19 NOTE — PATIENT PROFILE ADULT - FALL HARM RISK - HARM RISK INTERVENTIONS

## 2024-03-19 NOTE — DISCHARGE NOTE PROVIDER - NSDCFUADDAPPT_GEN_ALL_CORE_FT
APPTS ARE READY TO BE MADE: [ ] YES    Best Family or Patient Contact (if needed):    Additional Information about above appointments (if needed):    1: Endocrinology clinic  2:   3:     Other comments or requests:

## 2024-03-19 NOTE — PROGRESS NOTE ADULT - ASSESSMENT
84M PMH CKD3, HTN, CAD, SHARATH, BPH, DM2, presents with hypoglycemia likely in setting of recent decreased PO intake secondary to URI symptoms and continued medication use.    Pt has had an a1c of 6.0 in October 2023 and 6.3 March 2024 demonstrating relatively well controlled diabetes over the past 6 months.     #T2DM  #Hypoglycemia    Inpatient Medication Recommendations:   Low dose insulin sliding scale TID premeal and QHS, Finger stick premeal TID and QHS  In patient FS goal Range 100-180  Hypoglycemia protocol while inpatient    Discharge Medications Recommendations:  Will stop Glipizide in setting of recent hypoglycemia event. Will stop pioglitazone 30mg QD given multitude of side effects. Will likely continue Jardiance 10mg QD and Repaglinide 1mg QD pre-lunch depending on pre-meal FS/insulin requirement while inpatient.     Please have patient follow up with the Endocrinology clinic upon discharge at [address] [place] [address], [address] [address] [phone].    #HTN  Continue blood pressure management with home medications as clinically indicated.  Home regimen includes: Hydralazine 100mg TID, amlodipine 10mg QD, Spironolactone 25mg QD  Goal systolic blood pressure while in patient <180    #HLD  Continue with Zetia 10mg QD, Fenofibrate 48mg QD, Atorvastatin 40mg QD, omega 3 fish oil capsule qd  Fasting Lipid Profile:    Rest of care as per primary team.    Patient case PENDING discussed with attending.  All recommendations are preliminary until attending attestation complete.  84M PMH CKD3, HTN, CAD, SHARATH, BPH, DM2, presents with hypoglycemia likely in setting of recent decreased PO intake secondary to URI symptoms and continued medication use.    Pt has had an a1c of 6.0 in October 2023 and 6.3 March 2024 demonstrating relatively well controlled diabetes over the past 6 months.     #T2DM  #Hypoglycemia    Inpatient Medication Recommendations:   Low dose insulin sliding scale TID premeal and QHS, Finger stick premeal TID and QHS  In patient FS goal Range 100-180  Hypoglycemia protocol while inpatient    Discharge Medications Recommendations:  Will stop Glipizide in setting of recent hypoglycemia event. Will stop pioglitazone 30mg QD given multitude of side effects. Will likely continue Jardiance 10mg QD and Repaglinide 1mg QD pre-lunch depending on pre-meal FS/insulin requirement while inpatient.     Please have patient follow up with the Endocrinology clinic upon discharge at [address] [place] [address], [address] [address] [phone].    #HTN  Continue blood pressure management with home medications as clinically indicated.  Home regimen includes: Hydralazine 100mg TID, amlodipine 10mg QD, Spironolactone 25mg QD  Goal systolic blood pressure while in patient <180    #HLD  Continue with Zetia 10mg QD, Fenofibrate 48mg QD, Atorvastatin 40mg QD, omega 3 fish oil capsule qd  Fasting Lipid Profile:    Rest of care as per primary team.    Patient case PENDING discussed with attending.  All recommendations are preliminary until attending attestation complete.  84M PMH CKD3, HTN, CAD, SHARATH, BPH, DM2, presents with hypoglycemia likely in setting of recent decreased PO intake secondary to URI symptoms and continued medication use.    Pt has had an a1c of 6.0 in October 2023 and 6.3 March 2024 demonstrating relatively well controlled diabetes over the past 6 months.     #T2DM  #Hypoglycemia    Inpatient Medication Recommendations:   Low dose insulin sliding scale TID premeal and QHS, Finger stick premeal TID and QHS  In patient FS goal Range 100-180  Hypoglycemia protocol while inpatient    Discharge Medications Recommendations:  Stop Glipizide in setting of recent hypoglycemia event. Stop pioglitazone 30mg QD given multitude of side effects.   Discharge on Jardiance 10mg QAM. Pt can check premeal finger stick for dinner, IF FS >200 can take Repaglinide 1mg.  Please have patient follow up with the Endocrinology clinic upon discharge at [address] [place] [address], [address] [address] [phone].    #HTN  Continue blood pressure management with home medications as clinically indicated.  Home regimen includes: Hydralazine 100mg TID, amlodipine 10mg QD, Spironolactone 25mg QD  Goal systolic blood pressure while in patient <180    #HLD  Continue with Zetia 10mg QD, Fenofibrate 48mg QD, Atorvastatin 40mg QD, omega 3 fish oil capsule qd  Fasting Lipid Profile consistent with appropriate medication use    Rest of care as per primary team.    Patient case discussed with attending.  All recommendations are preliminary until attending attestation complete.

## 2024-03-19 NOTE — CONSULT NOTE ADULT - NS ATTEND AMEND GEN_ALL_CORE FT
Troponin noted, he has no symptoms  BNP elevated  Cont BP meds  Agree with renal to hold aldactone for now  Get echo    Jarad
formulated plan with and  agree with above as scribed by NP Chaka [Skylar]       lungs no rales   heart RRR  ext no edema       SAPNA on CKD   hypoglycemia  HTN   mild hypercalcemia    cr higher ? volume depletion   trend cr and lytes  no further glipizide as can increase hypoglycemic events due to ckd   cont norvasc coreg as well as hydralazine   hold aldactone given sapna

## 2024-03-19 NOTE — DISCHARGE NOTE PROVIDER - NSDCMRMEDTOKEN_GEN_ALL_CORE_FT
allopurinol 100 mg oral tablet: 1 tab(s) orally once a day  amLODIPine 10 mg oral tablet: 1 tab(s) orally once a day  atorvastatin 40 mg oral tablet: 1 tab(s) orally once a day  carvedilol 25 mg oral tablet: 1 tab(s) orally 2 times a day  Centrum silvertablet: 1 tablet orally once a day  clopidogrel 75 mg oral tablet: 1 tab(s) orally once a day  ezetimibe 10 mg oral tablet: 1 tab(s) orally once a day  fenofibrate 48 mg oral tablet: 1 tab(s) orally once a day  hydrALAZINE 100 mg oral tablet: 1 tab(s) orally 3 times a day  Jardiance 10 mg oral tablet: 1 tab(s) orally once a day (in the morning)  Omega 3 Fish Oil capsule: 1 capsule orally once a day  ranolazine 500 mg oral tablet, extended release: 1 tab(s) orally 2 times a day  senna (sennosides) 8.6 mg oral tablet: 2 tab(s) orally once a day  spironolactone 25 mg oral tablet: 1 tab(s) orally once a day  trospium 20 mg oral tablet: 1 tab(s) orally 2 times a day  Vitamin D tablet: 1 tablet orally once a day   allopurinol 100 mg oral tablet: 1 tab(s) orally once a day  amLODIPine 10 mg oral tablet: 1 tab(s) orally once a day  atorvastatin 40 mg oral tablet: 1 tab(s) orally once a day  carvedilol 25 mg oral tablet: 1 tab(s) orally 2 times a day  Centrum silvertablet: 1 tablet orally once a day  clopidogrel 75 mg oral tablet: 1 tab(s) orally once a day  ezetimibe 10 mg oral tablet: 1 tab(s) orally once a day  fenofibrate 48 mg oral tablet: 1 tab(s) orally once a day  hydrALAZINE 100 mg oral tablet: 1 tab(s) orally 3 times a day  Jardiance 10 mg oral tablet: 1 tab(s) orally once a day (in the morning)  Omega 3 Fish Oil capsule: 1 capsule orally once a day  ranolazine 500 mg oral tablet, extended release: 1 tab(s) orally 2 times a day  repaglinide 1 mg oral tablet: 1 tab(s) orally once a day only if fingerstick before dinner &gt; 200  senna (sennosides) 8.6 mg oral tablet: 2 tab(s) orally once a day  trospium 20 mg oral tablet: 1 tab(s) orally 2 times a day  Vitamin D tablet: 1 tablet orally once a day

## 2024-03-19 NOTE — DISCHARGE NOTE PROVIDER - NSFOLLOWUPCLINICS_GEN_ALL_ED_FT
[facility]  Endocrinology  [address] [place] [address]  Phone: [phone]  Fax:   Established Patient  Follow Up Time: 1 week

## 2024-03-19 NOTE — DISCHARGE NOTE PROVIDER - NSDCCPCAREPLAN_GEN_ALL_CORE_FT
PRINCIPAL DISCHARGE DIAGNOSIS  Diagnosis: Hypoglycemia  Assessment and Plan of Treatment: Stop taking glipizide and pioglitazone. Continue taking Jardiance. If your sugars are above 200 prior to dinner, you may continue taking prandin prior to meals. Monitor for low sugars. If you feel signs/symptoms of hypoglycemia such as nausea, nervousness, shaking, sweating, heart beating very fast. If your blood sugar is below 70 take 15 grams of carbohydrates (ex 4 oz of apple juice, 3-4 glucose tablets, or 4-6 oz of regular soda) wait 15 minutes and repeat blood sugar to make sure it comes up above 70. Follow-up with endocrinology in 1-2 weeks.      SECONDARY DISCHARGE DIAGNOSES  Diagnosis: Stage 3 chronic kidney disease  Assessment and Plan of Treatment: You have chronically reduced kidney function. Follow-up with your kidney specialist.    Diagnosis: CAD (coronary artery disease)  Assessment and Plan of Treatment: Repeat an echocardiogram as an outpatient and follow-up with Dr. Abraham.     PRINCIPAL DISCHARGE DIAGNOSIS  Diagnosis: Hypoglycemia  Assessment and Plan of Treatment: Stop taking glipizide and pioglitazone. Continue taking Jardiance. Check your fingerstick prior to dinner - if it is above 200, you may take prandin. Monitor for low sugars. If you feel signs/symptoms of hypoglycemia such as nausea, nervousness, shaking, sweating, heart beating very fast. If your blood sugar is below 70 take 15 grams of carbohydrates (ex 4 oz of apple juice, 3-4 glucose tablets, or 4-6 oz of regular soda) wait 15 minutes and repeat blood sugar to make sure it comes up above 70. Follow-up with endocrinology in 1-2 weeks.      SECONDARY DISCHARGE DIAGNOSES  Diagnosis: CAD (coronary artery disease)  Assessment and Plan of Treatment: You had a repeat echocardiogram which was normal. Follow-up with Dr. Abraham.    Diagnosis: Acute on chronic renal failure  Assessment and Plan of Treatment: You had reduced kidney function on top of your existing kidney disease. This is likely due to dehydration. Your nephrologist recommended stopping spironolactone and following up in 1 week.

## 2024-03-19 NOTE — PROGRESS NOTE ADULT - ASSESSMENT
84M PMH CKD3, HTN, CAD, SHARATH, BPH, DM2, presents with hypoglycemia and now admitted for medication management

## 2024-03-19 NOTE — PROGRESS NOTE ADULT - NSPROGADDITIONALINFOA_GEN_ALL_CORE
The necessity of the time spent during the encounter on this date of service was due to:   - Ordering, reviewing, and interpreting labs, testing, and imaging  - Independently obtaining a review of systems and performing a physical exam  - Reviewing prior hospitalization and where necessary, outpatient records  - Reviewing consultant recommendations/communicating with consultants  - Counselling and educating patient and family regarding interpretation of aforementioned items and plan of care    Time-based billing (NON-critical care). Total minutes spent: 52

## 2024-03-19 NOTE — CONSULT NOTE ADULT - ASSESSMENT
84 year old Male with PMH CKD4, HTN, CAD, SHARATH, BPH, DM2, presents with hypoglycemia. Patient reports recent URI, which subsequently resolved and he returned to a normal state of health. On day prior to admission, patient reports he again felt unwell. Patient states he felt sweaty, his arms were heavy, no vomit, though he felt nauseous. EMS was subsequently called. Upon arrival, patients blood glucose was found to be in the 30s, for which he received an amp of d50 with resolution.       1- SAPNA on CKD4  2- HTN  3- BPH  4- DM2  5- mild hypercalcemia      recent outpatient creatinine ~2.6  sapna suspected in setting ?dehydration given mildly elevated calcium on aldactone.   hold aldactone for now  hold vitamin d supplements  check pth  proteinuria, check urine protein/creatinine ratio  bladder scan x 1 to r/o retention  amlodipine 10 mg daily for htn  hydralazine 100 mg tid for htn  coreg 25 mg bid  check echo  strict I/O  trend creatinine and electrolytes daily

## 2024-03-19 NOTE — PHYSICAL THERAPY INITIAL EVALUATION ADULT - PERTINENT HX OF CURRENT PROBLEM, REHAB EVAL
84Y M PMH DM2, CKD, HTN, CAD, SHARATH, BPH p/w hypoglycemia. Patient reportedly was feeling unwell during the day today, around 5 PM he became acutely weak, diaphoretic, and confused.  Wife was with him and called their son who called EMS. Fingerstick was 33 on EMS arrival, received 1 amp of D50 after which fingerstick improved to 220s per EMS.

## 2024-03-19 NOTE — DISCHARGE NOTE PROVIDER - ATTENDING DISCHARGE PHYSICAL EXAMINATION:
Vital Signs Last 24 Hrs  T(C): 36.4 (19 Mar 2024 11:48), Max: 37.2 (18 Mar 2024 16:35)  T(F): 97.6 (19 Mar 2024 11:48), Max: 99 (18 Mar 2024 16:35)  HR: 81 (19 Mar 2024 11:48) (75 - 84)  BP: 134/56 (19 Mar 2024 11:48) (129/55 - 134/56)  BP(mean): --  RR: 18 (19 Mar 2024 11:48) (17 - 18)  SpO2: 94% (19 Mar 2024 11:48) (94% - 98%)    Parameters below as of 19 Mar 2024 11:48  Patient On (Oxygen Delivery Method): room air    GENERAL: NAD, well appearing  HEAD: NCAT  CHEST/LUNG: Clear to auscultation bilaterally; No wheeze  HEART: Regular rate and rhythm; No murmurs, rubs, or gallops  ABDOMEN: Soft, Nontender, Nondistended; Bowel sounds present  EXTREMITIES:  2+ Peripheral Pulses, No clubbing, cyanosis, or edema  PSYCH: AAOx3, appropriate affect  NEUROLOGY: non-focal, arellano  SKIN: No rashes or lesions

## 2024-03-19 NOTE — CONSULT NOTE ADULT - SUBJECTIVE AND OBJECTIVE BOX
Vascular Cardiology Consult Note     DIRECT PROVIDER NUMBER: [phone]  / Available on TEAMS    CC:  Hypoglycemia    HPI:  85 y/o M with PMHX HTN, HLD, DM, CKD stage 3 - 4, SHARATH, CAD s/p PCI RCA with un-revascularized dLAD 70% on left heart cath 2022, history of chronic anemia and prior GI bleeding, Gallbladder CA s/p cholecystectomy who presented with hypoglycemia. Patient reports a recent URI. Denies C/P or SOB. Reports LE edema resolved with recent addition of Aldactone and home BP improved. Cr. noted to be elevated to 3.1. VSS on RA. Mild Troponin leak 62 - >55. BNP 4.5K. CXR showed right lower lobe patchy opacity, likely atelectasis or pneumonia. Glucose has improved. Vascular Cardiology consulted.     Allergies    Avapro (Hives)  enalapril (Unknown)  seasonal allergies-outdoor (Urticaria; Rhinorrhea; Sneezing)  losartan (Other)    Intolerances    losartan (Other)    MEDICATIONS:  amLODIPine   Tablet 10 milliGRAM(s) Oral daily  carvedilol 25 milliGRAM(s) Oral every 12 hours  clopidogrel Tablet 75 milliGRAM(s) Oral daily  heparin   Injectable 5000 Unit(s) SubCutaneous every 8 hours  hydrALAZINE 100 milliGRAM(s) Oral every 8 hours  ranolazine 500 milliGRAM(s) Oral two times a day  spironolactone 25 milliGRAM(s) Oral daily  acetaminophen     Tablet .. 650 milliGRAM(s) Oral every 6 hours PRN  melatonin 3 milliGRAM(s) Oral at bedtime PRN  ondansetron Injectable 4 milliGRAM(s) IV Push every 8 hours PRN  aluminum hydroxide/magnesium hydroxide/simethicone Suspension 30 milliLiter(s) Oral every 4 hours PRN  polyethylene glycol 3350 17 Gram(s) Oral daily  senna 2 Tablet(s) Oral at bedtime  allopurinol 100 milliGRAM(s) Oral daily  atorvastatin 40 milliGRAM(s) Oral at bedtime  fenofibrate Tablet 48 milliGRAM(s) Oral daily  glucagon  Injectable 1 milliGRAM(s) IntraMuscular once  insulin lispro (ADMELOG) corrective regimen sliding scale   SubCutaneous three times a day before meals  insulin lispro (ADMELOG) corrective regimen sliding scale   SubCutaneous at bedtime      PAST MEDICAL & SURGICAL HISTORY:  Hypertension  Hyperlipemia  Diabetes Mellitus Type II  Renal Calculi  h/olithotripsy 2010  H/O: Rotator Cuff Tear  Pneumonia  BPH (Benign Prostatic Hypertrophy)  Hypertension  Hyperlipidemia  Diabetes  CAD (coronary artery disease)  stents x4( 03/2017)  last stress test 7/22  echo 3/22  Ganglion cyst of wrist, left  SHARATH (obstructive sleep apnea)  non compliant with CPAP  Chronic kidney disease (CKD)  H/O gastroesophageal reflux (GERD)  OA (osteoarthritis)  History of Arthroscopy  bilateral shoulder rotator cuff repair  Laminectomy with Spinal Fusion  cervical- 2009  lumbar lamenectomy  1998  Bilateral cataracts  sp extraction  Trigger finger of both hands  sp repair  Cervical fusion syndrome  History of lumbar laminectomy  Bilateral rotator cuff syndrome  operated both'    FAMILY HISTORY:  Family history of cerebrovascular accident (Mother)  mother      SOCIAL HISTORY:  unchanged    REVIEW OF SYSTEMS:  CONSTITUTIONAL: No fever  EYES: No eye pain  ENT:  No throat pain  NECK: No pain   RESPIRATORY: No SOB  CARDIOVASCULAR: No C/P  GASTROINTESTINAL: No abdominal pain  GENITOURINARY: No hematuria  NEUROLOGICAL: No memory loss  SKIN: No LE wounds  LYMPH Nodes: No enlarged glands noted  ENDOCRINE: No heat or cold intolerance noted  MUSCULOSKELETAL: LE edema resolved   PSYCHIATRIC: No depression, anxiety  HEME/LYMPH: No bleeding gums  ALLERGY AND IMMUNOLOGIC: No hives    [ x] All others negative	    PHYSICAL EXAM:  T(C): 36.4 (03-19-24 @ 11:48), Max: 37.2 (03-18-24 @ 16:35)  HR: 81 (03-19-24 @ 11:48) (75 - 84)  BP: 134/56 (03-19-24 @ 11:48) (129/55 - 134/56)  RR: 18 (03-19-24 @ 11:48) (17 - 18)  SpO2: 94% (03-19-24 @ 11:48) (94% - 98%)  Wt(kg): --  I&O's Summary    Appearance:  NAD	  HEENT:  NC/AT  Cardiovascular:  RRR, S1 and S2  Respiratory: CTA B/L  Psychiatry:  AAO x 3  Gastrointestinal:  Soft, Non-tender, + BS	  Skin: No cyanosis	  Neurologic: No focal deficit  Extremities:  No LE edema, bilateral calves soft      LABS:	 	    CBC Full  -  ( 18 Mar 2024 00:35 )  WBC Count : 7.55 K/uL  Hemoglobin : 10.4 g/dL  Hematocrit : 32.7 %  Platelet Count - Automated : 148 K/uL  Mean Cell Volume : 98.2 fl  Mean Cell Hemoglobin : 31.2 pg  Mean Cell Hemoglobin Concentration : 31.8 gm/dL  Auto Neutrophil # : 6.56 K/uL  Auto Lymphocyte # : 0.72 K/uL  Auto Monocyte # : 0.20 K/uL  Auto Eosinophil # : 0.00 K/uL  Auto Basophil # : 0.07 K/uL  Auto Neutrophil % : 81.7 %  Auto Lymphocyte % : 9.6 %  Auto Monocyte % : 2.6 %  Auto Eosinophil % : 0.0 %  Auto Basophil % : 0.9 %    03-19    139  |  107  |  50<H>  ----------------------------<  101<H>  4.2   |  20<L>  |  3.14<H>  03-18    137  |  106  |  43<H>  ----------------------------<  91  3.9   |  23  |  2.85<H>    Ca    10.8<H>      19 Mar 2024 06:54  Ca    10.5      18 Mar 2024 11:21  Phos  2.4     03-18  Mg     2.2     03-18    TPro  6.9  /  Alb  3.8  /  TBili  0.5  /  DBili  x   /  AST  25  /  ALT  17  /  AlkPhos  45  03-18      Assessment:  1. Admission for Hypoglycemia   2. CAD s/p prior PCI RCA with un-revascularized dLAD 70%  3. CKD stage 3-4  4. HTN   5. HLD   6. DM  7. History of Gallbladder CA  8. History of Anemia and GI bleeding   ?  Plan:  1. Recommend repeat Echocardiogram (last in 2022).  2. Continue Plavix and Lipitor. On Tricor and Ranexa.   3. BP improved on current regimen. On BB, CCB, Hydralazine, Aldactone.   4. Appreciate Renal. Continue to monitor renal function.   5. Appreciate Primary Team and Endocrine. Will continue to follow.      Thank you  ESEQUIEL Burns, MS, [facility]  Vascular Cardiology Service    Please call with any questions:   DIRECT SERVICE NUMBER: [phone] / Available on TEAMS

## 2024-03-19 NOTE — DISCHARGE NOTE PROVIDER - HOSPITAL COURSE
HPI:  84M PMH CKD3, HTN, CAD, SHARATH, BPH, DM2, presents with hypoglycemia. Patient reports that he was in his normal state of health and developed possible URI symptoms last Wednesday, which subsequently resolved and he returned to a normal state of health. On day prior to admission, patient reports he again felt unwell. Patient states he felt sweaty, his arms were heavy, no vomit, though he felt nauseous.  Patient states he felt he was going to pass out, and called his wife, who called his son, and EMS was subsequently called. Upon arrival, patients blood glucose was found to be in the 30s. for which he received an amp of d50 with resolution. Subsequently brought to the hospital for further evaluation and treatment.   Patient states he does not have an endocrinologist, and has medications prescribed by his PCP. Reports he is not on injection medications. Has not had recent diarrhea, vomiting or other clear cause of dehydration.   At time of exam, denies chest pain or palpitations. Denies shortness of breath or cough. ROS otherwise noncontributory. VSS on admission. Labs notable for elevated Cr, mild elevation in troponin. Patient is now for admission to medicine for further evaluation and treatment.  (18 Mar 2024 10:45)    Hospital Course: Endocrinology consulted for management recommendations, recommended continuing SGLT2i and discontinuing glipizide and actos.     Important Medication Changes and Reason: endocrinology for DM management    Active or Pending Issues Requiring Follow-up:     Advanced Directives:   [X] Full code  [ ] DNR  [ ] Hospice    Discharge Diagnoses:  hypoglycemia  CKD  CAD   HPI:  84M PMH CKD3, HTN, CAD, SHARATH, BPH, DM2, presents with hypoglycemia. Patient reports that he was in his normal state of health and developed possible URI symptoms last Wednesday, which subsequently resolved and he returned to a normal state of health. On day prior to admission, patient reports he again felt unwell. Patient states he felt sweaty, his arms were heavy, no vomit, though he felt nauseous.  Patient states he felt he was going to pass out, and called his wife, who called his son, and EMS was subsequently called. Upon arrival, patients blood glucose was found to be in the 30s. for which he received an amp of d50 with resolution. Subsequently brought to the hospital for further evaluation and treatment.   Patient states he does not have an endocrinologist, and has medications prescribed by his PCP. Reports he is not on injection medications. Has not had recent diarrhea, vomiting or other clear cause of dehydration.   At time of exam, denies chest pain or palpitations. Denies shortness of breath or cough. ROS otherwise noncontributory. VSS on admission. Labs notable for elevated Cr, mild elevation in troponin. Patient is now for admission to medicine for further evaluation and treatment.  (18 Mar 2024 10:45)    Hospital Course: Endocrinology consulted for management recommendations, recommended continuing SGLT2i and discontinuing glipizide and actos.     Important Medication Changes and Reason: stop glipizide, actos, spironolactone    Active or Pending Issues Requiring Follow-up: endocrinology for DM management, nephrology for CKD/SAPNA management    Advanced Directives:   [X] Full code  [ ] DNR  [ ] Hospice    Discharge Diagnoses:  hypoglycemia  CKD  CAD

## 2024-03-19 NOTE — PROGRESS NOTE ADULT - SUBJECTIVE AND OBJECTIVE BOX
NOTE INCOMPLETE PENDING DISCUSSION    Admitted for: Hypoglycemia    Following for: Diabetes inpatient management    Overnight events: Pt had hypoglycemia event pre-meal dinner, juice+ amp d50     Subjective:       MEDICATIONS  (STANDING):  allopurinol 100 milliGRAM(s) Oral daily  amLODIPine   Tablet 10 milliGRAM(s) Oral daily  atorvastatin 40 milliGRAM(s) Oral at bedtime  carvedilol 25 milliGRAM(s) Oral every 12 hours  clopidogrel Tablet 75 milliGRAM(s) Oral daily  dextrose 5%. 1000 milliLiter(s) (50 mL/Hr) IV Continuous <Continuous>  dextrose 5%. 1000 milliLiter(s) (100 mL/Hr) IV Continuous <Continuous>  dextrose 50% Injectable 25 Gram(s) IV Push once  dextrose 50% Injectable 12.5 Gram(s) IV Push once  dextrose 50% Injectable 25 Gram(s) IV Push once  fenofibrate Tablet 48 milliGRAM(s) Oral daily  glucagon  Injectable 1 milliGRAM(s) IntraMuscular once  heparin   Injectable 5000 Unit(s) SubCutaneous every 8 hours  hydrALAZINE 100 milliGRAM(s) Oral every 8 hours  insulin lispro (ADMELOG) corrective regimen sliding scale   SubCutaneous three times a day before meals  insulin lispro (ADMELOG) corrective regimen sliding scale   SubCutaneous at bedtime  polyethylene glycol 3350 17 Gram(s) Oral daily  ranolazine 500 milliGRAM(s) Oral two times a day  senna 2 Tablet(s) Oral at bedtime  spironolactone 25 milliGRAM(s) Oral daily    MEDICATIONS  (PRN):  acetaminophen     Tablet .. 650 milliGRAM(s) Oral every 6 hours PRN Temp greater or equal to 38C (100.4F), Mild Pain (1 - 3)  aluminum hydroxide/magnesium hydroxide/simethicone Suspension 30 milliLiter(s) Oral every 4 hours PRN Dyspepsia  dextrose Oral Gel 15 Gram(s) Oral once PRN Blood Glucose LESS THAN 70 milliGRAM(s)/deciliter  melatonin 3 milliGRAM(s) Oral at bedtime PRN Insomnia  ondansetron Injectable 4 milliGRAM(s) IV Push every 8 hours PRN Nausea and/or Vomiting      Allergies    Avapro (Hives)  enalapril (Unknown)  seasonal allergies-outdoor (Urticaria; Rhinorrhea; Sneezing)  losartan (Other)    Intolerances    losartan (Other)        PHYSICAL EXAM:  VITALS: T(C): 37 (03-19-24 @ 04:40)  T(F): 98.6 (03-19-24 @ 04:40), Max: 99 (03-18-24 @ 16:35)  HR: 77 (03-19-24 @ 04:40) (75 - 84)  BP: 129/55 (03-19-24 @ 04:40) (129/55 - 132/64)  RR:  (17 - 18)  SpO2:  (97% - 98%)  Wt(kg): --  GENERAL: NAD  EYES: No proptosis, no lid lag, anicteric  RESPIRATORY: Clear to auscultation bilaterally  CARDIOVASCULAR: Regular rate and rhythm  GI: Soft, nontender, non distended  EXT: b/l feet without wounds, 2+ pulses  PSYCH: Alert and oriented x 3, reactive affect      A1C with Estimated Average Glucose Result: 6.3 % (03-18-24 @ 00:35)  A1C with Estimated Average Glucose Result: 6.0 % (10-11-23 @ 04:41)      POCT Blood Glucose.: 105 mg/dL (03-19-24 @ 07:30)  POCT Blood Glucose.: 140 mg/dL (03-18-24 @ 20:29)  POCT Blood Glucose.: 142 mg/dL (03-18-24 @ 18:23)  POCT Blood Glucose.: 62 mg/dL (03-18-24 @ 17:56)  POCT Blood Glucose.: 56 mg/dL (03-18-24 @ 17:43)  POCT Blood Glucose.: 93 mg/dL (03-18-24 @ 15:55)  POCT Blood Glucose.: 89 mg/dL (03-18-24 @ 11:09)  POCT Blood Glucose.: 78 mg/dL (03-18-24 @ 10:24)  POCT Blood Glucose.: 88 mg/dL (03-18-24 @ 08:06)  POCT Blood Glucose.: 95 mg/dL (03-18-24 @ 07:45)  POCT Blood Glucose.: 107 mg/dL (03-18-24 @ 05:56)  POCT Blood Glucose.: 112 mg/dL (03-18-24 @ 04:46)  POCT Blood Glucose.: 127 mg/dL (03-18-24 @ 03:44)  POCT Blood Glucose.: 88 mg/dL (03-18-24 @ 02:24)  POCT Blood Glucose.: 116 mg/dL (03-18-24 @ 01:27)  POCT Blood Glucose.: 139 mg/dL (03-18-24 @ 00:33)  POCT Blood Glucose.: 138 mg/dL (03-17-24 @ 23:33)      03-19    139  |  107  |  50<H>  ----------------------------<  101<H>  4.2   |  20<L>  |  3.14<H>    eGFR: 19<L>    Ca    10.8<H>      03-19  Mg     2.2     03-18  Phos  2.4     03-18    TPro  6.9  /  Alb  3.8  /  TBili  0.5  /  DBili  x   /  AST  25  /  ALT  17  /  AlkPhos  45  03-18                   NOTE INCOMPLETE PENDING DISCUSSION    Admitted for: Hypoglycemia    Following for: Diabetes inpatient management    Overnight events: Pt had hypoglycemia event pre-meal dinner, juice+ amp d50     Subjective:   Pt seen and examined bedside. Patient feeling well overall tolerating breakfast, urinating appropriately, has not had BM yet. Denies CP, SOB, N/V/D. When asking about recent blood sugars, he was able to recite bedtime and morning finger sticks. Asked about hypoglycemia event around dinner which the patient recalls but denies any symptoms. At time of hypoglycemia event pt recalls feeling fine, resting in bed--denies any headaches, dizziness, lightheadedness, blackened vision etc.     MEDICATIONS  (STANDING):  allopurinol 100 milliGRAM(s) Oral daily  amLODIPine   Tablet 10 milliGRAM(s) Oral daily  atorvastatin 40 milliGRAM(s) Oral at bedtime  carvedilol 25 milliGRAM(s) Oral every 12 hours  clopidogrel Tablet 75 milliGRAM(s) Oral daily  dextrose 5%. 1000 milliLiter(s) (50 mL/Hr) IV Continuous <Continuous>  dextrose 5%. 1000 milliLiter(s) (100 mL/Hr) IV Continuous <Continuous>  dextrose 50% Injectable 25 Gram(s) IV Push once  dextrose 50% Injectable 12.5 Gram(s) IV Push once  dextrose 50% Injectable 25 Gram(s) IV Push once  fenofibrate Tablet 48 milliGRAM(s) Oral daily  glucagon  Injectable 1 milliGRAM(s) IntraMuscular once  heparin   Injectable 5000 Unit(s) SubCutaneous every 8 hours  hydrALAZINE 100 milliGRAM(s) Oral every 8 hours  insulin lispro (ADMELOG) corrective regimen sliding scale   SubCutaneous three times a day before meals  insulin lispro (ADMELOG) corrective regimen sliding scale   SubCutaneous at bedtime  polyethylene glycol 3350 17 Gram(s) Oral daily  ranolazine 500 milliGRAM(s) Oral two times a day  senna 2 Tablet(s) Oral at bedtime  spironolactone 25 milliGRAM(s) Oral daily    MEDICATIONS  (PRN):  acetaminophen     Tablet .. 650 milliGRAM(s) Oral every 6 hours PRN Temp greater or equal to 38C (100.4F), Mild Pain (1 - 3)  aluminum hydroxide/magnesium hydroxide/simethicone Suspension 30 milliLiter(s) Oral every 4 hours PRN Dyspepsia  dextrose Oral Gel 15 Gram(s) Oral once PRN Blood Glucose LESS THAN 70 milliGRAM(s)/deciliter  melatonin 3 milliGRAM(s) Oral at bedtime PRN Insomnia  ondansetron Injectable 4 milliGRAM(s) IV Push every 8 hours PRN Nausea and/or Vomiting      Allergies    Avapro (Hives)  enalapril (Unknown)  seasonal allergies-outdoor (Urticaria; Rhinorrhea; Sneezing)  losartan (Other)    Intolerances    losartan (Other)        PHYSICAL EXAM:  VITALS: T(C): 37 (03-19-24 @ 04:40)  T(F): 98.6 (03-19-24 @ 04:40), Max: 99 (03-18-24 @ 16:35)  HR: 77 (03-19-24 @ 04:40) (75 - 84)  BP: 129/55 (03-19-24 @ 04:40) (129/55 - 132/64)  RR:  (17 - 18)  SpO2:  (97% - 98%)  Wt(kg): --  GENERAL: NAD  EYES: No proptosis, no lid lag, anicteric  RESPIRATORY: Clear to auscultation bilaterally  CARDIOVASCULAR: Regular rate and rhythm  GI: Soft, nontender, non distended  EXT: b/l feet without wounds, 2+ pulses  PSYCH: Alert and oriented x 3, reactive affect      A1C with Estimated Average Glucose Result: 6.3 % (03-18-24 @ 00:35)  A1C with Estimated Average Glucose Result: 6.0 % (10-11-23 @ 04:41)      POCT Blood Glucose.: 105 mg/dL (03-19-24 @ 07:30)  POCT Blood Glucose.: 140 mg/dL (03-18-24 @ 20:29)  POCT Blood Glucose.: 142 mg/dL (03-18-24 @ 18:23)  POCT Blood Glucose.: 62 mg/dL (03-18-24 @ 17:56)  POCT Blood Glucose.: 56 mg/dL (03-18-24 @ 17:43)  POCT Blood Glucose.: 93 mg/dL (03-18-24 @ 15:55)  POCT Blood Glucose.: 89 mg/dL (03-18-24 @ 11:09)  POCT Blood Glucose.: 78 mg/dL (03-18-24 @ 10:24)  POCT Blood Glucose.: 88 mg/dL (03-18-24 @ 08:06)  POCT Blood Glucose.: 95 mg/dL (03-18-24 @ 07:45)  POCT Blood Glucose.: 107 mg/dL (03-18-24 @ 05:56)  POCT Blood Glucose.: 112 mg/dL (03-18-24 @ 04:46)  POCT Blood Glucose.: 127 mg/dL (03-18-24 @ 03:44)  POCT Blood Glucose.: 88 mg/dL (03-18-24 @ 02:24)  POCT Blood Glucose.: 116 mg/dL (03-18-24 @ 01:27)  POCT Blood Glucose.: 139 mg/dL (03-18-24 @ 00:33)  POCT Blood Glucose.: 138 mg/dL (03-17-24 @ 23:33)      03-19    139  |  107  |  50<H>  ----------------------------<  101<H>  4.2   |  20<L>  |  3.14<H>    eGFR: 19<L>    Ca    10.8<H>      03-19  Mg     2.2     03-18  Phos  2.4     03-18    TPro  6.9  /  Alb  3.8  /  TBili  0.5  /  DBili  x   /  AST  25  /  ALT  17  /  AlkPhos  45  03-18   Admitted for: Hypoglycemia    Following for: Diabetes inpatient management    Overnight events: Pt had hypoglycemia event pre-meal dinner, juice+ amp d50     Subjective:   Pt seen and examined bedside. Patient feeling well overall tolerating breakfast, urinating appropriately, has not had BM yet. Denies CP, SOB, N/V/D. When asking about recent blood sugars, he was able to recite bedtime and morning finger sticks. Asked about hypoglycemia event around dinner which the patient recalls but denies any symptoms. At time of hypoglycemia event pt recalls feeling fine, resting in bed--denies any headaches, dizziness, lightheadedness, blackened vision etc.     MEDICATIONS  (STANDING):  allopurinol 100 milliGRAM(s) Oral daily  amLODIPine   Tablet 10 milliGRAM(s) Oral daily  atorvastatin 40 milliGRAM(s) Oral at bedtime  carvedilol 25 milliGRAM(s) Oral every 12 hours  clopidogrel Tablet 75 milliGRAM(s) Oral daily  dextrose 5%. 1000 milliLiter(s) (50 mL/Hr) IV Continuous <Continuous>  dextrose 5%. 1000 milliLiter(s) (100 mL/Hr) IV Continuous <Continuous>  dextrose 50% Injectable 25 Gram(s) IV Push once  dextrose 50% Injectable 12.5 Gram(s) IV Push once  dextrose 50% Injectable 25 Gram(s) IV Push once  fenofibrate Tablet 48 milliGRAM(s) Oral daily  glucagon  Injectable 1 milliGRAM(s) IntraMuscular once  heparin   Injectable 5000 Unit(s) SubCutaneous every 8 hours  hydrALAZINE 100 milliGRAM(s) Oral every 8 hours  insulin lispro (ADMELOG) corrective regimen sliding scale   SubCutaneous three times a day before meals  insulin lispro (ADMELOG) corrective regimen sliding scale   SubCutaneous at bedtime  polyethylene glycol 3350 17 Gram(s) Oral daily  ranolazine 500 milliGRAM(s) Oral two times a day  senna 2 Tablet(s) Oral at bedtime  spironolactone 25 milliGRAM(s) Oral daily    MEDICATIONS  (PRN):  acetaminophen     Tablet .. 650 milliGRAM(s) Oral every 6 hours PRN Temp greater or equal to 38C (100.4F), Mild Pain (1 - 3)  aluminum hydroxide/magnesium hydroxide/simethicone Suspension 30 milliLiter(s) Oral every 4 hours PRN Dyspepsia  dextrose Oral Gel 15 Gram(s) Oral once PRN Blood Glucose LESS THAN 70 milliGRAM(s)/deciliter  melatonin 3 milliGRAM(s) Oral at bedtime PRN Insomnia  ondansetron Injectable 4 milliGRAM(s) IV Push every 8 hours PRN Nausea and/or Vomiting      Allergies    Avapro (Hives)  enalapril (Unknown)  seasonal allergies-outdoor (Urticaria; Rhinorrhea; Sneezing)  losartan (Other)    Intolerances    losartan (Other)        PHYSICAL EXAM:  VITALS: T(C): 37 (03-19-24 @ 04:40)  T(F): 98.6 (03-19-24 @ 04:40), Max: 99 (03-18-24 @ 16:35)  HR: 77 (03-19-24 @ 04:40) (75 - 84)  BP: 129/55 (03-19-24 @ 04:40) (129/55 - 132/64)  RR:  (17 - 18)  SpO2:  (97% - 98%)  Wt(kg): --  GENERAL: NAD  EYES: No proptosis, no lid lag, anicteric  RESPIRATORY: Clear to auscultation bilaterally  CARDIOVASCULAR: Regular rate and rhythm  GI: Soft, nontender, non distended  EXT: b/l feet without wounds, 2+ pulses  PSYCH: Alert and oriented x 3, reactive affect      A1C with Estimated Average Glucose Result: 6.3 % (03-18-24 @ 00:35)  A1C with Estimated Average Glucose Result: 6.0 % (10-11-23 @ 04:41)      POCT Blood Glucose.: 105 mg/dL (03-19-24 @ 07:30)  POCT Blood Glucose.: 140 mg/dL (03-18-24 @ 20:29)  POCT Blood Glucose.: 142 mg/dL (03-18-24 @ 18:23)  POCT Blood Glucose.: 62 mg/dL (03-18-24 @ 17:56)  POCT Blood Glucose.: 56 mg/dL (03-18-24 @ 17:43)  POCT Blood Glucose.: 93 mg/dL (03-18-24 @ 15:55)  POCT Blood Glucose.: 89 mg/dL (03-18-24 @ 11:09)  POCT Blood Glucose.: 78 mg/dL (03-18-24 @ 10:24)  POCT Blood Glucose.: 88 mg/dL (03-18-24 @ 08:06)  POCT Blood Glucose.: 95 mg/dL (03-18-24 @ 07:45)  POCT Blood Glucose.: 107 mg/dL (03-18-24 @ 05:56)  POCT Blood Glucose.: 112 mg/dL (03-18-24 @ 04:46)  POCT Blood Glucose.: 127 mg/dL (03-18-24 @ 03:44)  POCT Blood Glucose.: 88 mg/dL (03-18-24 @ 02:24)  POCT Blood Glucose.: 116 mg/dL (03-18-24 @ 01:27)  POCT Blood Glucose.: 139 mg/dL (03-18-24 @ 00:33)  POCT Blood Glucose.: 138 mg/dL (03-17-24 @ 23:33)      03-19    139  |  107  |  50<H>  ----------------------------<  101<H>  4.2   |  20<L>  |  3.14<H>    eGFR: 19<L>    Ca    10.8<H>      03-19  Mg     2.2     03-18  Phos  2.4     03-18    TPro  6.9  /  Alb  3.8  /  TBili  0.5  /  DBili  x   /  AST  25  /  ALT  17  /  AlkPhos  45  03-18

## 2024-03-19 NOTE — DISCHARGE NOTE PROVIDER - PROVIDER TOKENS
PROVIDER:[TOKEN:[[id number]],FOLLOWUP:[1 week]] PROVIDER:[TOKEN:[[id number]],FOLLOWUP:[1 week]],PROVIDER:[TOKEN:[[id number]],FOLLOWUP:[1 week],ESTABLISHEDPATIENT:[T]]

## 2024-03-19 NOTE — CONSULT NOTE ADULT - SUBJECTIVE AND OBJECTIVE BOX
[facility]  [phone]  NEPHROLOGY      INITIAL CONSULT NOTE  --------------------------------------------------------------------------------  HPI:    84 year old Male with PMH CKD4, HTN, CAD, SHARATH, BPH, DM2, presents with hypoglycemia. Patient reports recent URI, which subsequently resolved and he returned to a normal state of health. On day prior to admission, patient reports he again felt unwell. Patient states he felt sweaty, his arms were heavy, no vomit, though he felt nauseous. EMS was subsequently called. Upon arrival, patients blood glucose was found to be in the 30s, for which he received an amp of d50 with resolution. Renal consult for abnormal creatinine.     PAST HISTORY  --------------------------------------------------------------------------------  PAST MEDICAL & SURGICAL HISTORY:  Hypertension      Hyperlipemia      Diabetes Mellitus Type II      Renal Calculi  h/olithotripsy 2010      H/O: Rotator Cuff Tear      Pneumonia      BPH (Benign Prostatic Hypertrophy)      Hypertension      Hyperlipidemia      Diabetes  2      CAD (coronary artery disease)  stents x4( 03/2017)  last stress test 7/22  echo 3/22      Ganglion cyst of wrist, left      SHARATH (obstructive sleep apnea)  non compliant with CPAP      Chronic kidney disease (CKD)      H/O gastroesophageal reflux (GERD)      OA (osteoarthritis)      History of Arthroscopy  bilateral shoulder rotator cuff repair      Laminectomy with Spinal Fusion  cervical- 2009      lumbar lamenectomy  1998      Bilateral cataracts  sp extraction      Trigger finger of both hands  sp repair      Cervical fusion syndrome      History of lumbar laminectomy      Bilateral rotator cuff syndrome  operated both'        FAMILY HISTORY:  Family history of cerebrovascular accident (Mother)  mother      PAST SOCIAL HISTORY:  denies tobacco use    ALLERGIES & MEDICATIONS  --------------------------------------------------------------------------------  Allergies    Avapro (Hives)  enalapril (Unknown)  seasonal allergies-outdoor (Urticaria; Rhinorrhea; Sneezing)  losartan (Other)    Intolerances    losartan (Other)    Standing Inpatient Medications  allopurinol 100 milliGRAM(s) Oral daily  amLODIPine   Tablet 10 milliGRAM(s) Oral daily  atorvastatin 40 milliGRAM(s) Oral at bedtime  carvedilol 25 milliGRAM(s) Oral every 12 hours  clopidogrel Tablet 75 milliGRAM(s) Oral daily  dextrose 5%. 1000 milliLiter(s) IV Continuous <Continuous>  dextrose 5%. 1000 milliLiter(s) IV Continuous <Continuous>  dextrose 50% Injectable 25 Gram(s) IV Push once  dextrose 50% Injectable 12.5 Gram(s) IV Push once  dextrose 50% Injectable 25 Gram(s) IV Push once  fenofibrate Tablet 48 milliGRAM(s) Oral daily  glucagon  Injectable 1 milliGRAM(s) IntraMuscular once  heparin   Injectable 5000 Unit(s) SubCutaneous every 8 hours  hydrALAZINE 100 milliGRAM(s) Oral every 8 hours  insulin lispro (ADMELOG) corrective regimen sliding scale   SubCutaneous three times a day before meals  insulin lispro (ADMELOG) corrective regimen sliding scale   SubCutaneous at bedtime  polyethylene glycol 3350 17 Gram(s) Oral daily  ranolazine 500 milliGRAM(s) Oral two times a day  senna 2 Tablet(s) Oral at bedtime  spironolactone 25 milliGRAM(s) Oral daily    PRN Inpatient Medications  acetaminophen     Tablet .. 650 milliGRAM(s) Oral every 6 hours PRN  aluminum hydroxide/magnesium hydroxide/simethicone Suspension 30 milliLiter(s) Oral every 4 hours PRN  dextrose Oral Gel 15 Gram(s) Oral once PRN  melatonin 3 milliGRAM(s) Oral at bedtime PRN  ondansetron Injectable 4 milliGRAM(s) IV Push every 8 hours PRN      REVIEW OF SYSTEMS  --------------------------------------------------------------------------------  Gen: No fevers/chills   Head/Eyes/Ears/Mouth: No headache; Normal hearing;   Respiratory: No dyspnea, Mild cough,  CV: No chest pain, orthopnea  GI: No abdominal pain, diarrhea, nausea, vomiting,  : No dysuria, decrease urination  MSK: No joint pain/swelling; no back pain  Neuro: No dizziness/lightheadedness, weakness,   also with no edema     VITALS/PHYSICAL EXAM  --------------------------------------------------------------------------------  T(C): 37 (03-19-24 @ 04:40), Max: 37.2 (03-18-24 @ 16:35)  HR: 77 (03-19-24 @ 04:40) (75 - 84)  BP: 129/55 (03-19-24 @ 04:40) (129/55 - 132/64)  RR: 18 (03-19-24 @ 04:40) (17 - 18)  SpO2: 97% (03-19-24 @ 04:40) (97% - 98%)  Wt(kg): --  Height (cm): 175.3 (03-18-24 @ 12:06)  Weight (kg): 82.69 (03-18-24 @ 12:06)  BMI (kg/m2): 26.9 (03-18-24 @ 12:06)  BSA (m2): 1.99 (03-18-24 @ 12:06)      Physical Exam:  	Gen: Non toxic comfortable appearing   	Pulm: decrease bs  no rales or ronchi or wheezing  	CV: No JVD. RRR, S1S2; no rub  	Abd: +BS, soft, nontender/nondistended  	: No suprapubic tenderness  	UE: Warm, no cyanosis  no clubbing,  no edema;   	LE: Warm, no cyanosis  no clubbing, no edema  	Neuro: alert and oriented. speech coherent   	Skin: Warm, no decrease skin turgor   	Vascular access: none    LABS/STUDIES  --------------------------------------------------------------------------------              10.4   7.55  >-----------<  148      [03-18-24 @ 00:35]              32.7     139  |  107  |  50  ----------------------------<  101      [03-19-24 @ 06:54]  4.2   |  20  |  3.14        Ca     10.8     [03-19-24 @ 06:54]      Mg     2.2     [03-18-24 @ 00:35]      Phos  2.4     [03-18-24 @ 00:35]    TPro  6.9  /  Alb  3.8  /  TBili  0.5  /  DBili  x   /  AST  25  /  ALT  17  /  AlkPhos  45  [03-18-24 @ 00:35]          Creatinine Trend:  SCr 3.14 [03-19 @ 06:54]  SCr 2.85 [03-18 @ 11:21]  SCr 2.89 [03-18 @ 00:35]      Urinalysis (03.18.24 @ 01:12)   pH Urine: 5.0  Blood, Urine: Negative  Glucose Qualitative, Urine: >=1000 mg/dL  Color: Yellow  Urine Appearance: Clear  Bilirubin: Negative  Ketone - Urine: Negative mg/dL  Specific Gravity: 1.022  Protein, Urine: 100 mg/dL  Urobilinogen: 0.2 mg/dL  Nitrite: Negative  Leukocyte Esterase Concentration: Negative      Iron 54, TIBC 269, %sat 20      [10-17-23 @ 06:37]  Ferritin 167      [10-17-23 @ 06:37]  Lipid: chol 135, , HDL 62, LDL --      [03-19-24 @ 06:54]

## 2024-03-19 NOTE — DISCHARGE NOTE PROVIDER - CARE PROVIDERS DIRECT ADDRESSES
,[email or web address]

## 2024-03-19 NOTE — PHYSICAL THERAPY INITIAL EVALUATION ADULT - ADDITIONAL COMMENTS
Patient lives in pvt house with family, 4 steps to enter. No steps to enter.  Patient ambulated with straight cane independent. Uses rw for long distance amb. pt owns no other dme s at home.  No h/o falls.

## 2024-03-20 ENCOUNTER — TRANSCRIPTION ENCOUNTER (OUTPATIENT)
Age: 84
End: 2024-03-20

## 2024-03-20 ENCOUNTER — RESULT REVIEW (OUTPATIENT)
Age: 84
End: 2024-03-20

## 2024-03-20 VITALS
TEMPERATURE: 99 F | DIASTOLIC BLOOD PRESSURE: 69 MMHG | HEART RATE: 81 BPM | OXYGEN SATURATION: 97 % | RESPIRATION RATE: 18 BRPM | SYSTOLIC BLOOD PRESSURE: 158 MMHG

## 2024-03-20 LAB
ANION GAP SERPL CALC-SCNC: 12 MMOL/L — SIGNIFICANT CHANGE UP (ref 5–17)
APPEARANCE UR: CLEAR — SIGNIFICANT CHANGE UP
BACTERIA # UR AUTO: NEGATIVE /HPF — SIGNIFICANT CHANGE UP
BILIRUB UR-MCNC: NEGATIVE — SIGNIFICANT CHANGE UP
BUN SERPL-MCNC: 54 MG/DL — HIGH (ref 7–23)
CALCIUM SERPL-MCNC: 10 MG/DL — SIGNIFICANT CHANGE UP (ref 8.4–10.5)
CALCIUM SERPL-MCNC: 10.8 MG/DL — HIGH (ref 8.4–10.5)
CAST: 2 /LPF — SIGNIFICANT CHANGE UP (ref 0–4)
CHLORIDE SERPL-SCNC: 103 MMOL/L — SIGNIFICANT CHANGE UP (ref 96–108)
CO2 SERPL-SCNC: 20 MMOL/L — LOW (ref 22–31)
COLOR SPEC: YELLOW — SIGNIFICANT CHANGE UP
CREAT ?TM UR-MCNC: 61 MG/DL — SIGNIFICANT CHANGE UP
CREAT SERPL-MCNC: 3.24 MG/DL — HIGH (ref 0.5–1.3)
DIFF PNL FLD: NEGATIVE — SIGNIFICANT CHANGE UP
EGFR: 18 ML/MIN/1.73M2 — LOW
GLUCOSE BLDC GLUCOMTR-MCNC: 145 MG/DL — HIGH (ref 70–99)
GLUCOSE BLDC GLUCOMTR-MCNC: 238 MG/DL — HIGH (ref 70–99)
GLUCOSE SERPL-MCNC: 152 MG/DL — HIGH (ref 70–99)
GLUCOSE UR QL: >=1000 MG/DL
HCT VFR BLD CALC: 28 % — LOW (ref 39–50)
HGB BLD-MCNC: 8.9 G/DL — LOW (ref 13–17)
KETONES UR-MCNC: NEGATIVE MG/DL — SIGNIFICANT CHANGE UP
LEUKOCYTE ESTERASE UR-ACNC: NEGATIVE — SIGNIFICANT CHANGE UP
MCHC RBC-ENTMCNC: 30.8 PG — SIGNIFICANT CHANGE UP (ref 27–34)
MCHC RBC-ENTMCNC: 31.8 GM/DL — LOW (ref 32–36)
MCV RBC AUTO: 96.9 FL — SIGNIFICANT CHANGE UP (ref 80–100)
NITRITE UR-MCNC: NEGATIVE — SIGNIFICANT CHANGE UP
NRBC # BLD: 0 /100 WBCS — SIGNIFICANT CHANGE UP (ref 0–0)
OSMOLALITY UR: 391 MOS/KG — SIGNIFICANT CHANGE UP (ref 300–900)
PH UR: 5 — SIGNIFICANT CHANGE UP (ref 5–8)
PLATELET # BLD AUTO: 151 K/UL — SIGNIFICANT CHANGE UP (ref 150–400)
POTASSIUM SERPL-MCNC: 4.1 MMOL/L — SIGNIFICANT CHANGE UP (ref 3.5–5.3)
POTASSIUM SERPL-SCNC: 4.1 MMOL/L — SIGNIFICANT CHANGE UP (ref 3.5–5.3)
POTASSIUM UR-SCNC: 18 MMOL/L — SIGNIFICANT CHANGE UP
PROT ?TM UR-MCNC: 62 MG/DL — HIGH (ref 0–12)
PROT UR-MCNC: 100 MG/DL
PROT/CREAT UR-RTO: 1 RATIO — HIGH (ref 0–0.2)
PTH-INTACT FLD-MCNC: 78 PG/ML — HIGH (ref 15–65)
RBC # BLD: 2.89 M/UL — LOW (ref 4.2–5.8)
RBC # FLD: 15.1 % — HIGH (ref 10.3–14.5)
RBC CASTS # UR COMP ASSIST: 0 /HPF — SIGNIFICANT CHANGE UP (ref 0–4)
SODIUM SERPL-SCNC: 135 MMOL/L — SIGNIFICANT CHANGE UP (ref 135–145)
SODIUM UR-SCNC: 49 MMOL/L — SIGNIFICANT CHANGE UP
SP GR SPEC: 1.01 — SIGNIFICANT CHANGE UP (ref 1–1.03)
SQUAMOUS # UR AUTO: 0 /HPF — SIGNIFICANT CHANGE UP (ref 0–5)
UROBILINOGEN FLD QL: 0.2 MG/DL — SIGNIFICANT CHANGE UP (ref 0.2–1)
UUN UR-MCNC: 535 MG/DL — SIGNIFICANT CHANGE UP
WBC # BLD: 3.78 K/UL — LOW (ref 3.8–10.5)
WBC # FLD AUTO: 3.78 K/UL — LOW (ref 3.8–10.5)
WBC UR QL: 0 /HPF — SIGNIFICANT CHANGE UP (ref 0–5)

## 2024-03-20 PROCEDURE — 84132 ASSAY OF SERUM POTASSIUM: CPT

## 2024-03-20 PROCEDURE — 93306 TTE W/DOPPLER COMPLETE: CPT | Mod: 26

## 2024-03-20 PROCEDURE — 83605 ASSAY OF LACTIC ACID: CPT

## 2024-03-20 PROCEDURE — 85018 HEMOGLOBIN: CPT

## 2024-03-20 PROCEDURE — 80061 LIPID PANEL: CPT

## 2024-03-20 PROCEDURE — 99285 EMERGENCY DEPT VISIT HI MDM: CPT

## 2024-03-20 PROCEDURE — 87086 URINE CULTURE/COLONY COUNT: CPT

## 2024-03-20 PROCEDURE — 87637 SARSCOV2&INF A&B&RSV AMP PRB: CPT

## 2024-03-20 PROCEDURE — 84300 ASSAY OF URINE SODIUM: CPT

## 2024-03-20 PROCEDURE — 82947 ASSAY GLUCOSE BLOOD QUANT: CPT

## 2024-03-20 PROCEDURE — 84484 ASSAY OF TROPONIN QUANT: CPT

## 2024-03-20 PROCEDURE — 84295 ASSAY OF SERUM SODIUM: CPT

## 2024-03-20 PROCEDURE — 82330 ASSAY OF CALCIUM: CPT

## 2024-03-20 PROCEDURE — 83036 HEMOGLOBIN GLYCOSYLATED A1C: CPT

## 2024-03-20 PROCEDURE — 82962 GLUCOSE BLOOD TEST: CPT

## 2024-03-20 PROCEDURE — 83735 ASSAY OF MAGNESIUM: CPT

## 2024-03-20 PROCEDURE — 93306 TTE W/DOPPLER COMPLETE: CPT

## 2024-03-20 PROCEDURE — 84540 ASSAY OF URINE/UREA-N: CPT

## 2024-03-20 PROCEDURE — 84156 ASSAY OF PROTEIN URINE: CPT

## 2024-03-20 PROCEDURE — 85027 COMPLETE CBC AUTOMATED: CPT

## 2024-03-20 PROCEDURE — 83880 ASSAY OF NATRIURETIC PEPTIDE: CPT

## 2024-03-20 PROCEDURE — 80048 BASIC METABOLIC PNL TOTAL CA: CPT

## 2024-03-20 PROCEDURE — 83970 ASSAY OF PARATHORMONE: CPT

## 2024-03-20 PROCEDURE — 82435 ASSAY OF BLOOD CHLORIDE: CPT

## 2024-03-20 PROCEDURE — 82010 KETONE BODYS QUAN: CPT

## 2024-03-20 PROCEDURE — 83935 ASSAY OF URINE OSMOLALITY: CPT

## 2024-03-20 PROCEDURE — 99239 HOSP IP/OBS DSCHRG MGMT >30: CPT

## 2024-03-20 PROCEDURE — 81001 URINALYSIS AUTO W/SCOPE: CPT

## 2024-03-20 PROCEDURE — 85025 COMPLETE CBC W/AUTO DIFF WBC: CPT

## 2024-03-20 PROCEDURE — 96372 THER/PROPH/DIAG INJ SC/IM: CPT

## 2024-03-20 PROCEDURE — 97161 PT EVAL LOW COMPLEX 20 MIN: CPT

## 2024-03-20 PROCEDURE — 36415 COLL VENOUS BLD VENIPUNCTURE: CPT

## 2024-03-20 PROCEDURE — 71045 X-RAY EXAM CHEST 1 VIEW: CPT

## 2024-03-20 PROCEDURE — 82570 ASSAY OF URINE CREATININE: CPT

## 2024-03-20 PROCEDURE — 80053 COMPREHEN METABOLIC PANEL: CPT

## 2024-03-20 PROCEDURE — 84100 ASSAY OF PHOSPHORUS: CPT

## 2024-03-20 PROCEDURE — 82310 ASSAY OF CALCIUM: CPT

## 2024-03-20 PROCEDURE — 85014 HEMATOCRIT: CPT

## 2024-03-20 PROCEDURE — 84133 ASSAY OF URINE POTASSIUM: CPT

## 2024-03-20 PROCEDURE — 82803 BLOOD GASES ANY COMBINATION: CPT

## 2024-03-20 RX ORDER — SPIRONOLACTONE 25 MG/1
1 TABLET, FILM COATED ORAL
Refills: 0 | DISCHARGE

## 2024-03-20 RX ORDER — REPAGLINIDE 1 MG/1
1 TABLET ORAL
Qty: 0 | Refills: 0 | DISCHARGE
Start: 2024-03-20

## 2024-03-20 RX ADMIN — CARVEDILOL PHOSPHATE 25 MILLIGRAM(S): 80 CAPSULE, EXTENDED RELEASE ORAL at 05:23

## 2024-03-20 RX ADMIN — Medication 2: at 12:14

## 2024-03-20 RX ADMIN — Medication 100 MILLIGRAM(S): at 05:24

## 2024-03-20 RX ADMIN — RANOLAZINE 500 MILLIGRAM(S): 500 TABLET, FILM COATED, EXTENDED RELEASE ORAL at 05:24

## 2024-03-20 RX ADMIN — HEPARIN SODIUM 5000 UNIT(S): 5000 INJECTION INTRAVENOUS; SUBCUTANEOUS at 05:24

## 2024-03-20 RX ADMIN — AMLODIPINE BESYLATE 10 MILLIGRAM(S): 2.5 TABLET ORAL at 05:24

## 2024-03-20 RX ADMIN — Medication 100 MILLIGRAM(S): at 04:01

## 2024-03-20 RX ADMIN — Medication 100 MILLIGRAM(S): at 13:54

## 2024-03-20 RX ADMIN — Medication 48 MILLIGRAM(S): at 12:08

## 2024-03-20 RX ADMIN — Medication 100 MILLIGRAM(S): at 12:08

## 2024-03-20 RX ADMIN — Medication 650 MILLIGRAM(S): at 02:40

## 2024-03-20 RX ADMIN — Medication 650 MILLIGRAM(S): at 03:40

## 2024-03-20 RX ADMIN — POLYETHYLENE GLYCOL 3350 17 GRAM(S): 17 POWDER, FOR SOLUTION ORAL at 12:08

## 2024-03-20 RX ADMIN — CLOPIDOGREL BISULFATE 75 MILLIGRAM(S): 75 TABLET, FILM COATED ORAL at 12:08

## 2024-03-20 NOTE — PROGRESS NOTE ADULT - PROBLEM SELECTOR PLAN 4
- Continue with home plavix 75 mg PO Qd  - Continue with home carvedilol 25 mg PO BID   - Continue with home atorvastatin 40 mg PO Qd
- Continue with home plavix 75 mg PO Qd  - Continue with home carvedilol 25 mg PO BID   - Continue with home atorvastatin 40 mg PO Qd

## 2024-03-20 NOTE — PROGRESS NOTE ADULT - ATTENDING COMMENTS
Patient was discharged prior to being seen by attending on service.   Agreed with the plan discussed above.    This note will not be billed.
Agree with Dr. Perez's assessment and plan as outlined above. Reviewed all pertinent labs, and imaging studies. Modifications made as indicated above. 84 M with history of HTN, T2D, CAD, CKD stage 3 here for hypoglycemia. A1C 6.3. Home DM meds: glipizide 10 mg daily, jardiacnce 10 mg daily, actos 30 mg daily, prandin 1 mg before lunch. Patient presented with hypoglycemia reported in the 30s. Glucose too tightly controlled in this 84 year old patient, thus resulting in hypoglycemia. [facility] inpatient continue with LDSS. Hypoglycemia caused by sulfonylurea can last for a few days. For discharge, will need to STOP glipizide, actos and prandin and can continue with jardiance 10 mg daily for CKD. Rest of the plan as above.

## 2024-03-20 NOTE — PROGRESS NOTE ADULT - SUBJECTIVE AND OBJECTIVE BOX
Admitted for: Hypoglycemia    Following for: Diabetes management    Subjective:       MEDICATIONS  (STANDING):  allopurinol 100 milliGRAM(s) Oral daily  amLODIPine   Tablet 10 milliGRAM(s) Oral daily  atorvastatin 40 milliGRAM(s) Oral at bedtime  carvedilol 25 milliGRAM(s) Oral every 12 hours  clopidogrel Tablet 75 milliGRAM(s) Oral daily  dextrose 5%. 1000 milliLiter(s) (50 mL/Hr) IV Continuous <Continuous>  dextrose 5%. 1000 milliLiter(s) (100 mL/Hr) IV Continuous <Continuous>  dextrose 50% Injectable 25 Gram(s) IV Push once  dextrose 50% Injectable 12.5 Gram(s) IV Push once  dextrose 50% Injectable 25 Gram(s) IV Push once  fenofibrate Tablet 48 milliGRAM(s) Oral daily  glucagon  Injectable 1 milliGRAM(s) IntraMuscular once  heparin   Injectable 5000 Unit(s) SubCutaneous every 8 hours  hydrALAZINE 100 milliGRAM(s) Oral every 8 hours  insulin lispro (ADMELOG) corrective regimen sliding scale   SubCutaneous three times a day before meals  insulin lispro (ADMELOG) corrective regimen sliding scale   SubCutaneous at bedtime  polyethylene glycol 3350 17 Gram(s) Oral daily  ranolazine 500 milliGRAM(s) Oral two times a day  senna 2 Tablet(s) Oral at bedtime    MEDICATIONS  (PRN):  acetaminophen     Tablet .. 650 milliGRAM(s) Oral every 6 hours PRN Temp greater or equal to 38C (100.4F), Mild Pain (1 - 3)  aluminum hydroxide/magnesium hydroxide/simethicone Suspension 30 milliLiter(s) Oral every 4 hours PRN Dyspepsia  dextrose Oral Gel 15 Gram(s) Oral once PRN Blood Glucose LESS THAN 70 milliGRAM(s)/deciliter  melatonin 3 milliGRAM(s) Oral at bedtime PRN Insomnia  ondansetron Injectable 4 milliGRAM(s) IV Push every 8 hours PRN Nausea and/or Vomiting      Allergies    Avapro (Hives)  enalapril (Unknown)  seasonal allergies-outdoor (Urticaria; Rhinorrhea; Sneezing)  losartan (Other)    Intolerances    losartan (Other)        PHYSICAL EXAM:  VITALS: T(C): 37.1 (03-20-24 @ 04:37)  T(F): 98.8 (03-20-24 @ 04:37), Max: 98.9 (03-19-24 @ 19:59)  HR: 81 (03-20-24 @ 04:37) (81 - 91)  BP: 136/64 (03-20-24 @ 04:37) (134/56 - 168/65)  RR:  (18 - 18)  SpO2:  (94% - 96%)  Wt(kg): --  GENERAL: NAD  EYES: No proptosis, no lid lag, anicteric  RESPIRATORY: Clear to auscultation bilaterally  CARDIOVASCULAR: Regular rate and rhythm  GI: Soft, nontender, non distended  EXT: b/l feet without wounds, 2+ pulses  PSYCH: Alert and oriented x 3, reactive affect      A1C with Estimated Average Glucose Result: 6.3 % (03-18-24 @ 00:35)  A1C with Estimated Average Glucose Result: 6.0 % (10-11-23 @ 04:41)      POCT Blood Glucose.: 145 mg/dL (03-20-24 @ 09:40)  POCT Blood Glucose.: 191 mg/dL (03-19-24 @ 21:13)  POCT Blood Glucose.: 196 mg/dL (03-19-24 @ 17:14)  POCT Blood Glucose.: 190 mg/dL (03-19-24 @ 10:54)  POCT Blood Glucose.: 105 mg/dL (03-19-24 @ 07:30)  POCT Blood Glucose.: 140 mg/dL (03-18-24 @ 20:29)  POCT Blood Glucose.: 142 mg/dL (03-18-24 @ 18:23)  POCT Blood Glucose.: 62 mg/dL (03-18-24 @ 17:56)  POCT Blood Glucose.: 56 mg/dL (03-18-24 @ 17:43)  POCT Blood Glucose.: 93 mg/dL (03-18-24 @ 15:55)  POCT Blood Glucose.: 89 mg/dL (03-18-24 @ 11:09)  POCT Blood Glucose.: 78 mg/dL (03-18-24 @ 10:24)  POCT Blood Glucose.: 88 mg/dL (03-18-24 @ 08:06)  POCT Blood Glucose.: 95 mg/dL (03-18-24 @ 07:45)  POCT Blood Glucose.: 107 mg/dL (03-18-24 @ 05:56)  POCT Blood Glucose.: 112 mg/dL (03-18-24 @ 04:46)  POCT Blood Glucose.: 127 mg/dL (03-18-24 @ 03:44)  POCT Blood Glucose.: 88 mg/dL (03-18-24 @ 02:24)  POCT Blood Glucose.: 116 mg/dL (03-18-24 @ 01:27)  POCT Blood Glucose.: 139 mg/dL (03-18-24 @ 00:33)  POCT Blood Glucose.: 138 mg/dL (03-17-24 @ 23:33)      03-20    135  |  103  |  54<H>  ----------------------------<  152<H>  4.1   |  20<L>  |  3.24<H>    eGFR: 18<L>    Ca    10.8<H>      03-20  Mg     2.2     03-18  Phos  2.4     03-18    TPro  6.9  /  Alb  3.8  /  TBili  0.5  /  DBili  x   /  AST  25  /  ALT  17  /  AlkPhos  45  03-18               Admitted for: Hypoglycemia    Following for: Diabetes management    FS ranging 100-190s, required 2Units ADMELOG total for 24hrs     Subjective: Pt seen and examined bedside. Feeling well. No acute complaints or concerns. Reports good appetite tolerating PO intake, urinating appropriately. ROS + for global headache.       MEDICATIONS  (STANDING):  allopurinol 100 milliGRAM(s) Oral daily  amLODIPine   Tablet 10 milliGRAM(s) Oral daily  atorvastatin 40 milliGRAM(s) Oral at bedtime  carvedilol 25 milliGRAM(s) Oral every 12 hours  clopidogrel Tablet 75 milliGRAM(s) Oral daily  dextrose 5%. 1000 milliLiter(s) (50 mL/Hr) IV Continuous <Continuous>  dextrose 5%. 1000 milliLiter(s) (100 mL/Hr) IV Continuous <Continuous>  dextrose 50% Injectable 25 Gram(s) IV Push once  dextrose 50% Injectable 12.5 Gram(s) IV Push once  dextrose 50% Injectable 25 Gram(s) IV Push once  fenofibrate Tablet 48 milliGRAM(s) Oral daily  glucagon  Injectable 1 milliGRAM(s) IntraMuscular once  heparin   Injectable 5000 Unit(s) SubCutaneous every 8 hours  hydrALAZINE 100 milliGRAM(s) Oral every 8 hours  insulin lispro (ADMELOG) corrective regimen sliding scale   SubCutaneous three times a day before meals  insulin lispro (ADMELOG) corrective regimen sliding scale   SubCutaneous at bedtime  polyethylene glycol 3350 17 Gram(s) Oral daily  ranolazine 500 milliGRAM(s) Oral two times a day  senna 2 Tablet(s) Oral at bedtime    MEDICATIONS  (PRN):  acetaminophen     Tablet .. 650 milliGRAM(s) Oral every 6 hours PRN Temp greater or equal to 38C (100.4F), Mild Pain (1 - 3)  aluminum hydroxide/magnesium hydroxide/simethicone Suspension 30 milliLiter(s) Oral every 4 hours PRN Dyspepsia  dextrose Oral Gel 15 Gram(s) Oral once PRN Blood Glucose LESS THAN 70 milliGRAM(s)/deciliter  melatonin 3 milliGRAM(s) Oral at bedtime PRN Insomnia  ondansetron Injectable 4 milliGRAM(s) IV Push every 8 hours PRN Nausea and/or Vomiting      Allergies    Avapro (Hives)  enalapril (Unknown)  seasonal allergies-outdoor (Urticaria; Rhinorrhea; Sneezing)  losartan (Other)    Intolerances    losartan (Other)        PHYSICAL EXAM:  VITALS: T(C): 37.1 (03-20-24 @ 04:37)  T(F): 98.8 (03-20-24 @ 04:37), Max: 98.9 (03-19-24 @ 19:59)  HR: 81 (03-20-24 @ 04:37) (81 - 91)  BP: 136/64 (03-20-24 @ 04:37) (134/56 - 168/65)  RR:  (18 - 18)  SpO2:  (94% - 96%)  Wt(kg): --  GENERAL: NAD  EYES: No proptosis, no lid lag, anicteric  RESPIRATORY: Clear to auscultation bilaterally  CARDIOVASCULAR: Regular rate and rhythm  GI: Soft, nontender, non distended  EXT: b/l feet without wounds, 2+ pulses  PSYCH: Alert and oriented x 3, reactive affect      A1C with Estimated Average Glucose Result: 6.3 % (03-18-24 @ 00:35)  A1C with Estimated Average Glucose Result: 6.0 % (10-11-23 @ 04:41)      POCT Blood Glucose.: 145 mg/dL (03-20-24 @ 09:40)  POCT Blood Glucose.: 191 mg/dL (03-19-24 @ 21:13)  POCT Blood Glucose.: 196 mg/dL (03-19-24 @ 17:14)  POCT Blood Glucose.: 190 mg/dL (03-19-24 @ 10:54)  POCT Blood Glucose.: 105 mg/dL (03-19-24 @ 07:30)  POCT Blood Glucose.: 140 mg/dL (03-18-24 @ 20:29)  POCT Blood Glucose.: 142 mg/dL (03-18-24 @ 18:23)  POCT Blood Glucose.: 62 mg/dL (03-18-24 @ 17:56)  POCT Blood Glucose.: 56 mg/dL (03-18-24 @ 17:43)  POCT Blood Glucose.: 93 mg/dL (03-18-24 @ 15:55)  POCT Blood Glucose.: 89 mg/dL (03-18-24 @ 11:09)  POCT Blood Glucose.: 78 mg/dL (03-18-24 @ 10:24)  POCT Blood Glucose.: 88 mg/dL (03-18-24 @ 08:06)  POCT Blood Glucose.: 95 mg/dL (03-18-24 @ 07:45)  POCT Blood Glucose.: 107 mg/dL (03-18-24 @ 05:56)  POCT Blood Glucose.: 112 mg/dL (03-18-24 @ 04:46)  POCT Blood Glucose.: 127 mg/dL (03-18-24 @ 03:44)  POCT Blood Glucose.: 88 mg/dL (03-18-24 @ 02:24)  POCT Blood Glucose.: 116 mg/dL (03-18-24 @ 01:27)  POCT Blood Glucose.: 139 mg/dL (03-18-24 @ 00:33)  POCT Blood Glucose.: 138 mg/dL (03-17-24 @ 23:33)      03-20    135  |  103  |  54<H>  ----------------------------<  152<H>  4.1   |  20<L>  |  3.24<H>    eGFR: 18<L>    Ca    10.8<H>      03-20  Mg     2.2     03-18  Phos  2.4     03-18    TPro  6.9  /  Alb  3.8  /  TBili  0.5  /  DBili  x   /  AST  25  /  ALT  17  /  AlkPhos  45  03-18

## 2024-03-20 NOTE — PROGRESS NOTE ADULT - PROBLEM SELECTOR PLAN 2
can understand some english Patient presents to the hospital with hypoglycemia at home. No clear instigating factor, though with possible URI last Wednesday. Patient reports no previous similar events, and has been on antihyperglycemics for years, though notably is on medications which may have caused hypoglycemia (glipizide, pioglitizone in some contexts). No insulin use. Last A1c 6.8 as per patient  - Endocrine consult requested for medication management given his hypoglycemia. Patient is also interested in following with endocrinology as an outpatient.   - stop glipizide and pioglitazone on discharge  - cont farxiga for renal protection and DM  - cont prandin only if predinner FS > 200  - Continue with home atorvastatin 40 mg PO Qd

## 2024-03-20 NOTE — DISCHARGE NOTE NURSING/CASE MANAGEMENT/SOCIAL WORK - PATIENT PORTAL LINK FT
You can access the FollowMyHealth Patient Portal offered by [facility] by registering at the following website: [email or web address]. By joining [facility]’s FollowMyHealth portal, you will also be able to view your health information using other applications (apps) compatible with our system.

## 2024-03-20 NOTE — PROGRESS NOTE ADULT - ASSESSMENT
84M PMH CKD3, HTN, CAD, SHARATH, BPH, DM2, presents with hypoglycemia likely in setting of recent decreased PO intake secondary to URI symptoms and continued medication use.    Pt has had an a1c of 6.0 in October 2023 and 6.3 March 2024 demonstrating relatively well controlled diabetes over the past 6 months. A1c too tightly controlled for this age group.    #T2DM  #Hypoglycemia    Inpatient Medication Recommendations:   Low dose insulin sliding scale TID premeal and QHS, Finger stick premeal TID and QHS  In patient FS goal Range 100-180  Hypoglycemia protocol while inpatient    Discharge Medications Recommendations:  -Stop Glipizide in setting of recent hypoglycemia event.   -Stop pioglitazone 30mg QD given multitude of side effects.   -Discharge on Jardiance 10mg QAM. Pt can check premeal finger stick for dinner, IF FS >200 can take Repaglinide 1mg.   -Please ensure patient has enough test strips/needles/alcohol swabs on discharge to continue with FS monitoring.   -Please have patient follow up with the Endocrinology clinic upon discharge at [address] [place] [phone].    #HTN  Continue blood pressure management with home medications as clinically indicated.  Home regimen includes: Hydralazine 100mg TID, amlodipine 10mg QD, Spironolactone 25mg QD  Goal systolic blood pressure while in patient <180  Appreciate Endocrinology recommendations.    #HLD  Continue with Zetia 10mg QD, Fenofibrate 48mg QD, Atorvastatin 40mg QD, omega 3 fish oil capsule qd  Fasting Lipid Profile consistent with appropriate medication use    Rest of care as per primary team.    Patient case PENDING discussed with attending.  All recommendations are preliminary until attending attestation complete.  84M PMH CKD3, HTN, CAD, SHARATH, BPH, DM2, presents with hypoglycemia likely in setting of recent decreased PO intake secondary to URI symptoms and continued medication use.    Pt has had an a1c of 6.0 in October 2023 and 6.3 March 2024 demonstrating relatively well controlled diabetes over the past 6 months. A1c too tightly controlled for this age group.    #T2DM  #Hypoglycemia    Inpatient Medication Recommendations:   Low dose insulin sliding scale TID premeal and QHS, Finger stick premeal TID and QHS  In patient FS goal Range 100-180  Hypoglycemia protocol while inpatient    Discharge Medications Recommendations:  -Stop Glipizide in setting of recent hypoglycemia event.   -Stop pioglitazone 30mg QD given multitude of side effects.   Tentatively Discharge on Jardiance 10mg QAM if EGFR >30. Pt can check premeal finger stick for dinner, IF FS >200 can take Repaglinide 1mg.   -Please ensure patient has enough test strips/needles/alcohol swabs on discharge to continue with FS monitoring.   -Please have patient follow up with the Endocrinology clinic upon discharge at [address] [place] [phone].    #HTN  Continue blood pressure management with home medications as clinically indicated.  Home regimen includes: Hydralazine 100mg TID, amlodipine 10mg QD, Spironolactone 25mg QD  Goal systolic blood pressure while in patient <180  Appreciate Endocrinology recommendations.    #HLD  Continue with Zetia 10mg QD, Fenofibrate 48mg QD, Atorvastatin 40mg QD, omega 3 fish oil capsule qd  Fasting Lipid Profile consistent with appropriate medication use    Rest of care as per primary team.    Patient case PENDING discussed with attending.  All recommendations are preliminary until attending attestation complete.

## 2024-03-20 NOTE — PROGRESS NOTE ADULT - PROBLEM SELECTOR PLAN 5
- Continue with home amlodipine 10 mg PO Qd  - Continue with home hydralazine 100 mg PO TID
- Continue with home amlodipine 10 mg PO Qd  - Continue with home hydralazine 100 mg PO TID

## 2024-03-20 NOTE — PROGRESS NOTE ADULT - PROBLEM SELECTOR PLAN 1
Baseline Cr 2.5 (reviewed outpatient records)  - Monitor labs and urine output.   - Avoid NSAIDs, ACEI/ARBS, RCA and nephrotoxins.   - Renally dose medications   - nephrology consult appreciated  - echo, hold spironolactone
Baseline Cr 2.5 (reviewed outpatient records). unclear etiology - despite elevated BNP, he appears on the dry side with decreased skin turgor  - Monitor labs and urine output.   - Avoid NSAIDs, ACEI/ARBS, RCA and nephrotoxins.   - Renally dose medications   - nephrology consult appreciated  - echo, hold spironolactone  - discussed with Dr. Zaman - can be discharged with close follow-up

## 2024-03-20 NOTE — PROGRESS NOTE ADULT - SUBJECTIVE AND OBJECTIVE BOX
[facility] Division of Hospital Medicine  Surinder Amaro MD  Available via MS Teams    SUBJECTIVE / OVERNIGHT EVENTS: No acute events overnight. Pt seen and examined at bedside. Denies any urinary symptoms, eating well.     ADDITIONAL REVIEW OF SYSTEMS:    MEDICATIONS  (STANDING):  allopurinol 100 milliGRAM(s) Oral daily  amLODIPine   Tablet 10 milliGRAM(s) Oral daily  atorvastatin 40 milliGRAM(s) Oral at bedtime  carvedilol 25 milliGRAM(s) Oral every 12 hours  clopidogrel Tablet 75 milliGRAM(s) Oral daily  dextrose 5%. 1000 milliLiter(s) (50 mL/Hr) IV Continuous <Continuous>  dextrose 5%. 1000 milliLiter(s) (100 mL/Hr) IV Continuous <Continuous>  dextrose 50% Injectable 25 Gram(s) IV Push once  dextrose 50% Injectable 12.5 Gram(s) IV Push once  dextrose 50% Injectable 25 Gram(s) IV Push once  fenofibrate Tablet 48 milliGRAM(s) Oral daily  glucagon  Injectable 1 milliGRAM(s) IntraMuscular once  heparin   Injectable 5000 Unit(s) SubCutaneous every 8 hours  hydrALAZINE 100 milliGRAM(s) Oral every 8 hours  insulin lispro (ADMELOG) corrective regimen sliding scale   SubCutaneous three times a day before meals  insulin lispro (ADMELOG) corrective regimen sliding scale   SubCutaneous at bedtime  polyethylene glycol 3350 17 Gram(s) Oral daily  ranolazine 500 milliGRAM(s) Oral two times a day  senna 2 Tablet(s) Oral at bedtime    MEDICATIONS  (PRN):  acetaminophen     Tablet .. 650 milliGRAM(s) Oral every 6 hours PRN Temp greater or equal to 38C (100.4F), Mild Pain (1 - 3)  aluminum hydroxide/magnesium hydroxide/simethicone Suspension 30 milliLiter(s) Oral every 4 hours PRN Dyspepsia  dextrose Oral Gel 15 Gram(s) Oral once PRN Blood Glucose LESS THAN 70 milliGRAM(s)/deciliter  melatonin 3 milliGRAM(s) Oral at bedtime PRN Insomnia  ondansetron Injectable 4 milliGRAM(s) IV Push every 8 hours PRN Nausea and/or Vomiting      I&O's Summary    19 Mar 2024 07:01  -  20 Mar 2024 07:00  --------------------------------------------------------  IN: 1210 mL / OUT: 1125 mL / NET: 85 mL        T(C): 36.8 (24 @ 09:00), Max: 37.2 (24 @ 19:59)  HR: 77 (24 @ 09:00) (77 - 91)  BP: 157/66 (24 @ 09:00) (134/56 - 168/65)  RR: 18 (24 @ 09:00) (18 - 18)  SpO2: 96% (24 @ 09:00) (94% - 96%)        LABS:                        8.9    3.78  )-----------( 151      ( 20 Mar 2024 06:45 )             28.0     03-    135  |  103  |  54<H>  ----------------------------<  152<H>  4.1   |  20<L>  |  3.24<H>    Ca    10.8<H>      20 Mar 2024 06:44      Urinalysis Basic - ( 20 Mar 2024 10:41 )    Color: Yellow / Appearance: Clear / S.014 / pH: x  Gluc: x / Ketone: Negative mg/dL  / Bili: Negative / Urobili: 0.2 mg/dL   Blood: x / Protein: 100 mg/dL / Nitrite: Negative   Leuk Esterase: Negative / RBC: 0 /HPF / WBC 0 /HPF   Sq Epi: x / Non Sq Epi: 0 /HPF / Bacteria: Negative /HPF        Culture - Urine (collected 18 Mar 2024 01:12)  Source: Clean Catch Clean Catch (Midstream)  Final Report (18 Mar 2024 23:45):    <10,000 CFU/mL Normal Urogenital Maty      COVID-19 PCR: NotDetec (14 Oct 2023 11:06)        RADIOLOGY & ADDITIONAL TESTS:  New Results Reviewed Today:   New Imaging Personally Reviewed Today:  New Electrocardiogram Personally Reviewed Today:  Prior or Outpatient Records Reviewed Today:    COMMUNICATION:  Care Discussed with Consultants/Other Providers and Details of Discussion: Discussed with ACP  Discussions with Patient/Family:  PCP Communication:    GENERAL: NAD, well appearing  HEAD: NCAT  CHEST/LUNG: Clear to auscultation bilaterally; No wheeze  HEART: Regular rate and rhythm; No murmurs, rubs, or gallops  ABDOMEN: Soft, Nontender, Nondistended; Bowel sounds present  EXTREMITIES:  2+ Peripheral Pulses, No clubbing, cyanosis, or edema  PSYCH: AAOx3, appropriate affect  NEUROLOGY: non-focal, arellano  SKIN: No rashes or lesions

## 2024-03-20 NOTE — PROGRESS NOTE ADULT - PROBLEM SELECTOR PLAN 6
No acute flare  - Continue with home allopurinol 100 mg PO Qd
No acute flare  - Continue with home allopurinol 100 mg PO Qd

## 2024-04-02 ENCOUNTER — APPOINTMENT (OUTPATIENT)
Dept: ENDOCRINOLOGY | Facility: CLINIC | Age: 84
End: 2024-04-02
Payer: MEDICARE

## 2024-04-02 VITALS
OXYGEN SATURATION: 96 % | HEART RATE: 73 BPM | HEIGHT: 69 IN | WEIGHT: 172 LBS | DIASTOLIC BLOOD PRESSURE: 60 MMHG | SYSTOLIC BLOOD PRESSURE: 132 MMHG | BODY MASS INDEX: 25.48 KG/M2

## 2024-04-02 DIAGNOSIS — N18.30 CHRONIC KIDNEY DISEASE, STAGE 3 UNSPECIFIED: ICD-10-CM

## 2024-04-02 DIAGNOSIS — E11.3299 TYPE 2 DIABETES MELLITUS WITH MILD NONPROLIFERATIVE DIABETIC RETINOPATHY WITHOUT MACULAR EDEMA, UNSPECIFIED EYE: ICD-10-CM

## 2024-04-02 DIAGNOSIS — E11.9 TYPE 2 DIABETES MELLITUS W/OUT COMPLICATIONS: ICD-10-CM

## 2024-04-02 DIAGNOSIS — E78.5 HYPERLIPIDEMIA, UNSPECIFIED: ICD-10-CM

## 2024-04-02 DIAGNOSIS — C23 MALIGNANT NEOPLASM OF GALLBLADDER: ICD-10-CM

## 2024-04-02 DIAGNOSIS — E04.1 NONTOXIC SINGLE THYROID NODULE: ICD-10-CM

## 2024-04-02 DIAGNOSIS — E83.52 HYPERCALCEMIA: ICD-10-CM

## 2024-04-02 DIAGNOSIS — I10 ESSENTIAL (PRIMARY) HYPERTENSION: ICD-10-CM

## 2024-04-02 LAB — HBA1C MFR BLD HPLC: 7.4

## 2024-04-02 PROCEDURE — 83036 HEMOGLOBIN GLYCOSYLATED A1C: CPT | Mod: QW

## 2024-04-02 PROCEDURE — 99205 OFFICE O/P NEW HI 60 MIN: CPT

## 2024-04-09 LAB
CA-I SERPL-SCNC: 6 MG/DL
CALCIUM SERPL-MCNC: 10.9 MG/DL
PARATHYROID HORMONE INTACT: 87 PG/ML
TSH SERPL-ACNC: 1.22 UIU/ML

## 2024-04-09 NOTE — ASU DISCHARGE PLAN (ADULT/PEDIATRIC) - MODE OF TRANSPORTATION
Pulmonary Medicine  Cystic Fibrosis - Lung Transplant Team  Progress Note  2024     Patient: Sofie Rodriguez  MRN: [id number]  : 1960 (age 63 year old)  Transplant: 2022 (Lung), POD#[id number]  Admission date: 2/10/2024    Assessment & Plan:     Sofie Rodriguez is a 63 year old female with h/o COPD s/p BSLT () with course complicated by post-operative hemidiaphragm palsy, recurrent PNAs, positive DSA, EBV viremia, hypogammaglobulinemia, severe gastroparesis s/p G/J tube placement (22) and pyloric botox (23), GI bleed 2/2 pyloric ulcer, hemobilia s/p ERCP and MRCP, chronic diarrhea, recurrent C diff colitis, ESRD on iHD, recurrent falls with injuries (recent right hip fx s/p ORIF 2023), deconditioning, and FTT.  Admitted 2/10 for failure to thrive and initiation of TPN/lipids.  Emergent intubation - for hypoxic and hypercapneic respiratory failure and encephalopathy. Returned to ICU - and again 3/18-3/20 d/t tenuous respiratory status complicated by variable daytime NIPPV tolerance and hypervolemia with iHD limited d/t hypotension. Again transferred back to ICU 3/24 for intubation and bronch/BAL given hypoxic and hypercapneic respiratory failure. CT with extensive bilateral infiltrate and etiology likely multifactorial including volume overload and infection, possible mucous plugging, ACR or AMR less likely.  Extubated , no daytime hypoxia and hypercapnia remains stable with good adherence to NIPPV with sleep.  Tolerating PO, diarrhea more stable.  Progress acidosis and hypercapnia since 4/3 despite excellent adherence to overnight NIPPV and adjustments in settings, still without daytime hypoxia.      Today's recommendations:  - Daily morning VBG with additional VBG this evening at , will compare evening VBG with tomorrow AM VBG (after HD today-->bicarbonate bath adjusted per discussion with renal)  - AVAPS to continue with TV to 425 ml  - Primary team to obtain  home NIPPV machine and work to transition to long-term device while inpatient  - May need to revisit neuro assessment for factors contributing to progressive hypercapnic acidosis  - CSA level today subtherapeutic, increased and adjusted to PO, repeat level ordered 4/12  - DSA and EBV due 4/23  - Encourage TID supplemental shakes daily to meet PO nutritional goals     Acute on chronic hypoxic/hypercapneic respiratory failure:  S/p bilateral sequential lung transplant for COPD:   Hypoventilation, Suspected CARLEE:  PFTs in clinic remained significantly below her baseline.  Baseline hypoxia with 2L NC overnight PTA.  S/p intubation 2/17-2/19 for acute hypoxic/hypercapneic respiratory failure with encephalopathy, CT with concern for infection and pulmonary edema given recent addition of TPN nutrition.  Bronch (2/18) with very friable tissue, cutlures only with C. glabrata.  Persistent respiratory failure also complicated by hypoventilation and deconditioning d/t malnutrition. Neurology consulted 2/27, MRI brain (3/1) without acute intracranial pathology.  SNIFF (3/8) normal.  Metabolic CART suggested overfeeding on TPN.  Returned to ICU (3/18-3/20) d/t tenuous respiratory status complicated by NIPPV intolerance and hypervolemia with iHD limited d/t hypotension (CXR with increased pulmonary edema).  Overall, her PCO2 retention is likely multifactorial with a component being from overfeeding (though remedied with nutrition adjustment), metabolic compensation barrier d/t renal failure, pulmonary edema, bicarb loss with diarrhea, hypoventilation with declining NIF and FVC concerning for neuromuscular etiology (though Neurology consult was not revealing), and NIPPV intolerance due to claustrophobia. Worsening hypoxic and hypercapneic respiratory failure 3/24 and transferred to ICU for intubation. CT chest with extensive bilateral infiltrates markedly increased from previous.  Bronch with small L>R sided secretions easily  suctioned, BAL with no evidence of DAH, non bloody.  S/p Zosyn (3/23-4/2) for 10 day empiric course.  Extubated 4/2, hypercapnia stable with consistent overnight BiPAP use.  CXR (4/8) with stable bibasilar opacities and small left pleural effusion.  Progressive hypercapnia and acidosis despite transition from BiPAP to AVAPS (due to decreasing TV) and gradual increase in AVAPS TV, pt with excellent adherence to use overnight and with naps.  - Daily morning VBG with additional VBG this evening at 2000, will compare evening VBG with tomorrow AM VBG (after HD today-->bicarbonate bath adjusted per discussion with renal)  - AVAPS to continue with TV to 425 ml (ideal body weight for height up to 130# correlating with upper limit of TV at 8 ml/kg at 470 ml)  - Primary team to obtain home NIPPV machine and work to transition to long-term device while inpatient  - May need to revisit neuro assessment for factors contributing to progressive hypercapnic acidosis     Immunosuppression:  AZA previously stopped 5/2023 d/t low ImmuKnow assay and EBV viremia.  ImmuKnow (2/27) remained low at 88.  ImmuKnow (4/1) further decreased to 43.  Transitioned from [place] to CSA 3/11.  -  mg BID (decreased 4/3).  Goal 120-140 (decreased 4/4 for ImmuKnow).  Level today 87, increased to 125 mg BID and adjusted from G tube suspension to PO.  Repeat level 4/12 (ordered).  - Prednisone 5 mg daily     Prophylaxis:   - Bactrim qMWF for PJP ppx  - CMV ppx not currently indicated, D+/R+, CMV negative 3/18     Positive DSA: AMR treatment deferred given frailty and stable PFTs.  DSA DQB2 decreased on 3/6 with 5667 mfi, and again decreased to 4960 on 3/23.  Most recent cell-free DNA (2/18) mildly elevated at 1.04 (concerning for possible rejection), which was increased from prior level of 0.12 (1/10).  IST increase deferred at that time per Dr. Gong.  S/p IVIG (2/27) for DSA (IgG WNL).  Immuknow as above.  Prospera (cell free DNA) 0.44 (3/18)  suggests AMR less likely, follow  - Repeat DSA monthly (due 4/23)      EBV viremia: CT CAP (2/7) without lymphadenopathy.  Recent levels improving (3/18) 23k.  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  Notable weight loss (40# in a year) d/t diarrhea, GI dysmotility, and intolerance of enteral feeds (PEG/J tube in place), most recently on elemental formula.  Extensive OP eval and f/u with GI. S/p port placement for TPN and lipids. Continued for ~5 weeks inpatient without considerable improvement, transitioned back to TF.  C diff positive (3/13), on fidaxomicin.  Repeat (4/4) negative, enteric B/V panel also negative.  Loose stools persist, GI consulted 4/4.  Colonoscopy recommended by GI this admission, but pt. deferred d/t risk for reintubation and improvement in diarrhea today.  - Strongly encourage TID supplemental shakes daily to meet PO nutritional goals     ESRD: CT with volume overload secondary to TPN volume, iHD increased from PTA TThSa schedule with unsustained improvement.  Management per Nephrology, dialysis via [facility].  Unable to remove fluid on 3/23 d/t hypotension, tolerance now improved with midodrine on HD days.  Volume removal and dialysis per nephrology.     Goals of care: Care conference held with palliative and primary team on 3/15 for medical update with pt. and daughters.  Repeat care conference 3/29 (see palliative and MICU notes) patient would like to proceed with re-intubation and trach if pt. fails extubation d/t progressive hypercapnia, understanding that this would severely complicate potential disposition options.     We appreciate the  excellent care provided by the [facility] team.  Recommendations communicated via Vocera and this note.  Will continue to follow along closely, please do not hesitate to call with any questions or concerns.     Patient discussed with Dr. Huff.    Catherine Johnson, DNP, APRN, CNP  Inpatient Nurse Practitioner  Pulmonary CF/Transplant     Subjective & Interval History:     Remains on RA during the day, on AVAPS overnight with increase in TV last night from 400 to 425 ml without improvement in morning VBG.  No fatigue or HA.  No new cough or sputum.  Continues to tolerate PO fairly well, loose stools somewhat stable.    Review of Systems:     C: No fever, no chills, no recent weight  INTEGUMENTARY/SKIN: No rash or obvious new lesions  ENT/MOUTH: No sore throat, no sinus pain, no nasal congestion or drainage  RESP: See interval history  CV: No chest pain, no palpitations, no peripheral edema, no orthopnea  GI: No nausea, no vomiting, no reflux symptoms  : No dysuria  MUSCULOSKELETAL: No myalgias, no arthralgias  ENDOCRINE: Blood sugars with adequate control  NEURO: No numbness or tingling  PSYCHIATRIC: Mood stable    Physical Exam:     All notes, images, and labs from past 24 hours (at minimum) were reviewed.    Vital signs:  Temp: 97.6  F (36.4  C) Temp src: Oral BP: 101/49 Pulse: 64   Resp: 18 SpO2: 96 % O2 Device: BiPAP/CPAP Oxygen Delivery: 4 LPM   Weight: 41.3 kg (91 lb 1.6 oz)  I/O:   Intake/Output Summary (Last 24 hours) at 4/9/2024 0806  Last data filed at 4/9/2024 0800  Gross per 24 hour   Intake 1320 ml   Output --   Net 1320 ml     Constitutional: Lying in bed in dialysis, thin appearing, in no apparent distress.   HEENT: Eyes with pink conjunctivae, anicteric.  Oral mucosa moist without lesions.   PULM: Diminished bases bilaterally.  No crackles, no rhonchi, no wheezes.  Non-labored breathing on AVAPS  ml.  CV: Normal S1 and S2.  RRR.  No murmur, gallop, or rub.  No peripheral edema.   ABD:  "NABS, soft, nontender, nondistended.  PEG/J tube site cdi.  MSK: Moves all extremities.  + muscle wasting.   NEURO: Alert and conversant.   SKIN: Warm, dry, fragile, scattered ecchymosis.   PSYCH: Mood stable.     Data:     LABS    CMP:   Recent Labs   Lab 04/09/24  0710 04/08/24  0438 04/07/24  0635 04/06/24 0432    137 135 142   POTASSIUM 4.8 5.3 4.5 4.2   CHLORIDE 97* 96* 95* 102   CO2 26 26 27 26   ANIONGAP 18* 15 13 14   GLC 84 84 104* 114*   .0* 80.3* 46.3* 60.0*   CR 6.59* 4.99* 3.14* 4.78*   GFRESTIMATED 7* 9* 16* 10*   ESTUARDO 9.1 9.6 9.5 9.6   MAG 2.3 2.2 1.8 2.0   PHOS 6.0* 6.1* 3.9 4.8*   PROTTOTAL 5.8* 6.1* 6.3* 5.7*   ALBUMIN 3.5 3.8 3.8 3.6   BILITOTAL 0.2 0.2 0.2 0.2   ALKPHOS 118 106 115 110   AST 16 13 16 14   ALT 10 8 10 12     CBC:   Recent Labs   Lab 04/09/24  0710 04/08/24 0438 04/06/24  0728 04/06/24 0432   WBC 6.9 8.1 7.6 7.8   RBC 2.70* 3.05* 2.74* 2.79*   HGB 9.4* 10.6* 9.5* 9.8*   HCT 31.0* 34.4* 30.7* 31.6*   * 113* 112* 113*   MCH 34.8* 34.8* 34.7* 35.1*   MCHC 30.3* 30.8* 30.9* 31.0*   RDW 18.2* 17.8* 17.4* 17.2*    292 321 308       INR:   Recent Labs   Lab 04/09/24  0710 04/08/24 0438 04/07/24  0635 04/06/24  0432   INR 1.06 1.04 1.10 1.11       Glucose:   Recent Labs   Lab 04/09/24  0710 04/08/24  0438 04/07/24  0635 04/06/24  0432 04/05/24  0437 04/04/24  0320   GLC 84 84 104* 114* 108* 100*       Blood Gas:   Recent Labs   Lab 04/09/24  0710 04/08/24  0438 04/07/24  0636   PHV 7.19* 7.21* 7.23*   PCO2V 77* 75* 74*   PO2V 25 27 27   HCO3V 29* 30* 31*   LINDY -0.6 0.7 1.4   O2PER 30 30 30       Culture Data No results for input(s): \"CULT\" in the last 168 hours.    Virology Data:   Lab Results   Component Value Date    FLUAH1 Not Detected 03/24/2024    FLUAH3 Not Detected 03/24/2024    GH2435 Not Detected 03/24/2024    IFLUB Not Detected 03/24/2024    RSVA Not Detected 03/24/2024    RSVB Not Detected 03/24/2024    PIV1 Not Detected 03/24/2024    PIV2 Not " Detected 03/24/2024    PIV3 Not Detected 03/24/2024    HMPV Not Detected 03/24/2024       Historical CMV results (last 3 of prior testing):  Lab Results   Component Value Date    CMVQNT Not Detected 03/18/2024    CMVQNT Not Detected 02/19/2024    CMVQNT Not Detected 02/07/2024     Lab Results   Component Value Date    CMVLOG 3.2 07/12/2023    CMVLOG <2.1 04/19/2023    CMVLOG 3.5 01/25/2023       Urine Studies    Recent Labs   Lab Test 02/18/24  0222 05/18/23  0627   URINEPH 7.5* 5.0   NITRITE Negative Negative   LEUKEST Trace* Moderate*   WBCU 66* 21*       Most Recent Breeze Pulmonary Function Testing (FVC/FEV1 only)  FVC-Pre   Date Value Ref Range Status   02/07/2024 1.19 L    01/10/2024 1.12 L    08/29/2023 1.48 L    07/25/2023 1.55 L      FVC-%Pred-Pre   Date Value Ref Range Status   02/07/2024 42 %    01/10/2024 39 %    08/29/2023 53 %    07/25/2023 55 %      FEV1-Pre   Date Value Ref Range Status   02/07/2024 1.13 L    01/10/2024 1.10 L    08/29/2023 1.43 L    07/25/2023 1.54 L      FEV1-%Pred-Pre   Date Value Ref Range Status   02/07/2024 51 %    01/10/2024 49 %    08/29/2023 64 %    07/25/2023 69 %        IMAGING    Recent Results (from the past 48 hour(s))   XR Chest 2 Views    Narrative    Exam: XR CHEST 2 VIEWS, 4/8/2024 8:30 AM    Indication: Interval f/u    Comparison: Chest radiographs 4/11/2024, 3/29/2020 oh    Findings:   PA and lateral views of the chest were obtained. Atherosclerotic  calcification at the aortic knob. Postsurgical changes clamshell  sternotomy and bilateral lung transplantation are again visualized.  Trachea is midline. Cardiomegaly, slightly decreased compared to  4/1/2024, and there is prominent pulmonary convexity suggesting  pulmonary enlargement. This was actually confirmed on review of recent  chest CT of 3/23/2024. Also, the left atrial border is quite close to  the spine an review of that CT as well show left atrial enlargement.  Stable small left pleural effusion. Stable  please correlate for  cardiac arrhythmia. Bilateral interstitial opacities. No focal  consolidation. No pneumothorax. Upper abdomen is unremarkable.  Partially visualized and catheter projects over the mid abdomen. No  acute osseous abnormalities.      Impression    Impression:   1. Similar appearing small left pleural effusion.  2. Stable bilateral pulmonary interstitial opacities suggestive of  pulmonary edema.  3. Left atrial enlargement, he has correlate for arrhythmia.  4. Pulmonary artery enlargement complex choroid for pulmonary  hypertension.  5. Atherosclerosis.    I have personally reviewed the examination and initial interpretation  and I agree with the findings.    ALVINA HARRY MD         SYSTEM ID:  [id number]      Ambulatory

## 2024-04-25 ENCOUNTER — APPOINTMENT (OUTPATIENT)
Dept: ULTRASOUND IMAGING | Facility: IMAGING CENTER | Age: 84
End: 2024-04-25
Payer: MEDICARE

## 2024-04-25 ENCOUNTER — OUTPATIENT (OUTPATIENT)
Dept: OUTPATIENT SERVICES | Facility: HOSPITAL | Age: 84
LOS: 1 days | End: 2024-04-25
Payer: MEDICARE

## 2024-04-25 DIAGNOSIS — E04.1 NONTOXIC SINGLE THYROID NODULE: ICD-10-CM

## 2024-04-25 DIAGNOSIS — H26.9 UNSPECIFIED CATARACT: Chronic | ICD-10-CM

## 2024-04-25 DIAGNOSIS — Q76.1 KLIPPEL-FEIL SYNDROME: Chronic | ICD-10-CM

## 2024-04-25 DIAGNOSIS — Z98.89 OTHER SPECIFIED POSTPROCEDURAL STATES: Chronic | ICD-10-CM

## 2024-04-25 DIAGNOSIS — M75.101 UNSPECIFIED ROTATOR CUFF TEAR OR RUPTURE OF RIGHT SHOULDER, NOT SPECIFIED AS TRAUMATIC: Chronic | ICD-10-CM

## 2024-04-25 DIAGNOSIS — M65.30 TRIGGER FINGER, UNSPECIFIED FINGER: Chronic | ICD-10-CM

## 2024-04-25 PROCEDURE — 76536 US EXAM OF HEAD AND NECK: CPT

## 2024-04-25 PROCEDURE — 76536 US EXAM OF HEAD AND NECK: CPT | Mod: 26

## 2024-05-06 ENCOUNTER — RESULT REVIEW (OUTPATIENT)
Age: 84
End: 2024-05-06

## 2024-05-06 ENCOUNTER — EMERGENCY (EMERGENCY)
Facility: HOSPITAL | Age: 84
LOS: 1 days | Discharge: HOME | End: 2024-05-06
Attending: STUDENT IN AN ORGANIZED HEALTH CARE EDUCATION/TRAINING PROGRAM
Payer: MEDICARE

## 2024-05-06 VITALS
HEART RATE: 62 BPM | RESPIRATION RATE: 18 BRPM | SYSTOLIC BLOOD PRESSURE: 185 MMHG | OXYGEN SATURATION: 100 % | HEIGHT: 69 IN | WEIGHT: 179.9 LBS | TEMPERATURE: 98 F | DIASTOLIC BLOOD PRESSURE: 78 MMHG

## 2024-05-06 DIAGNOSIS — M75.101 UNSPECIFIED ROTATOR CUFF TEAR OR RUPTURE OF RIGHT SHOULDER, NOT SPECIFIED AS TRAUMATIC: Chronic | ICD-10-CM

## 2024-05-06 DIAGNOSIS — Q76.1 KLIPPEL-FEIL SYNDROME: Chronic | ICD-10-CM

## 2024-05-06 DIAGNOSIS — M65.30 TRIGGER FINGER, UNSPECIFIED FINGER: Chronic | ICD-10-CM

## 2024-05-06 DIAGNOSIS — H26.9 UNSPECIFIED CATARACT: Chronic | ICD-10-CM

## 2024-05-06 DIAGNOSIS — Z98.89 OTHER SPECIFIED POSTPROCEDURAL STATES: Chronic | ICD-10-CM

## 2024-05-06 LAB
ALBUMIN SERPL ELPH-MCNC: 4.3 G/DL — SIGNIFICANT CHANGE UP (ref 3.3–5)
ALP SERPL-CCNC: 66 U/L — SIGNIFICANT CHANGE UP (ref 40–120)
ALT FLD-CCNC: 14 U/L — SIGNIFICANT CHANGE UP (ref 10–45)
ANION GAP SERPL CALC-SCNC: 13 MMOL/L — SIGNIFICANT CHANGE UP (ref 5–17)
APTT BLD: 32.5 SEC — SIGNIFICANT CHANGE UP (ref 24.5–35.6)
AST SERPL-CCNC: 22 U/L — SIGNIFICANT CHANGE UP (ref 10–40)
BASOPHILS # BLD AUTO: 0.03 K/UL — SIGNIFICANT CHANGE UP (ref 0–0.2)
BASOPHILS NFR BLD AUTO: 0.8 % — SIGNIFICANT CHANGE UP (ref 0–2)
BILIRUB SERPL-MCNC: 0.4 MG/DL — SIGNIFICANT CHANGE UP (ref 0.2–1.2)
BUN SERPL-MCNC: 39 MG/DL — HIGH (ref 7–23)
CALCIUM SERPL-MCNC: 10.4 MG/DL — SIGNIFICANT CHANGE UP (ref 8.4–10.5)
CHLORIDE SERPL-SCNC: 106 MMOL/L — SIGNIFICANT CHANGE UP (ref 96–108)
CK MB BLD-MCNC: 3 % — SIGNIFICANT CHANGE UP (ref 0–3.5)
CK MB CFR SERPL CALC: 3.9 NG/ML — SIGNIFICANT CHANGE UP (ref 0–6.7)
CK SERPL-CCNC: 131 U/L — SIGNIFICANT CHANGE UP (ref 30–200)
CO2 SERPL-SCNC: 21 MMOL/L — LOW (ref 22–31)
CREAT SERPL-MCNC: 2.59 MG/DL — HIGH (ref 0.5–1.3)
EGFR: 24 ML/MIN/1.73M2 — LOW
EOSINOPHIL # BLD AUTO: 0.18 K/UL — SIGNIFICANT CHANGE UP (ref 0–0.5)
EOSINOPHIL NFR BLD AUTO: 4.6 % — SIGNIFICANT CHANGE UP (ref 0–6)
GLUCOSE BLDC GLUCOMTR-MCNC: 113 MG/DL — HIGH (ref 70–99)
GLUCOSE BLDC GLUCOMTR-MCNC: 272 MG/DL — HIGH (ref 70–99)
GLUCOSE SERPL-MCNC: 125 MG/DL — HIGH (ref 70–99)
HCT VFR BLD CALC: 38.7 % — LOW (ref 39–50)
HGB BLD-MCNC: 12.4 G/DL — LOW (ref 13–17)
IMM GRANULOCYTES NFR BLD AUTO: 0.3 % — SIGNIFICANT CHANGE UP (ref 0–0.9)
INR BLD: 0.92 RATIO — SIGNIFICANT CHANGE UP (ref 0.85–1.18)
LIDOCAIN IGE QN: 54 U/L — SIGNIFICANT CHANGE UP (ref 7–60)
LYMPHOCYTES # BLD AUTO: 0.72 K/UL — LOW (ref 1–3.3)
LYMPHOCYTES # BLD AUTO: 18.3 % — SIGNIFICANT CHANGE UP (ref 13–44)
MAGNESIUM SERPL-MCNC: 2.2 MG/DL — SIGNIFICANT CHANGE UP (ref 1.6–2.6)
MCHC RBC-ENTMCNC: 31.6 PG — SIGNIFICANT CHANGE UP (ref 27–34)
MCHC RBC-ENTMCNC: 32 GM/DL — SIGNIFICANT CHANGE UP (ref 32–36)
MCV RBC AUTO: 98.5 FL — SIGNIFICANT CHANGE UP (ref 80–100)
MONOCYTES # BLD AUTO: 0.59 K/UL — SIGNIFICANT CHANGE UP (ref 0–0.9)
MONOCYTES NFR BLD AUTO: 15 % — HIGH (ref 2–14)
NEUTROPHILS # BLD AUTO: 2.4 K/UL — SIGNIFICANT CHANGE UP (ref 1.8–7.4)
NEUTROPHILS NFR BLD AUTO: 61 % — SIGNIFICANT CHANGE UP (ref 43–77)
NRBC # BLD: 0 /100 WBCS — SIGNIFICANT CHANGE UP (ref 0–0)
NT-PROBNP SERPL-SCNC: 1029 PG/ML — HIGH (ref 0–300)
PLATELET # BLD AUTO: 198 K/UL — SIGNIFICANT CHANGE UP (ref 150–400)
POTASSIUM SERPL-MCNC: 4.1 MMOL/L — SIGNIFICANT CHANGE UP (ref 3.5–5.3)
POTASSIUM SERPL-SCNC: 4.1 MMOL/L — SIGNIFICANT CHANGE UP (ref 3.5–5.3)
PROT SERPL-MCNC: 7.3 G/DL — SIGNIFICANT CHANGE UP (ref 6–8.3)
PROTHROM AB SERPL-ACNC: 10.1 SEC — SIGNIFICANT CHANGE UP (ref 9.5–13)
RBC # BLD: 3.93 M/UL — LOW (ref 4.2–5.8)
RBC # FLD: 14.6 % — HIGH (ref 10.3–14.5)
SODIUM SERPL-SCNC: 140 MMOL/L — SIGNIFICANT CHANGE UP (ref 135–145)
TROPONIN T, HIGH SENSITIVITY RESULT: 50 NG/L — SIGNIFICANT CHANGE UP (ref 0–51)
TROPONIN T, HIGH SENSITIVITY RESULT: 54 NG/L — HIGH (ref 0–51)
WBC # BLD: 3.93 K/UL — SIGNIFICANT CHANGE UP (ref 3.8–10.5)
WBC # FLD AUTO: 3.93 K/UL — SIGNIFICANT CHANGE UP (ref 3.8–10.5)

## 2024-05-06 PROCEDURE — 93356 MYOCRD STRAIN IMG SPCKL TRCK: CPT

## 2024-05-06 PROCEDURE — 76376 3D RENDER W/INTRP POSTPROCES: CPT | Mod: 26

## 2024-05-06 PROCEDURE — 93306 TTE W/DOPPLER COMPLETE: CPT | Mod: 26

## 2024-05-06 PROCEDURE — 99223 1ST HOSP IP/OBS HIGH 75: CPT

## 2024-05-06 PROCEDURE — 99215 OFFICE O/P EST HI 40 MIN: CPT

## 2024-05-06 PROCEDURE — 71045 X-RAY EXAM CHEST 1 VIEW: CPT | Mod: 26

## 2024-05-06 PROCEDURE — 99053 MED SERV 10PM-8AM 24 HR FAC: CPT

## 2024-05-06 PROCEDURE — 99205 OFFICE O/P NEW HI 60 MIN: CPT

## 2024-05-06 RX ORDER — CLOPIDOGREL BISULFATE 75 MG/1
75 TABLET, FILM COATED ORAL DAILY
Refills: 0 | Status: DISCONTINUED | OUTPATIENT
Start: 2024-05-06 | End: 2024-05-09

## 2024-05-06 RX ORDER — SODIUM CHLORIDE 9 MG/ML
1000 INJECTION, SOLUTION INTRAVENOUS
Refills: 0 | Status: DISCONTINUED | OUTPATIENT
Start: 2024-05-06 | End: 2024-05-09

## 2024-05-06 RX ORDER — HYDRALAZINE HCL 50 MG
100 TABLET ORAL THREE TIMES A DAY
Refills: 0 | Status: DISCONTINUED | OUTPATIENT
Start: 2024-05-06 | End: 2024-05-09

## 2024-05-06 RX ORDER — FINASTERIDE 5 MG/1
1 TABLET, FILM COATED ORAL DAILY
Refills: 0 | Status: DISCONTINUED | OUTPATIENT
Start: 2024-05-06 | End: 2024-05-07

## 2024-05-06 RX ORDER — RANOLAZINE 500 MG/1
500 TABLET, FILM COATED, EXTENDED RELEASE ORAL
Refills: 0 | Status: DISCONTINUED | OUTPATIENT
Start: 2024-05-06 | End: 2024-05-09

## 2024-05-06 RX ORDER — DEXTROSE 50 % IN WATER 50 %
25 SYRINGE (ML) INTRAVENOUS ONCE
Refills: 0 | Status: DISCONTINUED | OUTPATIENT
Start: 2024-05-06 | End: 2024-05-09

## 2024-05-06 RX ORDER — DEXTROSE 50 % IN WATER 50 %
15 SYRINGE (ML) INTRAVENOUS ONCE
Refills: 0 | Status: DISCONTINUED | OUTPATIENT
Start: 2024-05-06 | End: 2024-05-09

## 2024-05-06 RX ORDER — INSULIN LISPRO 100/ML
VIAL (ML) SUBCUTANEOUS
Refills: 0 | Status: DISCONTINUED | OUTPATIENT
Start: 2024-05-06 | End: 2024-05-09

## 2024-05-06 RX ORDER — DEXTROSE 50 % IN WATER 50 %
12.5 SYRINGE (ML) INTRAVENOUS ONCE
Refills: 0 | Status: DISCONTINUED | OUTPATIENT
Start: 2024-05-06 | End: 2024-05-09

## 2024-05-06 RX ORDER — IBUPROFEN 200 MG
400 TABLET ORAL ONCE
Refills: 0 | Status: DISCONTINUED | OUTPATIENT
Start: 2024-05-06 | End: 2024-05-06

## 2024-05-06 RX ORDER — GLUCAGON INJECTION, SOLUTION 0.5 MG/.1ML
1 INJECTION, SOLUTION SUBCUTANEOUS ONCE
Refills: 0 | Status: DISCONTINUED | OUTPATIENT
Start: 2024-05-06 | End: 2024-05-09

## 2024-05-06 RX ORDER — AMLODIPINE BESYLATE 2.5 MG/1
10 TABLET ORAL DAILY
Refills: 0 | Status: DISCONTINUED | OUTPATIENT
Start: 2024-05-06 | End: 2024-05-09

## 2024-05-06 RX ORDER — ASPIRIN/CALCIUM CARB/MAGNESIUM 324 MG
162 TABLET ORAL ONCE
Refills: 0 | Status: COMPLETED | OUTPATIENT
Start: 2024-05-06 | End: 2024-05-06

## 2024-05-06 RX ORDER — ACETAMINOPHEN 500 MG
1000 TABLET ORAL ONCE
Refills: 0 | Status: COMPLETED | OUTPATIENT
Start: 2024-05-06 | End: 2024-05-06

## 2024-05-06 RX ORDER — ACETAMINOPHEN 500 MG
650 TABLET ORAL ONCE
Refills: 0 | Status: COMPLETED | OUTPATIENT
Start: 2024-05-06 | End: 2024-05-06

## 2024-05-06 RX ORDER — ATORVASTATIN CALCIUM 80 MG/1
40 TABLET, FILM COATED ORAL AT BEDTIME
Refills: 0 | Status: DISCONTINUED | OUTPATIENT
Start: 2024-05-06 | End: 2024-05-09

## 2024-05-06 RX ORDER — DEXTROSE 10 % IN WATER 10 %
125 INTRAVENOUS SOLUTION INTRAVENOUS ONCE
Refills: 0 | Status: DISCONTINUED | OUTPATIENT
Start: 2024-05-06 | End: 2024-05-09

## 2024-05-06 RX ADMIN — Medication 3: at 18:01

## 2024-05-06 RX ADMIN — Medication 650 MILLIGRAM(S): at 11:00

## 2024-05-06 RX ADMIN — Medication 162 MILLIGRAM(S): at 06:05

## 2024-05-06 RX ADMIN — Medication 650 MILLIGRAM(S): at 10:25

## 2024-05-06 RX ADMIN — Medication 1000 MILLIGRAM(S): at 23:00

## 2024-05-06 RX ADMIN — Medication 400 MILLIGRAM(S): at 22:30

## 2024-05-06 NOTE — ED CDU PROVIDER INITIAL DAY NOTE - PROGRESS NOTE DETAILS
CDU PROGRESS NOTE ESEQUIEL CHOI: Received pt at 1900 sign-out. Case/plan reviewed. Pt resting in stretcher in NAD. VSS. Pt ate dinner. Ambulatory around unit with steady gait. S1 S2 noted, RRR, lungs CTA b/l, BS x4 with soft, nontender abdomen. Pt c/o headache and chest pain unchanged from arrival to ED. EKG in ED no signs of acute ischemia and HsT 54--50. Will give Ofirmev 1gm IVPB and monitor overnight.

## 2024-05-06 NOTE — ED CDU PROVIDER DISPOSITION NOTE - ATTENDING APP SHARED VISIT CONTRIBUTION OF CARE
Attending MD Ritchie:   I personally have seen and examined this patient.  Physician assistant note reviewed and agree on plan of care and except where noted.  See below for details.     Seen in CDU Red 41    84M with PMH/PSH including CAD s/p stents x 3, RCA, mLAD 70% w/o anterior ischemia (2016), follows with Dr. Abraham, CKD3-4, HTN, HLD, BPH, DM, gallbladder cancer s/p lap cholecystectomy sent to the CDU after presenting to the ED with chest pain.  Denies chest pain, shortness of breath, abdominal pain, nausea, vomiting, diarrhea, urinary complaints.  Awaiting stress.      Exam:   General: NAD  HENT: head NCAT, airway patent  Eyes: anicteric, no conjunctival injection   Lungs: lungs CTAB with good inspiratory effort, no wheezing, no rhonchi, no rales  Cardiac: +S1S2, no obvious m/r/g, no LE edema  GI: abdomen soft with +BS, NT, ND  MSK: ranging neck and extremities freely  Neuro: moving all extremities spontaneously, nonfocal  Psych: normal mood and affect     A/P: 84M with chest pain, no acute issues at  this time.  Lab and radiology tests reviewed with patient.  Echo, stress, noted.  Cards recs noted and appreciated.  Stable for discharge.  Follow up instructions given, importance of follow up emphasized, return to ED parameters reviewed and patient verbalized understanding.  All questions answered, all concerns addressed.

## 2024-05-06 NOTE — ED CDU PROVIDER DISPOSITION NOTE - NSFOLLOWUPINSTRUCTIONS_ED_ALL_ED_FT
Please make sure to follow up with your primary care doctor within 1-2 days and with the CARDIOLOGY specialist. The information for follow up can be found below. Bring a copy of all of your results with you to your follow up appointments.   Return to the ER as discussed if you develop any new or worsening symptoms.      Froylan Abraham  Vascular Medicine  [address] [place] [address], [address] [place] [id number]  Phone: [phone]    Continue all of your medication as prescribed.

## 2024-05-06 NOTE — CONSULT NOTE ADULT - SUBJECTIVE AND OBJECTIVE BOX
84 M PMH CAD w/ 3 stents to RCA (and dLAD 70%) in 2016 follows with Dr. Abraham, CKD3-4, HTN, HLD, BPH, DM presents with chest pain since Wednesday. Pt describes pain as "sharp, stabbing" only lasting a few seconds, then disappears. Chest pain occurs a few times an hour. Last night pain migrated to Left axilla, so pt decided to come in for evaluation Back in 2016, pt did not have any symptoms when stents were placed. TTE 3/2024 with EF 64%. In the ED, vitals stable. BNP 1029, troponin 50 ->54. Denies shortness of breath, nausea, vomiting, lightheadedness, dizziness. Not currently having chest pain.     Past Medical History  Hypertension    Hyperlipemia    Diabetes Mellitus Type II    Renal Calculi  h/olithotripsy 2010    History of Arthroscopy    H/O: Rotator Cuff Tear    Pneumonia    BPH (Benign Prostatic Hypertrophy)    Hypertension    Hyperlipidemia    Diabetes  2    CAD (coronary artery disease)  stents x4( 03/2017)  last stress test 7/22  echo 3/22    Ganglion cyst of wrist, left    SHARATH (obstructive sleep apnea)  non compliant with CPAP    Chronic kidney disease (CKD)    H/O gastroesophageal reflux (GERD)    OA (osteoarthritis)        Past Surgical History  History of Arthroscopy  bilateral shoulder rotator cuff repair    Laminectomy with Spinal Fusion  cervical- 2009    lumbar lamenectomy  1998    Bilateral cataracts  sp extraction    Trigger finger of both hands  sp repair    Cervical fusion syndrome    History of lumbar laminectomy    Bilateral rotator cuff syndrome  operated both'    MEDICATIONS  (STANDING):    MEDICATIONS  (PRN):    Vital Signs Last 24 Hrs  T(C): 36.5 (05-06-24 @ 07:20), Max: 36.6 (05-06-24 @ 05:00)  T(F): 97.7 (05-06-24 @ 07:20), Max: 97.8 (05-06-24 @ 05:00)  HR: 65 (05-06-24 @ 10:30) (61 - 68)  BP: 159/71 (05-06-24 @ 10:30) (149/64 - 190/73)  RR: 18 (05-06-24 @ 10:30) (17 - 18)  SpO2: 99% (05-06-24 @ 10:30) (99% - 100%)           Daily Height in cm: 175.26 (06 May 2024 05:00)    Daily   Admit Wt: Drug Dosing Weight  Height (cm): 175.3 (06 May 2024 05:00)  Weight (kg): 81.6 (06 May 2024 05:00)  BMI (kg/m2): 26.6 (06 May 2024 05:00)  BSA (m2): 1.98 (06 May 2024 05:00)    Allergies: losartan (Other)  losartan (Other)  Avapro (Hives)  enalapril (Unknown)  seasonal allergies-outdoor (Urticaria; Rhinorrhea; Sneezing)    FAMILY HISTORY:  Family history of cerebrovascular accident (Mother)  mother    Review of Systems  above    PHYSICAL EXAM  General: Well nourished, well developed, NAD.                                              Neuro: AO4  Resp: on room air, breath sounds clear b/l  Cardiac: no murmurs, rubs, or gallops  Abd: soft, non-tender, non-distended  Extremities: trace edema b/l                                                          LABS:                        12.4   3.93  )-----------( 198      ( 06 May 2024 06:19 )             38.7     05-06    140  |  106  |  39<H>  ----------------------------<  125<H>  4.1   |  21<L>  |  2.59<H>    Ca    10.4      06 May 2024 06:19  Mg     2.2     05-06    TPro  7.3  /  Alb  4.3  /  TBili  0.4  /  DBili  x   /  AST  22  /  ALT  14  /  AlkPhos  66  05-06    PT/INR - ( 06 May 2024 06:19 )   PT: 10.1 sec;   INR: 0.92 ratio         PTT - ( 06 May 2024 06:19 )  PTT:32.5 sec    TTE / ROSALEE: 3/2024 with EF 64%     84 M PMH CAD w/ 3 stents to RCA (and mLAD 70% w/o anterior ischemia) in 2016 follows with Dr. Abraham, CKD3-4, HTN, HLD, BPH, DM, gallbladder cancer s/p lap cholecystectomy presents with chest pain since Wednesday. Pt describes pain as "sharp, stabbing" only lasting a few seconds, then disappears. Chest pain occurs a few times an hour. Last night pain migrated to left axilla, so pt decided to come in for evaluation. Back in 2016, pt did not have any symptoms when stents were placed, found incidentally. TTE 3/2024 with EF 64%. In the ED, vitals stable. BNP 1029, troponin 50 ->54. Denies shortness of breath, nausea, vomiting, lightheadedness, dizziness. Not currently having chest pain.     Past Medical History  Hypertension    Hyperlipemia    Diabetes Mellitus Type II    Renal Calculi  h/olithotripsy 2010    History of Arthroscopy    H/O: Rotator Cuff Tear    Pneumonia    BPH (Benign Prostatic Hypertrophy)    Hypertension    Hyperlipidemia    Diabetes  2    CAD (coronary artery disease)  stents x4( 03/2017)  last stress test 7/22  echo 3/22    Ganglion cyst of wrist, left    SHARATH (obstructive sleep apnea)  non compliant with CPAP    Chronic kidney disease (CKD)    H/O gastroesophageal reflux (GERD)    OA (osteoarthritis)        Past Surgical History  History of Arthroscopy  bilateral shoulder rotator cuff repair    Laminectomy with Spinal Fusion  cervical- 2009    lumbar lamenectomy  1998    Bilateral cataracts  sp extraction    Trigger finger of both hands  sp repair    Cervical fusion syndrome    History of lumbar laminectomy    Bilateral rotator cuff syndrome  operated both'    MEDICATIONS  (STANDING):    MEDICATIONS  (PRN):    Vital Signs Last 24 Hrs  T(C): 36.5 (05-06-24 @ 07:20), Max: 36.6 (05-06-24 @ 05:00)  T(F): 97.7 (05-06-24 @ 07:20), Max: 97.8 (05-06-24 @ 05:00)  HR: 65 (05-06-24 @ 10:30) (61 - 68)  BP: 159/71 (05-06-24 @ 10:30) (149/64 - 190/73)  RR: 18 (05-06-24 @ 10:30) (17 - 18)  SpO2: 99% (05-06-24 @ 10:30) (99% - 100%)           Daily Height in cm: 175.26 (06 May 2024 05:00)    Daily   Admit Wt: Drug Dosing Weight  Height (cm): 175.3 (06 May 2024 05:00)  Weight (kg): 81.6 (06 May 2024 05:00)  BMI (kg/m2): 26.6 (06 May 2024 05:00)  BSA (m2): 1.98 (06 May 2024 05:00)    Allergies: losartan (Other)  losartan (Other)  Avapro (Hives)  enalapril (Unknown)  seasonal allergies-outdoor (Urticaria; Rhinorrhea; Sneezing)    FAMILY HISTORY:  Family history of cerebrovascular accident (Mother)  mother    Review of Systems  above    PHYSICAL EXAM  General: Well nourished, well developed, NAD.                                              Neuro: AO4  Resp: on room air, breath sounds clear b/l  Cardiac: no murmurs, rubs, or gallops  Abd: soft, non-tender, non-distended  Extremities: trace edema b/l                                                          LABS:                        12.4   3.93  )-----------( 198      ( 06 May 2024 06:19 )             38.7     05-06    140  |  106  |  39<H>  ----------------------------<  125<H>  4.1   |  21<L>  |  2.59<H>    Ca    10.4      06 May 2024 06:19  Mg     2.2     05-06    TPro  7.3  /  Alb  4.3  /  TBili  0.4  /  DBili  x   /  AST  22  /  ALT  14  /  AlkPhos  66  05-06    PT/INR - ( 06 May 2024 06:19 )   PT: 10.1 sec;   INR: 0.92 ratio         PTT - ( 06 May 2024 06:19 )  PTT:32.5 sec    TTE / ROSALEE: 3/2024 with EF 64%     Initial Cardiology Consult note    ROSY JOSEPH is a 84 M PMH CAD w/ 3 stents to RCA (and mLAD 70% w/o anterior ischemia) in 2016 follows with Dr. Abraham, CKD3-4, HTN, HLD, BPH, DM, gallbladder cancer s/p lap cholecystectomy presents with chest pain since Wednesday. Pt describes pain as "sharp, stabbing" only lasting a few seconds, then disappears. Chest pain occurs a few times an hour. Last night pain migrated to left axilla, so pt decided to come in for evaluation. Back in 2016, pt did not have any symptoms when stents were placed, found incidentally. TTE 3/2024 with EF 64%. In the ED, vitals stable. BNP 1029, troponin 50 ->54. Denies shortness of breath, nausea, vomiting, lightheadedness, dizziness. Not currently having chest pain.     Past Medical History  Hypertension    Hyperlipemia    Diabetes Mellitus Type II    Renal Calculi  h/olithotripsy 2010    History of Arthroscopy    H/O: Rotator Cuff Tear    Pneumonia    BPH (Benign Prostatic Hypertrophy)    Hypertension    Hyperlipidemia    Diabetes  2    CAD (coronary artery disease)  stents x4( 03/2017)  last stress test 7/22  echo 3/22    Ganglion cyst of wrist, left    SHARATH (obstructive sleep apnea)  non compliant with CPAP    Chronic kidney disease (CKD)    H/O gastroesophageal reflux (GERD)    OA (osteoarthritis)        Past Surgical History  History of Arthroscopy  bilateral shoulder rotator cuff repair    Laminectomy with Spinal Fusion  cervical- 2009    lumbar lamenectomy  1998    Bilateral cataracts  sp extraction    Trigger finger of both hands  sp repair    Cervical fusion syndrome    History of lumbar laminectomy    Bilateral rotator cuff syndrome  operated both'    MEDICATIONS  (STANDING):    MEDICATIONS  (PRN):    Vital Signs Last 24 Hrs  T(C): 36.5 (05-06-24 @ 07:20), Max: 36.6 (05-06-24 @ 05:00)  T(F): 97.7 (05-06-24 @ 07:20), Max: 97.8 (05-06-24 @ 05:00)  HR: 65 (05-06-24 @ 10:30) (61 - 68)  BP: 159/71 (05-06-24 @ 10:30) (149/64 - 190/73)  RR: 18 (05-06-24 @ 10:30) (17 - 18)  SpO2: 99% (05-06-24 @ 10:30) (99% - 100%)           Daily Height in cm: 175.26 (06 May 2024 05:00)    Daily   Admit Wt: Drug Dosing Weight  Height (cm): 175.3 (06 May 2024 05:00)  Weight (kg): 81.6 (06 May 2024 05:00)  BMI (kg/m2): 26.6 (06 May 2024 05:00)  BSA (m2): 1.98 (06 May 2024 05:00)    Allergies: losartan (Other)  losartan (Other)  Avapro (Hives)  enalapril (Unknown)  seasonal allergies-outdoor (Urticaria; Rhinorrhea; Sneezing)    FAMILY HISTORY:  Family history of cerebrovascular accident (Mother)  mother    Review of Systems  above    PHYSICAL EXAM  General: Well nourished, well developed, NAD.                                              Neuro: AO4  Resp: on room air, breath sounds clear b/l  Cardiac: no murmurs, rubs, or gallops  Abd: soft, non-tender, non-distended  Extremities: trace edema b/l                                                          LABS:                        12.4   3.93  )-----------( 198      ( 06 May 2024 06:19 )             38.7     05-06    140  |  106  |  39<H>  ----------------------------<  125<H>  4.1   |  21<L>  |  2.59<H>    Ca    10.4      06 May 2024 06:19  Mg     2.2     05-06    TPro  7.3  /  Alb  4.3  /  TBili  0.4  /  DBili  x   /  AST  22  /  ALT  14  /  AlkPhos  66  05-06    PT/INR - ( 06 May 2024 06:19 )   PT: 10.1 sec;   INR: 0.92 ratio         PTT - ( 06 May 2024 06:19 )  PTT:32.5 sec    TTE / ROSALEE: 3/2024 with EF 64%

## 2024-05-06 NOTE — ED ADULT NURSE NOTE - OBJECTIVE STATEMENT
The patient is a 84y male presenting to the ED from home for complaints of chest pain. Patient presents with a PMH of CAD, 4 Stents Placed, BPH, Chronic kidney disease, DM2, HLD, OA (osteoarthritis) , and SHARATH (obstructive sleep apnea) non compliant with CPAP. Patient endorsed experiencing INT left sided chest pain for about 5 days which was intermittently radiating to his LUE. Upon assessment patient palpitations, shortness of breath, headache, visual disturbances, numbness/tingling, fever, chills, diaphoresis,  nausea, vomiting, constipation, diarrhea, or urinary symptoms. Safety and comfort measures provided, bed locked and in lowest position, side rails up for safety. Call bell within reach. Awaiting results  this time.

## 2024-05-06 NOTE — ED CDU PROVIDER DISPOSITION NOTE - PATIENT PORTAL LINK FT
You can access the FollowMyHealth Patient Portal offered by [facility] by registering at the following website: [email or web address]. By joining [facility] FollowMyHealth portal, you will also be able to view your health information using other applications (apps) compatible with our system.

## 2024-05-06 NOTE — ED ADULT NURSE REASSESSMENT NOTE - NS ED NURSE REASSESS COMMENT FT1
Pt received from JULIAN Daniels at 19:00hrs. Pt A&O x 4. Pt is observed in CDU for chest pain, awaiting stress test. Pt c/o  chest pain. ESEQUIEL Sanabria notified. IV Tylenol given as ordered with good relief. Patient denies SOB, dizziness or palpitations. V/S stable, IV 20G Lt AC, patent and free of signs of infiltration. Pt OOB independently. Safety & comfort measures maintained. Call bell in reach. Will continue to monitor. Pt received from JULIAN Daniels at 19:00hrs. Pt A&O x 4. Pt is observed in CDU for chest pain, awaiting stress test. Pt c/o  chest pain. ESEQUIEL Sanabria notified. IV Tylenol given as ordered with good relief. Patient denies SOB, dizziness or palpitations. V/S stable, IV 20G Rt AC, patent and free of signs of infiltration. Pt OOB independently. Safety & comfort measures maintained. Call bell in reach. Will continue to monitor.

## 2024-05-06 NOTE — ED CDU PROVIDER INITIAL DAY NOTE - CLINICAL SUMMARY MEDICAL DECISION MAKING FREE TEXT BOX
83 yo M hx CAD, 3 stents, CKD, HTN, HLD, BPH, DM, gallbladder CA s/p cholecystectomy, presenting w/ high risk CP. Intermittent. HDS in ED. ECG w/ no stemi. Labs showing CKD, anemia, elevated trop, stable. No active CP. Plan for ischemic evaluation. Pt agreeable.

## 2024-05-06 NOTE — ED ADULT NURSE NOTE - NSFALLHARMRISKINTERV_ED_ALL_ED

## 2024-05-06 NOTE — ED ADULT NURSE REASSESSMENT NOTE - NS ED NURSE REASSESS COMMENT FT1
0720 Pt in ER gold rm 6. Daughter at stretcher side. Pt A&Ox4. IVL intact RACF without sx of infilt. No chest pain at present. States intermittent 8/10 at sternum occurring every few minutes and is sharp and lasts for a few seconds. Dr deleon and re evaluating pt. Color pink. skin W&D. 0800 Voided 350cc clear yellow urine in urinal

## 2024-05-06 NOTE — ED PROVIDER NOTE - CLINICAL SUMMARY MEDICAL DECISION MAKING FREE TEXT BOX
Hx of cataracts - will see her own eye   Declined vaccines.    84M PMH CKD3, HTN, CAD s/p stent 2016, SHARATH, BPH, DM2`presents for five days of intermittent sharp chest pain radiating to left axilla region, with no associated symptoms of dizziness, nausea, vomiting, sob.on arrival bp 185/78 HR 62 afebrile 100% on RA. EKG unchanged from prior ST elevation in anterior leads. pt well appearing in NAD. physical exam no m/r/g, lungs clear to auscultation b/l, abdomen soft non tender, 1+pitting edema b/l. bedside US showing adequate squeeze no pericardial effusion VTI>18 and no wall motion abnormalities. r/o acs given history w labs, and adm for stress test.    last TTE 3/2024 EF 64% no WMA.   last cath dLAD 70% stenosis, and RCA 40%.

## 2024-05-06 NOTE — ED CDU PROVIDER INITIAL DAY NOTE - OBJECTIVE STATEMENT
Date: 6/2/2024    Patient Name: Rip Serrano          To Whom it may concern:    This letter has been written at the patient's request. The above patient was seen at [facility] for treatment of a medical condition on 6/2/2024.    This patient should be excused from attending work on 6/3/24.          Sincerely,     BLAYNE Alexis       85 yo M with a PMH of CAD w/ 3 stents to RCA (and mLAD 70% w/o anterior ischemia) in 2016 follows with Dr. Abraham, CKD3-4, HTN, HLD, BPH, DM, gallbladder cancer s/p lap cholecystectomy presents with chest pain since Wednesday. Pt describes pain as "sharp, stabbing" only lasting a few seconds and self resolves. Chest pain occurs a few times an hour. Last night pain migrated to left axilla and maybe to his left arm which concerned him. Pt states at the time he received stents in 2016 pt was asymptomatic, never had chest pain. Of note, pt had TTE 3/2024 with an EF of 64%. Denies nausea, vomiting, fever, chills, SOB, cough, palpitations, diaphoresis, lightheadedness, syncope.

## 2024-05-06 NOTE — ED ADULT NURSE REASSESSMENT NOTE - NS ED NURSE REASSESS COMMENT FT1
1000 Eating breakfast. Awaiting eval by CDU PA. Continues to c/o intermittent sharp, stabbing sternal pain. A&Ox4. Color pink. Skin W&D

## 2024-05-06 NOTE — ED PROCEDURE NOTE - ATTENDING CONTRIBUTION TO CARE
I, Jaron Stein, was present for the supervision of the described procedure, and provided assistance in its completion as needed.

## 2024-05-06 NOTE — ED PROVIDER NOTE - OBJECTIVE STATEMENT
84M PMH CKD3, HTN, CAD s/p stent 2016, SHARATH, BPH, DM2`presents for five days of intermittent sharp chest pain radiating to left axilla region, with no associated symptoms of dizziness, nausea, vomiting, sob. patient says pain is intermittent, not positional, and not associated w exertion. last echo this year, last stress last year.    last TTE 3/2024 EF 64% no WMA.   last cath dLAD 70% stenosis, and RCA 40%.

## 2024-05-06 NOTE — ED CDU PROVIDER DISPOSITION NOTE - CARE PROVIDER_API CALL
Froylan Abraham  Vascular Medicine  [address] [place] [address], [address] [place] [id number]  Phone: [phone]  Fax: [phone]  Follow Up Time:

## 2024-05-06 NOTE — ED ADULT NURSE REASSESSMENT NOTE - NSFALLUNIVINTERV_ED_ALL_ED
Bed/Stretcher in lowest position, wheels locked, appropriate side rails in place/Call bell, personal items and telephone in reach/Instruct patient to call for assistance before getting out of bed/chair/stretcher/Non-slip footwear applied when patient is off stretcher/[place] to call system/Physically safe environment - no spills, clutter or unnecessary equipment/Purposeful proactive rounding/Room/bathroom lighting operational, light cord in reach

## 2024-05-06 NOTE — ED CDU PROVIDER DISPOSITION NOTE - CLINICAL COURSE
85 yo M with a PMH of CAD w/ 3 stents to RCA (and mLAD 70% w/o anterior ischemia) in 2016 follows with Dr. Abraham, CKD3-4, HTN, HLD, BPH, DM, gallbladder cancer s/p lap cholecystectomy presents with chest pain since Wednesday. Pt describes pain as "sharp, stabbing" only lasting a few seconds and self resolves. Chest pain occurs a few times an hour. Last night pain migrated to left axilla and maybe to his left arm which concerned him. Pt states at the time he received stents in 2016 pt was asymptomatic, never had chest pain. Of note, pt had TTE 3/2024 with an EF of 64%. Denies nausea, vomiting, fever, chills, SOB, cough, palpitations, diaphoresis, lightheadedness, syncope.  In the ED, vitals stable. Troponin 50 ->54, BNP 1029. Cr significantly improved from baseline. EKG non ischemic. CXR w/o acute pathology. Seen by Cards, agree with ECHO/stress. Place in CDU for cntd management.  In the CDU*** 85 yo M with a PMH of CAD w/ 3 stents to RCA (and mLAD 70% w/o anterior ischemia) in 2016 follows with Dr. Abraham, CKD3-4, HTN, HLD, BPH, DM, gallbladder cancer s/p lap cholecystectomy presents with chest pain since Wednesday. Pt describes pain as "sharp, stabbing" only lasting a few seconds and self resolves. Chest pain occurs a few times an hour. Last night pain migrated to left axilla and maybe to his left arm which concerned him. Pt states at the time he received stents in 2016 pt was asymptomatic, never had chest pain. Of note, pt had TTE 3/2024 with an EF of 64%. Denies nausea, vomiting, fever, chills, SOB, cough, palpitations, diaphoresis, lightheadedness, syncope.  In the ED, vitals stable. Troponin 50 ->54, BNP 1029. Cr significantly improved from baseline. EKG non ischemic. CXR w/o acute pathology. Seen by Cards, agree with ECHO/stress. Place in CDU for cntd management.  In the CDU patient had telemetry monitoring, echo and stress test which were WNL. Patient stable for dc home with outpatient cardiology follow up.

## 2024-05-06 NOTE — CONSULT NOTE ADULT - ASSESSMENT
84 M PMH CAD w/ 3 FOREST to RCA (and dLAD 70%) in 2016 follows with Dr. Abraham, TTE (3/2024) EF 64%, CKD3-4, HTN, HLD, BPH, DM presents with intermittent, sporadic chest pain since Wednesday. BNP 1029, troponin 50 ->54.    PLAN:  - Trend troponin  - TTE to eval elevated BNP  - Stress test after TTE  - D/w cardiology fellow and attending    Cardiology   ROSY JOSEPH is a 84 M PMH CAD w/ 3 FOREST to RCA (and dLAD 70%) in 2016 follows with Dr. Abraham, TTE (3/2024) EF 64%, CKD3-4, HTN, HLD, BPH, DM presents with intermittent, sporadic chest pain since Wednesday. BNP 1029, troponin 50 ->54.  will plan for TTE and stress test    PLAN:  - Trend troponin  - TTE to eval elevated BNP  - Stress test after TTE  - D/w cardiology fellow and attending    Patient seen and examined with the fellow, the note above has been edited to reflect my independent history, physical exam, assessment and plan.      Henrry Lawson MD, PhD  Cardiology Attending  [facility]    For day time coverage Mon-Fri see Non-Service Consult Attending on [email or web address], password: [email or web address]; daytime weekends covered by general cardiology consult service attending.)

## 2024-05-06 NOTE — ED PROVIDER NOTE - ATTENDING CONTRIBUTION TO CARE
I, Jaron Stein, have performed a history and physical exam on this patient, and discussed their management with the resident. I have fully participated in the care of this patient. I agree with the history, physical exam, and plan as documented by the resident

## 2024-05-06 NOTE — ED PROVIDER NOTE - PHYSICAL EXAMINATION
GENERAL: well appearing in no acute distress, non-toxic appearing  HEAD: normocephalic, atraumatic  CARDIAC: regular rate and rhythm, normal S1S2, no appreciable murmurs, 2+ pulses in UE/LE b/l  PULM: normal breath sounds, clear to ascultation bilaterally, no rales, rhonchi, wheezing  GI: abdomen nondistended, soft, nontender, no guarding, rebound tenderness  NEURO: no focal motor or sensory deficits  MSK: no peripheral edema, no calf tenderness b/l  SKIN: no visible rashes

## 2024-05-06 NOTE — ED ADULT TRIAGE NOTE - NS ED TRIAGE AVPU SCALE
[General Appearance - Well Developed] : well developed [General Appearance - Well Nourished] : well nourished [Normal Appearance] : normal appearance [Well Groomed] : well groomed [General Appearance - In No Acute Distress] : no acute distress [Abdomen Soft] : soft [Abdomen Tenderness] : non-tender [Costovertebral Angle Tenderness] : no ~M costovertebral angle tenderness [Urethral Meatus] : meatus normal [Urinary Bladder Findings] : the bladder was normal on palpation [Scrotum] : the scrotum was normal [Testes Mass (___cm)] : there were no testicular masses [No Prostate Nodules] : no prostate nodules [Edema] : no peripheral edema [] : no respiratory distress [Respiration, Rhythm And Depth] : normal respiratory rhythm and effort [Exaggerated Use Of Accessory Muscles For Inspiration] : no accessory muscle use [Oriented To Time, Place, And Person] : oriented to person, place, and time [Affect] : the affect was normal [Mood] : the mood was normal [Not Anxious] : not anxious [Normal Station and Gait] : the gait and station were normal for the patient's age [No Focal Deficits] : no focal deficits [No Palpable Adenopathy] : no palpable adenopathy Alert-The patient is alert, awake and responds to voice. The patient is oriented to time, place, and person. The triage nurse is able to obtain subjective information.

## 2024-05-06 NOTE — ED CDU PROVIDER INITIAL DAY NOTE - PHYSICAL EXAMINATION
CONSTITUTIONAL: Well appearing and in no apparent distress.  ENT: Airway patent, moist mucous membranes.   EYES: Pupils equal, round and reactive to light. EOMI. Conjunctiva normal appearing.   CARDIAC: Normal rate, regular rhythm.  Heart sounds S1, S2.    RESPIRATORY: Breath sounds clear and equal bilaterally.   GASTROINTESTINAL: Abdomen soft, non-tender, not distended.  MUSCULOSKELETAL: Spine appears normal.  NEUROLOGICAL: Alert and oriented x3, no focal deficits, no motor or sensory deficits. 5/5 muscle strength throughout.  SKIN: Skin normal color, warm, dry and intact.   PSYCHIATRIC: Normal mood and affect. CONSTITUTIONAL: Well appearing and in no apparent distress.  ENT: Airway patent, moist mucous membranes.   EYES: Pupils equal, round and reactive to light. EOMI. Conjunctiva normal appearing.   CARDIAC: Normal rate, regular rhythm.  Heart sounds S1, S2.    RESPIRATORY: Breath sounds clear and equal bilaterally.   GASTROINTESTINAL: Abdomen soft, non-tender, not distended.  MUSCULOSKELETAL: 1+ pitting edema BLE.   NEUROLOGICAL: Alert and oriented x3, no focal deficits, no motor or sensory deficits. 5/5 muscle strength throughout.  SKIN: Skin normal color, warm, dry and intact.   PSYCHIATRIC: Normal mood and affect.

## 2024-05-07 ENCOUNTER — RESULT REVIEW (OUTPATIENT)
Age: 84
End: 2024-05-07

## 2024-05-07 VITALS
RESPIRATION RATE: 18 BRPM | TEMPERATURE: 98 F | SYSTOLIC BLOOD PRESSURE: 167 MMHG | HEART RATE: 69 BPM | DIASTOLIC BLOOD PRESSURE: 70 MMHG | OXYGEN SATURATION: 98 %

## 2024-05-07 LAB
A1C WITH ESTIMATED AVERAGE GLUCOSE RESULT: 6.9 % — HIGH (ref 4–5.6)
CHOLEST SERPL-MCNC: 135 MG/DL — SIGNIFICANT CHANGE UP
ESTIMATED AVERAGE GLUCOSE: 151 MG/DL — HIGH (ref 68–114)
GLUCOSE BLDC GLUCOMTR-MCNC: 111 MG/DL — HIGH (ref 70–99)
GLUCOSE BLDC GLUCOMTR-MCNC: 193 MG/DL — HIGH (ref 70–99)
HDLC SERPL-MCNC: 65 MG/DL — SIGNIFICANT CHANGE UP
LIPID PNL WITH DIRECT LDL SERPL: 46 MG/DL — SIGNIFICANT CHANGE UP
NON HDL CHOLESTEROL: 70 MG/DL — SIGNIFICANT CHANGE UP
TRIGL SERPL-MCNC: 140 MG/DL — SIGNIFICANT CHANGE UP

## 2024-05-07 PROCEDURE — 82962 GLUCOSE BLOOD TEST: CPT

## 2024-05-07 PROCEDURE — 93005 ELECTROCARDIOGRAM TRACING: CPT | Mod: XU

## 2024-05-07 PROCEDURE — 78452 HT MUSCLE IMAGE SPECT MULT: CPT | Mod: 26,MC

## 2024-05-07 PROCEDURE — 84132 ASSAY OF SERUM POTASSIUM: CPT

## 2024-05-07 PROCEDURE — 93306 TTE W/DOPPLER COMPLETE: CPT

## 2024-05-07 PROCEDURE — 82330 ASSAY OF CALCIUM: CPT

## 2024-05-07 PROCEDURE — 83690 ASSAY OF LIPASE: CPT

## 2024-05-07 PROCEDURE — 76376 3D RENDER W/INTRP POSTPROCES: CPT

## 2024-05-07 PROCEDURE — 83036 HEMOGLOBIN GLYCOSYLATED A1C: CPT

## 2024-05-07 PROCEDURE — 82947 ASSAY GLUCOSE BLOOD QUANT: CPT

## 2024-05-07 PROCEDURE — 83880 ASSAY OF NATRIURETIC PEPTIDE: CPT

## 2024-05-07 PROCEDURE — 78452 HT MUSCLE IMAGE SPECT MULT: CPT | Mod: MC

## 2024-05-07 PROCEDURE — 82553 CREATINE MB FRACTION: CPT

## 2024-05-07 PROCEDURE — 93017 CV STRESS TEST TRACING ONLY: CPT

## 2024-05-07 PROCEDURE — 83605 ASSAY OF LACTIC ACID: CPT

## 2024-05-07 PROCEDURE — 82550 ASSAY OF CK (CPK): CPT

## 2024-05-07 PROCEDURE — 93018 CV STRESS TEST I&R ONLY: CPT | Mod: MC

## 2024-05-07 PROCEDURE — 99285 EMERGENCY DEPT VISIT HI MDM: CPT | Mod: 25

## 2024-05-07 PROCEDURE — 82803 BLOOD GASES ANY COMBINATION: CPT

## 2024-05-07 PROCEDURE — A9500: CPT

## 2024-05-07 PROCEDURE — 99233 SBSQ HOSP IP/OBS HIGH 50: CPT | Mod: 25

## 2024-05-07 PROCEDURE — 93356 MYOCRD STRAIN IMG SPCKL TRCK: CPT

## 2024-05-07 PROCEDURE — 83735 ASSAY OF MAGNESIUM: CPT

## 2024-05-07 PROCEDURE — 85610 PROTHROMBIN TIME: CPT

## 2024-05-07 PROCEDURE — 93016 CV STRESS TEST SUPVJ ONLY: CPT | Mod: MC

## 2024-05-07 PROCEDURE — 85025 COMPLETE CBC W/AUTO DIFF WBC: CPT

## 2024-05-07 PROCEDURE — 80061 LIPID PANEL: CPT

## 2024-05-07 PROCEDURE — 99238 HOSP IP/OBS DSCHRG MGMT 30/<: CPT

## 2024-05-07 PROCEDURE — 85018 HEMOGLOBIN: CPT

## 2024-05-07 PROCEDURE — 71045 X-RAY EXAM CHEST 1 VIEW: CPT

## 2024-05-07 PROCEDURE — G0378: CPT

## 2024-05-07 PROCEDURE — 96374 THER/PROPH/DIAG INJ IV PUSH: CPT | Mod: XU

## 2024-05-07 PROCEDURE — 36415 COLL VENOUS BLD VENIPUNCTURE: CPT

## 2024-05-07 PROCEDURE — 80053 COMPREHEN METABOLIC PANEL: CPT

## 2024-05-07 PROCEDURE — 82435 ASSAY OF BLOOD CHLORIDE: CPT

## 2024-05-07 PROCEDURE — 84484 ASSAY OF TROPONIN QUANT: CPT

## 2024-05-07 PROCEDURE — 84295 ASSAY OF SERUM SODIUM: CPT

## 2024-05-07 PROCEDURE — 85014 HEMATOCRIT: CPT

## 2024-05-07 PROCEDURE — 85730 THROMBOPLASTIN TIME PARTIAL: CPT

## 2024-05-07 RX ORDER — ACETAMINOPHEN 500 MG
975 TABLET ORAL ONCE
Refills: 0 | Status: COMPLETED | OUTPATIENT
Start: 2024-05-07 | End: 2024-05-07

## 2024-05-07 RX ADMIN — Medication 975 MILLIGRAM(S): at 08:46

## 2024-05-07 RX ADMIN — AMLODIPINE BESYLATE 10 MILLIGRAM(S): 2.5 TABLET ORAL at 06:28

## 2024-05-07 RX ADMIN — Medication 100 MILLIGRAM(S): at 06:33

## 2024-05-07 RX ADMIN — ATORVASTATIN CALCIUM 40 MILLIGRAM(S): 80 TABLET, FILM COATED ORAL at 00:30

## 2024-05-07 RX ADMIN — Medication 100 MILLIGRAM(S): at 00:30

## 2024-05-07 RX ADMIN — RANOLAZINE 500 MILLIGRAM(S): 500 TABLET, FILM COATED, EXTENDED RELEASE ORAL at 06:28

## 2024-05-07 RX ADMIN — CLOPIDOGREL BISULFATE 75 MILLIGRAM(S): 75 TABLET, FILM COATED ORAL at 00:30

## 2024-05-07 NOTE — ED ADULT NURSE REASSESSMENT NOTE - NS ED NURSE REASSESS COMMENT FT1
Patient's /84 at 00:12 hrs, patient asymptomatic. ESEQUIEL Sanabria notified. Hydralazine given as ordered.

## 2024-05-07 NOTE — ED CDU PROVIDER SUBSEQUENT DAY NOTE - PHYSICAL EXAMINATION
CONSTITUTIONAL: Well appearing and in no apparent distress.  ENT: Airway patent, moist mucous membranes.   EYES: Pupils equal, round and reactive to light. EOMI. Conjunctiva normal appearing.   CARDIAC: Normal rate, regular rhythm.  Heart sounds S1, S2.    RESPIRATORY: Breath sounds clear and equal bilaterally.   GASTROINTESTINAL: Abdomen soft, non-tender, not distended.  MUSCULOSKELETAL: 1+ pitting edema BLE.   NEUROLOGICAL: Alert and oriented x3, no focal deficits, no motor or sensory deficits. 5/5 muscle strength throughout.  SKIN: Skin normal color, warm, dry and intact.   PSYCHIATRIC: Normal mood and affect.

## 2024-05-07 NOTE — ED ADULT NURSE REASSESSMENT NOTE - NS ED NURSE REASSESS COMMENT FT1
07.00 Received the Pt from  JULIAN Vee  Pt is Observed for CP for ECHO & Stress test . Received the Pt A&OX 4  Pt obeys commands Belinda N/V/D fever chills cp SOB   Comfort care & safety measures continued  IV site looks clean & dry no signs of infiltration noted pt denies  pain IV site .Pt is oriented to the unit Plan of care explained .  Pt is advised to call for help  call bell with in the reach pt verbalized the understanding .  Pt is evaluated by CDU MD Chau Santos . GCS 15/15 A&OX 4 PERRLA  size 3 Strong upper & lower extremities steady gait  Pt is ambulatory & independent   No facial droop  No Hand Leg drop denies numbness tingling  Plan of care ongoing

## 2024-05-07 NOTE — ED ADULT NURSE REASSESSMENT NOTE - NSFALLHARMRISKINTERV_ED_ALL_ED

## 2024-05-07 NOTE — ED CDU PROVIDER SUBSEQUENT DAY NOTE - PROGRESS NOTE DETAILS
Patient evaluated at bedside. ECHO and stress wnl. No acute findings. Evaluated by cardiology fellow. Stable for dc home with outpatient follow up. Has no complaints at this time. Evaluated by cdu attending Dr. Ritchie. Stable for dc. Mary Owens PA-C

## 2024-05-07 NOTE — PROGRESS NOTE ADULT - ASSESSMENT
ancef, intraop gentamicin/Followed protocol  84 M PMH CAD w/ 3 FOREST to RCA (and dLAD 70%) in 2016 follows with Dr. Abraham, TTE (3/2024) EF 64%, CKD3-4, HTN, HLD, BPH, DM presents with intermittent, sporadic chest pain since Wednesday. BNP 1029, troponin 50 ->54. will plan for TTE and stress test. Quality of pain most c/w MSK pain however will r/o ischemia w/ Stress testing. TTE showing mild diastolic dysfunction c/w HTN, recommend aggressive BP control in outpatient setting.     PLAN:  - f/u Stress test   - restart home carvedilol following stress testing    All recommendations pending attending attestation. We will continue to follow with you.     Hiwot Mckinney MD  PGY-4, Cardiology  Available on TEAMS    For all new consults  [email or web address]  Login: [email or web address] ROSY JOSEPH is a  84 M PMH CAD w/ 3 FOREST to RCA (and dLAD 70%) in 2016 follows with Dr. Abraham, TTE (3/2024) EF 64%, CKD3-4, HTN, HLD, BPH, DM presents with intermittent, sporadic chest pain since Wednesday. BNP 1029, troponin 50 ->54. will plan for TTE and stress test. Quality of pain most c/w MSK pain however will r/o ischemia w/ Stress testing. TTE showing mild diastolic dysfunction c/w HTN, recommend aggressive BP control in outpatient setting.     PLAN:  - f/u Stress test   - restart home carvedilol following stress testing    All recommendations pending attending attestation. We will continue to follow with you.    UPdate:  stress test result is normal    1. Normal myocardial perfusion scan, with no evidence of infarction or inducible ischemia.   2. Qualitative Perfusion:      - small-sized, mild defect(s) in the inferolateral wall that is fixed, partially normalizes with prone imaging suggestive of diaphragmatic attenuation artifact.   3. The left ventricle is normal in function and mildly enlarged in size. The post stress left ventricular EF is 63 %. The stress end diastolic volume is 114 ml and systolic volume is 42 ml.   4. Stress electrocardiogram: No significant ischemic ST segment changes beyond baseline abnormalities.    plan for outpatient cardiology fu and BP control      final cardiology recommendations above, please call with questions    Hiwot Mckinney MD  PGY-4, Cardiology  Available on TEAMS    For all new consults  [email or web address]  Login: [email or web address]    Patient seen and examined with the fellow, the note above has been edited to reflect my independent history, physical exam, assessment and plan.      Henrry Lawson MD, PhD  Cardiology Attending  [facility]    For day time coverage Mon-Fri see Non-Service Consult Attending on [email or web address], password: [email or web address]; daytime weekends covered by general cardiology consult service attending.)   ancef, intraop gentamicin/Followed protocol

## 2024-05-07 NOTE — ED CDU PROVIDER SUBSEQUENT DAY NOTE - ATTENDING APP SHARED VISIT CONTRIBUTION OF CARE
Attending MD Ritchie:   I personally have seen and examined this patient.  Physician assistant note reviewed and agree on plan of care and except where noted.  See below for details.     Seen in CDU Red 41    84M with PMH/PSH including CAD s/p stents x 3, RCA, mLAD 70% w/o anterior ischemia (2016), follows with Dr. Abraham, CKD3-4, HTN, HLD, BPH, DM, gallbladder cancer s/p lap cholecystectomy sent to the CDU after presenting to the ED with chest pain.  Denies chest pain, shortness of breath, abdominal pain, nausea, vomiting, diarrhea, urinary complaints.  Awaiting stress.      Exam:   General: NAD  HENT: head NCAT, airway patent  Eyes: anicteric, no conjunctival injection   Lungs: lungs CTAB with good inspiratory effort, no wheezing, no rhonchi, no rales  Cardiac: +S1S2, no obvious m/r/g, no LE edema  GI: abdomen soft with +BS, NT, ND  MSK: ranging neck and extremities freely  Neuro: moving all extremities spontaneously, nonfocal  Psych: normal mood and affect     A/P: 84M with chest pain, no acute issues at  this time.  Lab and radiology tests reviewed with patient.  Awaiting stress and final cards recs.

## 2024-05-07 NOTE — PROGRESS NOTE ADULT - SUBJECTIVE AND OBJECTIVE BOX
Overnight Events:     Review Of Systems: No chest pain, shortness of breath, or palpitations            Current Meds:  amLODIPine   Tablet 10 milliGRAM(s) Oral daily  atorvastatin 40 milliGRAM(s) Oral at bedtime  clopidogrel Tablet 75 milliGRAM(s) Oral daily  dextrose 10% Bolus 125 milliLiter(s) IV Bolus once  dextrose 5%. 1000 milliLiter(s) IV Continuous <Continuous>  dextrose 5%. 1000 milliLiter(s) IV Continuous <Continuous>  dextrose 50% Injectable 12.5 Gram(s) IV Push once  dextrose 50% Injectable 25 Gram(s) IV Push once  dextrose Oral Gel 15 Gram(s) Oral once PRN  glucagon  Injectable 1 milliGRAM(s) IntraMuscular once  hydrALAZINE 100 milliGRAM(s) Oral three times a day  insulin lispro (ADMELOG) corrective regimen sliding scale   SubCutaneous three times a day before meals  ranolazine 500 milliGRAM(s) Oral two times a day      Vitals:  T(F): 97.9 (), Max: 98.4 ()  HR: 75 () (61 - 75)  BP: 160/68 () (151/66 - 193/82)  RR: 18 ()  SpO2: 100% ()  I&O's Summary      Physical Exam:  GEN: comfortable appearing, lying in bed in NAD  HEENT: NCAT, MMM  CV: Regular S1, S2, no m/r/g  RESP: CTAB  ABD: Soft, NTND, +BS  EXT: No LE edema, WWP, pulses palpable throughout  NEURO: No focal deficits, AOx3  SKIN:  No rashes                          12.4   3.93  )-----------( 198      ( 06 May 2024 06:19 )             38.7         140  |  106  |  39<H>  ----------------------------<  125<H>  4.1   |  21<L>  |  2.59<H>    Ca    10.4      06 May 2024 06:19  Mg     2.2         TPro  7.3  /  Alb  4.3  /  TBili  0.4  /  DBili  x   /  AST  22  /  ALT  14  /  AlkPhos  66      PT/INR - ( 06 May 2024 06:19 )   PT: 10.1 sec;   INR: 0.92 ratio         PTT - ( 06 May 2024 06:19 )  PTT:32.5 sec  CARDIAC MARKERS ( 06 May 2024 08:26 )  50 ng/L / x     / x     / x     / x     / x      CARDIAC MARKERS ( 06 May 2024 06:19 )  54 ng/L / x     / x     / 131 U/L / x     / 3.9 ng/mL        Total Cholesterol: 135  LDL: --  HDL: 65  T

## 2024-05-07 NOTE — ED CDU PROVIDER SUBSEQUENT DAY NOTE - HISTORY
CDU PROGRESS NOTE ESEQUIEL CHOI: Pt resting comfortably, feeling well without complaint. NAD, VSS. No events on telemetry. TTE showed l. Left ventricular systolic function is normal with an ejection fraction of 69 % by Hernández's method of disks. There are no regional wall motion abnormalities seen. Will continue to monitor, stress in am.

## 2024-05-14 NOTE — ASU PREOP CHECKLIST - ALLERGIES REVIEWED
Initiated during last admission in March secondary to pneumonia, is in the process of being weaned off.  At baseline on 2 L as needed  CXR unremarkable   done

## 2024-05-19 NOTE — H&P PST ADULT - LIVES WITH, PROFILE
ED Attending Note      Patient : Valentín Mcintyre Age: 26 year old Sex: female   MRN: [id number] Encounter Date: 5/18/2024      History     Chief Complaint   Patient presents with   • Nausea         27 y/o female with hx of enhanced BMI presents to the ED with CC of near syncopal episode with dizziness (described as room spinning), n/v. She states that symptoms started between 7-8pm. Resolved at present. She reports pain in L. Knee 2:2 falling during near syncopal episode.       Dizziness   This is a new problem. The current episode started 3 to 5 hours ago. The problem occurs constantly. The problem has been resolved. There was no loss of consciousness. The problem is associated with normal activity. Associated symptoms include diaphoresis, dizziness, light-headedness, nausea, palpitations, slurred speech, vertigo, visual change and vomiting. Pertinent negatives include abdominal pain, back pain, bladder incontinence, bowel incontinence, chest pain, clumsiness, congestion, fever, focal sensory loss, focal weakness, headaches, malaise/fatigue, seizures and weakness. She has tried nothing for the symptoms. Her past medical history is significant for HTN. Her past medical history does not include CAD, CVA, DM, seizures, TIA or vertigo.     .    ALLERGIES:  No Known Allergies    Current Discharge Medication List        Prior to Admission Medications    Details   ibuprofen (MOTRIN) 600 MG tablet Take 1 tablet by mouth every 8 hours as needed for Pain.  Qty: 30 tablet, Refills: 0      ergocalciferol (DRISDOL) 1.25 mg (50,000 units) capsule Take 1 capsule by mouth 1 day a week.  Qty: 12 capsule, Refills: 0      pantoprazole (PROTONIX) 20 MG tablet Take 1 tablet by mouth 2 times daily for 14 days.  Qty: 28 tablet, Refills: 0      albuterol 108 (90 Base) MCG/ACT inhaler Inhale 2 puffs into the lungs every 4 hours as needed for Shortness of Breath or Wheezing.  Qty: 1 Inhaler, Refills: 0             Current Discharge  Medication List          Past History       Past Medical History:   Diagnosis Date   • Acne    • Anxiety disorder    • Attention deficit disorder of adult    • Benign hypertension    • Depressive disorder    • GERD (gastroesophageal reflux disease)    • Morbid obesity  (CMD)    • Sleep apnea    • Vitamin D deficiency        Past Surgical History:   Procedure Laterality Date   • No past surgeries         Family History   Problem Relation Age of Onset   • Diabetes Mother    • Diabetes Father    • Multiple Sclerosis Father    • Sleep Apnea Father        Social History     Tobacco Use   • Smoking status: Never   • Smokeless tobacco: Never   Vaping Use   • Vaping status: Every Day   • Substances: Nicotine   Substance Use Topics   • Alcohol use: Yes     Comment: socially   • Drug use: Yes     Types: Marijuana     Comment: 1-2 weekly       Review of Systems   Constitutional:  Positive for diaphoresis. Negative for fever and malaise/fatigue.   HENT:  Negative for congestion.    Cardiovascular:  Positive for palpitations. Negative for chest pain.   Gastrointestinal:  Positive for nausea and vomiting. Negative for abdominal pain and bowel incontinence.   Genitourinary:  Negative for bladder incontinence.   Musculoskeletal:  Negative for back pain.   Neurological:  Positive for dizziness, vertigo and light-headedness. Negative for focal weakness, seizures, weakness and headaches.     {Chart Review (Optional):558273493}   Physical Exam     ED Triage Vitals [05/18/24 2109]   ED Triage Vitals Group      Temp 97.5 °F (36.4 °C)      Heart Rate 79      Resp 19      BP (!) 179/101      SpO2 96 %      EtCO2 mmHg       Height       Weight (!) 352 lb (159.7 kg)      Weight Scale Used Standing scale      BMI (Calculated)       IBW/kg (Calculated)        Physical Exam  Constitutional:       Appearance: Normal appearance.      Comments: Enhanced BMI   Skin:     Findings: Abrasion present.      Comments: Abrasion to L knee   Neurological:       Mental Status: She is alert.       Procedures    Lab Results     Labs Reviewed   CBC WITH DIFFERENTIAL    Narrative:     The following orders were created for panel order CBC with Automated Differential.  Procedure                               Abnormality         Status                     ---------                               -----------         ------                     CBC with Automated Dif...[[id number]]                      Final result                 Please view results for these tests on the individual orders.   CBC WITH AUTOMATED DIFFERENTIAL (PERFORMABLE ONLY)    Narrative:     This is an appended report.  These results have been appended to a previously verified report.   COMPREHENSIVE METABOLIC PANEL   LIPASE   POCT URINE PREGNANCY    Medications - No data to display  EKG Results     EKG Interpretation  Rate: ***  Rhythm: {rhythm:63290}   Abnormality: {ABNORMAL:012235}    EKG tracing interpreted by ED physician    Radiology Results     CT HEAD WO CONTRAST    (Results Pending)   XR KNEE 3 VIEW LEFT    (Results Pending)       MDM     Medical Decision Making       MDM done in ED Course    Does the Patient have sepsis: NO       Multiple differential diagnoses were considered. The patient / caregivers were apprised of diagnostic / treatment options including alternate modes of care, in addition to risks and benefits, for this medical condition. Based on this discussion the patient / caregiver agrees with this chosen diagnostic and treatment plan.    ED Course          {Data Independently Reviewed:485691305}    {ED Scoring Tool (Optional):268175206}  Clinical Impression     ED Diagnosis    None          Disposition      {ED Disposition:505282645}    There is no disposition no dispo time  There is no comment      Olga Baez MD   5/18/2024 10:51 PM     ***                       CBC WITH DIFFERENTIAL    Narrative:     The following orders were created for panel order CBC with Automated Differential.  Procedure                               Abnormality         Status                     ---------                               -----------         ------                     CBC with Automated Dif...[[id number]]                      Final result                 Please view results for these tests on the individual orders.   CBC WITH AUTOMATED DIFFERENTIAL (PERFORMABLE ONLY)    Narrative:     This is an appended report.  These results have been appended to a previously verified report.   ALCOHOL   POCT URINE PREGNANCY      Medications   meclizine (ANTIVERT) tablet 25 mg (25 mg Oral Given 5/19/24 0011)   sodium chloride (NORMAL SALINE) 0.9 % bolus 1,000 mL (1,000 mLs Intravenous New Bag 5/19/24 0003)   meclizine (ANTIVERT) tablet 25 mg (25 mg Oral Given 5/19/24 0016)     EKG Results     EKG Interpretation  Rate: 70  Rhythm: normal sinus rhythm   Abnormality: no, Sinus arrhythmia      EKG tracing interpreted by ED physician    Radiology Results     CT HEAD WO CONTRAST    (Results Pending)   XR KNEE 3 VIEW LEFT    (Results Pending)   Prelim Report  Clinical History: light headedness near syncope  CT scan of the head without intravenous contrast (axial sections with sagittal and coronal  reformats). May 18, 2024 2322 hours  Clinical History: light headedness near syncope  Comparison: No prior study is available for comparison.  Findings:  No evidence of intracranial hemorrhage, mass effect or midline shift. The ventricles and CSF spaces  are unremarkable. The calvarium is unremarkable. The mastoid air cells and the visualized paranasal  sinuses are clear.  Impression:  No evidence of intracranial hemorrhage, mass effect or midline shift.      MDM     Medical Decision Making       MDM done in ED Course    Does the Patient have sepsis: NO       Multiple differential diagnoses were considered. The  patient / caregivers were apprised of diagnostic / treatment options including alternate modes of care, in addition to risks and benefits, for this medical condition. Based on this discussion the patient / caregiver agrees with this chosen diagnostic and treatment plan.    ED Course     ED Course as of 05/19/24 0107   Sun May 19, 2024   0000 25 y/o female with near syncopal episode. Paitent also reported vertiginous symptoms. Patient CT head and lab work WNL. Patient given vertigo meds.  [EG]   0040 Patient reports improvement in symptoms [EG]      ED Course User Index  [EG] Olga Baez MD         Cardiac Monitor Review: 1:01 AM  I have independently reviewed the patients cardiac monitor. The patient's rhythm shows normal sinus rhythm  with rate of 68 bpm.      Clinical Impression     ED Diagnoses       Diagnosis Comment Associated Orders       Final diagnoses    Vertigo -- --    Near syncope -- --             Disposition        Clinical Impression and Diagnosis  1:02 AM       ED Diagnoses       Diagnosis Comment Associated Orders       Final diagnoses    Vertigo -- --    Near syncope -- --            Follow Up:  Geneva Gamboa MD  [address] [address]  [phone]                Summary of your Discharge Medications        Take these Medications        Details   meclizine 25 MG tablet  Commonly known as: ANTIVERT   Take 1 tablet by mouth 3 times daily as needed for Dizziness.              Pt is discharged to home/self care in stable condition.        Discharge 5/19/2024 12:55 AM  Valentín Earl Coppastar discharge to home/self care.          Olga Baez MD   5/19/2024 10:51 PM                            Olga Baez MD  05/19/24 0107     spouse

## 2024-07-09 ENCOUNTER — APPOINTMENT (OUTPATIENT)
Dept: ENDOCRINOLOGY | Facility: CLINIC | Age: 84
End: 2024-07-09
Payer: MEDICARE

## 2024-07-09 VITALS
HEIGHT: 69 IN | SYSTOLIC BLOOD PRESSURE: 140 MMHG | OXYGEN SATURATION: 98 % | DIASTOLIC BLOOD PRESSURE: 70 MMHG | WEIGHT: 190 LBS | HEART RATE: 70 BPM | BODY MASS INDEX: 28.14 KG/M2

## 2024-07-09 DIAGNOSIS — E78.5 HYPERLIPIDEMIA, UNSPECIFIED: ICD-10-CM

## 2024-07-09 DIAGNOSIS — E04.1 NONTOXIC SINGLE THYROID NODULE: ICD-10-CM

## 2024-07-09 DIAGNOSIS — E11.9 TYPE 2 DIABETES MELLITUS W/OUT COMPLICATIONS: ICD-10-CM

## 2024-07-09 DIAGNOSIS — I10 ESSENTIAL (PRIMARY) HYPERTENSION: ICD-10-CM

## 2024-07-09 LAB — GLUCOSE BLDC GLUCOMTR-MCNC: 241

## 2024-07-09 PROCEDURE — G2211 COMPLEX E/M VISIT ADD ON: CPT

## 2024-07-09 PROCEDURE — 82962 GLUCOSE BLOOD TEST: CPT

## 2024-07-09 PROCEDURE — 99214 OFFICE O/P EST MOD 30 MIN: CPT

## 2024-07-09 RX ORDER — REPAGLINIDE 1 MG/1
1 TABLET ORAL
Qty: 270 | Refills: 0 | Status: ACTIVE | COMMUNITY
Start: 2024-07-09 | End: 1900-01-01

## 2024-09-11 ENCOUNTER — NON-APPOINTMENT (OUTPATIENT)
Age: 84
End: 2024-09-11

## 2024-09-11 NOTE — PROGRESS NOTE ADULT - ASSESSMENT
81 year old male with HTN, CAD with stents, BPH, HLD, gout, GERD, DM2, CKD3 presenting for foreign body in his left foot, initially was in the ED 1/15/21, was cleaned and discharged on augmentin. Was following with outpatient Podiatry with recent MRI demonstrating piece of glass retained so sent to the ED. XR here with erosive changes on the fifth metatarsal head and lateral base of the proximal fifth phalanx consistent with OM. s/p OR with Podiatry for I&D with concern for residual bone infection.    Afebrile, WBC WNL.  No systemic signs of infection.  Deep tissue culture with s. aureus, prior culture with MSSA  Discussed with podiatry and concern for residual OM    Recommend:  #Left foot wound with retained glass, XR concerning for OM  -Deep culture with MSSA  -Since prior cx with MSSA, can start cefazolin 2 grams IV q 12 hours (renally dosed)  -Will need PICC and at least 6 weeks 4/14/21.  -Will need CBC/ CMP/ ESR/ CRP weekly on discharge faxed to my office at [phone]    #CKD3  -Continue to monitor Cr while on abx and adjust accordingly    Bassem Maguire MD  Pager [phone]  After 5pm/weekends call [phone]    Discussed with primary team.       [facility] Internal Medicine   Progress Note              Subjective:  Patient seen and examined by me.     No overnight events or complaints on review of systems           ordered repeat labs on  stable for now monitor        14 point Review of systems is negative except for as noted above.     I/O's    Intake/Output Summary (Last 24 hours) at 2024 1432  Last data filed at 2024 1200  Gross per 24 hour   Intake 340 ml   Output --   Net 340 ml         ALLERGIES:  Kdc:brilliant blue fcf+tamsulosin and Penicillins     No data recorded  MAP (mmHg)  Av.7  Min: 85  Max: 95          HOSPITAL MEDS  Current Facility-Administered Medications   Medication Dose Route Frequency Provider Last Rate Last Admin    thiamine (VITAMIN B1) tablet 100 mg  100 mg Oral Daily Markell Velasquez DO   100 mg at 24 0950    polyethylene glycol (MIRALAX) packet 17 g  17 g Oral Daily Felicia Lewis MD   17 g at 09/10/24 0950    senna (SENOKOT) 8.6 mg  1 tablet Oral BID Felicia Lewis MD   8.6 mg at 24 0950    enoxaparin (LOVENOX) injection 40 mg  40 mg Subcutaneous Nightly Markell Velasquez DO   40 mg at 24    tamsulosin (FLOMAX) capsule 0.4 mg  0.4 mg Oral Nightly Abby Mejia DO   0.4 mg at 09/10/24 2054    bisacodyl (DULCOLAX) suppository 10 mg  10 mg Rectal Daily Markell Velasquez DO           Current Facility-Administered Medications   Medication Dose Route Frequency Provider Last Rate Last Admin       Current Facility-Administered Medications   Medication Dose Route Frequency Provider Last Rate Last Admin    OLANZapine (ZyPREXA ZYDIS) disintegrating tablet 5 mg  5 mg Oral Q6H PRN Mary Quintana MD   5 mg at 09/10/24 1635    lidocaine 2% urethral (UROJET) 2 % jelly 10 mL  10 mL Transurethral Once PRN Tiffani Latif CNP        OLANZapine (ZyPREXA) IM injection 5 mg  5 mg Intramuscular TID PRN Hayder Alicea MD   5 mg at 24 0501    acetaminophen (TYLENOL) tablet 650 mg  650 mg  Oral Q4H PRN Justin Melissa DO   650 mg at 08/27/24 0943    benztropine mesylate (COGENTIN) 1 MG/ML injection 1 mg  1 mg Intramuscular Q6H PRN Brittany Alcocer MD        Or    benztropine (COGENTIN) tablet 1 mg  1 mg Oral Q6H PRN Brittany Alcocer MD        sodium chloride 0.9 % flush bag 25 mL  25 mL Intravenous PRN Markell Velasquez DO        traZODone (DESYREL) tablet 50 mg  50 mg Oral Nightly PRN Abby Mejia DO        diphenhydrAMINE (BENADRYL) injection 50 mg  50 mg Intramuscular Q6H PRN Markell Velasquez DO              Last Recorded Vitals      SpO2 Readings from Last 3 Encounters:   09/11/24 96%   08/08/22 96%   02/08/22 95%        VITAL SIGNS:     Vital Last Value 24 Hour Range   Temperature 97.9 °F (36.6 °C) (09/11/24 1409) Temp  Min: 97.5 °F (36.4 °C)  Max: 98.1 °F (36.7 °C)   Pulse (!) 107 (09/11/24 1409) Pulse  Min: 71  Max: 114   Respiratory 16 (09/11/24 1409) Resp  Min: 16  Max: 18   Non-Invasive  Blood Pressure 132/63 (09/11/24 1409) BP  Min: 110/72  Max: 132/63   Pulse Oximetry 96 % (09/11/24 1409) SpO2  Min: 96 %  Max: 98 %   Arterial   Blood Pressure   No data recorded      Vital Today Admitted   Weight 60.1 kg (132 lb 7.9 oz) (09/11/24 0521) Weight: 51.3 kg (113 lb 1.5 oz) (08/02/24 1407)   Height N/A Height: 5' 8\" (172.7 cm) (08/02/24 1407)   BMI N/A BMI (Calculated): 17.2 (08/02/24 1407)            Physical Exam:  General: Alert and oriented, no acute distress  Eyes: no scleral icterus, no conjunctival erythema   Cardio: S1, S2, RRR,    Pulm: Lungs clear to auscultation bilaterally, no wheeze or rhonchi noted. No chest wall tenderness  GI: Soft, non-tender, nondistended. Normal bowel sounds auscultated x4 quadrants  : No suprapubic Tenderness, no CVA tenderness bilaterally  Ext: No upper or lower extremity edema noted. No cords palpated.   Musculoskeletal: Neurolysed weakness  Psych: Blunted affect  Labs   Recent Labs     09/11/24  0958   WBC 11.0   RBC 4.03*   HGB 12.0*   HCT 36.9*      MCV 91.6    MCH 29.8   MCHC 32.5   NRBCRE 0         Recent Labs     09/11/24  0958   SODIUM 138   POTASSIUM 3.9   CO2 28   ANIONGAP 8   GLUCOSE 164*   BUN 28*   CREATININE 0.74   CALCIUM 8.7   BILIRUBIN 0.4   AST 14   GPT 17   ALKPT 76   GLOB 3.1   AGR 1.0        Recent Labs   Lab 09/11/24  0958 09/08/24  0556 09/05/24  0537   SODIUM 138 135 138   POTASSIUM 3.9 3.9 4.1   CHLORIDE 106 104 105   CO2 28 27 27   BUN 28* 28* 22*   CREATININE 0.74 0.77 0.70   GLUCOSE 164* 94 103*   ALBUMIN 3.1* 3.2* 3.3*   AST 14 18 16   BILIRUBIN 0.4 0.4 0.4       No results for input(s): \"PCT\" in the last 72 hours.     No results found     No results for input(s): \"INR\", \"PT\", \"PTT\" in the last 72 hours.    No results available in last 24 hours    Imaging    XR ABDOMEN 1 VIEW   Final Result      Moderate to large amount of fecal material present within the colon and   rectum. No bowel obstruction.               Electronically Signed by: RACHEL MUJICA M.D.    Signed on: 8/6/2024 1:26 PM    Workstation ID: [id number]          Cultures  Microbiology Results       None            All imaging, labs, vitals and related tests have been reviewed and interpreted by me.     Assessment/Plan:    -64-year-old man with a history of psychosis, depression has been in the hospital since 8/2.  He is undergoing treatment for agitation and paranoia with assistance of psychiatry.  He continues to refuse all his medications.  We are arranging for state guardianship.    Discussing with our psychiatry colleagues if he will be a candidate for transfer to the psychiatry floor.      Impression:     Schizoaffective disorder with depression and SI  EMNDOZA  Severe protein calorie malnutrition, poor oral intake but improving  Mild anemia  BPH  Lactic acidosis due to starvation ketosis, resolved  Urinary retention, resolved        Plan:     Continue to encourage oral intake  Psychiatry following along, IM Zyprexa as needed  still refusing all medications for now, but we will  continue to encourage  Video sitter at bedside  Social work working on state guardianship for ECF placement.  Voiding trial ongoing, tolerating so far       Repeat Labs stable monitor vitals         DVT Prophylaxis:   Current Active Medications for DVT Prophylaxis (From admission, onward)           Stop     enoxaparin (LOVENOX) injection 40 mg  40 mg,   Subcutaneous,   NIGHTLY         --                   Diet: One Time Diet Liquid Full  3 Times/Day W Meals; Magic Cup Dessert/High Protein Pudding, Chocolate Oral Nutrition Supplement  Safety Tray No Knives Deliver per Site Process -  Note  Regular Diet  3 Times/Day W Meals; Ensure Plus Hp/Standard Oral Supplement Oral Nutrition Supplement  Baseline Activity: Ambulates Independently    CODE STATUS:   Code Status: Selective Treatment/DNR    Physician Notification:  Consultants notified of patient via Perfect Serve.  Communication: with patient, nurse     MORE than 50 MINS WERE SPENT ON THIS PATIENTS CARE TODAY. This includes the following: Reviewed all vitals, medications, new orders, I/O, labs, micro, radiology, nurses notes, pertinent consultant notes which are reflected in assessment and plan    Primary Care Physician  Chinedu Garces, DO        [facility]  9/11/2024 2:32 PM

## 2024-09-12 ENCOUNTER — APPOINTMENT (OUTPATIENT)
Dept: CARDIOLOGY | Facility: CLINIC | Age: 84
End: 2024-09-12
Payer: MEDICARE

## 2024-09-12 ENCOUNTER — NON-APPOINTMENT (OUTPATIENT)
Age: 84
End: 2024-09-12

## 2024-09-12 VITALS
BODY MASS INDEX: 28.14 KG/M2 | SYSTOLIC BLOOD PRESSURE: 159 MMHG | OXYGEN SATURATION: 99 % | HEART RATE: 65 BPM | DIASTOLIC BLOOD PRESSURE: 80 MMHG | HEIGHT: 69 IN | WEIGHT: 190 LBS

## 2024-09-12 DIAGNOSIS — I25.10 ATHEROSCLEROTIC HEART DISEASE OF NATIVE CORONARY ARTERY W/OUT ANGINA PECTORIS: ICD-10-CM

## 2024-09-12 PROCEDURE — G2211 COMPLEX E/M VISIT ADD ON: CPT

## 2024-09-12 PROCEDURE — 99214 OFFICE O/P EST MOD 30 MIN: CPT

## 2024-09-12 NOTE — PHYSICAL EXAM
[General Appearance - Well Developed] : well developed [Heart Rate And Rhythm] : heart rate and rhythm were normal [Heart Sounds] : normal S1 and S2 [Bowel Sounds] : normal bowel sounds [Abdomen Soft] : soft [Abdomen Tenderness] : non-tender [Abnormal Walk] : normal gait [Nail Clubbing] : no clubbing of the fingernails [Cyanosis, Localized] : no localized cyanosis [Skin Color & Pigmentation] : normal skin color and pigmentation [] : no rash [No Venous Stasis] : no venous stasis [Skin Lesions] : no skin lesions [No Skin Ulcers] : no skin ulcer [No Xanthoma] : no  xanthoma was observed [Oriented To Time, Place, And Person] : oriented to person, place, and time [Affect] : the affect was normal [Mood] : the mood was normal [No Anxiety] : not feeling anxious [FreeTextEntry1] : mild LE edema, bilaterally

## 2024-09-12 NOTE — HISTORY OF PRESENT ILLNESS
Hpi Title: Evaluation of Skin Lesions
[FreeTextEntry1] : 9/12/2024   Doing ok  he remains on plavix Aspirin no more no iron No bleeding in the stool No leg swelling Breathing ok  Returned from [place] [facility] - recovering No Chest pain or pressure.   3/1/2024  Doing ok  No CP or SOB He resumed plavix he is also on aspirin  no changes to his meds He is active. Walking ok  Hip feels fine, no pains No blood in the stool Stopped taking iron - consitpation  BP at home is 145-150 systolic   11/10/2023  Admitted Oct 14 - Discuarhged Oct 17th  started aspirin  Oct 18th No blood in the stool  No chest pain  Breathing is ok  his BP at home is 145/70 .  Sometimes 150, sometimes 135 eating and drinking ok  Seeing GI in the office.   Average is 140    Discharge Medications Actos 30 mg oral tablet: 1 tab(s) orally once a day allopurinol 100 mg oral tablet: 1 tab(s) orally once a day amLODIPine 10 mg oral tablet: 1 tab(s) orally once a day atorvastatin 40 mg oral tablet: 1 tab(s) orally once a day carvedilol 25 mg oral tablet: 1 tab(s) orally 2 times a day docusate sodium 100 mg oral capsule: 1 cap(s) orally 3 times a day ezetimibe 10 mg oral tablet: 1 tab(s) orally once a day fenofibrate 48 mg oral tablet: 1 tab(s) orally once a day glipiZIDE 10 mg oral tablet: 1 tab(s) orally once a day Jardiance 10 mg oral tablet: 1 tab(s) orally once a day (in the morning) Propecia 1 mg oral tablet: 1 tab(s) orally once a day ranolazine 500 mg oral tablet, extended release: 1 tab(s) orally once a day repaglinide 1 mg oral tablet: 1 tab(s) orally 2 times a day before meals Aspirin 81mg daily   10/13/2023  Admitted 10/10-10/12 due to bright red blood in stool suspected to have diverticular bleed.  Colonoscopy: Impression:    - Moderate diverticulosis in the descending colon, in the transverse colon                       and in the ascending colon, likely soure of patient's prior bleeding                      - Non-bleeding external hemorrhoids.                      - No specimens collected. Recommendation:      - Return patient to hospital [facility] for ongoing care.                      - Advance diet as tolerated                      - Would transition to monotherapy for history of cardiac stents if possible.                       Defer to primary team and cardiology  No further blood in stool today.  Patient was transfused 1 unit for PRBC, Hgb 6.9. Last Hgb 7.8. Cr. was elevated to 2.7  Denies C/P or SOB. No LE pain or edema.   ASA and Plavix was held during admission.   He also had a hip replacement was in September.   Medications:  Actos  allopurinol  aspirin 81 mg oral delayed release tablet: 1 tab(s) orally every other day (has not resumed yet) atorvastatin 40 mg oral tablet: 1 tab(s) orally once a day carvedilol 25 mg oral tablet: 1 tab(s) orally 2 times a day ezetimibe 10 mg oral tablet: 1 tab(s) orally once a day fenofibrate 48 mg oral tablet: 1 tab(s) orally once a day glipiZIDE 1 hydrALAZINE 100 mg oral tablet: 1 tab(s) orally 3 times a day Jardiance  Norvasc 10 mg oral tablet: 1 tab(s) orally once a day at 6pm omega-3 polyunsaturated fatty acids 1200 mg oral capsule: 1 cap(s) orally once a day Propecia  ranolazine 500 mg oral tablet, extended release: 1 tab(s) orally 2 times a day repaglinide  trospium  7/14/2023 He is planning on having a R hip replacement August 10th [facility]in [facility] Dr. Alexi Venegas Fax:  [phone] no cp or angina  Meds  Ranexa 500mg BID Jardiance ASA 81 every other day  Actos Glipizide Repaglinide Amlodipine 10  Plavix 75 Atorava 40 Coreg 25mg BID  Hydral 100mg TID flomax 0.4 finasteride Zetia Fenofibrate Allopurinol  1/13/2023 Was seen by urologist 2nd opinion - Tom Bearden -no Urolift needed NO CP or SOB wants to travel to [facility] in a few months.     Ranexa Jardiance ASA 81 every other day  Actos Glipizide Repaglinide Amlodipine 10  Plavix 75 Atorava 40 Coreg 25mg BID  Hydral 100mg TID  Lasix 20mg  flomax 0.4 finasteride Zetia Fenofibrate Allopurinol   10/2022 Doing ok Has plans for urolift, but not yet scheduled     Meds:     Cardiac Hx: 3/16 Cath - RCA PCI with FOREST x 3. LAD 70% --but nuc stress without anterior ischemia.  5/16 NST - Basal inf. / lateral ischemia 5/16 Echo - Normal LV, RV, EF  6/16 Renal Duplex - No GATITO 3/2022- Echo- normal LV and RV function, nl EF 8/2022- C  LAD- There is a 70 % stenosis.   Cx- Angiography shows moderate atherosclerosis.   RCA- is a 40 % stenosis.    Allergies: ACE/ARB allergy noted    
[FreeTextEntry1] : 9/12/2024   Doing ok  he remains on plavix Aspirin no more no iron No bleeding in the stool No leg swelling Breathing ok  Returned from [place] [facility] - recovering No Chest pain or pressure.   3/1/2024  Doing ok  No CP or SOB He resumed plavix he is also on aspirin  no changes to his meds He is active. Walking ok  Hip feels fine, no pains No blood in the stool Stopped taking iron - consitpation  BP at home is 145-150 systolic   11/10/2023  Admitted Oct 14 - Discuarhged Oct 17th  started aspirin  Oct 18th No blood in the stool  No chest pain  Breathing is ok  his BP at home is 145/70 .  Sometimes 150, sometimes 135 eating and drinking ok  Seeing GI in the office.   Average is 140    Discharge Medications Actos 30 mg oral tablet: 1 tab(s) orally once a day allopurinol 100 mg oral tablet: 1 tab(s) orally once a day amLODIPine 10 mg oral tablet: 1 tab(s) orally once a day atorvastatin 40 mg oral tablet: 1 tab(s) orally once a day carvedilol 25 mg oral tablet: 1 tab(s) orally 2 times a day docusate sodium 100 mg oral capsule: 1 cap(s) orally 3 times a day ezetimibe 10 mg oral tablet: 1 tab(s) orally once a day fenofibrate 48 mg oral tablet: 1 tab(s) orally once a day glipiZIDE 10 mg oral tablet: 1 tab(s) orally once a day Jardiance 10 mg oral tablet: 1 tab(s) orally once a day (in the morning) Propecia 1 mg oral tablet: 1 tab(s) orally once a day ranolazine 500 mg oral tablet, extended release: 1 tab(s) orally once a day repaglinide 1 mg oral tablet: 1 tab(s) orally 2 times a day before meals Aspirin 81mg daily   10/13/2023  Admitted 10/10-10/12 due to bright red blood in stool suspected to have diverticular bleed.  Colonoscopy: Impression:    - Moderate diverticulosis in the descending colon, in the transverse colon                       and in the ascending colon, likely soure of patient's prior bleeding                      - Non-bleeding external hemorrhoids.                      - No specimens collected. Recommendation:      - Return patient to hospital [facility] for ongoing care.                      - Advance diet as tolerated                      - Would transition to monotherapy for history of cardiac stents if possible.                       Defer to primary team and cardiology  No further blood in stool today.  Patient was transfused 1 unit for PRBC, Hgb 6.9. Last Hgb 7.8. Cr. was elevated to 2.7  Denies C/P or SOB. No LE pain or edema.   ASA and Plavix was held during admission.   He also had a hip replacement was in September.   Medications:  Actos  allopurinol  aspirin 81 mg oral delayed release tablet: 1 tab(s) orally every other day (has not resumed yet) atorvastatin 40 mg oral tablet: 1 tab(s) orally once a day carvedilol 25 mg oral tablet: 1 tab(s) orally 2 times a day ezetimibe 10 mg oral tablet: 1 tab(s) orally once a day fenofibrate 48 mg oral tablet: 1 tab(s) orally once a day glipiZIDE 1 hydrALAZINE 100 mg oral tablet: 1 tab(s) orally 3 times a day Jardiance  Norvasc 10 mg oral tablet: 1 tab(s) orally once a day at 6pm omega-3 polyunsaturated fatty acids 1200 mg oral capsule: 1 cap(s) orally once a day Propecia  ranolazine 500 mg oral tablet, extended release: 1 tab(s) orally 2 times a day repaglinide  trospium  7/14/2023 He is planning on having a R hip replacement August 10th [facility]in [facility] Dr. Alexi Venegas Fax:  [phone] no cp or angina  Meds  Ranexa 500mg BID Jardiance ASA 81 every other day  Actos Glipizide Repaglinide Amlodipine 10  Plavix 75 Atorava 40 Coreg 25mg BID  Hydral 100mg TID flomax 0.4 finasteride Zetia Fenofibrate Allopurinol  1/13/2023 Was seen by urologist 2nd opinion - Tom Bearden -no Urolift needed NO CP or SOB wants to travel to [facility] in a few months.     Ranexa Jardiance ASA 81 every other day  Actos Glipizide Repaglinide Amlodipine 10  Plavix 75 Atorava 40 Coreg 25mg BID  Hydral 100mg TID  Lasix 20mg  flomax 0.4 finasteride Zetia Fenofibrate Allopurinol   10/2022 Doing ok Has plans for urolift, but not yet scheduled     Meds:     Cardiac Hx: 3/16 Cath - RCA PCI with FOREST x 3. LAD 70% --but nuc stress without anterior ischemia.  5/16 NST - Basal inf. / lateral ischemia 5/16 Echo - Normal LV, RV, EF  6/16 Renal Duplex - No GATITO 3/2022- Echo- normal LV and RV function, nl EF 8/2022- C  LAD- There is a 70 % stenosis.   Cx- Angiography shows moderate atherosclerosis.   RCA- is a 40 % stenosis.    Allergies: ACE/ARB allergy noted

## 2024-09-12 NOTE — ASSESSMENT
[FreeTextEntry1] : Assessment: 1. CAD s/p RCA FOREST x 3 and unrevascularized 70% mLAD (2016 NST without anterior ischemia). C 9/2022 with mLAD 70%, un-revascularized 2. HTN / HLD / DM 3. 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 Recent admission for acute anemia and GI bleeding 5. CKD stage 3-4  Plan:    1.Cont plavix.  2   Watch for GI bleeding 3  Montior BP at home- his home Blood pressures are 140-150/75 4.  Continue amlodipine 10mg, ranolazine, coreg 25mg BID, atorvastatin , hyrdralazine 100mg TID 5.   He is off spiranolactone and BP is 140-150 at home, will ask him to resume his spiranolactone 12.5mg DAILY  6.  He will see Dr. Escamilla next month for labwork to check creatinine and potassium  7.  RTC in 6 months.  8.  Cont atorvastatin and zetia.  9.  Recent stress test and echo in Spring of 2024 was ok

## 2024-09-16 NOTE — ED CDU PROVIDER INITIAL DAY NOTE - PMH
BPH (Benign Prostatic Hypertrophy)    CAD (coronary artery disease)  stents x4( 03/2017)  last echo stress test 05/2016  Chronic kidney disease (CKD)    Diabetes  2  Diabetes Mellitus Type II    Ganglion cyst of wrist, left    H/O gastroesophageal reflux (GERD)    H/O: Rotator Cuff Tear    Hyperlipemia    Hyperlipidemia    Hypertension    Hypertension    OA (osteoarthritis)    SHARATH (obstructive sleep apnea)  non compliant with CPAP  Pneumonia    Renal Calculi  h/olithotripsy 2010   hard copy, drawn during this pregnancy

## 2024-09-24 ENCOUNTER — APPOINTMENT (OUTPATIENT)
Dept: ORTHOPEDIC SURGERY | Facility: CLINIC | Age: 84
End: 2024-09-24
Payer: MEDICARE

## 2024-09-24 DIAGNOSIS — M18.12 UNILATERAL PRIMARY OSTEOARTHRITIS OF FIRST CARPOMETACARPAL JOINT, LEFT HAND: ICD-10-CM

## 2024-09-24 PROCEDURE — 99203 OFFICE O/P NEW LOW 30 MIN: CPT

## 2024-10-03 ENCOUNTER — OUTPATIENT (OUTPATIENT)
Dept: OUTPATIENT SERVICES | Facility: HOSPITAL | Age: 84
LOS: 1 days | End: 2024-10-03
Payer: MEDICARE

## 2024-10-03 ENCOUNTER — RESULT REVIEW (OUTPATIENT)
Age: 84
End: 2024-10-03

## 2024-10-03 ENCOUNTER — APPOINTMENT (OUTPATIENT)
Dept: ULTRASOUND IMAGING | Facility: CLINIC | Age: 84
End: 2024-10-03
Payer: MEDICARE

## 2024-10-03 DIAGNOSIS — M65.30 TRIGGER FINGER, UNSPECIFIED FINGER: Chronic | ICD-10-CM

## 2024-10-03 DIAGNOSIS — Q76.1 KLIPPEL-FEIL SYNDROME: Chronic | ICD-10-CM

## 2024-10-03 DIAGNOSIS — Z98.89 OTHER SPECIFIED POSTPROCEDURAL STATES: Chronic | ICD-10-CM

## 2024-10-03 DIAGNOSIS — M75.101 UNSPECIFIED ROTATOR CUFF TEAR OR RUPTURE OF RIGHT SHOULDER, NOT SPECIFIED AS TRAUMATIC: Chronic | ICD-10-CM

## 2024-10-03 DIAGNOSIS — H26.9 UNSPECIFIED CATARACT: Chronic | ICD-10-CM

## 2024-10-03 DIAGNOSIS — M18.12 UNILATERAL PRIMARY OSTEOARTHRITIS OF FIRST CARPOMETACARPAL JOINT, LEFT HAND: ICD-10-CM

## 2024-10-03 PROCEDURE — 20604 DRAIN/INJ JOINT/BURSA W/US: CPT | Mod: LT

## 2024-10-03 PROCEDURE — 20604 DRAIN/INJ JOINT/BURSA W/US: CPT

## 2024-10-07 ENCOUNTER — NON-APPOINTMENT (OUTPATIENT)
Age: 84
End: 2024-10-07

## 2024-10-25 ENCOUNTER — APPOINTMENT (OUTPATIENT)
Dept: ENDOCRINOLOGY | Facility: CLINIC | Age: 84
End: 2024-10-25
Payer: MEDICARE

## 2024-10-25 VITALS
SYSTOLIC BLOOD PRESSURE: 142 MMHG | HEART RATE: 62 BPM | OXYGEN SATURATION: 98 % | BODY MASS INDEX: 27.7 KG/M2 | WEIGHT: 187 LBS | DIASTOLIC BLOOD PRESSURE: 68 MMHG | HEIGHT: 69 IN

## 2024-10-25 DIAGNOSIS — E78.5 HYPERLIPIDEMIA, UNSPECIFIED: ICD-10-CM

## 2024-10-25 DIAGNOSIS — E04.1 NONTOXIC SINGLE THYROID NODULE: ICD-10-CM

## 2024-10-25 DIAGNOSIS — E83.52 HYPERCALCEMIA: ICD-10-CM

## 2024-10-25 DIAGNOSIS — E11.9 TYPE 2 DIABETES MELLITUS W/OUT COMPLICATIONS: ICD-10-CM

## 2024-10-25 LAB — HBA1C MFR BLD HPLC: 8

## 2024-10-25 PROCEDURE — 99214 OFFICE O/P EST MOD 30 MIN: CPT

## 2024-10-25 PROCEDURE — 83036 HEMOGLOBIN GLYCOSYLATED A1C: CPT | Mod: QW

## 2024-10-26 LAB
ALBUMIN SERPL ELPH-MCNC: 4.1 G/DL
ALP BLD-CCNC: 60 U/L
ALT SERPL-CCNC: 23 U/L
ANION GAP SERPL CALC-SCNC: 11 MMOL/L
AST SERPL-CCNC: 27 U/L
BILIRUB SERPL-MCNC: 0.4 MG/DL
BUN SERPL-MCNC: 40 MG/DL
CALCIUM SERPL-MCNC: 10.8 MG/DL
CALCIUM SERPL-MCNC: 10.8 MG/DL
CHLORIDE SERPL-SCNC: 104 MMOL/L
CHOLEST SERPL-MCNC: 136 MG/DL
CO2 SERPL-SCNC: 23 MMOL/L
CREAT SERPL-MCNC: 2.7 MG/DL
CREAT SPEC-SCNC: 57 MG/DL
EGFR: 23 ML/MIN/1.73M2
GLUCOSE SERPL-MCNC: 155 MG/DL
HDLC SERPL-MCNC: 61 MG/DL
LDLC SERPL CALC-MCNC: 50 MG/DL
MICROALBUMIN 24H UR DL<=1MG/L-MCNC: 102.6 MG/DL
MICROALBUMIN/CREAT 24H UR-RTO: 1813 MG/G
NONHDLC SERPL-MCNC: 75 MG/DL
PARATHYROID HORMONE INTACT: 115 PG/ML
POTASSIUM SERPL-SCNC: 4.8 MMOL/L
PROT SERPL-MCNC: 6.5 G/DL
SODIUM SERPL-SCNC: 138 MMOL/L
TRIGL SERPL-MCNC: 150 MG/DL

## 2024-11-26 ENCOUNTER — RX RENEWAL (OUTPATIENT)
Age: 84
End: 2024-11-26

## 2024-12-19 NOTE — ED ADULT NURSE NOTE - ED STAT RN HANDOFF TIME
What Type Of Note Output Would You Prefer (Optional)?: Bullet Format
What Is The Reason For Today's Visit?: Full Body Skin Examination
What Is The Reason For Today's Visit? (Being Monitored For X): the re-examination of lesions previously examined
07:16

## 2024-12-31 NOTE — ASU PREOP CHECKLIST - HEART RATE (BEATS/MIN)
[facility] Medicine  Progress Note    Patient Name: Carlos Chahal  MRN: [id number]  Patient Class: IP- Inpatient   Admission Date: 12/15/2024  Length of Stay: 16 days  Attending Physician: Kim Diamond MD  Primary Care Provider: Jose Francisco Klein MD        Subjective     Principal Problem:Sepsis        HPI:  Carlos Chahla is a 35 y.o. male who presents to the ED from nursing home for altered mental status and difficulty breathing.  Patient is found to be hypoxic.  He has a history of anoxic brain injury     This am, pt is on ventilator and levophed at 0.25mcg and ivfs at 75ml/hr.      Spoke with father this am on condition of pt    Overview/Hospital Course:  12/17 ND became more awake yesterday with wound changes - low dose propofol added for pt - able to wean levophed to 0.15mcg.  did tolerate tf last night - check kub this am  12/18 ND pt had to be changed back to ac control last nigth after becoming tachypneic and had low tv.  Tolerating ac mode.  Levophed down to 0.1 mcg  12/19 ND pt toleratign vent - will wean RR today - cont to slowly wean levophed as tolerates - wbc slowly imrpoving - tolerating low dose tfs - will cont to increase tfs as tolerates  12/20 ND tolerating vent - cont to work on feeds and nutritional support  12/21 [place] weekend coverage, in no acute distress, stable vital signs, AC/18/400/5/40%, SpO2 100%. On proprofol for sedation, patient tracks voice and moving head and neck with lid opening. Mild bump in WBC, afebrile, may be associated with the reported residual >200 and tube feeds held. Abdomen, soft and no distension on exam. Will resume as tolerated and monitor.  Blood cx x2, final report no growth. Continue merrem (D5), gentamicin (D2) with mixed MDRO frida from sputum studies and UTI. Replete Mg, continue abx. Continue daily wound care.   12/22 [place] weekend coverage, overnight rate change and tolerating AC12/400/5/40 SpO2 100%, proprofol 15 mcg/kg/min, levophed  0.1mcg/kg/min. Patient without gag reflex on suction. Tube feeds held overnight and resumed, remains on trickle feeds. Ileostomy output 100.   No associated abdominal distension, patient in no distress. Vent settings weaned. Leukocytosis resolved. Continue IV abx for MDRO coverage.   12/23 ND Pt still havign trouble tolerating tfs -change reglan iv; pt is more awake this am - will change to simv for a few hours today   12/24 ND elevated bs with tpn- will add long acting insulin as 12u and restart tpn - ssi adjusted to moderate scale.  12/25 FM:  Holiday coverage, chart reviewed and case discussed with team.  Patient's Levophed is being weaned slowly and he is not tolerating his tube feeds.  Abdominal exam is soft patient has output via ostomy we will check KUB.  Patient is followed by surgery and has a polymicrobial respiratory and urinary tract infection.  Patient has multiple wounds and has a history of anoxic brain injury and is a long-term nursing home resident.  12/26 FM:  Patient is off of vasopressors this morning, patient's KUB noted and will rechallenge tube feedings today.  Patient is tolerating TPN labs noted and his hemoglobin has continued to trend downward Ms. With no visible blood loss.  Patient's other labs noted his white blood cell count is rising despite appropriate antibiotics based on respiratory and urinary cultures.  Patient's prognosis is poor.  12/27 ND pt  still not tolerating tfs - surgery following -  cont tpn; h/h improved after transfusion.  Updated aunt on pts condition  12/28/24:  Patient seen this morning.  Not tolerating tube feedings at this time, remains on vent support.  Poor prognosis at this time.  Patient with poor urine output despite diuretics.  Trial of albumin infusion followed by lasix today.    12/29/24:  Good response to albumin/lasix combo yesterday with good urine output.  Renal function essentially unchanged.  Sputum culture with gram negative rods, susceptibility  pending.  Continue abx for now. Leukocytosis improving, H/H stable.   12/30 ND pt will be switched to simv today to start havign him do more work on breathing.  Awaiting sputum cx results - cxr appears improved and wbc improving.  Still not tolerating tfs  12/31 WC: Patient having ongoing need for ventilator support.  Sputum culture still pending.  Abdominal x-ray reveals no evidence of bowel obstruction.  Tube feeds as tolerated.        Review of Systems   Unable to perform ROS: Acuity of condition     Objective:     Vital Signs (Most Recent):  Temp: 96.8 °F (36 °C) (12/31/24 0702)  Pulse: 86 (12/31/24 0746)  Resp: 20 (12/31/24 0746)  BP: (!) 95/54 (12/31/24 0600)  SpO2: 100 % (12/31/24 0746) Vital Signs (24h Range):  Temp:  [96.7 °F (35.9 °C)-97.1 °F (36.2 °C)] 96.8 °F (36 °C)  Pulse:  [78-91] 86  Resp:  [17-31] 20  SpO2:  [100 %] 100 %  BP: ()/(51-72) 95/54     Weight: 68.5 kg (151 lb 0.2 oz)  Body mass index is 23.65 kg/m².    Intake/Output Summary (Last 24 hours) at 12/31/2024 0821  Last data filed at 12/31/2024 0600  Gross per 24 hour   Intake 2759.15 ml   Output 2775 ml   Net -15.85 ml         Physical Exam  Constitutional:       Appearance: He is ill-appearing.      Comments: Thin male; intubated   HENT:      Mouth/Throat:      Mouth: Mucous membranes are moist.   Eyes:      Pupils: Pupils are equal, round, and reactive to light.   Cardiovascular:      Rate and Rhythm: Normal rate and regular rhythm.      Heart sounds: Normal heart sounds.   Pulmonary:      Effort: Pulmonary effort is normal.      Breath sounds: Normal breath sounds. Transmitted upper airway sounds present.   Abdominal:      General: There is no distension.      Palpations: Abdomen is soft. There is no mass.      Tenderness: There is no abdominal tenderness.      Comments: Jtube noted; ileostomy noted   Genitourinary:     Comments: Rojas with yellow urine  Musculoskeletal:      Right lower leg: Edema present.      Left lower leg: Edema  "present.   Lymphadenopathy:      Cervical: No cervical adenopathy.   Skin:     General: Skin is warm and dry.      Comments: Wounds to sacrum; groin area             Significant Labs: All pertinent labs within the past 24 hours have been reviewed.    Significant Imaging: I have reviewed all pertinent imaging results/findings within the past 24 hours.    Assessment and Plan     * Sepsis  This patient does have evidence of infective focus  My overall impression is septic shock due to MAP < 65 and SBP < 90.  Source: Respiratory and Urinary Tract  Antibiotics given-   Antibiotics (72h ago, onward)      Start     Stop Route Frequency Ordered    12/30/24 1300  erythromycin base tablet 250 mg         -- PER NG TUBE 4 times daily 12/30/24 1111    12/29/24 1125  gentamicin - pharmacy to dose         -- IV pharmacy to manage frequency 12/29/24 1025          Latest lactate reviewed-  No results for input(s): "LACTATE", "POCLAC" in the last 72 hours.    Organ dysfunction indicated by Acute respiratory failure and Encephalopathy    Fluid challenge Ideal Body Weight- The patient's ideal body weight is Ideal body weight: 66.1 kg (145 lb 11.6 oz) which will be used to calculate fluid bolus of 30 ml/kg for treatment of septic shock.      Post- resuscitation assessment Yes Perfusion exam was performed within 6 hours of septic shock presentation after bolus shows Inadequate tissue perfusion assessed by non-invasive monitoring       Will Start Pressors- Levophed for MAP of 65  Source control achieved by: iv zosym. Vanc, ivfs    Patient has a leukocytosis.  Last trend as follows-   Lab Results   Component Value Date    WBC 19.77 (H) 12/31/2024    WBC 14.32 (H) 12/30/2024    WBC 17.69 (H) 12/29/2024 12/17 wean levophed as tolerates- abxs changed to merrem, cotn vanc - await culture final reports  12/18 wbc slightly imrpoved - cont iv abxs- await final sputum culture - on merrem fro esbl kleb in urine  12/19 cont iv merrem for " 59 esbl kleb in urine and proteus in sputum - dc vanc  12/20 wbc improvign - cotn iv merrem  12/24 wbc normal - cont iv abxs  12/25 FM:  Cont GENT.  12/26 FM:  Cont GENT, prognosis poor, pulm/gu/skin infections.  12/27 repeat bc and sputum culture due to increasing wbc - cont gent; completed 10 days of merrem  12/29/24:  Sputum with gram negative rods, continue gentamicin, susceptibility pending   12/30 wbc improvign    Edema  Lasix 20mg iv bid - monitor I/o  12/30 decreae lasix to daily - edema has imrpoved    Hypokalemia  Patient's most recent potassium results are listed below.   Recent Labs     12/29/24  0349 12/30/24  0543 12/31/24  0335   K 4.5 3.8 4.2       Plan  - Replete potassium per protocol  - Monitor potassium Daily  - Patient's hypokalemia is stable, continue below and monitor  12/20 increase pot bicarb to bid  12/23 decrease pot bicard to daily dosing  12/24 improved- stop potassium bicarb repalcement - monitor    Severe malnutrition  Nutrition consulted. Most recent weight and BMI monitored-     Measurements:  Wt Readings from Last 1 Encounters:   12/16/24 68.5 kg (151 lb 0.2 oz)   Body mass index is 23.65 kg/m².    Patient has been screened and assessed by RD.    Malnutrition Type:  Context:    Level:      Malnutrition Characteristic Summary:       Interventions/Recommendations (treatment strategy):  1. Rec'd Continuous EN: Glucerna 1.5 @ 40mL/r increasing by 5 mL q6hrs to goal rate of 55mL/hr with a continuous 40mL FWF to provide 1980kcal (94% EEN), 109g of protein (100% EPN), and 1962mL FW.   2. Néstor BID via PEG tube to promote wound healing and to provide additional nutrition.           - Do not mix Néstor with formula in a tube-feeding bag         - Pour one packet of Néstor in a clean, small container for mixing.           - Add 4 fl oz (120 mL) water at room temperature.           - Mix well with disposable spoon or tongue blade until all particles are completely hydrated.           - Verify  correct tube placement.           - Flush feeding tube with 30 mL water.           -Administer Néstor through feeding tube using a 60-mL or larger syringe.           - Flush with an additional 30 mL water.  3. Rec'd ClinimixE 5/20 @ 80mL/hr to provide 1690kcal (80% EEN), 96g of protein (88% EPN), and 1920mL TFV.      -Add 250mL of 20% lipids 3x/week to provide additional calories.      -Check Triglycerides before adding lipids. 4. RD to follow and make rec's accordingly.    12/18 restart tfs today at 1ml/hr to see if can tolerate feeds  12/19 increase tfs as tolerates  12/23 not tolerating tfs- will get tpn orders and start that until can tolerate tfs better - ncrease regaln 10 mg iv q 6hrs  12/24 con ttfs as tolerates - started on tpn for further nutritional support  12/25 FM:  Check KUB.  12/26 FM:  Resume TF today, monitor.  12/27 on tpn - not tolerating tfs at this time  12/28/24:  Albumin infusion today.   12/31:  tube feeds as tolerated    Hypomagnesemia  Patient has Abnormal Magnesium: hypomagnesemia. Will continue to monitor electrolytes closely. Will replace the affected electrolytes and repeat labs to be done after interventions completed. The patient's magnesium results have been reviewed and are listed below.  Recent Labs   Lab 12/31/24  0335   MG 1.9      12/17 mag imrpoving - repalce mag today  12/19 replace mag today  12/20 mag oxide bid  12/21 2g Mg  12/23 dose of iv mag today to keep mag level about 2  12/25 FM:  Replace, recheck.  12/30 replace mag today    Pneumonia of right lower lobe due to infectious organism  Patient has a diagnosis of pneumonia. The cause of the pneumonia is suspected to be bacterial in etiology but organism is not known. The pneumonia is stable. The patient has the following signs/symptoms of pneumonia: persistent hypoxia  and sputum production. The patient does have a current oxygen requirement and the patient does not have a home oxygen requirement. I have reviewed the  pertinent imaging. The following cultures have been collected: Blood cultures and Sputum culture The culture results are listed below.     Current antimicrobial regimen consists of the antibiotics listed below. Will monitor patient closely and continue current treatment plan unchanged.    Antibiotics (From admission, onward)      Start     Stop Route Frequency Ordered    12/30/24 1300  erythromycin base tablet 250 mg         -- PER NG TUBE 4 times daily 12/30/24 1111    12/29/24 1125  gentamicin - pharmacy to dose         -- IV pharmacy to manage frequency 12/29/24 1025            Microbiology Results (last 7 days)       Procedure Component Value Units Date/Time    Blood culture [[id number]] Collected: 12/27/24 0837    Order Status: Completed Specimen: Blood Updated: 12/30/24 1812     Blood Culture, Routine No Growth to date      No Growth to date      No Growth to date      No Growth to date    Blood culture [[id number]] Collected: 12/27/24 0838    Order Status: Completed Specimen: Blood Updated: 12/30/24 1812     Blood Culture, Routine No Growth to date      No Growth to date      No Growth to date      No Growth to date    Culture, Respiratory with Gram Stain [[id number]]  (Abnormal) Collected: 12/27/24 1522    Order Status: Completed Specimen: Respiratory from Endotracheal Aspirate Updated: 12/30/24 1121     Respiratory Culture GRAM NEGATIVE FLORA  Many  Identification and susceptibility pending       Gram Stain (Respiratory) Few Gram positive cocci     Gram Stain (Respiratory) Rare Gram positive rods     Gram Stain (Respiratory) Rare WBC's        12/17 dc zosyn with recent esbl kleb in urine - will change to merrem - cont vanc until final cultures obtained - change vent to simv; add nebs  12/18 cotn merrem and vanc - await final sputum cutlure - cont vent on ac control; nebs - wbc imrpoved slightly; lasix 20mg iv for edema today  12/19  dc vanc - proteus grwoign from sputum - cont merrem and nebs; lasix today -  wean rr on vent  12/20 wnc improved - cont merrem - nebs and vent - another dose of lasix today for edema - fio2 increased  12/23 cont iv gent and merren due to sent of multiple bugs (acinetobacter/kleb/proteus)  12/24 cont current abxs  12/25 FM:  Cont GENT  12/26 FM:  Cont GENT, poor prognosis.  12/27 on gent - get new sputum culture today due to elevated wbc - cont vent and nebs  12/28/24:Gram positive cocci/rods on sputum, blood cultures negative   12/29/24:  Gram negative rods on sputum cx.   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  Recent Labs     12/29/24  0349 12/30/24  0723 12/31/24  0335   HGB 8.0* 8.4* 8.0*   HCT 24.7* 25.9* 24.2*       Plan  - Monitor serial CBC: Daily  - Transfuse PRBC if patient becomes hemodynamically unstable, symptomatic or H/H drops below 7/21.  - Patient has not received any PRBC transfusions to date  - Patient's anemia is currently worsening. Will adjust treatment as follows: 2uprbcs today  12/17 h/h Improved  12/20 h/h holding  12/26 FM:  Transfuse today.  12/27 hh improved after transfusion  12/28/24:  Continue daily monitoring.  12/30 h/h stable  12/31: Hemoglobin remained stable    History of anoxic brain injury  12/25 FM:  Poor prognosis.      Urinary tract infection associated with indwelling urethral catheter  Await new urine culture =- currently on zosyn/vanc  12/17 change to merrem/vanc  12/18 has esbl klebsiella - cont merrem  12/23 on merrem  12/25 FM:  Continue Gent, CS reviewed.  12/26 FM:  WBC increasing, monitor, on appropriate abx based on cultures.  12/28/24:  Blood culture negative  to date, sputum culture with some gram positive cocci and rods.  Continue abx for now.  Gentamicin per pharmacy to dose.     Tailor antibiotics based on culture and sensitivity    Type 1 diabetes  Patient's FSGs are uncontrolled due to hyperglycemia on current medication regimen.  Last A1c reviewed-   Lab Results   Component Value Date    HGBA1C 6.6 (H) 12/16/2024     Most recent fingerstick glucose reviewed-   Recent Labs   Lab 12/30/24 2035 12/30/24  2327 12/31/24  0334 12/31/24  0736   POCTGLUCOSE 177* 134* 186* 276*       Current correctional scale  Low  Maintain anti-hyperglycemic dose as follows-   Antihyperglycemics (From admission, onward)      Start     Stop Route Frequency Ordered    12/30/24 0900  insulin glargine U-100 (Lantus) pen 20 Units         -- SubQ Daily 12/30/24 0734    12/24/24 0807  insulin aspart U-100 pen 0-10 Units         -- SubQ Every 4 hours PRN 12/24/24 0707          Hold Oral hypoglycemics while patient is in the hospital.  12/17 A1c improved to 6.6%  12/24 due to tpn - add lantus 12 u daily - ssi changed to moderate scale  12/25 FM:  BS logs noted, continue coverage.  12/26 FM:  Cont SSI.  12/27 increase lantus to 16u daily      VTE Risk Mitigation (From admission, onward)           Ordered     enoxaparin injection 40 mg  Daily         12/16/24 0815     IP VTE LOW RISK PATIENT  Once         12/15/24 1201     Place sequential compression device  Until discontinued         12/15/24 1201                    Discharge Planning   JOSE MANUEL:      Code Status: Full Code   Medical Readiness for Discharge Date:   Discharge Plan A: Other (TBD)   Discharge Delays: None known at this time            Critical care time: 30 minute        Doug Riggs Jr, MD  Department of Hospital Medicine   [place] - Intensive Care

## 2025-01-14 ENCOUNTER — APPOINTMENT (OUTPATIENT)
Dept: ENDOCRINOLOGY | Facility: CLINIC | Age: 85
End: 2025-01-14
Payer: MEDICARE

## 2025-01-14 VITALS
SYSTOLIC BLOOD PRESSURE: 138 MMHG | OXYGEN SATURATION: 97 % | DIASTOLIC BLOOD PRESSURE: 68 MMHG | WEIGHT: 185 LBS | BODY MASS INDEX: 27.32 KG/M2 | HEART RATE: 74 BPM

## 2025-01-14 DIAGNOSIS — E78.5 HYPERLIPIDEMIA, UNSPECIFIED: ICD-10-CM

## 2025-01-14 DIAGNOSIS — E11.9 TYPE 2 DIABETES MELLITUS W/OUT COMPLICATIONS: ICD-10-CM

## 2025-01-14 DIAGNOSIS — N18.4 CHRONIC KIDNEY DISEASE, STAGE 4 (SEVERE): ICD-10-CM

## 2025-01-14 DIAGNOSIS — N17.9 ACUTE KIDNEY FAILURE, UNSPECIFIED: ICD-10-CM

## 2025-01-14 DIAGNOSIS — I10 ESSENTIAL (PRIMARY) HYPERTENSION: ICD-10-CM

## 2025-01-14 DIAGNOSIS — C23 MALIGNANT NEOPLASM OF GALLBLADDER: ICD-10-CM

## 2025-01-14 DIAGNOSIS — E04.1 NONTOXIC SINGLE THYROID NODULE: ICD-10-CM

## 2025-01-14 DIAGNOSIS — E83.52 HYPERCALCEMIA: ICD-10-CM

## 2025-01-14 DIAGNOSIS — N18.30 CHRONIC KIDNEY DISEASE, STAGE 3 UNSPECIFIED: ICD-10-CM

## 2025-01-14 LAB — HBA1C MFR BLD HPLC: 8.2

## 2025-01-14 PROCEDURE — 83036 HEMOGLOBIN GLYCOSYLATED A1C: CPT | Mod: QW

## 2025-01-14 PROCEDURE — G2211 COMPLEX E/M VISIT ADD ON: CPT

## 2025-01-14 PROCEDURE — 99214 OFFICE O/P EST MOD 30 MIN: CPT

## 2025-03-07 NOTE — PATIENT PROFILE ADULT - FALL HARM RISK - FALL HARM RISK
Pre-op Instructions For Out-Patient Endoscopy Surgery    Medication Instructions:  Please stop herbs and any supplements now (includes vitamins and minerals).    For these medications:  Dulaglutide (Trulicity), Exenatide (Byetta and Bydureon, Liraglutide (Victoza), Lixisenatide (Adlyxin), Semaglutide (Ozempic and Rybelsus), Tirzepatide (Mounjaro, Zepbound)- Stop 1 week prior if taking weekly or 1 day prior if taking every 12 hours or daily.     Please contact your surgeon and prescribing physician for pre-op instructions for any blood thinners.    If you have inhalers/aerosol treatments at home, please use them the morning of your surgery and bring the inhalers with you to the hospital.    Please take the following medications the morning of your surgery with a sip of water:    None     Surgery Instructions:  After midnight before surgery:  Do not eat or drink anything, including water, mints, gum, and hard candy.  You may brush your teeth without swallowing.  No smoking, chewing tobacco, or street drugs.    Please shower or bathe before surgery.       Please do not wear any cologne, lotion, powder, jewelry, piercings, perfume, makeup, nail polish, hair accessories, or hair spray on the day of surgery.  Wear loose comfortable clothing.    Leave your valuables at home but bring a payment source for any after-surgery prescriptions you plan to fill at [facility].  Bring a storage case for any glasses/contacts.    An adult who is responsible for you MUST drive you home and should be with you for the first 24 hours after surgery.     The Day of Surgery: 3/14/25 @ 2:30 pm    Arrive at [facility] Entrance at 12:30 am and check in at the desk.     If you have a living will or healthcare power of , please bring a copy.    You will be taken to the pre-op holding area where you will be prepared for surgery.  A physical assessment will be performed by a nurse practitioner or house officer.  Coagulation/Other

## 2025-03-14 ENCOUNTER — APPOINTMENT (OUTPATIENT)
Dept: CARDIOLOGY | Facility: CLINIC | Age: 85
End: 2025-03-14
Payer: MEDICARE

## 2025-03-14 VITALS
BODY MASS INDEX: 27.32 KG/M2 | SYSTOLIC BLOOD PRESSURE: 165 MMHG | WEIGHT: 185 LBS | HEART RATE: 74 BPM | DIASTOLIC BLOOD PRESSURE: 84 MMHG | OXYGEN SATURATION: 98 %

## 2025-03-14 VITALS — SYSTOLIC BLOOD PRESSURE: 135 MMHG | DIASTOLIC BLOOD PRESSURE: 65 MMHG

## 2025-03-14 DIAGNOSIS — I25.10 ATHEROSCLEROTIC HEART DISEASE OF NATIVE CORONARY ARTERY W/OUT ANGINA PECTORIS: ICD-10-CM

## 2025-03-14 PROCEDURE — 99214 OFFICE O/P EST MOD 30 MIN: CPT

## 2025-03-14 PROCEDURE — G2211 COMPLEX E/M VISIT ADD ON: CPT

## 2025-04-01 NOTE — ASU PATIENT PROFILE, ADULT - TEACHING/LEARNING FACTORS INFLUENCE READINESS TO LEARN
No care due was identified.  [facility] Embedded Care Due Messages. Reference number: [id number].   3/31/2025 8:10:37 PM CDT   none

## 2025-04-08 ENCOUNTER — APPOINTMENT (OUTPATIENT)
Dept: ENDOCRINOLOGY | Facility: CLINIC | Age: 85
End: 2025-04-08
Payer: MEDICARE

## 2025-04-08 VITALS
BODY MASS INDEX: 27.55 KG/M2 | HEART RATE: 81 BPM | DIASTOLIC BLOOD PRESSURE: 80 MMHG | SYSTOLIC BLOOD PRESSURE: 142 MMHG | WEIGHT: 186 LBS | HEIGHT: 69 IN | OXYGEN SATURATION: 97 %

## 2025-04-08 DIAGNOSIS — E78.5 HYPERLIPIDEMIA, UNSPECIFIED: ICD-10-CM

## 2025-04-08 DIAGNOSIS — I10 ESSENTIAL (PRIMARY) HYPERTENSION: ICD-10-CM

## 2025-04-08 DIAGNOSIS — E04.1 NONTOXIC SINGLE THYROID NODULE: ICD-10-CM

## 2025-04-08 DIAGNOSIS — E83.52 HYPERCALCEMIA: ICD-10-CM

## 2025-04-08 DIAGNOSIS — E11.9 TYPE 2 DIABETES MELLITUS W/OUT COMPLICATIONS: ICD-10-CM

## 2025-04-08 PROCEDURE — G2211 COMPLEX E/M VISIT ADD ON: CPT

## 2025-04-08 PROCEDURE — 99214 OFFICE O/P EST MOD 30 MIN: CPT

## 2025-04-15 RX ORDER — SEMAGLUTIDE 0.68 MG/ML
2 INJECTION, SOLUTION SUBCUTANEOUS
Qty: 9 | Refills: 1 | Status: ACTIVE | COMMUNITY
Start: 2025-04-15 | End: 1900-01-01

## 2025-04-16 ENCOUNTER — RX RENEWAL (OUTPATIENT)
Age: 85
End: 2025-04-16

## 2025-05-16 NOTE — CONSULT NOTE ADULT - CONSULT REQUESTED DATE/TIME
----- Message from Fior Epstein MD sent at 5/13/2025  3:31 PM CDT -----  Please inform patient of the following results  Lipids are still elevated  Encourage patient to take rosuvastatin on a regular basis  A1c 6.7 which is slightly improved from before  Continue with low-carb diet and exercise  
She states Sertraline, she states she had to call out of work yesterday. She was very jittery, then her head was really hurting.     She states she is not going to take this. She doesn't want it.   
10-Oct-2023 12:28
10-Oct-2023
No

## 2025-06-14 NOTE — ED ADULT TRIAGE NOTE - WEIGHT IN LBS
Signs Of Vitality Reviewed: Yes    IMPORTANT EVENTS THIS SHIFT:  1128-Rec'd as new admit from ER via stretcher per tech. Accomp by son. Pt unable to stand d/t weakness- use Maxi slide to transfer pt from stretcher to bed. AA & O x 3. Oriented to room & routines. No c/o pain, resp distress,n/v or bleeding noted.   1225-paged Dr. Johnson for admission orders.  1228-Dr. Johnson here to see pt & son at bedside. Made aware that son says he doesn't check pt's blood sugar at home. Dr. Johnson says pt can refuse accuchecks & insulin but will not discont orders for accuchecks & insulin since it's his recommendations.    IMPORTANT EVENTS COMING UP/GOALS (PLEASE INCLUDE WHITE BOARD AND DISCHARGE BOARD UPDATES):   PATIENT SPECIAL NEEDS/ACCOMMODATIONS:  BLE weakness. Urin incont-has external catheter in place. DM but pt's son is refusing accuchecks ac & hs- Dr. Johnson.                184.9

## 2025-06-23 NOTE — ED ADULT TRIAGE NOTE - NS ED NURSE BANDS TYPE
Outreach attempt was made to schedule a Medicare Wellness Visit. This was the first attempt. Contact was not made, no answer/busy.  PSR tried to call 2x's and call did not go thru.  Last mwv:  9/18/24     Name band;

## 2025-07-18 ENCOUNTER — RX RENEWAL (OUTPATIENT)
Age: 85
End: 2025-07-18

## 2025-07-23 ENCOUNTER — APPOINTMENT (OUTPATIENT)
Dept: ENDOCRINOLOGY | Facility: CLINIC | Age: 85
End: 2025-07-23

## 2025-09-16 ENCOUNTER — NON-APPOINTMENT (OUTPATIENT)
Age: 85
End: 2025-09-16

## 2025-09-18 ENCOUNTER — NON-APPOINTMENT (OUTPATIENT)
Age: 85
End: 2025-09-18

## 2025-09-18 ENCOUNTER — APPOINTMENT (OUTPATIENT)
Dept: CARDIOLOGY | Facility: CLINIC | Age: 85
End: 2025-09-18
Payer: MEDICARE

## 2025-09-18 VITALS
BODY MASS INDEX: 26.96 KG/M2 | SYSTOLIC BLOOD PRESSURE: 144 MMHG | OXYGEN SATURATION: 98 % | DIASTOLIC BLOOD PRESSURE: 64 MMHG | HEART RATE: 61 BPM | HEIGHT: 69 IN | WEIGHT: 182 LBS

## 2025-09-18 DIAGNOSIS — I25.10 ATHEROSCLEROTIC HEART DISEASE OF NATIVE CORONARY ARTERY W/OUT ANGINA PECTORIS: ICD-10-CM

## 2025-09-18 PROCEDURE — G2211 COMPLEX E/M VISIT ADD ON: CPT

## 2025-09-18 PROCEDURE — 99214 OFFICE O/P EST MOD 30 MIN: CPT

## (undated) DEVICE — SOL INJ NS 0.9% 500ML 2 PORT

## (undated) DEVICE — SYR LUER LOK 50CC

## (undated) DEVICE — ELCTR GROUNDING PAD ADULT COVIDIEN

## (undated) DEVICE — SUCTION YANKAUER NO CONTROL VENT

## (undated) DEVICE — BRUSH COLONOSCOPY CYTOLOGY

## (undated) DEVICE — CATH IV SAFE BC 22G X 1" (BLUE)

## (undated) DEVICE — FOLEY HOLDER STATLOCK 2 WAY ADULT

## (undated) DEVICE — IRRIGATOR BIO SHIELD

## (undated) DEVICE — FORCEP RADIAL JAW 4 JUMBO 2.8MM 3.2MM 240CM ORANGE DISP

## (undated) DEVICE — BIOPSY FORCEP RADIAL JAW 4 STANDARD WITH NEEDLE

## (undated) DEVICE — POLY TRAP ETRAP

## (undated) DEVICE — TUBING SUCTION 20FT

## (undated) DEVICE — TUBING SUCTION CONN 6FT STERILE

## (undated) DEVICE — TUBING IV SET GRAVITY 3Y 100" MACRO

## (undated) DEVICE — PACK IV START WITH CHG

## (undated) DEVICE — CATH IV SAFE BC 20G X 1.16" (PINK)

## (undated) DEVICE — CLAMP BX HOT RAD JAW 3

## (undated) DEVICE — SENSOR O2 FINGER ADULT